# Patient Record
Sex: FEMALE | Race: WHITE | NOT HISPANIC OR LATINO | Employment: OTHER | URBAN - METROPOLITAN AREA
[De-identification: names, ages, dates, MRNs, and addresses within clinical notes are randomized per-mention and may not be internally consistent; named-entity substitution may affect disease eponyms.]

---

## 2017-07-20 ENCOUNTER — HOSPITAL ENCOUNTER (OUTPATIENT)
Dept: MAMMOGRAPHY | Facility: HOSPITAL | Age: 67
Discharge: HOME | End: 2017-07-20
Attending: FAMILY MEDICINE | Admitting: FAMILY MEDICINE

## 2017-08-14 ENCOUNTER — HOSPITAL ENCOUNTER (OUTPATIENT)
Dept: CARDIOLOGY | Facility: HOSPITAL | Age: 67
Discharge: HOME | End: 2017-08-14
Attending: FAMILY MEDICINE | Admitting: FAMILY MEDICINE

## 2018-09-21 ENCOUNTER — HOSPITAL ENCOUNTER (OUTPATIENT)
Dept: MAMMOGRAPHY | Facility: HOSPITAL | Age: 68
Discharge: HOME | End: 2018-09-21
Attending: FAMILY MEDICINE | Admitting: FAMILY MEDICINE

## 2019-10-09 ENCOUNTER — TRANSCRIBE ORDERS (OUTPATIENT)
Dept: ADMINISTRATIVE | Facility: HOSPITAL | Age: 69
End: 2019-10-09

## 2019-10-09 ENCOUNTER — HOSPITAL ENCOUNTER (OUTPATIENT)
Dept: GENERAL RADIOLOGY | Facility: HOSPITAL | Age: 69
Discharge: HOME | End: 2019-10-09
Admitting: NURSE PRACTITIONER

## 2019-10-09 DIAGNOSIS — M54.50 LOW BACK PAIN, UNSPECIFIED BACK PAIN LATERALITY, UNSPECIFIED CHRONICITY, UNSPECIFIED WHETHER SCIATICA PRESENT: ICD-10-CM

## 2019-10-09 DIAGNOSIS — M54.50 LOW BACK PAIN, UNSPECIFIED BACK PAIN LATERALITY, UNSPECIFIED CHRONICITY, UNSPECIFIED WHETHER SCIATICA PRESENT: Primary | ICD-10-CM

## 2019-10-09 PROCEDURE — 72114 X-RAY EXAM L-S SPINE BENDING: CPT

## 2020-06-09 ENCOUNTER — TRANSCRIBE ORDERS (OUTPATIENT)
Dept: ADMINISTRATIVE | Facility: HOSPITAL | Age: 70
End: 2020-06-09

## 2020-06-09 DIAGNOSIS — Z12.31 VISIT FOR SCREENING MAMMOGRAM: Primary | ICD-10-CM

## 2020-06-11 ENCOUNTER — HOSPITAL ENCOUNTER (OUTPATIENT)
Dept: MAMMOGRAPHY | Facility: HOSPITAL | Age: 70
Discharge: HOME | End: 2020-06-11
Admitting: NURSE PRACTITIONER

## 2020-06-11 DIAGNOSIS — Z12.31 VISIT FOR SCREENING MAMMOGRAM: ICD-10-CM

## 2020-06-11 PROCEDURE — 77063 BREAST TOMOSYNTHESIS BI: CPT

## 2020-06-11 PROCEDURE — 77067 SCR MAMMO BI INCL CAD: CPT

## 2020-06-30 ENCOUNTER — OFFICE (AMBULATORY)
Dept: URBAN - METROPOLITAN AREA CLINIC 64 | Facility: CLINIC | Age: 70
End: 2020-06-30
Payer: COMMERCIAL

## 2020-06-30 VITALS
HEART RATE: 65 BPM | SYSTOLIC BLOOD PRESSURE: 162 MMHG | HEIGHT: 66 IN | DIASTOLIC BLOOD PRESSURE: 72 MMHG | WEIGHT: 201 LBS

## 2020-06-30 DIAGNOSIS — Z79.01 LONG TERM (CURRENT) USE OF ANTICOAGULANTS: ICD-10-CM

## 2020-06-30 DIAGNOSIS — Z86.010 PERSONAL HISTORY OF COLONIC POLYPS: ICD-10-CM

## 2020-06-30 DIAGNOSIS — I10 ESSENTIAL (PRIMARY) HYPERTENSION: ICD-10-CM

## 2020-06-30 PROCEDURE — 99203 OFFICE O/P NEW LOW 30 MIN: CPT | Performed by: NURSE PRACTITIONER

## 2020-08-20 ENCOUNTER — OFFICE (AMBULATORY)
Dept: URBAN - METROPOLITAN AREA PATHOLOGY 4 | Facility: PATHOLOGY | Age: 70
End: 2020-08-20
Payer: COMMERCIAL

## 2020-08-20 ENCOUNTER — ON CAMPUS - OUTPATIENT (AMBULATORY)
Dept: URBAN - METROPOLITAN AREA HOSPITAL 2 | Facility: HOSPITAL | Age: 70
End: 2020-08-20
Payer: COMMERCIAL

## 2020-08-20 VITALS
SYSTOLIC BLOOD PRESSURE: 147 MMHG | SYSTOLIC BLOOD PRESSURE: 142 MMHG | RESPIRATION RATE: 19 BRPM | HEART RATE: 74 BPM | RESPIRATION RATE: 16 BRPM | SYSTOLIC BLOOD PRESSURE: 155 MMHG | OXYGEN SATURATION: 98 % | HEART RATE: 80 BPM | DIASTOLIC BLOOD PRESSURE: 64 MMHG | DIASTOLIC BLOOD PRESSURE: 92 MMHG | WEIGHT: 199 LBS | OXYGEN SATURATION: 92 % | HEART RATE: 82 BPM | DIASTOLIC BLOOD PRESSURE: 80 MMHG | OXYGEN SATURATION: 88 % | SYSTOLIC BLOOD PRESSURE: 152 MMHG | HEART RATE: 78 BPM | OXYGEN SATURATION: 94 % | OXYGEN SATURATION: 95 % | TEMPERATURE: 96.2 F | HEIGHT: 66 IN | DIASTOLIC BLOOD PRESSURE: 79 MMHG | HEART RATE: 75 BPM | RESPIRATION RATE: 18 BRPM | DIASTOLIC BLOOD PRESSURE: 77 MMHG | SYSTOLIC BLOOD PRESSURE: 141 MMHG | SYSTOLIC BLOOD PRESSURE: 154 MMHG | HEART RATE: 76 BPM | HEART RATE: 77 BPM | DIASTOLIC BLOOD PRESSURE: 73 MMHG | DIASTOLIC BLOOD PRESSURE: 88 MMHG | SYSTOLIC BLOOD PRESSURE: 105 MMHG

## 2020-08-20 DIAGNOSIS — Z86.010 PERSONAL HISTORY OF COLONIC POLYPS: ICD-10-CM

## 2020-08-20 DIAGNOSIS — K57.30 DIVERTICULOSIS OF LARGE INTESTINE WITHOUT PERFORATION OR ABS: ICD-10-CM

## 2020-08-20 DIAGNOSIS — D12.5 BENIGN NEOPLASM OF SIGMOID COLON: ICD-10-CM

## 2020-08-20 DIAGNOSIS — K62.1 RECTAL POLYP: ICD-10-CM

## 2020-08-20 DIAGNOSIS — K64.1 SECOND DEGREE HEMORRHOIDS: ICD-10-CM

## 2020-08-20 PROBLEM — K63.5 POLYP OF COLON: Status: ACTIVE | Noted: 2020-08-20

## 2020-08-20 LAB
GI HISTOLOGY: A. UNSPECIFIED: (no result)
GI HISTOLOGY: B. UNSPECIFIED: (no result)
GI HISTOLOGY: PDF REPORT: (no result)

## 2020-08-20 PROCEDURE — 45385 COLONOSCOPY W/LESION REMOVAL: CPT | Mod: PT | Performed by: INTERNAL MEDICINE

## 2020-08-20 PROCEDURE — 45380 COLONOSCOPY AND BIOPSY: CPT | Mod: 59,PT | Performed by: INTERNAL MEDICINE

## 2020-08-20 PROCEDURE — 88305 TISSUE EXAM BY PATHOLOGIST: CPT | Mod: 26 | Performed by: INTERNAL MEDICINE

## 2020-08-20 PROCEDURE — 45380 COLONOSCOPY AND BIOPSY: CPT | Mod: PT,59 | Performed by: INTERNAL MEDICINE

## 2021-09-29 ENCOUNTER — TRANSCRIBE ORDERS (OUTPATIENT)
Dept: ADMINISTRATIVE | Facility: HOSPITAL | Age: 71
End: 2021-09-29

## 2021-09-29 DIAGNOSIS — Z12.31 BREAST CANCER SCREENING BY MAMMOGRAM: ICD-10-CM

## 2021-09-29 DIAGNOSIS — Z12.39 BREAST SCREENING: Primary | ICD-10-CM

## 2021-10-27 ENCOUNTER — HOSPITAL ENCOUNTER (OUTPATIENT)
Dept: MAMMOGRAPHY | Facility: HOSPITAL | Age: 71
Discharge: HOME | End: 2021-10-27
Admitting: STUDENT IN AN ORGANIZED HEALTH CARE EDUCATION/TRAINING PROGRAM

## 2021-10-27 DIAGNOSIS — Z12.31 BREAST CANCER SCREENING BY MAMMOGRAM: ICD-10-CM

## 2021-10-27 PROCEDURE — 77067 SCR MAMMO BI INCL CAD: CPT

## 2021-10-27 PROCEDURE — 77063 BREAST TOMOSYNTHESIS BI: CPT

## 2021-12-16 ENCOUNTER — HOSPITAL ENCOUNTER (EMERGENCY)
Facility: HOSPITAL | Age: 71
Discharge: HOME | End: 2021-12-16
Admitting: EMERGENCY MEDICINE

## 2021-12-16 ENCOUNTER — APPOINTMENT (OUTPATIENT)
Dept: GENERAL RADIOLOGY | Facility: HOSPITAL | Age: 71
End: 2021-12-16

## 2021-12-16 ENCOUNTER — APPOINTMENT (OUTPATIENT)
Dept: CARDIOLOGY | Facility: HOSPITAL | Age: 71
End: 2021-12-16

## 2021-12-16 VITALS
HEIGHT: 66 IN | BODY MASS INDEX: 33.2 KG/M2 | HEART RATE: 64 BPM | DIASTOLIC BLOOD PRESSURE: 61 MMHG | TEMPERATURE: 96.3 F | SYSTOLIC BLOOD PRESSURE: 158 MMHG | WEIGHT: 206.6 LBS | RESPIRATION RATE: 18 BRPM | OXYGEN SATURATION: 97 %

## 2021-12-16 DIAGNOSIS — M25.472 LEFT ANKLE SWELLING: ICD-10-CM

## 2021-12-16 DIAGNOSIS — J01.10 ACUTE FRONTAL SINUSITIS, RECURRENCE NOT SPECIFIED: ICD-10-CM

## 2021-12-16 DIAGNOSIS — B34.9 VIRAL ILLNESS: ICD-10-CM

## 2021-12-16 DIAGNOSIS — R05.9 COUGH: Primary | ICD-10-CM

## 2021-12-16 DIAGNOSIS — M25.572 ACUTE LEFT ANKLE PAIN: ICD-10-CM

## 2021-12-16 LAB
B PARAPERT DNA SPEC QL NAA+PROBE: NOT DETECTED
B PERT DNA SPEC QL NAA+PROBE: NOT DETECTED
BH CV LOWER VASCULAR LEFT COMMON FEMORAL AUGMENT: NORMAL
BH CV LOWER VASCULAR LEFT COMMON FEMORAL COMPETENT: NORMAL
BH CV LOWER VASCULAR LEFT COMMON FEMORAL COMPRESS: NORMAL
BH CV LOWER VASCULAR LEFT COMMON FEMORAL PHASIC: NORMAL
BH CV LOWER VASCULAR LEFT COMMON FEMORAL SPONT: NORMAL
BH CV LOWER VASCULAR LEFT DISTAL FEMORAL COMPRESS: NORMAL
BH CV LOWER VASCULAR LEFT GASTRONEMIUS COMPRESS: NORMAL
BH CV LOWER VASCULAR LEFT GREATER SAPH AK COMPRESS: NORMAL
BH CV LOWER VASCULAR LEFT GREATER SAPH BK COMPRESS: NORMAL
BH CV LOWER VASCULAR LEFT LESSER SAPH COMPRESS: NORMAL
BH CV LOWER VASCULAR LEFT MID FEMORAL AUGMENT: NORMAL
BH CV LOWER VASCULAR LEFT MID FEMORAL COMPETENT: NORMAL
BH CV LOWER VASCULAR LEFT MID FEMORAL COMPRESS: NORMAL
BH CV LOWER VASCULAR LEFT MID FEMORAL PHASIC: NORMAL
BH CV LOWER VASCULAR LEFT MID FEMORAL SPONT: NORMAL
BH CV LOWER VASCULAR LEFT PERONEAL COMPRESS: NORMAL
BH CV LOWER VASCULAR LEFT POPLITEAL AUGMENT: NORMAL
BH CV LOWER VASCULAR LEFT POPLITEAL COMPETENT: NORMAL
BH CV LOWER VASCULAR LEFT POPLITEAL COMPRESS: NORMAL
BH CV LOWER VASCULAR LEFT POPLITEAL PHASIC: NORMAL
BH CV LOWER VASCULAR LEFT POPLITEAL SPONT: NORMAL
BH CV LOWER VASCULAR LEFT POSTERIOR TIBIAL COMPRESS: NORMAL
BH CV LOWER VASCULAR LEFT PROXIMAL FEMORAL COMPRESS: NORMAL
BH CV LOWER VASCULAR LEFT SAPHENOFEMORAL JUNCTION COMPRESS: NORMAL
BH CV LOWER VASCULAR RIGHT COMMON FEMORAL AUGMENT: NORMAL
BH CV LOWER VASCULAR RIGHT COMMON FEMORAL COMPETENT: NORMAL
BH CV LOWER VASCULAR RIGHT COMMON FEMORAL COMPRESS: NORMAL
BH CV LOWER VASCULAR RIGHT COMMON FEMORAL PHASIC: NORMAL
BH CV LOWER VASCULAR RIGHT COMMON FEMORAL SPONT: NORMAL
C PNEUM DNA NPH QL NAA+NON-PROBE: NOT DETECTED
FLUAV SUBTYP SPEC NAA+PROBE: NOT DETECTED
FLUBV RNA ISLT QL NAA+PROBE: NOT DETECTED
HADV DNA SPEC NAA+PROBE: NOT DETECTED
HCOV 229E RNA SPEC QL NAA+PROBE: NOT DETECTED
HCOV HKU1 RNA SPEC QL NAA+PROBE: NOT DETECTED
HCOV NL63 RNA SPEC QL NAA+PROBE: NOT DETECTED
HCOV OC43 RNA SPEC QL NAA+PROBE: NOT DETECTED
HMPV RNA NPH QL NAA+NON-PROBE: NOT DETECTED
HPIV1 RNA ISLT QL NAA+PROBE: NOT DETECTED
HPIV2 RNA SPEC QL NAA+PROBE: NOT DETECTED
HPIV3 RNA NPH QL NAA+PROBE: NOT DETECTED
HPIV4 P GENE NPH QL NAA+PROBE: NOT DETECTED
M PNEUMO IGG SER IA-ACNC: NOT DETECTED
MAXIMAL PREDICTED HEART RATE: 147 BPM
RHINOVIRUS RNA SPEC NAA+PROBE: NOT DETECTED
RSV RNA NPH QL NAA+NON-PROBE: NOT DETECTED
SARS-COV-2 RNA NPH QL NAA+NON-PROBE: NOT DETECTED
STRESS TARGET HR: 125 BPM

## 2021-12-16 PROCEDURE — 99283 EMERGENCY DEPT VISIT LOW MDM: CPT

## 2021-12-16 PROCEDURE — 71045 X-RAY EXAM CHEST 1 VIEW: CPT

## 2021-12-16 PROCEDURE — 93971 EXTREMITY STUDY: CPT

## 2021-12-16 PROCEDURE — 73610 X-RAY EXAM OF ANKLE: CPT

## 2021-12-16 PROCEDURE — 0202U NFCT DS 22 TRGT SARS-COV-2: CPT

## 2021-12-16 PROCEDURE — 73630 X-RAY EXAM OF FOOT: CPT

## 2021-12-16 RX ORDER — AZITHROMYCIN 250 MG/1
500 TABLET, FILM COATED ORAL DAILY
Qty: 6 TABLET | Refills: 0 | Status: ON HOLD | OUTPATIENT
Start: 2021-12-16 | End: 2022-05-14

## 2021-12-16 RX ORDER — AZITHROMYCIN 250 MG/1
250 TABLET, FILM COATED ORAL DAILY
Qty: 5 TABLET | Refills: 0 | Status: SHIPPED | OUTPATIENT
Start: 2021-12-16 | End: 2021-12-16 | Stop reason: SDUPTHER

## 2021-12-16 NOTE — DISCHARGE INSTRUCTIONS
Please fill your antibiotic if symptoms persist past 7 days of nasal congestion.  You likely have a viral illness.  Please use Mucinex and cough medicine over-the-counter as indicated.  Can use Benadryl or nondrowsy antihistamines like Zyrtec as needed to help with nasal congestion.  Can use over-the-counter Flonase as well to help with nasal congestion.  Please follow-up the primary care provider in 1 week's time if symptoms persist.  Please come back to the ER if you have chest pain or shortness of breath as you will need reevaluation at that time.

## 2022-03-20 ENCOUNTER — HOSPITAL ENCOUNTER (EMERGENCY)
Facility: HOSPITAL | Age: 72
Discharge: HOME | End: 2022-03-20
Attending: EMERGENCY MEDICINE | Admitting: EMERGENCY MEDICINE

## 2022-03-20 ENCOUNTER — APPOINTMENT (OUTPATIENT)
Dept: GENERAL RADIOLOGY | Facility: HOSPITAL | Age: 72
End: 2022-03-20

## 2022-03-20 VITALS
HEART RATE: 61 BPM | WEIGHT: 202.6 LBS | BODY MASS INDEX: 31.8 KG/M2 | HEIGHT: 67 IN | SYSTOLIC BLOOD PRESSURE: 153 MMHG | OXYGEN SATURATION: 92 % | TEMPERATURE: 97.7 F | RESPIRATION RATE: 16 BRPM | DIASTOLIC BLOOD PRESSURE: 67 MMHG

## 2022-03-20 DIAGNOSIS — J20.5 RSV BRONCHITIS: Primary | ICD-10-CM

## 2022-03-20 LAB
B PARAPERT DNA SPEC QL NAA+PROBE: NOT DETECTED
B PERT DNA SPEC QL NAA+PROBE: NOT DETECTED
C PNEUM DNA NPH QL NAA+NON-PROBE: NOT DETECTED
FLUAV SUBTYP SPEC NAA+PROBE: NOT DETECTED
FLUBV RNA ISLT QL NAA+PROBE: NOT DETECTED
HADV DNA SPEC NAA+PROBE: NOT DETECTED
HCOV 229E RNA SPEC QL NAA+PROBE: NOT DETECTED
HCOV HKU1 RNA SPEC QL NAA+PROBE: NOT DETECTED
HCOV NL63 RNA SPEC QL NAA+PROBE: NOT DETECTED
HCOV OC43 RNA SPEC QL NAA+PROBE: NOT DETECTED
HMPV RNA NPH QL NAA+NON-PROBE: NOT DETECTED
HPIV1 RNA ISLT QL NAA+PROBE: NOT DETECTED
HPIV2 RNA SPEC QL NAA+PROBE: NOT DETECTED
HPIV3 RNA NPH QL NAA+PROBE: NOT DETECTED
HPIV4 P GENE NPH QL NAA+PROBE: NOT DETECTED
M PNEUMO IGG SER IA-ACNC: NOT DETECTED
RHINOVIRUS RNA SPEC NAA+PROBE: NOT DETECTED
RSV RNA NPH QL NAA+NON-PROBE: DETECTED
SARS-COV-2 RNA NPH QL NAA+NON-PROBE: NOT DETECTED

## 2022-03-20 PROCEDURE — 0202U NFCT DS 22 TRGT SARS-COV-2: CPT | Performed by: EMERGENCY MEDICINE

## 2022-03-20 PROCEDURE — 71046 X-RAY EXAM CHEST 2 VIEWS: CPT

## 2022-03-20 PROCEDURE — 99283 EMERGENCY DEPT VISIT LOW MDM: CPT

## 2022-03-20 RX ORDER — BENZONATATE 100 MG/1
100 CAPSULE ORAL 3 TIMES DAILY PRN
Qty: 20 CAPSULE | Refills: 0 | Status: ON HOLD | OUTPATIENT
Start: 2022-03-20 | End: 2022-05-14

## 2022-03-20 RX ORDER — METHYLPREDNISOLONE 4 MG/1
TABLET ORAL
Qty: 21 TABLET | Refills: 0 | Status: ON HOLD | OUTPATIENT
Start: 2022-03-20 | End: 2022-05-14

## 2022-03-20 RX ORDER — ALBUTEROL SULFATE 90 UG/1
2 AEROSOL, METERED RESPIRATORY (INHALATION) EVERY 4 HOURS PRN
Qty: 18 G | Refills: 0 | Status: ON HOLD | OUTPATIENT
Start: 2022-03-20 | End: 2022-05-14

## 2022-03-20 NOTE — ED NOTES
Patient presents to the ED today with complaints of productive cough and congestion x2 weeks; states that she has been on abx, but is not improving; sputum is yellow;  is currently admitted for pneumonia; denies fever; denies nausea, vomiting, and diarrhea

## 2022-03-20 NOTE — ED PROVIDER NOTES
"Subjective   Chief complaint: Cough    72-year-old female presents with cough and congestion.  Patient states symptoms have been present for about 2 weeks.  She states her doctor has put her on 2 different rounds of antibiotics and she is not feeling any better.  She just finished Augmentin today.  She continues to have a productive cough and a lot of nasal congestion.  She denies any fever.  She denies any chest pain or shortness of breath.  She states her  is currently hospitalized with pneumonia.  She denies any alleviating or exacerbating factors.      History provided by:  Patient      Review of Systems   Constitutional: Negative for fever.   HENT: Positive for congestion.    Eyes: Negative for redness.   Respiratory: Positive for cough. Negative for shortness of breath.    Cardiovascular: Negative for chest pain.   Gastrointestinal: Negative for abdominal pain, diarrhea and vomiting.   Genitourinary: Negative for dysuria.   Musculoskeletal: Negative for back pain.   Skin: Negative for rash.   Neurological: Negative for dizziness and headaches.   Psychiatric/Behavioral: Negative for confusion.       No past medical history on file.    Allergies   Allergen Reactions   • Codeine Nausea And Vomiting, Other (See Comments) and Nausea Only     nausea  nausea     • Cefdinir Unknown - High Severity   • Levofloxacin Nausea Only   • Lisinopril Cough       Past Surgical History:   Procedure Laterality Date   • BREAST BIOPSY     • HYSTERECTOMY         Family History   Problem Relation Age of Onset   • Breast cancer Sister        Social History     Socioeconomic History   • Marital status:        /80 (BP Location: Left arm, Patient Position: Sitting)   Pulse 106   Temp 97.7 °F (36.5 °C) (Temporal)   Resp 16   Ht 168.9 cm (66.5\")   Wt 91.9 kg (202 lb 9.6 oz)   SpO2 93%   BMI 32.21 kg/m²       Objective   Physical Exam  Vitals and nursing note reviewed.   Constitutional:       Appearance: Normal " appearance. She is well-developed.   HENT:      Head: Normocephalic and atraumatic.      Nose: Congestion present.   Eyes:      Pupils: Pupils are equal, round, and reactive to light.   Cardiovascular:      Rate and Rhythm: Normal rate and regular rhythm.      Heart sounds: Normal heart sounds.   Pulmonary:      Effort: Pulmonary effort is normal. No respiratory distress.      Breath sounds: Normal breath sounds.   Abdominal:      General: Bowel sounds are normal.      Palpations: Abdomen is soft.      Tenderness: There is no abdominal tenderness.   Musculoskeletal:         General: Normal range of motion.      Cervical back: Normal range of motion and neck supple.   Skin:     General: Skin is warm and dry.   Neurological:      Mental Status: She is alert and oriented to person, place, and time.         Procedures           ED Course      Results for orders placed or performed during the hospital encounter of 03/20/22   Respiratory Panel PCR w/COVID-19(SARS-CoV-2) GABRIEL/SHAWN/DEE DEE/PAD/COR/MAD/DAVID In-House, NP Swab in UTM/VTM, 3-4 HR TAT - Swab, Nasopharynx    Specimen: Nasopharynx; Swab   Result Value Ref Range    ADENOVIRUS, PCR Not Detected Not Detected    Coronavirus 229E Not Detected Not Detected    Coronavirus HKU1 Not Detected Not Detected    Coronavirus NL63 Not Detected Not Detected    Coronavirus OC43 Not Detected Not Detected    COVID19 Not Detected Not Detected - Ref. Range    Human Metapneumovirus Not Detected Not Detected    Human Rhinovirus/Enterovirus Not Detected Not Detected    Influenza A PCR Not Detected Not Detected    Influenza B PCR Not Detected Not Detected    Parainfluenza Virus 1 Not Detected Not Detected    Parainfluenza Virus 2 Not Detected Not Detected    Parainfluenza Virus 3 Not Detected Not Detected    Parainfluenza Virus 4 Not Detected Not Detected    RSV, PCR Detected (A) Not Detected    Bordetella pertussis pcr Not Detected Not Detected    Bordetella parapertussis PCR Not Detected Not  Detected    Chlamydophila pneumoniae PCR Not Detected Not Detected    Mycoplasma pneumo by PCR Not Detected Not Detected     XR Chest 2 View    Result Date: 3/20/2022   1. Cardiomegaly. 2. No active pulmonary disease.  Electronically Signed By-Jun Rodriguez MD On:3/20/2022 1:00 PM This report was finalized on [id number] by  Jun Rodriguez MD.                                               MDM   Chest x-ray reviewed by me and read by the radiologist shows no acute disease.  Respiratory panel is positive for RSV.  Patient is oxygenating well on room air.  I see no indication for admission at this time.  Patient will be discharged with prescriptions for albuterol inhaler, Medrol Dosepak, Tessalon.  She is to follow-up with her primary doctor.      Final diagnoses:   RSV bronchitis       ED Disposition  ED Disposition     ED Disposition   Discharge    Condition   Stable    Comment   --             Ian Cordero MD  [address] [place] [address] [phone]    Call in 2 days           Medication List      New Prescriptions    albuterol sulfate  (90 Base) MCG/ACT inhaler  Commonly known as: PROVENTIL HFA;VENTOLIN HFA;PROAIR HFA  Inhale 2 puffs Every 4 (Four) Hours As Needed for Wheezing or Shortness of Air.     benzonatate 100 MG capsule  Commonly known as: TESSALON  Take 1 capsule by mouth 3 (Three) Times a Day As Needed for Cough.     methylPREDNISolone 4 MG dose pack  Commonly known as: MEDROL  Take as directed on package instructions.           Where to Get Your Medications      These medications were sent to [id number] - [facility] [place] - [address] AT [facility] - [phone]  - [phone]   [address], [place] [address]    Phone: [phone]   · albuterol sulfate  (90 Base) MCG/ACT inhaler  · benzonatate 100 MG capsule  · methylPREDNISolone 4 MG dose pack          Jude Espinoza MD  03/20/22 6720

## 2022-05-14 ENCOUNTER — HOSPITAL ENCOUNTER (OUTPATIENT)
Facility: HOSPITAL | Age: 72
Setting detail: OBSERVATION
Discharge: HOME | End: 2022-05-17
Attending: EMERGENCY MEDICINE | Admitting: HOSPITALIST

## 2022-05-14 ENCOUNTER — APPOINTMENT (OUTPATIENT)
Dept: CT IMAGING | Facility: HOSPITAL | Age: 72
End: 2022-05-14

## 2022-05-14 ENCOUNTER — APPOINTMENT (OUTPATIENT)
Dept: MRI IMAGING | Facility: HOSPITAL | Age: 72
End: 2022-05-14

## 2022-05-14 DIAGNOSIS — R42 DIZZINESS: ICD-10-CM

## 2022-05-14 DIAGNOSIS — N17.9 ACUTE KIDNEY INJURY: ICD-10-CM

## 2022-05-14 DIAGNOSIS — S01.01XA LACERATION OF SCALP, INITIAL ENCOUNTER: Primary | ICD-10-CM

## 2022-05-14 DIAGNOSIS — N39.0 URINARY TRACT INFECTION WITH HEMATURIA, SITE UNSPECIFIED: ICD-10-CM

## 2022-05-14 DIAGNOSIS — R31.9 URINARY TRACT INFECTION WITH HEMATURIA, SITE UNSPECIFIED: ICD-10-CM

## 2022-05-14 DIAGNOSIS — S09.90XA INJURY OF HEAD, INITIAL ENCOUNTER: ICD-10-CM

## 2022-05-14 PROBLEM — N30.00 ACUTE CYSTITIS: Status: ACTIVE | Noted: 2022-05-14

## 2022-05-14 LAB
ALBUMIN SERPL-MCNC: 3.9 G/DL (ref 3.5–5.2)
ALBUMIN/GLOB SERPL: 1.6 G/DL
ALP SERPL-CCNC: 65 U/L (ref 39–117)
ALT SERPL W P-5'-P-CCNC: 34 U/L (ref 1–33)
ANION GAP SERPL CALCULATED.3IONS-SCNC: 13 MMOL/L (ref 5–15)
ANION GAP SERPL CALCULATED.3IONS-SCNC: 14 MMOL/L (ref 5–15)
AST SERPL-CCNC: 41 U/L (ref 1–32)
BACTERIA UR QL AUTO: ABNORMAL /HPF
BASOPHILS # BLD AUTO: 0.1 10*3/MM3 (ref 0–0.2)
BASOPHILS # BLD AUTO: 0.2 10*3/MM3 (ref 0–0.2)
BASOPHILS NFR BLD AUTO: 0.6 % (ref 0–1.5)
BASOPHILS NFR BLD AUTO: 1.6 % (ref 0–1.5)
BILIRUB SERPL-MCNC: 0.4 MG/DL (ref 0–1.2)
BILIRUB UR QL STRIP: NEGATIVE
BUN SERPL-MCNC: 40 MG/DL (ref 8–23)
BUN SERPL-MCNC: 48 MG/DL (ref 8–23)
BUN/CREAT SERPL: 23 (ref 7–25)
BUN/CREAT SERPL: 24.4 (ref 7–25)
CALCIUM SPEC-SCNC: 10 MG/DL (ref 8.6–10.5)
CALCIUM SPEC-SCNC: 11 MG/DL (ref 8.6–10.5)
CHLORIDE SERPL-SCNC: 102 MMOL/L (ref 98–107)
CHLORIDE SERPL-SCNC: 108 MMOL/L (ref 98–107)
CLARITY UR: ABNORMAL
CO2 SERPL-SCNC: 20 MMOL/L (ref 22–29)
CO2 SERPL-SCNC: 23 MMOL/L (ref 22–29)
COLOR UR: YELLOW
CREAT SERPL-MCNC: 1.64 MG/DL (ref 0.57–1)
CREAT SERPL-MCNC: 2.09 MG/DL (ref 0.57–1)
DEPRECATED RDW RBC AUTO: 45.5 FL (ref 37–54)
DEPRECATED RDW RBC AUTO: 46.8 FL (ref 37–54)
EGFRCR SERPLBLD CKD-EPI 2021: 24.5 ML/MIN/1.73
EGFRCR SERPLBLD CKD-EPI 2021: 32.7 ML/MIN/1.73
EOSINOPHIL # BLD AUTO: 0.1 10*3/MM3 (ref 0–0.4)
EOSINOPHIL # BLD AUTO: 0.2 10*3/MM3 (ref 0–0.4)
EOSINOPHIL NFR BLD AUTO: 1 % (ref 0.3–6.2)
EOSINOPHIL NFR BLD AUTO: 1.6 % (ref 0.3–6.2)
ERYTHROCYTE [DISTWIDTH] IN BLOOD BY AUTOMATED COUNT: 15 % (ref 12.3–15.4)
ERYTHROCYTE [DISTWIDTH] IN BLOOD BY AUTOMATED COUNT: 15.2 % (ref 12.3–15.4)
GLOBULIN UR ELPH-MCNC: 2.5 GM/DL
GLUCOSE SERPL-MCNC: 104 MG/DL (ref 65–99)
GLUCOSE SERPL-MCNC: 106 MG/DL (ref 65–99)
GLUCOSE UR STRIP-MCNC: NEGATIVE MG/DL
HCT VFR BLD AUTO: 41.5 % (ref 34–46.6)
HCT VFR BLD AUTO: 42.4 % (ref 34–46.6)
HGB BLD-MCNC: 13 G/DL (ref 12–15.9)
HGB BLD-MCNC: 13.7 G/DL (ref 12–15.9)
HGB UR QL STRIP.AUTO: ABNORMAL
HYALINE CASTS UR QL AUTO: ABNORMAL /LPF
KETONES UR QL STRIP: NEGATIVE
LEUKOCYTE ESTERASE UR QL STRIP.AUTO: ABNORMAL
LYMPHOCYTES # BLD AUTO: 0.9 10*3/MM3 (ref 0.7–3.1)
LYMPHOCYTES # BLD AUTO: 1.3 10*3/MM3 (ref 0.7–3.1)
LYMPHOCYTES NFR BLD AUTO: 12.5 % (ref 19.6–45.3)
LYMPHOCYTES NFR BLD AUTO: 8.1 % (ref 19.6–45.3)
MAGNESIUM SERPL-MCNC: 1.8 MG/DL (ref 1.6–2.4)
MCH RBC QN AUTO: 27.5 PG (ref 26.6–33)
MCH RBC QN AUTO: 28.4 PG (ref 26.6–33)
MCHC RBC AUTO-ENTMCNC: 31.4 G/DL (ref 31.5–35.7)
MCHC RBC AUTO-ENTMCNC: 32.4 G/DL (ref 31.5–35.7)
MCV RBC AUTO: 87.4 FL (ref 79–97)
MCV RBC AUTO: 87.7 FL (ref 79–97)
MONOCYTES # BLD AUTO: 0.6 10*3/MM3 (ref 0.1–0.9)
MONOCYTES # BLD AUTO: 0.9 10*3/MM3 (ref 0.1–0.9)
MONOCYTES NFR BLD AUTO: 5.5 % (ref 5–12)
MONOCYTES NFR BLD AUTO: 8.6 % (ref 5–12)
NEUTROPHILS NFR BLD AUTO: 75.7 % (ref 42.7–76)
NEUTROPHILS NFR BLD AUTO: 8 10*3/MM3 (ref 1.7–7)
NEUTROPHILS NFR BLD AUTO: 84.8 % (ref 42.7–76)
NEUTROPHILS NFR BLD AUTO: 9.6 10*3/MM3 (ref 1.7–7)
NITRITE UR QL STRIP: POSITIVE
NRBC BLD AUTO-RTO: 0.1 /100 WBC (ref 0–0.2)
NRBC BLD AUTO-RTO: 0.1 /100 WBC (ref 0–0.2)
PH UR STRIP.AUTO: <=5 [PH] (ref 5–8)
PLATELET # BLD AUTO: 247 10*3/MM3 (ref 140–450)
PLATELET # BLD AUTO: 289 10*3/MM3 (ref 140–450)
PMV BLD AUTO: 6.5 FL (ref 6–12)
PMV BLD AUTO: 6.6 FL (ref 6–12)
POTASSIUM SERPL-SCNC: 3.2 MMOL/L (ref 3.5–5.2)
POTASSIUM SERPL-SCNC: 3.8 MMOL/L (ref 3.5–5.2)
PROT SERPL-MCNC: 6.4 G/DL (ref 6–8.5)
PROT UR QL STRIP: NEGATIVE
RBC # BLD AUTO: 4.74 10*6/MM3 (ref 3.77–5.28)
RBC # BLD AUTO: 4.83 10*6/MM3 (ref 3.77–5.28)
RBC # UR STRIP: ABNORMAL /HPF
REF LAB TEST METHOD: ABNORMAL
SARS-COV-2 RNA PNL SPEC NAA+PROBE: NOT DETECTED
SODIUM SERPL-SCNC: 139 MMOL/L (ref 136–145)
SODIUM SERPL-SCNC: 141 MMOL/L (ref 136–145)
SP GR UR STRIP: 1.02 (ref 1–1.03)
SQUAMOUS #/AREA URNS HPF: ABNORMAL /HPF
TROPONIN T SERPL-MCNC: <0.01 NG/ML (ref 0–0.03)
UROBILINOGEN UR QL STRIP: ABNORMAL
WBC # UR STRIP: ABNORMAL /HPF
WBC NRBC COR # BLD: 10.6 10*3/MM3 (ref 3.4–10.8)
WBC NRBC COR # BLD: 11.4 10*3/MM3 (ref 3.4–10.8)

## 2022-05-14 PROCEDURE — 81001 URINALYSIS AUTO W/SCOPE: CPT | Performed by: EMERGENCY MEDICINE

## 2022-05-14 PROCEDURE — G0378 HOSPITAL OBSERVATION PER HR: HCPCS

## 2022-05-14 PROCEDURE — 25010000002 MORPHINE PER 10 MG: Performed by: HOSPITALIST

## 2022-05-14 PROCEDURE — 36415 COLL VENOUS BLD VENIPUNCTURE: CPT | Performed by: HOSPITALIST

## 2022-05-14 PROCEDURE — 25010000002 TETANUS-DIPHTH-ACELL PERTUSSIS 5-2.5-18.5 LF-MCG/0.5 SUSPENSION PREFILLED SYRINGE: Performed by: EMERGENCY MEDICINE

## 2022-05-14 PROCEDURE — 25010000002 CEFTRIAXONE PER 250 MG: Performed by: HOSPITALIST

## 2022-05-14 PROCEDURE — 80053 COMPREHEN METABOLIC PANEL: CPT | Performed by: EMERGENCY MEDICINE

## 2022-05-14 PROCEDURE — 93005 ELECTROCARDIOGRAM TRACING: CPT | Performed by: EMERGENCY MEDICINE

## 2022-05-14 PROCEDURE — 25010000002 CEFTRIAXONE PER 250 MG: Performed by: NURSE PRACTITIONER

## 2022-05-14 PROCEDURE — 70551 MRI BRAIN STEM W/O DYE: CPT

## 2022-05-14 PROCEDURE — 99220 PR INITIAL OBSERVATION CARE/DAY 70 MINUTES: CPT | Performed by: HOSPITALIST

## 2022-05-14 PROCEDURE — 96361 HYDRATE IV INFUSION ADD-ON: CPT

## 2022-05-14 PROCEDURE — 90715 TDAP VACCINE 7 YRS/> IM: CPT | Performed by: EMERGENCY MEDICINE

## 2022-05-14 PROCEDURE — 96365 THER/PROPH/DIAG IV INF INIT: CPT

## 2022-05-14 PROCEDURE — 96376 TX/PRO/DX INJ SAME DRUG ADON: CPT

## 2022-05-14 PROCEDURE — C9803 HOPD COVID-19 SPEC COLLECT: HCPCS

## 2022-05-14 PROCEDURE — 87186 SC STD MICRODIL/AGAR DIL: CPT | Performed by: EMERGENCY MEDICINE

## 2022-05-14 PROCEDURE — 71250 CT THORAX DX C-: CPT

## 2022-05-14 PROCEDURE — 87635 SARS-COV-2 COVID-19 AMP PRB: CPT | Performed by: NURSE PRACTITIONER

## 2022-05-14 PROCEDURE — 83735 ASSAY OF MAGNESIUM: CPT | Performed by: EMERGENCY MEDICINE

## 2022-05-14 PROCEDURE — 85025 COMPLETE CBC W/AUTO DIFF WBC: CPT | Performed by: EMERGENCY MEDICINE

## 2022-05-14 PROCEDURE — 85025 COMPLETE CBC W/AUTO DIFF WBC: CPT | Performed by: HOSPITALIST

## 2022-05-14 PROCEDURE — 87040 BLOOD CULTURE FOR BACTERIA: CPT | Performed by: NURSE PRACTITIONER

## 2022-05-14 PROCEDURE — 84484 ASSAY OF TROPONIN QUANT: CPT | Performed by: EMERGENCY MEDICINE

## 2022-05-14 PROCEDURE — 90471 IMMUNIZATION ADMIN: CPT | Performed by: EMERGENCY MEDICINE

## 2022-05-14 PROCEDURE — 96375 TX/PRO/DX INJ NEW DRUG ADDON: CPT

## 2022-05-14 PROCEDURE — 70450 CT HEAD/BRAIN W/O DYE: CPT

## 2022-05-14 PROCEDURE — 99284 EMERGENCY DEPT VISIT MOD MDM: CPT

## 2022-05-14 PROCEDURE — 87086 URINE CULTURE/COLONY COUNT: CPT | Performed by: EMERGENCY MEDICINE

## 2022-05-14 PROCEDURE — 87077 CULTURE AEROBIC IDENTIFY: CPT | Performed by: EMERGENCY MEDICINE

## 2022-05-14 PROCEDURE — 72128 CT CHEST SPINE W/O DYE: CPT

## 2022-05-14 RX ORDER — SODIUM CHLORIDE 9 MG/ML
100 INJECTION, SOLUTION INTRAVENOUS CONTINUOUS
Status: DISCONTINUED | OUTPATIENT
Start: 2022-05-14 | End: 2022-05-15

## 2022-05-14 RX ORDER — POTASSIUM CHLORIDE 20 MEQ/1
20 TABLET, EXTENDED RELEASE ORAL ONCE
Status: COMPLETED | OUTPATIENT
Start: 2022-05-14 | End: 2022-05-14

## 2022-05-14 RX ORDER — AMLODIPINE BESYLATE 10 MG/1
10 TABLET ORAL DAILY
COMMUNITY
End: 2022-10-29

## 2022-05-14 RX ORDER — FENOFIBRATE 145 MG/1
145 TABLET, COATED ORAL DAILY
COMMUNITY
End: 2022-10-29

## 2022-05-14 RX ORDER — ATORVASTATIN CALCIUM 80 MG/1
80 TABLET, FILM COATED ORAL DAILY
COMMUNITY
End: 2022-10-29

## 2022-05-14 RX ORDER — SODIUM CHLORIDE 0.9 % (FLUSH) 0.9 %
10 SYRINGE (ML) INJECTION EVERY 12 HOURS SCHEDULED
Status: DISCONTINUED | OUTPATIENT
Start: 2022-05-14 | End: 2022-05-17 | Stop reason: HOSPADM

## 2022-05-14 RX ORDER — GLUCOSAMINE/D3/BOSWELLIA SERRA 1500MG-400
10000 TABLET ORAL DAILY
COMMUNITY
End: 2022-10-29

## 2022-05-14 RX ORDER — MORPHINE SULFATE 2 MG/ML
1 INJECTION, SOLUTION INTRAMUSCULAR; INTRAVENOUS EVERY 4 HOURS PRN
Status: DISCONTINUED | OUTPATIENT
Start: 2022-05-14 | End: 2022-05-17 | Stop reason: HOSPADM

## 2022-05-14 RX ORDER — IRBESARTAN 150 MG/1
300 TABLET ORAL NIGHTLY
Status: ON HOLD | COMMUNITY
End: 2022-05-17 | Stop reason: SDUPTHER

## 2022-05-14 RX ORDER — SODIUM CHLORIDE 0.9 % (FLUSH) 0.9 %
10 SYRINGE (ML) INJECTION AS NEEDED
Status: DISCONTINUED | OUTPATIENT
Start: 2022-05-14 | End: 2022-05-17 | Stop reason: HOSPADM

## 2022-05-14 RX ORDER — AMIODARONE HYDROCHLORIDE 200 MG/1
200 TABLET ORAL 3 TIMES DAILY
Status: ON HOLD | COMMUNITY
End: 2022-05-17 | Stop reason: SDUPTHER

## 2022-05-14 RX ORDER — POTASSIUM CHLORIDE 750 MG/1
10 TABLET, FILM COATED, EXTENDED RELEASE ORAL DAILY
COMMUNITY
End: 2022-05-17 | Stop reason: HOSPADM

## 2022-05-14 RX ORDER — CHOLECALCIFEROL (VITAMIN D3) 1250 MCG
1000 CAPSULE ORAL DAILY
COMMUNITY
End: 2022-10-29

## 2022-05-14 RX ORDER — ACETAMINOPHEN 325 MG/1
650 TABLET ORAL ONCE
Status: COMPLETED | OUTPATIENT
Start: 2022-05-14 | End: 2022-05-14

## 2022-05-14 RX ADMIN — CEFTRIAXONE 1 G: 10 INJECTION, POWDER, FOR SOLUTION INTRAVENOUS at 08:27

## 2022-05-14 RX ADMIN — TETANUS TOXOID, REDUCED DIPHTHERIA TOXOID AND ACELLULAR PERTUSSIS VACCINE, ADSORBED 0.5 ML: 5; 2.5; 8; 8; 2.5 SUSPENSION INTRAMUSCULAR at 06:36

## 2022-05-14 RX ADMIN — SODIUM CHLORIDE 1000 ML: 9 INJECTION, SOLUTION INTRAVENOUS at 07:17

## 2022-05-14 RX ADMIN — ACETAMINOPHEN 650 MG: 325 TABLET ORAL at 08:21

## 2022-05-14 RX ADMIN — MORPHINE SULFATE 1 MG: 2 INJECTION, SOLUTION INTRAMUSCULAR; INTRAVENOUS at 16:34

## 2022-05-14 RX ADMIN — SODIUM CHLORIDE 100 ML/HR: 9 INJECTION, SOLUTION INTRAVENOUS at 13:18

## 2022-05-14 RX ADMIN — CEFTRIAXONE 1 G: 1 INJECTION, POWDER, FOR SOLUTION INTRAMUSCULAR; INTRAVENOUS at 16:24

## 2022-05-14 RX ADMIN — POTASSIUM CHLORIDE 20 MEQ: 1500 TABLET, EXTENDED RELEASE ORAL at 08:21

## 2022-05-14 RX ADMIN — SODIUM CHLORIDE 100 ML/HR: 9 INJECTION, SOLUTION INTRAVENOUS at 16:25

## 2022-05-14 RX ADMIN — Medication 10 ML: at 13:19

## 2022-05-14 RX ADMIN — MORPHINE SULFATE 1 MG: 2 INJECTION, SOLUTION INTRAMUSCULAR; INTRAVENOUS at 23:47

## 2022-05-14 NOTE — ED PROVIDER NOTES
Subjective   72-year-old female tells me she got up from her recliner and had difficulty standing lost her balance and fell backwards striking her head.  Patient complains of a moderate headache, mild neck pain, moderate mid back pain, worse with movement.  Patient has been intermittently dizzy for the past several weeks.  Patient is a vague historian and does seem to minimize this to a degree.  Patient denies any focal weakness or numbness, no vomiting or diarrhea, no cough or fever, no chest pain or shortness of air.  No changes to her medicines.  Patient is managed on Xarelto for atrial fibrillation.          Review of Systems   Constitutional: Negative.    Respiratory: Negative.    Cardiovascular: Negative.    Gastrointestinal: Negative.    Genitourinary: Negative.    Musculoskeletal: Positive for back pain.   Skin: Positive for wound.   Neurological: Positive for dizziness, light-headedness and headaches.   Psychiatric/Behavioral: Negative.    All other systems reviewed and are negative.      Past Medical History:   Diagnosis Date   • Coronary artery disease        Allergies   Allergen Reactions   • Codeine Nausea And Vomiting, Other (See Comments) and Nausea Only     nausea  nausea     • Cefdinir Unknown - High Severity   • Levofloxacin Nausea Only   • Lisinopril Cough       Past Surgical History:   Procedure Laterality Date   • BREAST BIOPSY     • HYSTERECTOMY         Family History   Problem Relation Age of Onset   • Breast cancer Sister        Social History     Socioeconomic History   • Marital status:    Tobacco Use   • Smoking status: Never Smoker   • Smokeless tobacco: Never Used   Vaping Use   • Vaping Use: Never used           Objective   Physical Exam  Constitutional:       Comments: Elderly female no acute distress   HENT:      Head:      Comments: 2 cm occipital scalp wound with associated hematoma and active hemorrhage     Mouth/Throat:      Mouth: Mucous membranes are moist.      Pharynx:  Oropharynx is clear.   Eyes:      Extraocular Movements: Extraocular movements intact.      Conjunctiva/sclera: Conjunctivae normal.      Pupils: Pupils are equal, round, and reactive to light.   Neck:      Comments: Mild pain with range of motion, nontender  Cardiovascular:      Rate and Rhythm: Bradycardia present.      Heart sounds: Normal heart sounds.   Pulmonary:      Effort: Pulmonary effort is normal.      Breath sounds: Normal breath sounds.   Abdominal:      General: Bowel sounds are normal. There is no distension.      Palpations: Abdomen is soft.      Tenderness: There is no abdominal tenderness.   Musculoskeletal:      Comments: Full range of motion all 4 extremities without pain or tenderness.  Lumbar spine without any point tenderness, no point tenderness of thoracic spine but patient does have paraspinal tenderness at the level of the scapulas bilaterally extending over the ribs but not involving the shoulders.   Skin:     General: Skin is warm and dry.      Capillary Refill: Capillary refill takes less than 2 seconds.   Neurological:      Mental Status: She is oriented to person, place, and time.      Cranial Nerves: No cranial nerve deficit.      Sensory: No sensory deficit.      Motor: No weakness.   Psychiatric:         Mood and Affect: Mood normal.         Behavior: Behavior normal.         Laceration Repair    Date/Time: 5/14/2022 6:53 AM  Performed by: Rafael Navas MD  Authorized by: Rafael Navas MD     Consent:     Consent obtained:  Verbal    Consent given by:  Patient  Universal protocol:     Patient identity confirmed:  Verbally with patient  Anesthesia:     Anesthesia method:  Local infiltration    Local anesthetic:  Lidocaine 1% WITH epi  Laceration details:     Location:  Scalp    Scalp location:  Occipital    Length (cm):  2  Pre-procedure details:     Preparation:  Patient was prepped and draped in usual sterile fashion  Exploration:     Hemostasis achieved with:  Epinephrine  and direct pressure    Wound exploration: wound explored through full range of motion      Contaminated: no    Treatment:     Area cleansed with:  Chlorhexidine and saline    Amount of cleaning:  Standard    Irrigation solution:  Sterile saline    Debridement:  None  Skin repair:     Repair method:  Sutures    Suture size:  3-0    Suture material:  Nylon    Suture technique:  Simple interrupted    Number of sutures:  3  Approximation:     Approximation:  Close  Repair type:     Repair type:  Simple  Post-procedure details:     Dressing:  Antibiotic ointment    Procedure completion:  Tolerated               ED Course  ED Course as of 05/15/22 2258   Sat May 14, 2022   0640 EKG interpretation:  sinus bradycardia, rate 52, no acute st elevation [JR]   0730 Care assumed from Dr. Navas pending CT interpretation and admission [JW]   0808 Spoke with Dr. Sanford for admission..  On my reexam patient is awake alert and oriented but was asking for pain medicine for her back and Tylenol was ordered as she did have a head injury narcotics will be deferred to admitting.  Rocephin and blood cultures were ordered. [JW]   0808   Discussion with provider: rosalinda  Radiology interpretation:  X-rays reviewed by me and interpreted by radiologist,   CT Head Without Contrast    Result Date: 5/14/2022  1. No acute intracranial abnormality. 2. Right parietal soft tissue swelling. No calvarial fracture. 3. Age-related volume loss and mild chronic small vessel ischemic changes. Electronically signed by:  Prashant Robertson  5/14/2022 5:48 AM    CT Chest Without Contrast Diagnostic    Result Date: 5/14/2022  No acute abnormality. Electronically signed by:  Jake Jason D.O.  5/14/2022 5:54 AM    CT Thoracic Spine Without Contrast    Result Date: 5/14/2022   1. No acute fracture or malalignment of the thoracic spine. 2. Please see the separately reported chest CT for detailed intrathoracic findings.   Electronically signed by:  Prashant  Marcelo  5/14/2022 5:52 AM    Lab interpretation:  Labs viewed by me significant for, urinary tract infection, BUN 48 creatinine 2.09, potassium 3.2    Appropriate PPE worn during exam.    i discussed findings with patient who voices understanding of admission   [JW]      ED Course User Index  [JR] Rafael Navas MD  [JW] Dana Chen, APRN                                                 MDM  Number of Diagnoses or Management Options  Acute kidney injury (HCC)  Dizziness  Injury of head, initial encounter  Laceration of scalp, initial encounter  Urinary tract infection with hematuria, site unspecified  Diagnosis management comments: Results for orders placed or performed during the hospital encounter of 05/14/22  -Urine Culture - Urine, Urine, Clean Catch:   Specimen: Urine, Clean Catch       Result                      Value             Ref Range           Urine Culture                                                 Culture in progress  -Blood Culture - Blood, Blood, Arterial Line:   Specimen: Blood, Arterial Line       Result                      Value             Ref Range           Blood Culture                                                 No growth at 24 hours  -Blood Culture - Blood, Blood, Venous Line:   Specimen: Blood, Venous Line       Result                      Value             Ref Range           Blood Culture                                                 No growth at 24 hours  -COVID-19,CEPHEID/APURVA,COR/DEE DEE/PAD/KEVIN IN-HOUSE(OR EMERGENT/ADD-ON),NP SWAB IN TRANSPORT MEDIA 3-4 HR TAT, RT-PCR - Swab, Nasopharynx:   Specimen: Nasopharynx; Swab       Result                      Value             Ref Range           COVID19                     Not Detected      Not Detected*  -Comprehensive Metabolic Panel:   Specimen: Blood       Result                      Value             Ref Range           Glucose                     106 (H)           65 - 99 mg/dL       BUN                         48 (H)             8 - 23 mg/dL        Creatinine                  2.09 (H)          0.57 - 1.00 *       Sodium                      139               136 - 145 mm*       Potassium                   3.2 (L)           3.5 - 5.2 mm*       Chloride                    102               98 - 107 mmo*       CO2                         23.0              22.0 - 29.0 *       Calcium                     11.0 (H)          8.6 - 10.5 m*       Total Protein               6.4               6.0 - 8.5 g/*       Albumin                     3.90              3.50 - 5.20 *       ALT (SGPT)                  34 (H)            1 - 33 U/L          AST (SGOT)                  41 (H)            1 - 32 U/L          Alkaline Phosphatase        65                39 - 117 U/L        Total Bilirubin             0.4               0.0 - 1.2 mg*       Globulin                    2.5               gm/dL               A/G Ratio                   1.6               g/dL                BUN/Creatinine Ratio        23.0              7.0 - 25.0          Anion Gap                   14.0              5.0 - 15.0 m*       eGFR                        24.5 (L)          >60.0 mL/min*  -Urinalysis With Culture If Indicated - Urine, Clean Catch:   Specimen: Urine, Clean Catch       Result                      Value             Ref Range           Color, UA                   Yellow            Yellow, Straw       Appearance, UA              Cloudy (A)        Clear               pH, UA                      <=5.0             5.0 - 8.0           Specific [place], UA        1.016             1.005 - 1.030       Glucose, UA                 Negative          Negative            Ketones, UA                 Negative          Negative            Bilirubin, UA               Negative          Negative            Blood, UA                   Trace (A)         Negative            Protein, UA                 Negative          Negative            Leuk Esterase, UA                              Negative        Moderate (2+) (A)       Nitrite, UA                 Positive (A)      Negative            Urobilinogen, UA            1.0 E.U./dL       0.2 - 1.0 E.*  -Troponin:   Specimen: Blood       Result                      Value             Ref Range           Troponin T                  <0.010            0.000 - 0.03*  -CBC Auto Differential:   Specimen: Blood       Result                      Value             Ref Range           WBC                         10.60             3.40 - 10.80*       RBC                         4.83              3.77 - 5.28 *       Hemoglobin                  13.7              12.0 - 15.9 *       Hematocrit                  42.4              34.0 - 46.6 %       MCV                         87.7              79.0 - 97.0 *       MCH                         28.4              26.6 - 33.0 *       MCHC                        32.4              31.5 - 35.7 *       RDW                         15.2              12.3 - 15.4 %       RDW-SD                      46.8              37.0 - 54.0 *       MPV                         6.5               6.0 - 12.0 fL       Platelets                   289               140 - 450 10*       Neutrophil %                75.7              42.7 - 76.0 %       Lymphocyte %                12.5 (L)          19.6 - 45.3 %       Monocyte %                  8.6               5.0 - 12.0 %        Eosinophil %                1.6               0.3 - 6.2 %         Basophil %                  1.6 (H)           0.0 - 1.5 %         Neutrophils, Absolute       8.00 (H)          1.70 - 7.00 *       Lymphocytes, Absolute       1.30              0.70 - 3.10 *       Monocytes, Absolute         0.90              0.10 - 0.90 *       Eosinophils, Absolute       0.20              0.00 - 0.40 *       Basophils, Absolute         0.20              0.00 - 0.20 *       nRBC                        0.1               0.0 - 0.2 /1*  -Magnesium:   Specimen: Blood       Result                       Value             Ref Range           Magnesium                   1.8               1.6 - 2.4 mg*  -Urinalysis, Microscopic Only - Urine, Clean Catch:   Specimen: Urine, Clean Catch       Result                      Value             Ref Range           RBC, UA                     0-2 (A)           None Seen /H*       WBC, UA                     31-50 (A)         None Seen /H*       Bacteria, UA                4+ (A)            None Seen /H*       Squamous Epithelial Ce*     0-2               None Seen, 0*       Hyaline Casts, UA           7-12              None Seen /L*       Methodology                                                   Manual Light Microscopy  -Basic Metabolic Panel:   Specimen: Blood       Result                      Value             Ref Range           Glucose                     104 (H)           65 - 99 mg/dL       BUN                         40 (H)            8 - 23 mg/dL        Creatinine                  1.64 (H)          0.57 - 1.00 *       Sodium                      141               136 - 145 mm*       Potassium                   3.8               3.5 - 5.2 mm*       Chloride                    108 (H)           98 - 107 mmo*       CO2                         20.0 (L)          22.0 - 29.0 *       Calcium                     10.0              8.6 - 10.5 m*       BUN/Creatinine Ratio        24.4              7.0 - 25.0          Anion Gap                   13.0              5.0 - 15.0 m*       eGFR                        32.7 (L)          >60.0 mL/min*  -CBC Auto Differential:   Specimen: Blood       Result                      Value             Ref Range           WBC                         11.40 (H)         3.40 - 10.80*       RBC                         4.74              3.77 - 5.28 *       Hemoglobin                  13.0              12.0 - 15.9 *       Hematocrit                  41.5              34.0 - 46.6 %       MCV                         87.4              79.0 - 97.0 *        MCH                         27.5              26.6 - 33.0 *       MCHC                        31.4 (L)          31.5 - 35.7 *       RDW                         15.0              12.3 - 15.4 %       RDW-SD                      45.5              37.0 - 54.0 *       MPV                         6.6               6.0 - 12.0 fL       Platelets                   247               140 - 450 10*       Neutrophil %                84.8 (H)          42.7 - 76.0 %       Lymphocyte %                8.1 (L)           19.6 - 45.3 %       Monocyte %                  5.5               5.0 - 12.0 %        Eosinophil %                1.0               0.3 - 6.2 %         Basophil %                  0.6               0.0 - 1.5 %         Neutrophils, Absolute       9.60 (H)          1.70 - 7.00 *       Lymphocytes, Absolute       0.90              0.70 - 3.10 *       Monocytes, Absolute         0.60              0.10 - 0.90 *       Eosinophils, Absolute       0.10              0.00 - 0.40 *       Basophils, Absolute         0.10              0.00 - 0.20 *       nRBC                        0.1               0.0 - 0.2 /1*  -Basic Metabolic Panel:   Specimen: Blood       Result                      Value             Ref Range           Glucose                     82                65 - 99 mg/dL       BUN                         28 (H)            8 - 23 mg/dL        Creatinine                  1.21 (H)          0.57 - 1.00 *       Sodium                      142               136 - 145 mm*       Potassium                   3.9               3.5 - 5.2 mm*       Chloride                    108 (H)           98 - 107 mmo*       CO2                         24.0              22.0 - 29.0 *       Calcium                     9.9               8.6 - 10.5 m*       BUN/Creatinine Ratio        23.1              7.0 - 25.0          Anion Gap                   10.0              5.0 - 15.0 m*       eGFR                        47.1 (L)          >60.0  mL/min*  -CBC Auto Differential:   Specimen: Blood       Result                      Value             Ref Range           WBC                         8.80              3.40 - 10.80*       RBC                         4.35              3.77 - 5.28 *       Hemoglobin                  12.6              12.0 - 15.9 *       Hematocrit                  38.0              34.0 - 46.6 %       MCV                         87.3              79.0 - 97.0 *       MCH                         29.0              26.6 - 33.0 *       MCHC                        33.2              31.5 - 35.7 *       RDW                         15.0              12.3 - 15.4 %       RDW-SD                      46.4              37.0 - 54.0 *       MPV                         6.7               6.0 - 12.0 fL       Platelets                   244               140 - 450 10*       Neutrophil %                79.2 (H)          42.7 - 76.0 %       Lymphocyte %                10.6 (L)          19.6 - 45.3 %       Monocyte %                  7.9               5.0 - 12.0 %        Eosinophil %                1.6               0.3 - 6.2 %         Basophil %                  0.7               0.0 - 1.5 %         Neutrophils, Absolute       7.00              1.70 - 7.00 *       Lymphocytes, Absolute       0.90              0.70 - 3.10 *       Monocytes, Absolute         0.70              0.10 - 0.90 *       Eosinophils, Absolute       0.10              0.00 - 0.40 *       Basophils, Absolute         0.10              0.00 - 0.20 *       nRBC                        0.0               0.0 - 0.2 /1*  -ECG 12 Lead:        Result                      Value             Ref Range           QT Interval                 484               ms             CT Head Without Contrast   Final Result              1.  Interval resolution of small linear area of increased density within    the periventricular white matter of the posterior left temporal lobe    compatible with resolution of  hemorrhage.  No evidence of intracranial    hemorrhage or infarct.         Electronically Signed By-Abdirizak Bella MD On:5/15/2022 1:07 PM    This report was finalized on [id number] by  Abdirizak Bella MD.     MRI Brain Without Contrast   Final Result         No evidence of acute ischemic infarct or intracranial mass.         Solitary tiny subcentimeter focus of signal change which may relate to a    tiny focus of hemorrhage at the junction of the low left lateral    ventricle and the adjacent parietal-temporal lobe. On review of the head    CT earlier today there is subtle a tiny linear high density focus in    this area measuring 5 mm in length, 1 mm in width. Recommend head CT to    be performed today in 3 hours for follow-up of this finding to ensure no    increasing size of a tiny possible hemorrhage         There is a moderate-large amount of diffuse parenchymal disease favored    to relate to chronic microvascular ischemia or possibly demyelinating    disease.              Electronically Signed By-Babak Serra On:5/14/2022 10:13 AM    This report was finalized on [id number] by  Babak Serra, .     CT Head Without Contrast   Final Result        1. No acute intracranial abnormality.    2. Right parietal soft tissue swelling. No calvarial fracture.    3. Age-related volume loss and mild chronic small vessel ischemic changes.                    Electronically signed by:  Prashant Robertson      5/14/2022 5:48 AM     CT Thoracic Spine Without Contrast   Final Result         1. No acute fracture or malalignment of the thoracic spine.    2. Please see the separately reported chest CT for detailed intrathoracic findings.                      Electronically signed by:  Prashant Robertson      5/14/2022 5:52 AM     CT Chest Without Contrast Diagnostic   Final Result        No acute abnormality.        Electronically signed by:  Jake Jason D.O.      5/14/2022 5:54 AM            Amount and/or Complexity of Data  Reviewed  Clinical lab tests: reviewed and ordered  Tests in the radiology section of CPT®: ordered and reviewed  Tests in the medicine section of CPT®: ordered and reviewed  Decide to obtain previous medical records or to obtain history from someone other than the patient: yes  Obtain history from someone other than the patient: yes  Discuss the patient with other providers: yes  Independent visualization of images, tracings, or specimens: yes        Final diagnoses:   Laceration of scalp, initial encounter   Injury of head, initial encounter   Acute kidney injury (HCC)   Dizziness   Urinary tract infection with hematuria, site unspecified       ED Disposition  ED Disposition     ED Disposition   Decision to Admit    Condition   --    Comment   Level of Care: Telemetry [5]   Diagnosis: Laceration of scalp, initial encounter [403011]   Admitting Physician: RICHIE MITCHELL [[id number]]   Attending Physician: RICHIE MITCHELL [[id number]]               No follow-up provider specified.       Medication List      No changes were made to your prescriptions during this visit.          Rafael Navas MD  05/15/22 0725

## 2022-05-14 NOTE — PLAN OF CARE
Problem: Adult Inpatient Plan of Care  Goal: Plan of Care Review  Outcome: Ongoing, Progressing  Flowsheets (Taken 5/14/2022 1817)  Progress: improving  Plan of Care Reviewed With: patient  Outcome Evaluation: Pt fell at home. back head mild bleeding and controlled by ice pack. pain controlled by prn meds. NS running at 100. using BC times one assist. will continue to monitor.

## 2022-05-14 NOTE — H&P
[facility]      Patient Name: Rosario Ortez  : 1950  MRN: [id number]  Primary Care Physician:  Ian Cordero MD  Date of admission: 2022      Subjective      Chief Complaint: dizziness with Fall.    History of Present Illness: Rosario Ortez is a 72 y.o. female who presented to [facility] on 2022 complaining of feeling dizzy for last three to four days, today she was feeling very dizzy some times position with moving head, leading to fall with laceration to scalp region requiring suturing in ER. Patient underwent workup revealing elevated BUN and Cr consistent with acute kidney injury, also revealing elevated wbc on UA concerning for UTI, patient ct scan head follow by MRI head revealing no acute CVA.  Patient underwent ct scan chest without contrast revealing no acute infiltrates. Following this asked to admit the patient for further care.    Review of Systems   All other systems reviewed and are negative.       Personal History     No past medical history on file.     PMH significant for Allergic rhinitis.     Past Surgical History:   Procedure Laterality Date   • BREAST BIOPSY     • HYSTERECTOMY         Family History: family history includes Breast cancer in her sister. Otherwise pertinent FHx was reviewed and not pertinent to current issue.    Social History:      Home Medications:  Prior to Admission Medications     Prescriptions Last Dose Informant Patient Reported? Taking?    albuterol sulfate  (90 Base) MCG/ACT inhaler   No No    Inhale 2 puffs Every 4 (Four) Hours As Needed for Wheezing or Shortness of Air.    azithromycin (ZITHROMAX) 250 MG tablet   No No    Take 2 tablets by mouth Daily.    benzonatate (TESSALON) 100 MG capsule   No No    Take 1 capsule by mouth 3 (Three) Times a Day As Needed for Cough.    methylPREDNISolone (MEDROL) 4 MG dose pack   No No    Take as directed on package instructions.             Allergies:  Allergies   Allergen Reactions   • Codeine Nausea And Vomiting, Other (See Comments) and Nausea Only     nausea  nausea     • Cefdinir Unknown - High Severity   • Levofloxacin Nausea Only   • Lisinopril Cough       Objective      Vitals:   Temp:  [98.2 °F (36.8 °C)] 98.2 °F (36.8 °C)  Heart Rate:  [50-57] 56  Resp:  [16-20] 16  BP: (118-146)/(45-63) 143/61    Physical Exam  Vitals and nursing note reviewed.   Constitutional:       General: She is not in acute distress.     Appearance: Normal appearance. She is well-developed. She is not ill-appearing, toxic-appearing or diaphoretic.   HENT:      Head: Normocephalic and atraumatic.      Right Ear: Ear canal and external ear normal.      Left Ear: Ear canal and external ear normal.      Nose: Nose normal. No congestion or rhinorrhea.      Mouth/Throat:      Mouth: Mucous membranes are moist.      Pharynx: No oropharyngeal exudate.   Eyes:      General: No scleral icterus.        Right eye: No discharge.         Left eye: No discharge.      Extraocular Movements: Extraocular movements intact.      Conjunctiva/sclera: Conjunctivae normal.      Pupils: Pupils are equal, round, and reactive to light.   Neck:      Thyroid: No thyromegaly.      Vascular: No carotid bruit or JVD.      Trachea: No tracheal deviation.   Cardiovascular:      Rate and Rhythm: Normal rate and regular rhythm.      Pulses: Normal pulses.      Heart sounds: Normal heart sounds. No murmur heard.    No friction rub. No gallop.   Pulmonary:      Effort: Pulmonary effort is normal. No respiratory distress.      Breath sounds: Normal breath sounds. No stridor. No wheezing, rhonchi or rales.   Chest:      Chest wall: No tenderness.   Abdominal:      General: Bowel sounds are normal. There is no distension.      Palpations: Abdomen is soft. There is no mass.      Tenderness: There is no abdominal tenderness. There is no guarding or rebound.      Hernia: No hernia is present.    Musculoskeletal:         General: No swelling, tenderness, deformity or signs of injury. Normal range of motion.      Cervical back: Normal range of motion and neck supple. No rigidity. No muscular tenderness.      Right lower leg: No edema.      Left lower leg: No edema.   Lymphadenopathy:      Cervical: No cervical adenopathy.   Skin:     General: Skin is warm and dry.      Coloration: Skin is not jaundiced or pale.      Findings: No bruising, erythema or rash.   Neurological:      General: No focal deficit present.      Mental Status: She is alert and oriented to person, place, and time. Mental status is at baseline.      Cranial Nerves: No cranial nerve deficit.      Sensory: No sensory deficit.      Motor: No weakness or abnormal muscle tone.      Coordination: Coordination normal.   Psychiatric:         Mood and Affect: Mood normal.         Behavior: Behavior normal.         Thought Content: Thought content normal.         Judgment: Judgment normal.        Result Review    Result Review:  I have personally reviewed the results from the time of this admission to 5/14/2022 14:36 EDT and agree with these findings:  [x]  Laboratory  [x]  Microbiology  [x]  Radiology  [x]  EKG/Telemetry   []  Cardiology/Vascular   []  Pathology  []  Old records  []  Other:  Most notable findings include:     Assessment & Plan        Active Hospital Problems:  Active Hospital Problems    Diagnosis    • Laceration of scalp, initial encounter    • Acute kidney injury (HCC)    • Acute cystitis      Plan:      Fall with dizziness likely secondary to severe dehydration leading to scalp laceration, MRI head no acute finding but questionable concern for demyelinating disease, iv fluids, PTOT evaluate and treat,  for safe discharge planning.    Acute kidney injury ... iv fluids, monitor renal function, avoid nephro toxic medication, monitor urine output.    Acute UTI ... iv rocephin, follow urine culture, monitor wbc  count    Dvt prophylaxis with SCDs for now, will start lovenox sc       DVT prophylaxis:  Mechanical DVT prophylaxis orders are present.    CODE STATUS:    Level Of Support Discussed With: Patient  Code Status (Patient has no pulse and is not breathing): CPR (Attempt to Resuscitate)  Medical Interventions (Patient has pulse or is breathing): Full Support    Admission Status:  I believe this patient meets observation status.    I discussed the patient's findings and my recommendations with patient.    This patient has been examined wearing appropriate Personal Protective Equipment and discussed with hospital infection control department. 05/14/22      Signature:

## 2022-05-14 NOTE — ED NOTES
"Pt reports falling and hitting head and back. Pt is on blood thinners. Pt reports no LOC, n/v. Pt states she \"just fell\", pt reports she did not trip over anything, and her legs did not give out on her. Pt reports not having any recent falls. Pt reports no pain just \"soreness\". Pt has family at bedside.   "

## 2022-05-15 ENCOUNTER — APPOINTMENT (OUTPATIENT)
Dept: CT IMAGING | Facility: HOSPITAL | Age: 72
End: 2022-05-15

## 2022-05-15 LAB
ANION GAP SERPL CALCULATED.3IONS-SCNC: 10 MMOL/L (ref 5–15)
BASOPHILS # BLD AUTO: 0.1 10*3/MM3 (ref 0–0.2)
BASOPHILS NFR BLD AUTO: 0.7 % (ref 0–1.5)
BUN SERPL-MCNC: 28 MG/DL (ref 8–23)
BUN/CREAT SERPL: 23.1 (ref 7–25)
CALCIUM SPEC-SCNC: 9.9 MG/DL (ref 8.6–10.5)
CHLORIDE SERPL-SCNC: 108 MMOL/L (ref 98–107)
CO2 SERPL-SCNC: 24 MMOL/L (ref 22–29)
CREAT SERPL-MCNC: 1.21 MG/DL (ref 0.57–1)
DEPRECATED RDW RBC AUTO: 46.4 FL (ref 37–54)
EGFRCR SERPLBLD CKD-EPI 2021: 47.1 ML/MIN/1.73
EOSINOPHIL # BLD AUTO: 0.1 10*3/MM3 (ref 0–0.4)
EOSINOPHIL NFR BLD AUTO: 1.6 % (ref 0.3–6.2)
ERYTHROCYTE [DISTWIDTH] IN BLOOD BY AUTOMATED COUNT: 15 % (ref 12.3–15.4)
GLUCOSE SERPL-MCNC: 82 MG/DL (ref 65–99)
HCT VFR BLD AUTO: 38 % (ref 34–46.6)
HGB BLD-MCNC: 12.6 G/DL (ref 12–15.9)
LYMPHOCYTES # BLD AUTO: 0.9 10*3/MM3 (ref 0.7–3.1)
LYMPHOCYTES NFR BLD AUTO: 10.6 % (ref 19.6–45.3)
MCH RBC QN AUTO: 29 PG (ref 26.6–33)
MCHC RBC AUTO-ENTMCNC: 33.2 G/DL (ref 31.5–35.7)
MCV RBC AUTO: 87.3 FL (ref 79–97)
MONOCYTES # BLD AUTO: 0.7 10*3/MM3 (ref 0.1–0.9)
MONOCYTES NFR BLD AUTO: 7.9 % (ref 5–12)
NEUTROPHILS NFR BLD AUTO: 7 10*3/MM3 (ref 1.7–7)
NEUTROPHILS NFR BLD AUTO: 79.2 % (ref 42.7–76)
NRBC BLD AUTO-RTO: 0 /100 WBC (ref 0–0.2)
PLATELET # BLD AUTO: 244 10*3/MM3 (ref 140–450)
PMV BLD AUTO: 6.7 FL (ref 6–12)
POTASSIUM SERPL-SCNC: 3.9 MMOL/L (ref 3.5–5.2)
RBC # BLD AUTO: 4.35 10*6/MM3 (ref 3.77–5.28)
SODIUM SERPL-SCNC: 142 MMOL/L (ref 136–145)
WBC NRBC COR # BLD: 8.8 10*3/MM3 (ref 3.4–10.8)

## 2022-05-15 PROCEDURE — G0378 HOSPITAL OBSERVATION PER HR: HCPCS

## 2022-05-15 PROCEDURE — 97166 OT EVAL MOD COMPLEX 45 MIN: CPT

## 2022-05-15 PROCEDURE — 96366 THER/PROPH/DIAG IV INF ADDON: CPT

## 2022-05-15 PROCEDURE — 99225 PR SBSQ OBSERVATION CARE/DAY 25 MINUTES: CPT | Performed by: HOSPITALIST

## 2022-05-15 PROCEDURE — 99214 OFFICE O/P EST MOD 30 MIN: CPT | Performed by: PSYCHIATRY & NEUROLOGY

## 2022-05-15 PROCEDURE — 80048 BASIC METABOLIC PNL TOTAL CA: CPT | Performed by: HOSPITALIST

## 2022-05-15 PROCEDURE — 85025 COMPLETE CBC W/AUTO DIFF WBC: CPT | Performed by: HOSPITALIST

## 2022-05-15 PROCEDURE — 93005 ELECTROCARDIOGRAM TRACING: CPT | Performed by: HOSPITALIST

## 2022-05-15 PROCEDURE — 70450 CT HEAD/BRAIN W/O DYE: CPT

## 2022-05-15 PROCEDURE — 93010 ELECTROCARDIOGRAM REPORT: CPT | Performed by: INTERNAL MEDICINE

## 2022-05-15 PROCEDURE — 84446 ASSAY OF VITAMIN E: CPT | Performed by: PSYCHIATRY & NEUROLOGY

## 2022-05-15 PROCEDURE — 96361 HYDRATE IV INFUSION ADD-ON: CPT

## 2022-05-15 PROCEDURE — 25010000002 CEFTRIAXONE PER 250 MG: Performed by: HOSPITALIST

## 2022-05-15 RX ORDER — ACETAMINOPHEN 325 MG/1
650 TABLET ORAL EVERY 6 HOURS PRN
Status: DISCONTINUED | OUTPATIENT
Start: 2022-05-15 | End: 2022-05-17 | Stop reason: HOSPADM

## 2022-05-15 RX ADMIN — ACETAMINOPHEN 650 MG: 325 TABLET ORAL at 19:56

## 2022-05-15 RX ADMIN — Medication 10 ML: at 08:23

## 2022-05-15 RX ADMIN — CEFTRIAXONE 2 G: 2 INJECTION, POWDER, FOR SOLUTION INTRAMUSCULAR; INTRAVENOUS at 08:23

## 2022-05-15 RX ADMIN — Medication 10 ML: at 20:25

## 2022-05-15 RX ADMIN — ACETAMINOPHEN 650 MG: 325 TABLET ORAL at 09:06

## 2022-05-15 RX ADMIN — Medication 10 ML: at 19:56

## 2022-05-15 RX ADMIN — SODIUM CHLORIDE 100 ML/HR: 9 INJECTION, SOLUTION INTRAVENOUS at 03:45

## 2022-05-15 NOTE — THERAPY EVALUATION
Patient Name: Rosario Ortez  : 1950    MRN: [id number]                              Today's Date: 5/15/2022       Admit Date: 2022    Visit Dx:     ICD-10-CM ICD-9-CM   1. Laceration of scalp, initial encounter  S01.01XA 873.0   2. Injury of head, initial encounter  S09.90XA 959.01   3. Acute kidney injury (HCC)  N17.9 584.9   4. Dizziness  R42 780.4   5. Urinary tract infection with hematuria, site unspecified  N39.0 599.0    R31.9 599.70     Patient Active Problem List   Diagnosis   • Laceration of scalp, initial encounter   • Acute kidney injury (HCC)   • Acute cystitis     Past Medical History:   Diagnosis Date   • Coronary artery disease      Past Surgical History:   Procedure Laterality Date   • BREAST BIOPSY     • HYSTERECTOMY        General Information     Bimal Name 05/15/22 1615          OT Time and Intention    Document Type evaluation  -     Mode of Treatment individual therapy;occupational therapy  -DR Wallis Name 05/15/22 161          General Information    Patient Profile Reviewed yes  -     Prior Level of Function independent:;ADL's;all household mobility;home management  Pt states no AD at home although uses rwx in community at times  -     Existing Precautions/Restrictions fall  -     Barriers to Rehab none identified  -DR Wallis Name 05/15/22 161          Living Environment    People in Home spouse;child(clint), adult  -DR Wallis Name 05/15/22 161          Home Main Entrance    Number of Stairs, Main Entrance three  -     Stair Railings, Main Entrance railings safe and in good condition  -DR Wallis Name 05/15/22 161          Stairs Within Home, Primary    Stairs Comment, Within Home, Primary flight of stairs to access 2nd floor bedroom. Pt can stay in den if needed. 1/2 bath on main floor, full bath 2nd floor  -DR Wallis Name 05/15/22 1614          Cognition    Orientation Status (Cognition) oriented to;person;place;time  -DR Wallis Name 05/15/22 1775           Safety Issues, Functional Mobility    Impairments Affecting Function (Mobility) balance;cognition;endurance/activity tolerance;strength  -DR     Cognitive Impairments, Mobility Safety/Performance insight into deficits/self-awareness;judgment  -           User Key  (r) = Recorded By, (t) = Taken By, (c) = Cosigned By    Initials Name Provider Type    Cassie Garcia OT Occupational Therapist                 Mobility/ADL's     Row Name 05/15/22 1619          Bed Mobility    Bed Mobility supine-sit-supine  -DR     Supine-Sit-Supine [place] (Bed Mobility) supervision  -     Assistive Device (Bed Mobility) head of bed elevated;bed rails  -     Row Name 05/15/22 1619          Transfers    Transfers sit-stand transfer;toilet transfer  -DR     Sit-Stand [place] (Transfers) contact guard;verbal cues  -     [place] Level (Toilet Transfer) contact guard;verbal cues  -DR     Assistive Device (Toilet Transfer) walker, front-wheeled  -DR     Row Name 05/15/22 1619          Sit-Stand Transfer    Assistive Device (Sit-Stand Transfers) walker, front-wheeled  -DR     Row Name 05/15/22 1619          Toilet Transfer    Type (Toilet Transfer) --  amb level using rwx  -     Row Name 05/15/22 1619          Activities of Daily Living    BADL Assessment/Intervention upper body dressing;lower body dressing;toileting  -DR     Row Name 05/15/22 1619          Upper Body Dressing Assessment/Training    [place] Level (Upper Body Dressing) doff;don;pajama/robe;minimum assist (75% patient effort)  -     Position (Upper Body Dressing) edge of bed sitting  -DR     Row Name 05/15/22 1619          Lower Body Dressing Assessment/Training    [place] Level (Lower Body Dressing) doff;don;socks;supervision;verbal cues  -     Position (Lower Body Dressing) edge of bed sitting  -DR     Row Name 05/15/22 1619          Toileting Assessment/Training    [place] Level (Toileting) toileting skills;contact guard  assist  -DR     Assistive Devices (Toileting) commode  -     Position (Toileting) unsupported sitting  -DR           User Key  (r) = Recorded By, (t) = Taken By, (c) = Cosigned By    Initials Name Provider Type    Cassie Garcia, OT Occupational Therapist               Obj/Interventions     Row Name 05/15/22 1620          Sensory Assessment (Somatosensory)    Sensory Assessment (Somatosensory) UE sensation intact  -     Sensory Subjective Reports numbness;tingling  hands and feet due to neuropathy  -     Row Name 05/15/22 1620          Vision Assessment/Intervention    Visual Impairment/Limitations WFL  -     Row Name 05/15/22 1620          Range of Motion Comprehensive    General Range of Motion no range of motion deficits identified  -     Row Name 05/15/22 1620          Strength Comprehensive (MMT)    General Manual Muscle Testing (MMT) Assessment upper extremity strength deficits identified  -     Comment, General Manual Muscle Testing (MMT) Assessment BUE 4-/5  -     Row Name 05/15/22 1620          Balance    Balance Assessment sitting static balance;sitting dynamic balance;standing static balance;standing dynamic balance  -DR     Static Sitting Balance independent  -DR     Dynamic Sitting Balance supervision  -DR     Position, Sitting Balance sitting edge of bed  -DR     Static Standing Balance contact guard  -DR     Dynamic Standing Balance contact guard  -DR     Position/Device Used, Standing Balance supported;walker, front-wheeled  -DR     Balance Interventions occupation based/functional task  -DR           User Key  (r) = Recorded By, (t) = Taken By, (c) = Cosigned By    Initials Name Provider Type    Cassie Garcia, OT Occupational Therapist               Goals/Plan     Row Name 05/15/22 1628          Transfer Goal 1 (OT)    Activity/Assistive Device (Transfer Goal 1, OT) transfers, all  -     [place] Level/Cues Needed (Transfer Goal 1, OT) modified independence  -      Time Frame (Transfer Goal 1, OT) 2 weeks  -DR     Row Name 05/15/22 1628          Bathing Goal 1 (OT)    Activity/Device (Bathing Goal 1, OT) bathing skills, all  -DR     [place] Level/Cues Needed (Bathing Goal 1, OT) set-up required  -DR     Time Frame (Bathing Goal 1, OT) 2 weeks  -DR     Row Name 05/15/22 1628          Dressing Goal 1 (OT)    Activity/Device (Dressing Goal 1, OT) dressing skills, all  -DR     [place]/Cues Needed (Dressing Goal 1, OT) modified independence  -DR     Time Frame (Dressing Goal 1, OT) 2 weeks  -DR     Row Name 05/15/22 1628          Toileting Goal 1 (OT)    Activity/Device (Toileting Goal 1, OT) toileting skills, all  -DR     [place] Level/Cues Needed (Toileting Goal 1, OT) modified independence  -DR     Time Frame (Toileting Goal 1, OT) 2 weeks  -DR     Row Name 05/15/22 1628          Grooming Goal 1 (OT)    Activity/Device (Grooming Goal 1, OT) grooming skills, all  -DR     [place] (Grooming Goal 1, OT) modified independence  -DR     Time Frame (Grooming Goal 1, OT) 2 weeks  -DR     Row Name 05/15/22 1628          Therapy Assessment/Plan (OT)    Planned Therapy Interventions (OT) activity tolerance training;BADL retraining;occupation/activity based interventions;patient/caregiver education/training;transfer/mobility retraining  -DR           User Key  (r) = Recorded By, (t) = Taken By, (c) = Cosigned By    Initials Name Provider Type    Cassie Garcia, OT Occupational Therapist               Clinical Impression     Row Name 05/15/22 1622          Pain Assessment    Pretreatment Pain Rating 0/10 - no pain  -DR     Posttreatment Pain Rating 0/10 - no pain  -DR     Row Name 05/15/22 1622          Plan of Care Review    Plan of Care Reviewed With patient;daughter;spouse  -DR     Outcome Evaluation Pt is a 73 yo female who presented with dizziness with fall and laceration to scapl requiring sutures in ER.  MRI (-).  D/o acute kidney injury, dehyration, and acute  UTI.  Questionable concern for demylinating disease per chart review.  Pt lives with /daughter in 2 story home with 2-3 MAKSIM. Bedroom and full bath on 2nd floor although pt can stay in den on main level if needed. 1/2 bath on main level.  Pt I PTA no AD in the home and rwx commuity distances.  Pt able to have assist of family upon dc home. This date, S for bed mobility, CGA CTS, and CGA for amb level functional mobility using rwx. V/c's for safety with rwx use.  Pt S for socks, min A UBD, and CGA toileting for upright safety.  Pt alert and oriented except to situation, memory deficits noted.  Due to pt functioning below baseline, skilled OT to see 3x/wk while at [facility]. Low Intensity Therapy recommended post-acute care - This is recommended as therapy feels this patient would require 2-3 visits per week. HH would be the best option depending on patient's home bound status. OT recommending PT complete vestibular evaluation prior to pt being discharged home; pt reporting dizziness which led to fall although pt attributes to a medication. OT feels pt will benefit from vestibular eval.  -DR Wallis Name 05/15/22 1622          Therapy Assessment/Plan (OT)    Rehab Potential (OT) good, to achieve stated therapy goals  -     Criteria for Skilled Therapeutic Interventions Met (OT) yes;meets criteria  -     Therapy Frequency (OT) 3 times/wk  -DR Bimal Prieto 05/15/22 1622          Therapy Plan Review/Discharge Plan (OT)    Anticipated Discharge Disposition (OT) home with 24/7 care;home with home health  -DR Bimal Prieto 05/15/22 1622          Positioning and Restraints    Pre-Treatment Position in bed  -     Post Treatment Position bed  -DR     In Bed supine;call light within reach;encouraged to call for assist;with family/caregiver  -           User Key  (r) = Recorded By, (t) = Taken By, (c) = Cosigned By    Initials Name Provider Type    Cassie Garcia, OT Occupational Therapist               Outcome  Measures     Row Name 05/15/22 1628          How much help from another is currently needed...    Putting on and taking off regular lower body clothing? 3  -DR     Bathing (including washing, rinsing, and drying) 3  -DR     Toileting (which includes using toilet bed pan or urinal) 3  -DR     Putting on and taking off regular upper body clothing 3  -DR     Taking care of personal grooming (such as brushing teeth) 3  -DR     Eating meals 3  -DR     AM-PAC 6 Clicks Score (OT) 18  -DR     Row Name 05/15/22 1628          Functional Assessment    Outcome Measure Options AM-PAC 6 Clicks Daily Activity (OT)  -DR           User Key  (r) = Recorded By, (t) = Taken By, (c) = Cosigned By    Initials Name Provider Type    Cassie Garcia, OT Occupational Therapist                Occupational Therapy Education                 Title: PT OT SLP Therapies (In Progress)     Topic: Occupational Therapy (In Progress)     Point: ADL training (Done)     Description:   Instruct learner(s) on proper safety adaptation and remediation techniques during self care or transfers.   Instruct in proper use of assistive devices.              Learning Progress Summary           Patient CIARRA Clancy VU by  at 5/15/2022 1629   Family CIARRA Clancy VU by  at 5/15/2022 1629                   Point: Home exercise program (Not Started)     Description:   Instruct learner(s) on appropriate technique for monitoring, assisting and/or progressing therapeutic exercises/activities.              Learner Progress:  Not documented in this visit.          Point: Precautions (Done)     Description:   Instruct learner(s) on prescribed precautions during self-care and functional transfers.              Learning Progress Summary           Patient CIARRA Clancy VU by  at 5/15/2022 1629   Family CIARRA Clancy VU by  at 5/15/2022 1629                   Point: Body mechanics (Done)     Description:   Instruct learner(s) on proper positioning and spine alignment during  self-care, functional mobility activities and/or exercises.              Learning Progress Summary           Patient CIARRA Clancy VU by  at 5/15/2022 1629   Family CIARRA Clancy VU by  at 5/15/2022 1629                               User Key     Initials Effective Dates Name Provider Type Discipline     08/24/20 -  Cassie Kern OT Occupational Therapist OT              OT Recommendation and Plan  Planned Therapy Interventions (OT): activity tolerance training, BADL retraining, occupation/activity based interventions, patient/caregiver education/training, transfer/mobility retraining  Therapy Frequency (OT): 3 times/wk  Plan of Care Review  Plan of Care Reviewed With: patient, daughter, spouse  Outcome Evaluation: Pt is a 71 yo female who presented with dizziness with fall and laceration to scapl requiring sutures in ER.  MRI (-).  D/o acute kidney injury, dehyration, and acute UTI.  Questionable concern for demylinating disease per chart review.  Pt lives with /daughter in 2 story home with 2-3 MAKSIM. Bedroom and full bath on 2nd floor although pt can stay in den on main level if needed. 1/2 bath on main level.  Pt I PTA no AD in the home and rwx commuity distances.  Pt able to have assist of family upon dc home. This date, S for bed mobility, CGA CTS, and CGA for amb level functional mobility using rwx. V/c's for safety with rwx use.  Pt S for socks, min A UBD, and CGA toileting for upright safety.  Pt alert and oriented except to situation, memory deficits noted.  Due to pt functioning below baseline, skilled OT to see 3x/wk while at [facility]. Low Intensity Therapy recommended post-acute care - This is recommended as therapy feels this patient would require 2-3 visits per week. HH would be the best option depending on patient's home bound status. OT recommending PT complete vestibular evaluation prior to pt being discharged home; pt reporting dizziness which led to fall although pt attributes to a medication.  OT feels pt will benefit from vestibular eval.     Time Calculation:    Time Calculation- OT     Row Name 05/15/22 1629             Time Calculation- OT    OT Start Time 1455  -      OT Stop Time 1522  -      OT Time Calculation (min) 27 min  -      OT Received On 05/15/22  -      OT - Next Appointment 05/17/22  -      OT Goal Re-Cert Due Date 05/29/22  -            User Key  (r) = Recorded By, (t) = Taken By, (c) = Cosigned By    Initials Name Provider Type    Cassie Garcia OT Occupational Therapist              Therapy Charges for Today     Code Description Service Date Service Provider Modifiers Qty    [id number] HC OT EVAL MOD COMPLEXITY 4 5/15/2022 Cassie Kern OT GO 1               Cassie Kern OT  5/15/2022

## 2022-05-15 NOTE — PLAN OF CARE
Goal Outcome Evaluation:  Plan of Care Reviewed With: patient, daughter, spouse           Outcome Evaluation: Pt is a 71 yo female who presented with dizziness with fall and laceration to scapl requiring sutures in ER.  MRI (-).  D/o acute kidney injury, dehyration, and acute UTI.  Questionable concern for demylinating disease per chart review.  Pt lives with /daughter in 2 story home with 2-3 MAKSIM. Bedroom and full bath on 2nd floor although pt can stay in den on main level if needed. 1/2 bath on main level.  Pt I PTA no AD in the home and rwx commuity distances.  Pt able to have assist of family upon dc home. This date, S for bed mobility, CGA CTS, and CGA for amb level functional mobility using rwx. V/c's for safety with rwx use.  Pt S for socks, min A UBD, and CGA toileting for upright safety.  Pt alert and oriented except to situation, memory deficits noted.  Due to pt functioning below baseline, skilled OT to see 3x/wk while at [facility]. Low Intensity Therapy recommended post-acute care - This is recommended as therapy feels this patient would require 2-3 visits per week. HH would be the best option depending on patient's home bound status. OT recommending PT complete vestibular evaluation prior to pt being discharged home; pt reporting dizziness which led to fall although pt attributes to a medication. OT feels pt will benefit from vestibular eval.

## 2022-05-15 NOTE — CONSULTS
Primary Care Provider: Ian Cordero MD     Consult requested by: Sang Purcell MD    Reason for Consultation: Neurological evaluation for loss of balance.     Rosario Ortez is a 72 y.o. female *    History taken from: patient chart RN    Chief complaint: Evaluate for an episode of loss of balance.        SUBJECTIVE:    History of present illness:   The patient is a 72 year old lady with coronary artery disease who was sleeping on a recliner and upon waking up she fell down.  She tried to stand up twice before she was successful however she fell backwards and back of head strikes a hard surface.  She did not have loss or impairment of consciousness at that time.  She is not certain about getting dizzy prior to fall.  She was brought to [facility] and admitted for further work up and management.  She did not have any episodes of dizziness, however she had an episode of fall secondary to her legs give away a few months ago.  The patient underwent a MRI of Brain showed a tiny linear area of high density in left parietal-temporal region.  The patient denies any focal weakness, speech and swallowing problems, vision difficulties.     Review of Systems   Constitutional: Negative  HENT: Negative.    Eyes: Negative.    Respiratory: Negative.    Cardiovascular: CAD.    Gastrointestinal: Negative.    Genitourinary: Negative.    Musculoskeletal: Negative  Skin: Negative.    Neurological: s/p fall.    Hematological: Negative.    Psychiatric/Behavioral: Negative.        PATIENT HISTORY:  Past Medical History:   Diagnosis Date   • Coronary artery disease    ,   Past Surgical History:   Procedure Laterality Date   • BREAST BIOPSY     • HYSTERECTOMY     ,   Family History   Problem Relation Age of Onset   • Breast cancer Sister    ,   Social History     Tobacco Use   • Smoking status: Never Smoker   • Smokeless tobacco: Never Used   Vaping Use   • Vaping Use: Never used   ,   Medications Prior to Admission   Medication  Sig Dispense Refill Last Dose   • amiodarone (PACERONE) 200 MG tablet Take 200 mg by mouth 3 (Three) Times a Day.      • amLODIPine (NORVASC) 10 MG tablet Take 10 mg by mouth Daily.      • atorvastatin (LIPITOR) 80 MG tablet Take 80 mg by mouth Daily.      • Biotin 31028 MCG tablet Take 10,000 mcg by mouth Daily.      • Cholecalciferol (Vitamin D3) 1.25 MG (47149 UT) capsule Take 1,000 Units by mouth Daily.      • fenofibrate (TRICOR) 145 MG tablet Take 145 mg by mouth Daily.      • irbesartan (AVAPRO) 150 MG tablet Take 300 mg by mouth Every Night.      • potassium chloride 10 MEQ CR tablet Take 10 mEq by mouth Daily.      • rivaroxaban (XARELTO) 20 MG tablet Take 20 mg by mouth Daily.      , Scheduled Meds:  cefTRIAXone, 2 g, Intravenous, Q24H  sodium chloride, 10 mL, Intravenous, Q12H    , Continuous Infusions:  sodium chloride, 100 mL/hr, Last Rate: 100 mL/hr (05/15/22 0707)    , PRN Meds:  •  acetaminophen  •  Morphine  •  sodium chloride, Allergies:  Codeine, Cefdinir, Levofloxacin, and Lisinopril    ________________________________________________________        OBJECTIVE:  Upon today's exam, The patient is laying in bed in no apparent distress. Head NC, AT, Neck supple, trachea midline.  Lungs CTA,  Good pulmonary effort.  CV  S1-S2 no murmur. Abdomen soft, non tender.  Ext no edema, no cyanosis.          Neurologic Exam  The patient is awake, alert, oriented x 3, speech is fluent with good comprehension, follow commands. Name common objects.  CN VFFC, EOMI, no facial droop. Tongue midline. Motor 5/5. Sensory light touch intact. Reflexes absent, plantar mute. Cerebellum finger to nose intact.   ________________________________________________________   RESULTS REVIEW:    VITAL SIGNS:   Temp:  [97.4 °F (36.3 °C)-98.9 °F (37.2 °C)] 97.8 °F (36.6 °C)  Heart Rate:  [52-85] 74  Resp:  [16-19] 18  BP: (136-164)/(53-87) 136/86     LABS:  WBC   Date Value Ref Range Status   05/15/2022 8.80 3.40 - 10.80 10*3/mm3  Final     RBC   Date Value Ref Range Status   05/15/2022 4.35 3.77 - 5.28 10*6/mm3 Final     Hemoglobin   Date Value Ref Range Status   05/15/2022 12.6 12.0 - 15.9 g/dL Final     Hematocrit   Date Value Ref Range Status   05/15/2022 38.0 34.0 - 46.6 % Final     MCV   Date Value Ref Range Status   05/15/2022 87.3 79.0 - 97.0 fL Final     MCH   Date Value Ref Range Status   05/15/2022 29.0 26.6 - 33.0 pg Final     MCHC   Date Value Ref Range Status   05/15/2022 33.2 31.5 - 35.7 g/dL Final     RDW   Date Value Ref Range Status   05/15/2022 15.0 12.3 - 15.4 % Final     RDW-SD   Date Value Ref Range Status   05/15/2022 46.4 37.0 - 54.0 fl Final     MPV   Date Value Ref Range Status   05/15/2022 6.7 6.0 - 12.0 fL Final     Platelets   Date Value Ref Range Status   05/15/2022 244 140 - 450 10*3/mm3 Final     Neutrophil %   Date Value Ref Range Status   05/15/2022 79.2 (H) 42.7 - 76.0 % Final     Lymphocyte %   Date Value Ref Range Status   05/15/2022 10.6 (L) 19.6 - 45.3 % Final     Monocyte %   Date Value Ref Range Status   05/15/2022 7.9 5.0 - 12.0 % Final     Eosinophil %   Date Value Ref Range Status   05/15/2022 1.6 0.3 - 6.2 % Final     Basophil %   Date Value Ref Range Status   05/15/2022 0.7 0.0 - 1.5 % Final     Neutrophils, Absolute   Date Value Ref Range Status   05/15/2022 7.00 1.70 - 7.00 10*3/mm3 Final     Lymphocytes, Absolute   Date Value Ref Range Status   05/15/2022 0.90 0.70 - 3.10 10*3/mm3 Final     Monocytes, Absolute   Date Value Ref Range Status   05/15/2022 0.70 0.10 - 0.90 10*3/mm3 Final     Eosinophils, Absolute   Date Value Ref Range Status   05/15/2022 0.10 0.00 - 0.40 10*3/mm3 Final     Basophils, Absolute   Date Value Ref Range Status   05/15/2022 0.10 0.00 - 0.20 10*3/mm3 Final     nRBC   Date Value Ref Range Status   05/15/2022 0.0 0.0 - 0.2 /100 WBC Final     Glucose   Date Value Ref Range Status   05/15/2022 82 65 - 99 mg/dL Final     BUN   Date Value Ref Range Status   05/15/2022 28 (H) 8  - 23 mg/dL Final     Creatinine   Date Value Ref Range Status   05/15/2022 1.21 (H) 0.57 - 1.00 mg/dL Final     Sodium   Date Value Ref Range Status   05/15/2022 142 136 - 145 mmol/L Final     Potassium   Date Value Ref Range Status   05/15/2022 3.9 3.5 - 5.2 mmol/L Final     Chloride   Date Value Ref Range Status   05/15/2022 108 (H) 98 - 107 mmol/L Final     CO2   Date Value Ref Range Status   05/15/2022 24.0 22.0 - 29.0 mmol/L Final     Calcium   Date Value Ref Range Status   05/15/2022 9.9 8.6 - 10.5 mg/dL Final     Total Protein   Date Value Ref Range Status   05/14/2022 6.4 6.0 - 8.5 g/dL Final     Albumin   Date Value Ref Range Status   05/14/2022 3.90 3.50 - 5.20 g/dL Final     ALT (SGPT)   Date Value Ref Range Status   05/14/2022 34 (H) 1 - 33 U/L Final     AST (SGOT)   Date Value Ref Range Status   05/14/2022 41 (H) 1 - 32 U/L Final     Alkaline Phosphatase   Date Value Ref Range Status   05/14/2022 65 39 - 117 U/L Final     Total Bilirubin   Date Value Ref Range Status   05/14/2022 0.4 0.0 - 1.2 mg/dL Final     BUN/Creatinine Ratio   Date Value Ref Range Status   05/15/2022 23.1 7.0 - 25.0 Final     Anion Gap   Date Value Ref Range Status   05/15/2022 10.0 5.0 - 15.0 mmol/L Final       Lab Results   Component Value Date    TSH 0.117 (L) 10/16/2019    LDL UNABLE TO CALCULATE 10/16/2019    HGBA1C 5.9 (H) 10/16/2019         IMAGING STUDIES:  CT Head Without Contrast    Result Date: 5/14/2022  1. No acute intracranial abnormality. 2. Right parietal soft tissue swelling. No calvarial fracture. 3. Age-related volume loss and mild chronic small vessel ischemic changes. Electronically signed by:  Prashant Robertson  5/14/2022 5:48 AM    CT Chest Without Contrast Diagnostic    Result Date: 5/14/2022  No acute abnormality. Electronically signed by:  Jake Jason D.O.  5/14/2022 5:54 AM    CT Thoracic Spine Without Contrast    Result Date: 5/14/2022   1. No acute fracture or malalignment of the thoracic spine. 2.  Please see the separately reported chest CT for detailed intrathoracic findings.   Electronically signed by:  Prashant Robertson  5/14/2022 5:52 AM    MRI Brain Without Contrast    Result Date: 5/14/2022   No evidence of acute ischemic infarct or intracranial mass.  Solitary tiny subcentimeter focus of signal change which may relate to a tiny focus of hemorrhage at the junction of the low left lateral ventricle and the adjacent parietal-temporal lobe. On review of the head CT earlier today there is subtle a tiny linear high density focus in this area measuring 5 mm in length, 1 mm in width. Recommend head CT to be performed today in 3 hours for follow-up of this finding to ensure no increasing size of a tiny possible hemorrhage  There is a moderate-large amount of diffuse parenchymal disease favored to relate to chronic microvascular ischemia or possibly demyelinating disease.   Electronically Signed By-Babak Serra On:5/14/2022 10:13 AM This report was finalized on [id number] by  Babak Serra, .      I reviewed the patient's new clinical results.      ________________________________________________________     PROBLEM LIST:    Laceration of scalp, initial encounter    Acute kidney injury (HCC)    Acute cystitis          Assessment & Plan   ASSESSMENT/PLAN:  The patient is a 72 year old lady with coronary artery disease who had a fall while trying to get up from her recliner.  She may have a syncopal episode.  MRI of Brain showed some changes that may be suggestive of very small hemorrhage in left parietal temporal region.   Rec:  Will obtain a CT Scan of Head without contrast.  Will obtain blood work including Vit B12, Vit E, Folic acid level, Vit E, TSH.  Orthostatics x 1.  Cardiology consult will be beneficial for syncope.  Will follow.           I discussed the patient's findings and my recommendations with patient and nursing staff    Makayla Ramos MD  05/15/22  11:53 EDT

## 2022-05-15 NOTE — PLAN OF CARE
Problem: Adult Inpatient Plan of Care  Goal: Plan of Care Review  Outcome: Ongoing, Progressing  Flowsheets (Taken 5/15/2022 1540)  Progress: improving  Plan of Care Reviewed With: patient  Outcome Evaluation: neurologist consulted per pt request. cardiologist consult for irregular heart rate. pain controlled by prn meds. DC NS as order. will continue to monitor.

## 2022-05-15 NOTE — PROGRESS NOTES
[facility] Medicine Services Daily Progress Note    Patient Name: Rosario Ortez  : 1950  MRN: [id number]  Primary Care Physician:  Ian Cordero MD  Date of admission: 2022      Subjective      Chief Complaint: Fall with dizziness.     Subjective   Patient feels better with the dizziness, denies for any nausea vomiting.    Review of Systems   All other systems reviewed and are negative.         Objective      Vitals:   Temp:  [97.4 °F (36.3 °C)-98.9 °F (37.2 °C)] 97.8 °F (36.6 °C)  Heart Rate:  [52-85] 74  Resp:  [16-19] 18  BP: (136-164)/(53-87) 136/86    Physical Exam  Vitals and nursing note reviewed.   Constitutional:       General: She is not in acute distress.     Appearance: Normal appearance. She is well-developed. She is not ill-appearing, toxic-appearing or diaphoretic.   HENT:      Head: Normocephalic and atraumatic.      Right Ear: Ear canal and external ear normal.      Left Ear: Ear canal and external ear normal.      Nose: Nose normal. No congestion or rhinorrhea.      Mouth/Throat:      Mouth: Mucous membranes are moist.      Pharynx: No oropharyngeal exudate.   Eyes:      General: No scleral icterus.        Right eye: No discharge.         Left eye: No discharge.      Extraocular Movements: Extraocular movements intact.      Conjunctiva/sclera: Conjunctivae normal.      Pupils: Pupils are equal, round, and reactive to light.   Neck:      Thyroid: No thyromegaly.      Vascular: No carotid bruit or JVD.      Trachea: No tracheal deviation.   Cardiovascular:      Rate and Rhythm: Normal rate and regular rhythm.      Pulses: Normal pulses.      Heart sounds: Normal heart sounds. No murmur heard.    No friction rub. No gallop.   Pulmonary:      Effort: Pulmonary effort is normal. No respiratory distress.      Breath sounds: Normal breath sounds. No stridor. No wheezing, rhonchi or rales.   Chest:      Chest wall: No tenderness.   Abdominal:      General: Bowel  sounds are normal. There is no distension.      Palpations: Abdomen is soft. There is no mass.      Tenderness: There is no abdominal tenderness. There is no guarding or rebound.      Hernia: No hernia is present.   Musculoskeletal:         General: No swelling, tenderness, deformity or signs of injury. Normal range of motion.      Cervical back: Normal range of motion and neck supple. No rigidity. No muscular tenderness.      Right lower leg: No edema.      Left lower leg: No edema.   Lymphadenopathy:      Cervical: No cervical adenopathy.   Skin:     General: Skin is warm and dry.      Coloration: Skin is not jaundiced or pale.      Findings: No bruising, erythema or rash.   Neurological:      General: No focal deficit present.      Mental Status: She is alert and oriented to person, place, and time. Mental status is at baseline.      Cranial Nerves: No cranial nerve deficit.      Sensory: No sensory deficit.      Motor: No weakness or abnormal muscle tone.      Coordination: Coordination normal.   Psychiatric:         Mood and Affect: Mood normal.         Behavior: Behavior normal.         Thought Content: Thought content normal.         Judgment: Judgment normal.             Result Review    Result Review:  I have personally reviewed the results from the time of this admission to 5/15/2022 11:03 EDT and agree with these findings:  [x]  Laboratory  [x]  Microbiology  [x]  Radiology  []  EKG/Telemetry   []  Cardiology/Vascular   []  Pathology  []  Old records  []  Other:  Most notable findings include:     Wounds (last 24 hours)     LDA Wound     Row Name 05/15/22 0708             Wound 05/14/22 1900 posterior occipital region Laceration    Wound - Properties Group Placement Date: 05/14/22  [facility] Placement Time: 1900  - Present on Hospital Admission: Y  - Orientation: posterior  - Location: occipital region  - Primary Wound Type: Laceration  -AH Additional Comments: from fall at home  -      Dressing  Appearance dry;intact  -YS      Closure Open to air  -YS      Retired Wound - Properties Group Placement Date: 05/14/22  - Placement Time: 1900  -AH Present on Hospital Admission: Y  -AH Orientation: posterior  -AH Location: occipital region  -AH Primary Wound Type: Laceration  -AH Additional Comments: from fall at home  -      Retired Wound - Properties Group Date first assessed: 05/14/22  - Time first assessed: 1900  -AH Present on Hospital Admission: Y  -AH Location: occipital region  -AH Primary Wound Type: Laceration  -AH Additional Comments: from fall at home  -            User Key  (r) = Recorded By, (t) = Taken By, (c) = Cosigned By    Initials Name Provider Type     Adam Garg LPN Licensed Nurse    Lilly Castellano RN Registered Nurse                  Assessment & Plan      Brief Patient Summary:  Rosario Ortez is a 72 y.o. female who       cefTRIAXone, 2 g, Intravenous, Q24H  sodium chloride, 10 mL, Intravenous, Q12H       sodium chloride, 100 mL/hr, Last Rate: 100 mL/hr (05/15/22 0707)         Active Hospital Problems:  Active Hospital Problems    Diagnosis    • Laceration of scalp, initial encounter    • Acute kidney injury (HCC)    • Acute cystitis      Plan:       Fall with dizziness likely secondary to severe dehydration leading to scalp laceration, MRI head no acute finding but questionable concern for demyelinating disease, DC iv fluids, PTOT evaluate and treat,  for safe discharge planning.  Consult neurology as per family request.     Acute kidney injury improved... DC iv fluids, monitor renal function, avoid nephro toxic medication, monitor urine output.     Acute UTI ... iv rocephin, follow urine culture, wbc count improved.     Dvt prophylaxis with SCDs for now, will start lovenox sc     Physical deconditioning, will ask PT OT evaluate and treat.        DVT prophylaxis:  Mechanical DVT prophylaxis orders are present.     CODE STATUS:    Level Of Support  Discussed With: Patient  Code Status (Patient has no pulse and is not breathing): CPR (Attempt to Resuscitate)  Medical Interventions (Patient has pulse or is breathing): Full Support     Admission Status:  I believe this patient meets observation status.    DVT prophylaxis:  Medical and mechanical DVT prophylaxis orders are present.    CODE STATUS:    Level Of Support Discussed With: Patient  Code Status (Patient has no pulse and is not breathing): CPR (Attempt to Resuscitate)  Medical Interventions (Patient has pulse or is breathing): Full Support      Disposition:  I expect patient to be discharged as per clinical course.    This patient has been examined wearing appropriate Personal Protective Equipment and discussed with hospital infection control department. 05/15/22      Electronically signed by Sang Sanford MD, 05/15/22, 11:03 EDT.  [facility] Hospitalist Team

## 2022-05-15 NOTE — PLAN OF CARE
Problem: Adult Inpatient Plan of Care  Goal: Absence of Hospital-Acquired Illness or Injury  Intervention: Identify and Manage Fall Risk  Description: Perform standard risk assessment on admission using a validated tool or comprehensive approach appropriate to the patient; reassess fall risk frequently, with change in status or transfer to another level of care.  Communicate fall injury risk to interprofessional healthcare team.  Determine need for increased observation, equipment and environmental modification, such as low bed, signage and supportive, nonskid footwear.  Adjust safety measures to individual developmental age, stage and identified risk factors.  Reinforce the importance of safety and physical activity with patient and family.  Perform regular intentional rounding to assess need for position change, pain assessment and personal needs, including assistance with toileting.  Recent Flowsheet Documentation  Taken 5/15/2022 0100 by Adam Garg LPN  Safety Promotion/Fall Prevention:   safety round/check completed   room organization consistent   nonskid shoes/slippers when out of bed   muscle strengthening facilitated   mobility aid in reach   lighting adjusted   fall prevention program maintained   clutter free environment maintained   assistive device/personal items within reach   activity supervised  Taken 5/14/2022 2310 by Adam Garg LPN  Safety Promotion/Fall Prevention:   safety round/check completed   room organization consistent   nonskid shoes/slippers when out of bed   muscle strengthening facilitated   mobility aid in reach   lighting adjusted   fall prevention program maintained   clutter free environment maintained   assistive device/personal items within reach   activity supervised  Taken 5/14/2022 2100 by Adam Garg LPN  Safety Promotion/Fall Prevention:   safety round/check completed   room organization consistent   nonskid shoes/slippers when out of bed    muscle strengthening facilitated   mobility aid in reach   lighting adjusted   fall prevention program maintained   clutter free environment maintained   assistive device/personal items within reach   activity supervised  Taken 5/14/2022 1959 by Adam Garg LPN  Safety Promotion/Fall Prevention:   safety round/check completed   room organization consistent   nonskid shoes/slippers when out of bed   muscle strengthening facilitated   mobility aid in reach   lighting adjusted   fall prevention program maintained   clutter free environment maintained   assistive device/personal items within reach   activity supervised  Intervention: Prevent Skin Injury  Description: Perform a screening for skin injury risk, such as pressure or moisture associated skin damage on admission and at regular intervals throughout hospital stay.  Keep all areas of skin (especially folds) clean and dry.  Maintain adequate skin hydration.  Relieve and redistribute pressure and protect bony prominences; implement measures based on patient-specific risk factors.  Match turning and repositioning schedule to clinical condition.  Encourage weight shift frequently; assist with reposition if unable to complete independently.  Float heels off bed; avoid pressure on the Achilles tendon.  Keep skin free from extended contact with medical devices.  Encourage functional activity and mobility, as early as tolerated.  Use aids (e.g., slide boards, mechanical lift) during transfer.  Recent Flowsheet Documentation  Taken 5/15/2022 0100 by Adam Garg LPN  Body Position: position changed independently  Taken 5/14/2022 2310 by Adam Garg LPN  Body Position: position changed independently  Taken 5/14/2022 2100 by Adam Garg LPN  Body Position: position changed independently  Taken 5/14/2022 1959 by Adam Garg LPN  Body Position: position changed independently  Intervention: Prevent and Manage VTE (Venous  Thromboembolism) Risk  Description: Assess for VTE (venous thromboembolism) risk.  Encourage and assist with early ambulation.  Initiate and maintain compression or other therapy, as indicated, based on identified risk in accordance with organizational protocol and provider order.  Encourage both active and passive leg exercises while in bed, if unable to ambulate.  Recent Flowsheet Documentation  Taken 5/15/2022 0100 by Adam Garg LPN  Activity Management:   activity encouraged   ambulated to bathroom   up to bedside commode  Taken 5/14/2022 2310 by Adam Garg LPN  Activity Management: activity encouraged  Taken 5/14/2022 2100 by Adam Garg LPN  Activity Management:   activity adjusted per tolerance   activity encouraged   up to bedside commode  Taken 5/14/2022 1959 by Adam Garg LPN  Activity Management:   activity adjusted per tolerance   activity encouraged   back to bed  Intervention: Prevent Infection  Description: Maintain skin and mucous membrane integrity; promote hand, oral and pulmonary hygiene.  Optimize fluid balance, nutrition, sleep and glycemic control to maximize infection resistance.  Identify potential sources of infection early to prevent or mitigate progression of infection (e.g., wound, lines, devices).  Evaluate ongoing need for invasive devices; remove promptly when no longer indicated.  Recent Flowsheet Documentation  Taken 5/15/2022 0100 by Adam Garg LPN  Infection Prevention:   visitors restricted/screened   single patient room provided   rest/sleep promoted   personal protective equipment utilized   hand hygiene promoted  Taken 5/14/2022 2310 by Adam Garg LPN  Infection Prevention:   visitors restricted/screened   single patient room provided   personal protective equipment utilized   hand hygiene promoted   rest/sleep promoted  Taken 5/14/2022 2100 by Adam Garg LPN  Infection Prevention:   visitors  restricted/screened   single patient room provided   rest/sleep promoted   personal protective equipment utilized   hand hygiene promoted  Taken 5/14/2022 1959 by Adam Garg LPN  Infection Prevention:   visitors restricted/screened   rest/sleep promoted   single patient room provided   personal protective equipment utilized   hand hygiene promoted  Goal: Optimal Comfort and Wellbeing  Intervention: Monitor Pain and Promote Comfort  Description: Assess pain level, treatment efficacy and patient response at regular intervals using a consistent pain scale.  Consider the presence and impact of preexisting chronic pain.  Encourage patient and caregiver involvement in pain assessment, interventions and safety measures.  Recent Flowsheet Documentation  Taken 5/14/2022 2347 by Adam Garg LPN  Pain Management Interventions:   quiet environment facilitated   see MAR   position adjusted   pain management plan reviewed with patient/caregiver     Problem: Fall Injury Risk  Goal: Absence of Fall and Fall-Related Injury  Intervention: Identify and Manage Contributors  Description: Develop a fall prevention plan with the patient and caregiver/family.  Provide reorientation, appropriate sensory stimulation and routines with changes in mental status to decrease risk of fall.  Promote use of personal vision and auditory aids.  Assess assistance level required for safe and effective self-care; provide support as needed, such as toileting, mobilization. For age 65 and older, implement timed toileting with assistance.  Encourage physical activity, such as performance of mobility and self-care at highest level of patient ability, multicomponent exercise program and provision of appropriate assistive devices.  If fall occurs, assess the severity of injury; implement fall injury protocol. Determine the cause and revise fall injury prevention plan.  Regularly review medication contribution to fall risk; adjust medication  administration times to minimize risk of falling.  Consider risk related to polypharmacy and age.  Balance adequate pain management with potential for oversedation.  Recent Flowsheet Documentation  Taken 5/15/2022 0100 by Adam Garg LPN  Medication Review/Management: medications reviewed  Taken 5/14/2022 2310 by Adam Garg LPN  Medication Review/Management: medications reviewed  Taken 5/14/2022 2100 by Adam Garg LPN  Medication Review/Management: medications reviewed  Taken 5/14/2022 1959 by Adam Garg LPN  Medication Review/Management: medications reviewed  Intervention: Promote Injury-Free Environment  Description: Provide a safe, barrier-free environment that encourages independent activity.  Keep care area uncluttered and well-lighted.  Determine need for increased observation or monitoring.  Avoid use of devices that minimize mobility, such as restraints or indwelling urinary catheter.  Recent Flowsheet Documentation  Taken 5/15/2022 0100 by Adam Garg LPN  Safety Promotion/Fall Prevention:   safety round/check completed   room organization consistent   nonskid shoes/slippers when out of bed   muscle strengthening facilitated   mobility aid in reach   lighting adjusted   fall prevention program maintained   clutter free environment maintained   assistive device/personal items within reach   activity supervised  Taken 5/14/2022 2310 by Adam Garg LPN  Safety Promotion/Fall Prevention:   safety round/check completed   room organization consistent   nonskid shoes/slippers when out of bed   muscle strengthening facilitated   mobility aid in reach   lighting adjusted   fall prevention program maintained   clutter free environment maintained   assistive device/personal items within reach   activity supervised  Taken 5/14/2022 2100 by Adam Garg LPN  Safety Promotion/Fall Prevention:   safety round/check completed   room organization consistent    nonskid shoes/slippers when out of bed   muscle strengthening facilitated   mobility aid in reach   lighting adjusted   fall prevention program maintained   clutter free environment maintained   assistive device/personal items within reach   activity supervised  Taken 5/14/2022 1959 by Adam Garg LPN  Safety Promotion/Fall Prevention:   safety round/check completed   room organization consistent   nonskid shoes/slippers when out of bed   muscle strengthening facilitated   mobility aid in reach   lighting adjusted   fall prevention program maintained   clutter free environment maintained   assistive device/personal items within reach   activity supervised     Problem: Asthma Comorbidity  Goal: Maintenance of Asthma Control  Intervention: Maintain Asthma Symptom Control  Description: Evaluate adherence to self-management (asthma action plan), such as medication, symptom-control, trigger-avoidance and self-monitoring.  Advocate for continuation of home regimen, including medication, method of delivery, schedule and symptom monitoring; acknowledge preferred modality and routine.  Minimize exposure to potential triggers, such as perfume, cleaning chemicals and all types of smoke.  Assess for proper use of inhaled medication and delivery technique; assist or reinstruct if needed.  Evaluate effectiveness of coping skills; encourage expression of feelings, expectations and concerns related to disease management and quality of life; reinforce education to enhance management plan and wellbeing.  Recent Flowsheet Documentation  Taken 5/15/2022 0100 by Adam Garg LPN  Medication Review/Management: medications reviewed  Taken 5/14/2022 2310 by Adam Garg LPN  Medication Review/Management: medications reviewed  Taken 5/14/2022 2100 by Adam Garg LPN  Medication Review/Management: medications reviewed  Taken 5/14/2022 1959 by Adam Garg LPN  Medication Review/Management:  medications reviewed     Problem: Behavioral Health Comorbidity  Goal: Maintenance of Behavioral Health Symptom Control  Intervention: Maintain Behavioral Health Symptom Control  Description: Confirm mental health diagnosis and current treatment.  Evaluate adherence to previously identified self-management plan.  Advocate continuation of home strategies, including medication.  Evaluate effectiveness of self-management strategies and coping skills.  Communicate with providers to ensure continuity and follow-up at transition.  Recent Flowsheet Documentation  Taken 5/15/2022 0100 by Adam Garg LPN  Medication Review/Management: medications reviewed  Taken 5/14/2022 2310 by Adam Garg LPN  Medication Review/Management: medications reviewed  Taken 5/14/2022 2100 by Adam Garg LPN  Medication Review/Management: medications reviewed  Taken 5/14/2022 1959 by Adam Garg LPN  Medication Review/Management: medications reviewed     Problem: COPD (Chronic Obstructive Pulmonary Disease) Comorbidity  Goal: Maintenance of COPD Symptom Control  Intervention: Maintain COPD-Symptom Control  Description: Evaluate adherence to management plan (e.g., medication, trigger avoidance, infection prevention, self-monitoring).  Advocate for continuation of home regimen, including medication, method of delivery, schedule and symptom monitoring.  Anticipate the need for breathing techniques and activity pacing to minimize fatigue and breathlessness.  Assess for proper use of inhaled medication and delivery technique; assist or reinstruct if needed.  Evaluate effectiveness of coping skills; encourage expression of feelings, expectations and concerns related to disease management and quality of life; reinforce education to enhance management plan and wellbeing.  Recent Flowsheet Documentation  Taken 5/15/2022 0100 by Adam Garg LPN  Medication Review/Management: medications reviewed  Taken 5/14/2022  2310 by Adam Garg LPN  Medication Review/Management: medications reviewed  Taken 5/14/2022 2100 by Adam Garg LPN  Medication Review/Management: medications reviewed  Taken 5/14/2022 1959 by Adam Garg LPN  Medication Review/Management: medications reviewed     Problem: Heart Failure Comorbidity  Goal: Maintenance of Heart Failure Symptom Control  Intervention: Maintain Heart Failure-Management  Description: Evaluate adherence to home heart failure self-care regimen (e.g., medication, fluid balance, sodium intake, daily weight, physical activity, telemonitoring, support).  Advocate continuation of home medication and schedule.  Consider pharmacologic therapy administration time and effects (e.g., avoid giving diuretic prior to bedtime or nitrates on empty stomach).  Monitor response to pharmacologic therapy, including weight fluctuations, blood pressure and electrolyte levels.  Monitor for signs and symptoms of anxiety and depression, including severity and duration; if present, provide psychosocial support.  Consider need for heart failure clinic or palliative care consult.  Recent Flowsheet Documentation  Taken 5/15/2022 0100 by Adam Garg LPN  Medication Review/Management: medications reviewed  Taken 5/14/2022 2310 by Adam Garg LPN  Medication Review/Management: medications reviewed  Taken 5/14/2022 2100 by Adam Garg LPN  Medication Review/Management: medications reviewed  Taken 5/14/2022 1959 by Adam Garg LPN  Medication Review/Management: medications reviewed     Problem: Hypertension Comorbidity  Goal: Blood Pressure in Desired Range  Intervention: Maintain Blood Pressure Management  Description: Evaluate adherence to home antihypertensive regimen (e.g., exercise and activity, diet modification, medication).  Provide scheduled antihypertensive medication; consider administration time and effects (e.g., avoid giving diuretic prior to  bedtime).  Monitor response to antihypertensive medication therapy (e.g., blood pressure, electrolyte levels, medication effects).  Minimize risk of orthostatic hypotension; encourage caution with position changes, particularly if elderly.  Recent Flowsheet Documentation  Taken 5/15/2022 0100 by Adam Garg LPN  Medication Review/Management: medications reviewed  Taken 5/14/2022 2310 by Adam Garg LPN  Medication Review/Management: medications reviewed  Taken 5/14/2022 2100 by Adam Garg LPN  Medication Review/Management: medications reviewed  Taken 5/14/2022 1959 by Adam Garg LPN  Medication Review/Management: medications reviewed     Problem: Osteoarthritis Comorbidity  Goal: Maintenance of Osteoarthritis Symptom Control  Intervention: Maintain Osteoarthritis Symptom Control  Description: Evaluate adherence to self-management plan, such as medication, exercise and weight management.  Advocate for continuation of home regimen, such as medication, physical activity and thermal agents; monitor response.  Encourage participation in functional activities, such as mobility and ADLs (activities of daily living) to minimize decline associated with inactivity.  Facilitate use of patient-specific assistive devices, equipment or orthoses.  Evaluate effectiveness of coping skills; encourage expression of feelings, expectations and concerns related to disease management and quality of life; reinforce education to enhance management plan and wellbeing.  Recent Flowsheet Documentation  Taken 5/15/2022 0100 by Adam Garg LPN  Activity Management:   activity encouraged   ambulated to bathroom   up to bedside commode  Assistive Device Utilized:   walker   gait belt  Medication Review/Management: medications reviewed  Taken 5/14/2022 2310 by Adam Garg LPN  Activity Management: activity encouraged  Medication Review/Management: medications reviewed  Taken 5/14/2022 2100 by  Adam Garg LPN  Activity Management:   activity adjusted per tolerance   activity encouraged   up to bedside commode  Medication Review/Management: medications reviewed  Taken 5/14/2022 1959 by Adam Garg LPN  Activity Management:   activity adjusted per tolerance   activity encouraged   back to bed  Assistive Device Utilized:   cane   walker  Medication Review/Management: medications reviewed     Problem: Pain Chronic (Persistent) (Comorbidity Management)  Goal: Acceptable Pain Control and Functional Ability  Intervention: Manage Persistent Pain  Description: Evaluate pain level, effect of treatment and patient response at regular intervals.  Minimize pain stimuli; coordinate care and adjust environment (e.g., light, noise, unnecessary movement); promote sleep/rest.  Match pharmacologic analgesia to severity and type of pain mechanism (e.g., neuropathic, muscle, inflammatory); consider multimodal approach (e.g., nonopioid, opioid, adjuvant).  Provide medication at regular intervals; titrate to patient response.  Manage breakthrough pain with additional doses; consider rotation or switching medication.  Monitor for signs of substance tolerance (increased dose to reach desired effect, decreased effect with same dose).  Avoid abrupt withdrawal of medication, especially agents capable of causing physical dependence.  Manage medication-induced effects, such as constipation, nausea, pruritus, urinary retention, somnolence and dizziness.  Provide multimodal treatment interventions, such as physical activity, therapeutic exercise, yoga, TENS (transcutaneous electrical nerve stimulation) and manual therapy.  Train in functional activity modifications, such as body mechanics, posture, ergonomics, energy conservation and activity pacing.  Consider addition of complementary or alternative therapy, such as acupuncture, hypnosis or therapeutic touch.  Recent Flowsheet Documentation  Taken 5/15/2022 0100 by  Adam Garg LPN  Medication Review/Management: medications reviewed  Taken 5/14/2022 2310 by Adam Garg LPN  Medication Review/Management: medications reviewed  Taken 5/14/2022 2100 by Adam Garg LPN  Medication Review/Management: medications reviewed  Taken 5/14/2022 1959 by Adam Garg LPN  Medication Review/Management: medications reviewed  Intervention: Develop Pain Management Plan  Description: Acknowledge patient as the expert in pain self-management.  Use a consistent, validated tool for pain assessment; include function and quality of life.  Evaluate risk for opioid use and dependence.  Set pain management goals; determine acceptable level of discomfort to allow for maximal functioning and quality of life.  Determine [id number] pain management plan, including both pharmacologic and nonpharmacologic measures.  Identify and integrate past successful treatment measures, if able.  Encourage patient and caregiver involvement in pain assessment, interventions and safety measures.  Re-evaluate plan regularly.  Recent Flowsheet Documentation  Taken 5/14/2022 2347 by Adam Garg LPN  Pain Management Interventions:   quiet environment facilitated   see MAR   position adjusted   pain management plan reviewed with patient/caregiver     Problem: Seizure Disorder Comorbidity  Goal: Maintenance of Seizure Control  Intervention: Maintain Seizure-Symptom Control  Description: Identify risks that may lower seizure threshold (e.g., hypoglycemia, illness, change in medications); assist in treatment.  Evaluate adherence to management plan (e.g., medication, symptom-control, trigger-avoidance, self-monitoring).  Advocate for continuation of home regimen, including medication, medication administration schedule, diet, sleep schedule and symptom monitoring; acknowledge home management and routine.  Minimize potential seizure triggers, such as stress, sleep deprivation and  diet modifications.  Evaluate effectiveness of coping skills; encourage expression of feelings, expectations and concerns related to disease management and quality of life; reinforce education to enhance management plan and wellbeing.  Maintain seizure precautions, such as removal of potential harmful objects, padded side rails, bed in low position, suction and airway protection equipment readily available.  Provide a quiet, calm environment.  If seizure occurs, stay at bedside; monitor and record details of seizure activity.  Maintain patent airway; do not insert anything into the mouth (unless the airway becomes compromised).  Place head of bed flat and turn to side-lying position to prevent aspiration and promote safety; do not restrain.  Anticipate administration of pharmacologic therapy, such as anticonvulsant.  Assess for injury following seizure; monitor, reassure and reorient patient.  Recent Flowsheet Documentation  Taken 5/14/2022 1959 by Adam Garg LPN  Seizure Precautions: activity supervised   Goal Outcome Evaluation:

## 2022-05-16 LAB
ANION GAP SERPL CALCULATED.3IONS-SCNC: 11 MMOL/L (ref 5–15)
BASOPHILS # BLD AUTO: 0.1 10*3/MM3 (ref 0–0.2)
BASOPHILS NFR BLD AUTO: 1.1 % (ref 0–1.5)
BUN SERPL-MCNC: 22 MG/DL (ref 8–23)
BUN/CREAT SERPL: 18.2 (ref 7–25)
CALCIUM SPEC-SCNC: 10.2 MG/DL (ref 8.6–10.5)
CHLORIDE SERPL-SCNC: 107 MMOL/L (ref 98–107)
CO2 SERPL-SCNC: 26 MMOL/L (ref 22–29)
CREAT SERPL-MCNC: 1.21 MG/DL (ref 0.57–1)
DEPRECATED RDW RBC AUTO: 46.8 FL (ref 37–54)
EGFRCR SERPLBLD CKD-EPI 2021: 47.1 ML/MIN/1.73
EOSINOPHIL # BLD AUTO: 0.2 10*3/MM3 (ref 0–0.4)
EOSINOPHIL NFR BLD AUTO: 2.6 % (ref 0.3–6.2)
ERYTHROCYTE [DISTWIDTH] IN BLOOD BY AUTOMATED COUNT: 15.2 % (ref 12.3–15.4)
FOLATE SERPL-MCNC: 3.57 NG/ML (ref 4.78–24.2)
GLUCOSE SERPL-MCNC: 90 MG/DL (ref 65–99)
HCT VFR BLD AUTO: 38.6 % (ref 34–46.6)
HGB BLD-MCNC: 12.5 G/DL (ref 12–15.9)
LYMPHOCYTES # BLD AUTO: 1 10*3/MM3 (ref 0.7–3.1)
LYMPHOCYTES NFR BLD AUTO: 14.8 % (ref 19.6–45.3)
MCH RBC QN AUTO: 28.5 PG (ref 26.6–33)
MCHC RBC AUTO-ENTMCNC: 32.5 G/DL (ref 31.5–35.7)
MCV RBC AUTO: 87.8 FL (ref 79–97)
MONOCYTES # BLD AUTO: 0.6 10*3/MM3 (ref 0.1–0.9)
MONOCYTES NFR BLD AUTO: 9 % (ref 5–12)
NEUTROPHILS NFR BLD AUTO: 4.9 10*3/MM3 (ref 1.7–7)
NEUTROPHILS NFR BLD AUTO: 72.5 % (ref 42.7–76)
NRBC BLD AUTO-RTO: 0 /100 WBC (ref 0–0.2)
PLATELET # BLD AUTO: 241 10*3/MM3 (ref 140–450)
PMV BLD AUTO: 6.6 FL (ref 6–12)
POTASSIUM SERPL-SCNC: 4.1 MMOL/L (ref 3.5–5.2)
RBC # BLD AUTO: 4.4 10*6/MM3 (ref 3.77–5.28)
SODIUM SERPL-SCNC: 144 MMOL/L (ref 136–145)
TSH SERPL DL<=0.05 MIU/L-ACNC: 6.24 UIU/ML (ref 0.27–4.2)
VIT B12 BLD-MCNC: 319 PG/ML (ref 211–946)
WBC NRBC COR # BLD: 6.7 10*3/MM3 (ref 3.4–10.8)

## 2022-05-16 PROCEDURE — 99213 OFFICE O/P EST LOW 20 MIN: CPT | Performed by: PSYCHIATRY & NEUROLOGY

## 2022-05-16 PROCEDURE — 36415 COLL VENOUS BLD VENIPUNCTURE: CPT | Performed by: HOSPITALIST

## 2022-05-16 PROCEDURE — 85025 COMPLETE CBC W/AUTO DIFF WBC: CPT | Performed by: HOSPITALIST

## 2022-05-16 PROCEDURE — 99204 OFFICE O/P NEW MOD 45 MIN: CPT | Performed by: INTERNAL MEDICINE

## 2022-05-16 PROCEDURE — 82746 ASSAY OF FOLIC ACID SERUM: CPT | Performed by: PSYCHIATRY & NEUROLOGY

## 2022-05-16 PROCEDURE — 80048 BASIC METABOLIC PNL TOTAL CA: CPT | Performed by: HOSPITALIST

## 2022-05-16 PROCEDURE — 82607 VITAMIN B-12: CPT | Performed by: PSYCHIATRY & NEUROLOGY

## 2022-05-16 PROCEDURE — 97161 PT EVAL LOW COMPLEX 20 MIN: CPT

## 2022-05-16 PROCEDURE — 25010000002 CEFTRIAXONE PER 250 MG: Performed by: HOSPITALIST

## 2022-05-16 PROCEDURE — 25010000002 MORPHINE PER 10 MG: Performed by: HOSPITALIST

## 2022-05-16 PROCEDURE — 84443 ASSAY THYROID STIM HORMONE: CPT | Performed by: PSYCHIATRY & NEUROLOGY

## 2022-05-16 PROCEDURE — 99225 PR SBSQ OBSERVATION CARE/DAY 25 MINUTES: CPT | Performed by: HOSPITALIST

## 2022-05-16 PROCEDURE — 96376 TX/PRO/DX INJ SAME DRUG ADON: CPT

## 2022-05-16 PROCEDURE — G0378 HOSPITAL OBSERVATION PER HR: HCPCS

## 2022-05-16 RX ORDER — ATORVASTATIN CALCIUM 40 MG/1
80 TABLET, FILM COATED ORAL NIGHTLY
Status: DISCONTINUED | OUTPATIENT
Start: 2022-05-16 | End: 2022-05-17 | Stop reason: HOSPADM

## 2022-05-16 RX ORDER — AMLODIPINE BESYLATE 5 MG/1
10 TABLET ORAL
Status: DISCONTINUED | OUTPATIENT
Start: 2022-05-16 | End: 2022-05-17 | Stop reason: HOSPADM

## 2022-05-16 RX ORDER — METOPROLOL TARTRATE 50 MG/1
50 TABLET, FILM COATED ORAL EVERY 12 HOURS SCHEDULED
Status: DISCONTINUED | OUTPATIENT
Start: 2022-05-16 | End: 2022-05-16

## 2022-05-16 RX ADMIN — Medication 10 ML: at 08:08

## 2022-05-16 RX ADMIN — AMLODIPINE BESYLATE 10 MG: 5 TABLET ORAL at 15:40

## 2022-05-16 RX ADMIN — ATORVASTATIN CALCIUM 80 MG: 40 TABLET, FILM COATED ORAL at 20:12

## 2022-05-16 RX ADMIN — RIVAROXABAN 20 MG: 20 TABLET, FILM COATED ORAL at 17:35

## 2022-05-16 RX ADMIN — Medication 10 ML: at 20:14

## 2022-05-16 RX ADMIN — CEFTRIAXONE 2 G: 2 INJECTION, POWDER, FOR SOLUTION INTRAMUSCULAR; INTRAVENOUS at 08:08

## 2022-05-16 RX ADMIN — MORPHINE SULFATE 1 MG: 2 INJECTION, SOLUTION INTRAMUSCULAR; INTRAVENOUS at 15:52

## 2022-05-16 NOTE — PROGRESS NOTES
LOS: 0 days     Rosario Ortez is a 72 y.o. female     Chief Complaint:  Follow up for loss of balance.        SUBJECTIVE:  History taken from: patient chart family RN    Interval History: 72 yr old lady with CAD, a-fib on Xarelto who had a fall while standing up from recliner and fell down.  The back of head strikes a hard surface. She was not certain about having a dizzy spell.  She presented to ER of [facility] and admitted for further management.  A MRI of Brain showed possible high density linear area in left parietal-temporal region.  On subsequent CT of Head it is not seen most likely resolved. She does not have any focal weakness,  Seems to be at her baseline.           Patient Complaints: none.       Review of Systems   Review of Systems   Review of Systems   Constitutional: Negative  HENT: Negative.    Eyes: Negative.    Respiratory: Negative.    Cardiovascular: CAD, A-fib.    Gastrointestinal: Negative.    Genitourinary: Negative.    Musculoskeletal: Negative  Skin: Negative.    Neurological: balance problems.    Hematological: Negative.    Psychiatric/Behavioral: Negative.      Pertinent PMH:  has a past medical history of Coronary artery disease.   ________________________________________________     OBJECTIVE:  The patient is laying is sitting in a chair in no apparent distress. Head NC, AT, Neck supple, trachea midline.  Lungs CTA,  Good pulmonary effort.  CV  S1-S2 no murmur. Abdomen soft, non tender.  Ext no edema, no cyanosis.            Neurologic Exam    The patient is awake, alert, oriented x 3, speech is fluent with good comprehension, follow commands. Name common objects.  CN VFFC, EOMI, no facial droop. Tongue midline. Motor 5/5. Sensory light touch intact. Reflexes absent, plantar mute. Cerebellum finger to nose intact.    ________________________________________________   RESULTS REVIEW    VITAL SIGNS:  Temp:  [97.6 °F (36.4 °C)-98.1 °F (36.7 °C)] 98.1 °F (36.7 °C)  Heart Rate:  [53-65]  59  Resp:  [16-18] 16  BP: (122-136)/(63-76) 130/63    LABS:   Lab Results   Component Value Date    WBC 6.70 05/16/2022    HGB 12.5 05/16/2022    HCT 38.6 05/16/2022    MCV 87.8 05/16/2022     05/16/2022     Lab Results   Component Value Date    GLUCOSE 90 05/16/2022    BUN 22 05/16/2022    CREATININE 1.21 (H) 05/16/2022    BCR 18.2 05/16/2022    K 4.1 05/16/2022    CO2 26.0 05/16/2022    CALCIUM 10.2 05/16/2022    ALBUMIN 3.90 05/14/2022    LABIL2 1.3 10/16/2019    AST 41 (H) 05/14/2022    ALT 34 (H) 05/14/2022       Lab Results   Component Value Date    TSH 6.240 (H) 05/16/2022    LDL UNABLE TO CALCULATE 10/16/2019    HGBA1C 5.9 (H) 10/16/2019         IMAGING STUDIES:  CT Head Without Contrast    Result Date: 5/15/2022    1.  Interval resolution of small linear area of increased density within the periventricular white matter of the posterior left temporal lobe compatible with resolution of hemorrhage.  No evidence of intracranial hemorrhage or infarct.  Electronically Signed By-Abdirizak Bella MD On:5/15/2022 1:07 PM This report was finalized on [id number] by  Abdirizak Bella MD.      I reviewed the patient's new clinical results.    ________________________________________________      PROBLEM LIST:    Laceration of scalp, initial encounter    Acute kidney injury (HCC)    Acute cystitis        Assessment & Plan   ASSESSMENT/PLAN:  72 year old lady with coronary artery disease who had a fall while trying to get up from her recliner.  She may have a syncopal episode.  MRI of Brain showed some changes that may be suggestive of very small hemorrhage in left parietal temporal region. Subsequent CT of Head showed no evidence of cerebral cortical hemorrhage.   Rec:  Will continue supportive care.  No new thoughts to offer.  Patient is signed off.  Please call for further assistance.     **Please refer to previous notes for further details and recommendations.     I discussed the patients findings and my  recommendations with patient, family and nursing staff    Makayla Ramos MD  05/16/22  10:57 EDT

## 2022-05-16 NOTE — PLAN OF CARE
Goal Outcome Evaluation:  Plan of Care Reviewed With: patient, spouse        Progress: improving  Outcome Evaluation: 71 yo female adm 5/14/22 after several days of dizziness. This resulted in fall at home w/ laceration to scalp (occipital region) requiring sutures. MRI (-) for infarct. CT (+) for small L temperoparietal hemorrhage. Followup CT shows area of hemorrhage has resolved. Pt no longer having symptoms of dizziness at all, even w/ movement of head. Pt denies that movement ever resulted in spinning sensation. Caused more lightheadedness and feeling of motion sickness, but this has all resolved. Symptoms are not consistent w/ BPPV. More likely caused by cerebral hemorrhage, which has now resolved. At baseline, pt is able to amb w/o assistive device. She has a hip replacement in April, 2022, and she was participating in OP PT to address that. Strength today is WFL. Pt able to amb 240 ft w/o assistive device or loss of balance. She appears to be at her baseline level for mobility. Recommend pt continue her OP PT program. No need for IP PT at this time. Will sign off.

## 2022-05-16 NOTE — PLAN OF CARE
Goal Outcome Evaluation:           Progress: improving   Pt complains of pain in back and back of head, relieved with tylenol. Has been resting comfortably throughout the night. Will continue to monitor.

## 2022-05-16 NOTE — THERAPY EVALUATION
Patient Name: Rosario Ortez  : 1950    MRN: [id number]                              Today's Date: 2022       Admit Date: 2022    Visit Dx:     ICD-10-CM ICD-9-CM   1. Laceration of scalp, initial encounter  S01.01XA 873.0   2. Injury of head, initial encounter  S09.90XA 959.01   3. Acute kidney injury (HCC)  N17.9 584.9   4. Dizziness  R42 780.4   5. Urinary tract infection with hematuria, site unspecified  N39.0 599.0    R31.9 599.70     Patient Active Problem List   Diagnosis   • Laceration of scalp, initial encounter   • Acute kidney injury (HCC)   • Acute cystitis     Past Medical History:   Diagnosis Date   • Coronary artery disease      Past Surgical History:   Procedure Laterality Date   • BREAST BIOPSY     • HYSTERECTOMY        General Information     Row Name 22 1258          Physical Therapy Time and Intention    Document Type evaluation  -CM     Mode of Treatment physical therapy  -CM     Row Name 22 1258          General Information    Patient Profile Reviewed yes  -CM     Prior Level of Function independent:;all household mobility;gait;community mobility  ambulates w/o assistive device; usually uses grocery cart for support in large stores, limits distance. No home O2.  -CM     Existing Precautions/Restrictions fall  -CM     Barriers to Rehab none identified  -CM     Row Name 22 1258          Living Environment    People in Home child(clint), adult;spouse  -CM     Row Name 22 1258          Home Main Entrance    Number of Stairs, Main Entrance three  -CM     Stair Railings, Main Entrance railings safe and in good condition  -CM     Row Name 22 1258          Stairs Within Home, Primary    Number of Stairs, Within Home, Primary none  -CM     Row Name 22 1258          Cognition    Orientation Status (Cognition) oriented x 4  -CM           User Key  (r) = Recorded By, (t) = Taken By, (c) = Cosigned By    Initials Name Provider Type    Shital Sharif  C, PT Physical Therapist               Mobility     Row Name 05/16/22 1300          Bed Mobility    Bed Mobility supine-sit  -CM     Supine-Sit [place] (Bed Mobility) independent  -CM     Assistive Device (Bed Mobility) head of bed elevated  -CM     Row Name 05/16/22 1300          Sit-Stand Transfer    Sit-Stand [place] (Transfers) supervision  -CM     Assistive Device (Sit-Stand Transfers) other (see comments)  gait belt, non skid socks  -CM     Row Name 05/16/22 1300          Gait/Stairs (Locomotion)    [place] Level (Gait) independent  -CM     Assistive Device (Gait) other (see comments)  began w/ rw, but pt was not really using, so moved aside rw and pt continued w/ only use of gait belt  -CM     Distance in Feet (Gait) 240 ft; no loss of balance; slow gait speed, but no other gait deviations noted.  -CM           User Key  (r) = Recorded By, (t) = Taken By, (c) = Cosigned By    Initials Name Provider Type    Shital Sharif, PT Physical Therapist               Obj/Interventions     Row Name 05/16/22 1301          Range of Motion Comprehensive    General Range of Motion no range of motion deficits identified  -CM     Row Name 05/16/22 1301          Strength Comprehensive (MMT)    General Manual Muscle Testing (MMT) Assessment no strength deficits identified  -CM     Comment, General Manual Muscle Testing (MMT) Assessment all strength WFL  -CM     Row Name 05/16/22 1303 05/16/22 1301       Balance    Balance Assessment -- sitting static balance  -CM    Static Sitting Balance -- independent  -CM    Dynamic Sitting Balance -- independent  -CM    Position, Sitting Balance -- unsupported;sitting edge of bed  -CM    Static Standing Balance -- independent  -CM    Dynamic Standing Balance -- supervision  -CM    Comment, Balance scores 25 of 28 on Tinetti balance scale, which is indicative of low risk for falls. Pt denies any spinning sensation at any time w/ her onset of dizziness. PT performed ailyn  hallpike test and had (-) result to both sides.  -CM --    Row Name 05/16/22 1308          Sensory Assessment (Somatosensory)    Sensory Assessment (Somatosensory) other (see comments)  has peripheral neuropathy w/ intermittent numbness; sensation present in both feet but diminished.  -CM           User Key  (r) = Recorded By, (t) = Taken By, (c) = Cosigned By    Initials Name Provider Type    Shital hSarif, PT Physical Therapist               Goals/Plan    No documentation.                Clinical Impression     Row Name 05/16/22 1301          Pain    Pretreatment Pain Rating 0/10 - no pain  -CM     Posttreatment Pain Rating 0/10 - no pain  -CM     Pre/Posttreatment Pain Comment no symptoms of dizziness or lightheadedness at all this date.  -CM     Pain Intervention(s) Repositioned;Emotional support;Ambulation/increased activity;Therapeutic presence;Therapeutic touch  -CM     Row Name 05/16/22 2450          Plan of Care Review    Plan of Care Reviewed With patient;spouse  -CM     Progress improving  -CM     Outcome Evaluation 71 yo female adm 5/14/22 after several days of dizziness. This resulted in fall at home w/ laceration to scalp (occipital region) requiring sutures. MRI (-) for infarct. CT (+) for small L temperoparietal hemorrhage. Followup CT shows area of hemorrhage has resolved. Pt no longer having symptoms of dizziness at all, even w/ movement of head. Pt denies that movement ever resulted in spinning sensation. Caused more lightheadedness and feeling of motion sickness, but this has all resolved. Symptoms are not consistent w/ BPPV. More likely caused by cerebral hemorrhage, which has now resolved. At baseline, pt is able to amb w/o assistive device. She has a hip replacement in April, 2022, and she was participating in OP PT to address that. Strength today is WFL. Pt able to amb 240 ft w/o assistive device or loss of balance. She appears to be at her baseline level for mobility. Recommend pt  continue her OP PT program. No need for IP PT at this time. Will sign off.  -     Row Name 05/16/22 1311          Therapy Assessment/Plan (PT)    Criteria for Skilled Interventions Met (PT) no;no problems identified which require skilled intervention  -     Therapy Frequency (PT) evaluation only  -     Row Name 05/16/22 1311          Vital Signs    Recovery Time VSS  -[facility] Name 05/16/22 1311          Positioning and Restraints    Pre-Treatment Position in bed  -     Post Treatment Position chair  -CM     In Chair notified nsg;sitting;call light within reach;encouraged to call for assist;exit alarm on;with family/caregiver  -           User Key  (r) = Recorded By, (t) = Taken By, (c) = Cosigned By    Initials Name Provider Type    Shital Sharif, PT Physical Therapist               Outcome Measures     [facility] Name 05/16/22 1302 05/16/22 0701       How much help from another person do you currently need...    Turning from your back to your side while in flat bed without using bedrails? 4  -CM 4  -EH    Moving from lying on back to sitting on the side of a flat bed without bedrails? 4  -CM 4  -EH    Moving to and from a bed to a chair (including a wheelchair)? 4  -CM 3  -EH    Standing up from a chair using your arms (e.g., wheelchair, bedside chair)? 4  -CM 3  -EH    Climbing 3-5 steps with a railing? 3  -CM 3  -EH    To walk in hospital room? 4  -CM 3  -EH    AM-PAC 6 Clicks Score (PT) 23  -CM 20  -EH    Highest level of mobility 7 --> Walked 25 feet or more  -CM 6 --> Walked 10 steps or more  -EH    Row Name 05/16/22 1302          Modified Keene Scale    Pre-Stroke Modified Keene Scale 0 - No Symptoms at all.  -CM     Modified Jones Scale 0 - No Symptoms at all.  -[facility] Name 05/16/22 1302          Tinetti Assessment    Tinetti Assessment yes  -CM     Sitting Balance 1  -CM     Arises 1  -CM     Attempts to Rise 2  -CM     Immediate Standing Balance (first 5 sec) 2  -CM     Standing Balance  "2  -CM     Sternal Nudge (feet close together) 0  -CM     Eyes Closed (feet close together) 1  -CM     Turning 360 Degrees- Steps 1  -CM     Turning 360 Degrees- Steadiness 1  -CM     Sitting Down 2  -CM     Tinetti Balance Score 13  -CM     Gait Initiation (immediate after told \"go\") 1  -CM     Step Length- Right Swing 1  -CM     Step Length- Left Swing 1  -CM     Foot Clearance- Right Foot 1  -CM     Foot Clearance- Left Foot 1  -CM     Step Symmetry 1  -CM     Step Continuity 1  -CM     Path (excursion) 2  -CM     Trunk 2  -CM     Base of Support 1  -CM     Gait Score 12  -CM     Tinetti Total Score 25  -CM     Row Name 05/16/22 1302          Functional Assessment    Outcome Measure Options Modified Jones;Tinetti  -CM           User Key  (r) = Recorded By, (t) = Taken By, (c) = Cosigned By    Initials Name Provider Type    CM Shital Law, PT Physical Therapist    Miguelina Tolbert RN Registered Nurse                             Physical Therapy Education                 Title: PT OT SLP Therapies (In Progress)     Topic: Physical Therapy (Done)     Point: Mobility training (Done)     Learning Progress Summary           Patient Acceptance, E,TB,H, VU,DU by  at 5/16/2022 1312    Comment: also gave handout on signs/symptoms of cva   Significant Other Acceptance, E,TB,H, VU,DU by  at 5/16/2022 1312    Comment: also gave handout on signs/symptoms of cva                               User Key     Initials Effective Dates Name Provider Type Discipline     06/16/21 -  Shital Law, PT Physical Therapist PT              PT Recommendation and Plan     Plan of Care Reviewed With: patient, spouse  Progress: improving  Outcome Evaluation: 73 yo female adm 5/14/22 after several days of dizziness. This resulted in fall at home w/ laceration to scalp (occipital region) requiring sutures. MRI (-) for infarct. CT (+) for small L temperoparietal hemorrhage. Followup CT shows area of hemorrhage has resolved. Pt no " longer having symptoms of dizziness at all, even w/ movement of head. Pt denies that movement ever resulted in spinning sensation. Caused more lightheadedness and feeling of motion sickness, but this has all resolved. Symptoms are not consistent w/ BPPV. More likely caused by cerebral hemorrhage, which has now resolved. At baseline, pt is able to amb w/o assistive device. She has a hip replacement in April, 2022, and she was participating in OP PT to address that. Strength today is WFL. Pt able to amb 240 ft w/o assistive device or loss of balance. She appears to be at her baseline level for mobility. Recommend pt continue her OP PT program. No need for IP PT at this time. Will sign off.     Time Calculation:    PT Charges     Row Name 05/16/22 1313             Time Calculation    Start Time 0856  -CM      Stop Time 0940  -CM      Time Calculation (min) 44 min  -CM      PT Received On 05/16/22  -CM              Time Calculation- PT    Total Timed Code Minutes- PT 0 minute(s)  -CM            User Key  (r) = Recorded By, (t) = Taken By, (c) = Cosigned By    Initials Name Provider Type    Shital Sharif, PT Physical Therapist              Therapy Charges for Today     Code Description Service Date Service Provider Modifiers Qty    10702399907 HC PT EVAL LOW COMPLEXITY 4 5/16/2022 Shital Law, PT GP 1          PT G-Codes  Outcome Measure Options: Milagro Davies  AM-PAC 6 Clicks Score (PT): 23  AM-PAC 6 Clicks Score (OT): 18  Modified Yoakum Scale: 0 - No Symptoms at all.  Milagro Total Score: 25    Shital Law PT  5/16/2022

## 2022-05-16 NOTE — PROGRESS NOTES
[facility] Medicine Services Daily Progress Note    Patient Name: Rsoario Ortez  : 1950  MRN: [id number]  Primary Care Physician:  Ian Cordero MD  Date of admission: 2022      Subjective      Chief Complaint: Fall with dizziness.     Subjective   Patient denies for any new complaint, no nausea no vomiting no abdominal pain.      Review of Systems   All other systems reviewed and are negative.         Objective      Vitals:   Temp:  [97.7 °F (36.5 °C)-98.1 °F (36.7 °C)] 98.1 °F (36.7 °C)  Heart Rate:  [53-65] 59  Resp:  [16-18] 16  BP: (122-136)/(63-76) 130/63    Physical Exam  Vitals and nursing note reviewed.   Constitutional:       General: She is not in acute distress.     Appearance: Normal appearance. She is well-developed. She is not ill-appearing, toxic-appearing or diaphoretic.   HENT:      Head: Normocephalic and atraumatic.      Right Ear: Ear canal and external ear normal.      Left Ear: Ear canal and external ear normal.      Nose: Nose normal. No congestion or rhinorrhea.      Mouth/Throat:      Mouth: Mucous membranes are moist.      Pharynx: No oropharyngeal exudate.   Eyes:      General: No scleral icterus.        Right eye: No discharge.         Left eye: No discharge.      Extraocular Movements: Extraocular movements intact.      Conjunctiva/sclera: Conjunctivae normal.      Pupils: Pupils are equal, round, and reactive to light.   Neck:      Thyroid: No thyromegaly.      Vascular: No carotid bruit or JVD.      Trachea: No tracheal deviation.   Cardiovascular:      Rate and Rhythm: Normal rate and regular rhythm.      Pulses: Normal pulses.      Heart sounds: Normal heart sounds. No murmur heard.    No friction rub. No gallop.   Pulmonary:      Effort: Pulmonary effort is normal. No respiratory distress.      Breath sounds: Normal breath sounds. No stridor. No wheezing, rhonchi or rales.   Chest:      Chest wall: No tenderness.   Abdominal:      General:  Bowel sounds are normal. There is no distension.      Palpations: Abdomen is soft. There is no mass.      Tenderness: There is no abdominal tenderness. There is no guarding or rebound.      Hernia: No hernia is present.   Musculoskeletal:         General: No swelling, tenderness, deformity or signs of injury. Normal range of motion.      Cervical back: Normal range of motion and neck supple. No rigidity. No muscular tenderness.      Right lower leg: No edema.      Left lower leg: No edema.   Lymphadenopathy:      Cervical: No cervical adenopathy.   Skin:     General: Skin is warm and dry.      Coloration: Skin is not jaundiced or pale.      Findings: No bruising, erythema or rash.   Neurological:      General: No focal deficit present.      Mental Status: She is alert and oriented to person, place, and time. Mental status is at baseline.      Cranial Nerves: No cranial nerve deficit.      Sensory: No sensory deficit.      Motor: No weakness or abnormal muscle tone.      Coordination: Coordination normal.   Psychiatric:         Mood and Affect: Mood normal.         Behavior: Behavior normal.         Thought Content: Thought content normal.         Judgment: Judgment normal.             Result Review    Result Review:  I have personally reviewed the results from the time of this admission to 5/16/2022 13:27 EDT and agree with these findings:  [x]  Laboratory  [x]  Microbiology  [x]  Radiology  []  EKG/Telemetry   []  Cardiology/Vascular   []  Pathology  []  Old records  []  Other:  Most notable findings include:     Wounds (last 24 hours)     LDA Wound     Row Name 05/16/22 0701 05/15/22 1901          Wound 05/14/22 1900 posterior occipital region Laceration    Wound - Properties Group Placement Date: 05/14/22  [facility] Placement Time: 1900  - Present on Hospital Admission: Y  -AH Orientation: posterior  -AH Location: occipital region  - Primary Wound Type: Laceration  -AH Additional Comments: from fall at home  -      Dressing Appearance -- dry;intact  -AB     Closure Open to air  -EH Open to air  -AB     Retired Wound - Properties Group Placement Date: 05/14/22  - Placement Time: 1900  -AH Present on Hospital Admission: Y  -AH Orientation: posterior  -AH Location: occipital region  -AH Primary Wound Type: Laceration  -AH Additional Comments: from fall at home  -     Retired Wound - Properties Group Date first assessed: 05/14/22  - Time first assessed: 1900  - Present on Hospital Admission: Y  -AH Location: occipital region  -AH Primary Wound Type: Laceration  -AH Additional Comments: from fall at home  -           User Key  (r) = Recorded By, (t) = Taken By, (c) = Cosigned By    Initials Name Provider Type     Miguelina Palma, RN Registered Nurse     Adam Garg LPN Licensed Nurse    Emmie Moy LPN Licensed Nurse                  Assessment & Plan      Brief Patient Summary:  Rosario Ortez is a 72 y.o. female who       cefTRIAXone, 2 g, Intravenous, Q24H  sodium chloride, 10 mL, Intravenous, Q12H             Active Hospital Problems:  Active Hospital Problems    Diagnosis    • Laceration of scalp, initial encounter    • Acute kidney injury (HCC)    • Acute cystitis      Plan:       Fall with dizziness likely secondary to severe dehydration leading to scalp laceration, MRI head no acute finding but questionable concern for demyelinating disease, DC iv fluids, PTOT evaluate and treat,  for safe discharge planning.  Consulted neurology, underwent repeat CT head without contrast revealing no evidence of intracranial bleed, MRI revealed possible questionable very small intracranial bleed.     Acute kidney injury improved... DC iv fluids, monitor renal function, avoid nephro toxic medication, monitor urine output.     Acute UTI ... iv rocephin, follow urine culture, wbc count improved.     Dvt prophylaxis with SCDs for now, will start lovenox sc     Physical deconditioning, will ask PT OT  evaluate and treat.    Anticipate discharge in next 24 to 48 hours if coming along well.        DVT prophylaxis:  Mechanical DVT prophylaxis orders are present.     CODE STATUS:    Level Of Support Discussed With: Patient  Code Status (Patient has no pulse and is not breathing): CPR (Attempt to Resuscitate)  Medical Interventions (Patient has pulse or is breathing): Full Support     Admission Status:  I believe this patient meets observation status.    DVT prophylaxis:  Medical and mechanical DVT prophylaxis orders are present.    CODE STATUS:    Level Of Support Discussed With: Patient  Code Status (Patient has no pulse and is not breathing): CPR (Attempt to Resuscitate)  Medical Interventions (Patient has pulse or is breathing): Full Support      Disposition:  I expect patient to be discharged as per clinical course.    This patient has been examined wearing appropriate Personal Protective Equipment and discussed with hospital infection control department. 05/16/22      Electronically signed by Sang Sanford MD, 05/16/22, 13:27 EDT.  [facility] Hospitalist Team

## 2022-05-16 NOTE — CASE MANAGEMENT/SOCIAL WORK
Discharge Planning Assessment  [facility]     Patient Name: Rosario Ortez  MRN: [id number]  Today's Date: 5/16/2022    Admit Date: 5/14/2022     Discharge Needs Assessment     Row Name 05/16/22 1255       Living Environment    People in Home spouse;child(clint), adult    Name(s) of People in Home Spouse-Surya    Current Living Arrangements home    Primary Care Provided by self    Provides Primary Care For no one    Family Caregiver if Needed child(clint), adult;spouse    Family Caregiver Names Spouse-Surya, Daughter- Marily    Quality of Family Relationships supportive    Able to Return to Prior Arrangements yes       Resource/Environmental Concerns    Resource/Environmental Concerns none    Transportation Concerns none       Transition Planning    Patient/Family Anticipates Transition to home with family    Patient/Family Anticipated Services at Transition none    Transportation Anticipated family or friend will provide       Discharge Needs Assessment    Readmission Within the Last 30 Days no previous admission in last 30 days    Equipment Currently Used at Home none    Concerns to be Addressed denies needs/concerns at this time;discharge planning    Anticipated Changes Related to Illness none    Equipment Needed After Discharge none               Discharge Plan     Row Name 05/16/22 6982       Plan    Plan DC Plan: Home with Spouse    Patient/Family in Agreement with Plan yes    Plan Comments Met with the patient at bedside. PCP and Pharmacy verified. The patient reports that she lives at home with her spouse who  will provide transport at discharge, and one daughter. She denies unsing any DME at home. She reports that she is IADL's at home and can still drive but does not very much. She reports that she was doing therapy OP at [facility] on [address] with Danny. She reports that she converted to Afib and saw her cardiologist who wanted her to stop going to rehab for a bit. She reports that she is no longer in afib  but when she saw her cardiologist last week he still felt it was too soon to return to her OP PT. At this time she is denying the need for HH and reports that she plans to return to doing rehab at [facility] once her cardiologist is okay with her returning.               Demographic Summary     Row Name 05/16/22 1255       General Information    Admission Type observation    Arrived From emergency department    Referral Source admission list    Reason for Consult discharge planning    Preferred Language English       Contact Information    Permission Granted to Share Info With                Functional Status     Row Name 05/16/22 1250       Functional Status    Usual Activity Tolerance good    Current Activity Tolerance good       Functional Status, IADL    Medications independent    Meal Preparation independent    Housekeeping independent    Laundry independent    Shopping assistive person       Mental Status    General Appearance WDL WDL       Mental Status Summary    Recent Changes in Mental Status/Cognitive Functioning no changes       Employment/    Employment Status retired            Met with patient in room wearing PPE: mask/googles  Maintained distance greater than 6 feet and spent less than 15 minutes in the room.     Lata Araujo RN     Office Phone: [phone]  Office Cell: [phone]

## 2022-05-16 NOTE — CONSULTS
Cardiology Consult Note      REQUESTING PHYSICIAN    Sang Sanford MD    PATIENT IDENTIFICATION  Name: Rosario Ortez  Age: 72 y.o.  Sex: female  :  1950  MRN: [id number]             REASON FOR CONSULTATION:  72-year-old female with no known history of ischemic heart disease.  Past medical history includes paroxysmal atrial fibrillation, coagulopathy with Xarelto, dyslipidemia, hypertension.      CC:  Dizziness      HISTORY OF PRESENT ILLNESS:   Patient presented to the emergency department at [facility] 2022 with complaint of lightheadedness over the past 3-4 days.  This occurs usually with position changes.  Patient experienced a fall due to her symptoms and sustained a laceration to her scalp requiring sutures.  Head CT/MRI with no evidence of acute CVA.  Chest x-ray with no acute findings.  Neurology has evaluated and signed off.  Upon my evaluation, patient is sitting up in bed eating lunch.  She denies any complaints at present.  She has had no vision changes, lower extremity edema, chest discomfort    Extensive conversation with patient and her  with regards to the complexity and management of atrial fibrillation.  In actuality, the patient was most likely orthostatic and has responded well to the fluid resuscitation.  She reports feeling well today.  She is currently under the care of cardiology in [place].  She is currently managed with amiodarone and Xarelto.      REVIEW OF SYSTEMS:  Pertinent items are noted in HPI, all other systems reviewed and negative    OBJECTIVE       ASSESSMENT  Orthostasis likely secondary to UTI and dehydration resulting in fall and head injury.     Resolved with fluid resuscitation  Urinary tract infection  Scalp laceration secondary to above  Paroxysmal atrial fibrillation  Coagulopathy on Xarelto  Dyslipidemia  Acute kidney injury  Elevated TSH-possibly amiodarone related  Elevated chads vascular score  Essential  "hypertension      PLAN  Continue Xarelto for stroke prevention  Continue amlodipine for hypertension  Decrease amiodarone to 200 mg daily  Follow-up with primary cardiologist for further A. fib management  Patient's heart rate bradycardic in the 50s.  We will hold BB at this time.  No evidence of acute stroke.  Patient will need work-up for hypothyroidism/consideration for amiodarone toxicity     With modest elevation, will defer to primary cardiologist/primary care physician  No plans for further cardiac work-up at this point.  Antimicrobials as per admitting service  Patient needs close follow-up with her primary cardiologist at discharge          Vital Signs  Visit Vitals  /73   Pulse 61   Temp 97.8 °F (36.6 °C)   Resp 16   Ht 168.9 cm (66.5\")   Wt 89 kg (196 lb 4.8 oz)   SpO2 95%   BMI 31.21 kg/m²     Oxygen Therapy  SpO2: 95 %  Pulse Oximetry Type: Intermittent  Device (Oxygen Therapy): room air  Flowsheet Rows      Flowsheet Row First Filed Value   Admission Height 168.9 cm (66.5\") Documented at 05/14/2022 0542   Admission Weight 90.7 kg (200 lb) Documented at 05/14/2022 0542          Intake & Output (last 3 days)         05/13 0701  05/14 0700 05/14 0701  05/15 0700 05/15 0701  05/16 0700 05/16 0701  05/17 0700    P.O.  480 480 480    I.V. (mL/kg)  1078 (12.1)      IV Piggyback  1010      Total Intake(mL/kg)  2568 (28.9) 480 (5.4) 480 (5.4)    Urine (mL/kg/hr)  1500 (0.7)      Total Output  1500      Net  +1068 +480 +480            Urine Unmeasured Occurrence  2 x 4 x     Stool Unmeasured Occurrence   1 x           Lines, Drains & Airways       Active LDAs       Name Placement date Placement time Site Days    Peripheral IV 05/14/22 0633 Right Antecubital 05/14/22  0633  Antecubital  2                    MEDICAL HISTORY    Past Medical History:   Diagnosis Date    Coronary artery disease         SURGICAL HISTORY    Past Surgical History:   Procedure Laterality Date    BREAST BIOPSY      HYSTERECTOMY      " "    FAMILY HISTORY    Family History   Problem Relation Age of Onset    Breast cancer Sister        SOCIAL HISTORY    Social History     Tobacco Use    Smoking status: Never Smoker    Smokeless tobacco: Never Used   Substance Use Topics    Alcohol use: Not on file        ALLERGIES    Allergies   Allergen Reactions    Codeine Nausea And Vomiting, Other (See Comments) and Nausea Only     nausea  nausea      Cefdinir Unknown - High Severity    Levofloxacin Nausea Only    Lisinopril Cough              /73   Pulse 61   Temp 97.8 °F (36.6 °C)   Resp 16   Ht 168.9 cm (66.5\")   Wt 89 kg (196 lb 4.8 oz)   SpO2 95%   BMI 31.21 kg/m²   Intake/Output last 3 shifts:  I/O last 3 completed shifts:  In: 2038 [P.O.:960; I.V.:1078]  Out: 1500 [Urine:1500]  Intake/Output this shift:  I/O this shift:  In: 480 [P.O.:480]  Out: -     PHYSICAL EXAM:    General: Alert, cooperative, no distress, appears stated age  Head:  Normocephalic, atraumatic, mucous membranes moist  Eyes:  Conjunctivae/corneas clear, EOM's intact     Neck:  Supple,  no bruit  Lungs:             Clear to auscultation bilaterally, no wheezes, rhonchi or rales are noted  Chest wall: No tenderness  Heart::  Regular rate and rhythm, S1 and S2 normal, 1/6 holosystolic murmur.  No rub or gallop  Abdomen: Soft, nontender, nondistended, bowel sounds active  Extremities: No cyanosis, clubbing, or edema   Pulses: 2+ and symmetric all extremities  Skin:  No rashes or lesions  Neuro/psych: A&O x3. CN II through XII are grossly intact with appropriate affect      Scheduled Meds:      amLODIPine, 10 mg, Oral, Q24H  atorvastatin, 80 mg, Oral, Nightly  cefTRIAXone, 2 g, Intravenous, Q24H  rivaroxaban, 20 mg, Oral, Daily With Dinner  sodium chloride, 10 mL, Intravenous, Q12H        Continuous Infusions:         PRN Meds:      acetaminophen    Morphine    sodium chloride        Results Review:     I reviewed the patient's new clinical results.    CBC    Results from last 7 " days   Lab Units 05/16/22  0114 05/15/22  0228 05/14/22  1259 05/14/22  0632   WBC 10*3/mm3 6.70 8.80 11.40* 10.60   HEMOGLOBIN g/dL 12.5 12.6 13.0 13.7   PLATELETS 10*3/mm3 241 244 247 289     Cr Clearance Estimated Creatinine Clearance: 46.3 mL/min (A) (by C-G formula based on SCr of 1.21 mg/dL (H)).  Coag     HbA1C   Lab Results   Component Value Date    HGBA1C 5.9 (H) 10/16/2019     Blood Glucose No results found for: POCGLU  Infection   Results from last 7 days   Lab Units 05/14/22  0818 05/14/22  0816 05/14/22  0730   BLOODCX  No growth at 2 days No growth at 2 days  --    URINECX   --   --  >100,000 CFU/mL Gram Negative Bacilli*     CMP   Results from last 7 days   Lab Units 05/16/22  0114 05/15/22  0228 05/14/22  1259 05/14/22  0632   SODIUM mmol/L 144 142 141 139   POTASSIUM mmol/L 4.1 3.9 3.8 3.2*   CHLORIDE mmol/L 107 108* 108* 102   CO2 mmol/L 26.0 24.0 20.0* 23.0   BUN mg/dL 22 28* 40* 48*   CREATININE mg/dL 1.21* 1.21* 1.64* 2.09*   GLUCOSE mg/dL 90 82 104* 106*   ALBUMIN g/dL  --   --   --  3.90   BILIRUBIN mg/dL  --   --   --  0.4   ALK PHOS U/L  --   --   --  65   AST (SGOT) U/L  --   --   --  41*   ALT (SGPT) U/L  --   --   --  34*     ABG      UA    Results from last 7 days   Lab Units 05/14/22  0730   NITRITE UA  Positive*   WBC UA /HPF 31-50*   BACTERIA UA /HPF 4+*   SQUAM EPITHEL UA /HPF 0-2   URINECX  >100,000 CFU/mL Gram Negative Bacilli*     JAVIER  No results found for: POCMETH, POCAMPHET, POCBARBITUR, POCBENZO, POCCOCAINE, POCOPIATES, POCOXYCODO, POCPHENCYC, POCPROPOXY, POCTHC, POCTRICYC  Lysis Labs   Results from last 7 days   Lab Units 05/16/22  0114 05/15/22  0228 05/14/22  1259 05/14/22  0632   HEMOGLOBIN g/dL 12.5 12.6 13.0 13.7   PLATELETS 10*3/mm3 241 244 247 289   CREATININE mg/dL 1.21* 1.21* 1.64* 2.09*     Radiology(recent) CT Head Without Contrast    Result Date: 5/15/2022    1.  Interval resolution of small linear area of increased density within the periventricular white matter  of the posterior left temporal lobe compatible with resolution of hemorrhage.  No evidence of intracranial hemorrhage or infarct.  Electronically Signed By-Abdirizak Bella MD On:5/15/2022 1:07 PM This report was finalized on [id number] by  Abdirizak Bella MD.        Results from last 7 days   Lab Units 05/14/22  0632   TROPONIN T ng/mL <0.010       Xrays, labs reviewed personally by physician.    ECG/EMG Results (most recent)       Procedure Component Value Units Date/Time    ECG 12 Lead [[id number]] Collected: 05/14/22 0633     Updated: 05/14/22 0634     QT Interval 484 ms     Narrative:      HEART RATE= 52  bpm  RR Interval= 1156  ms  NJ Interval= 180  ms  P Horizontal Axis= -1  deg  P Front Axis= 52  deg  QRSD Interval= 101  ms  QT Interval= 484  ms  QRS Axis= -25  deg  T Wave Axis= 40  deg  - OTHERWISE NORMAL ECG -  Sinus bradycardia  No previous ECG available for comparison  Electronically Signed By:   Date and Time of Study: 2022-05-14 06:33:21    ECG 12 Lead [[id number]] Collected: 05/15/22 1123     Updated: 05/16/22 0903    Narrative:      HEART RATE= 50  bpm  RR Interval= 1196  ms  NJ Interval= 172  ms  P Horizontal Axis= -41  deg  P Front Axis= 56  deg  QRSD Interval= 89  ms  QT Interval=   ms  QRS Axis= -36  deg  T Wave Axis= 65  deg  - OTHERWISE NORMAL ECG -  Sinus bradycardia  Left axis deviation  Low voltage, precordial leads  When compared with ECG of 14-May-2022 6:33:21,  No significant change  Electronically Signed By: Cj Phelps ([facility]) 16-May-2022 09:03:46  Date and Time of Study: 2022-05-15 11:23:08              Medication Review:   I have reviewed the patient's current medication list  Scheduled Meds:amLODIPine, 10 mg, Oral, Q24H  atorvastatin, 80 mg, Oral, Nightly  cefTRIAXone, 2 g, Intravenous, Q24H  rivaroxaban, 20 mg, Oral, Daily With Dinner  sodium chloride, 10 mL, Intravenous, Q12H      Continuous Infusions:   PRN Meds:.  acetaminophen    Morphine    sodium chloride    Imaging:  Imaging  Results (Last 72 Hours)       Procedure Component Value Units Date/Time    CT Head Without Contrast [[id number]] Collected: 05/15/22 1302     Updated: 05/15/22 1317    Narrative:      CT HEAD WO CONTRAST-     Date of Exam: 5/15/2022 12:52 PM     Indication: possible small hemorrhage in left parieto-temporal region,  please compare to MRI of Brain.; S01.01XA-Laceration without foreign  body of scalp, initial encounter; S09.90XA-Unspecified injury of head,  initial encounter; N17.9-Acute kidney failure, unspecified;  R42-Dizziness and giddiness; N39.0-Urinary tract infection, site not  specified; R31.9-Hematuria, unspecified.     Comparison Exams: CT brain 05/14/2022, MRI brain 05/14/2022     Technique: Multiple axial images were obtained from the skull base to  the vertex without the administration of IV contrast. The axial data was  used to generate reformatted images in the coronal and sagittal planes.  Automated exposure control and iterative reconstruction methods were  used.     FINDINGS:  Previously demonstrated punctate area high density within the  periventricular white matter of the left posterior temporal lobe is no  longer visualized.  Findings would be compatible with small  intraparenchymal hemorrhage which has resolved in the interval.  No new  hemorrhage is seen.  No evidence of acute infarct.  No abnormal  extra-axial fluid collections are seen.   There is no mass effect or  hydrocephalus.     There is no evidence of skull fracture.   Visualized paranasal sinuses  and mastoid air cells are clear.      The globes and orbits are within normal limits.  There is soft tissue  swelling over the right posterior parietal occipital region.       Impression:            1.  Interval resolution of small linear area of increased density within  the periventricular white matter of the posterior left temporal lobe  compatible with resolution of hemorrhage.  No evidence of intracranial  hemorrhage or infarct.      Electronically Signed By-Abdirizak Bella MD On:5/15/2022 1:07 PM  This report was finalized on [id number] by  Abdirizak Bella MD.    MRI Brain Without Contrast [[id number]] Collected: 05/14/22 0957     Updated: 05/14/22 1015    Narrative:      MRI BRAIN WO CONTRAST-     Date of Exam: 5/14/2022 9:06 AM     Indication: dizziness; S01.01XA-Laceration without foreign body of  scalp, initial encounter; S09.90XA-Unspecified injury of head, initial  encounter; N17.9-Acute kidney failure, unspecified; R42-Dizziness and  giddiness; N39.0-Urinary tract infection, site not specified;  R31.9-Hematuria, unspecified.       Comparison: None available.     Technique: Multiplanar multisequence images of the brain were performed  without contrast according to routine brain MRI protocol.     FINDINGS:     No restricted diffusion to suggest acute or subacute infarct. No  intracranial mass, or midline shift. There is a solitary tiny  subcentimeter focus of susceptibility artifact along the lateral margin  of the left ventricle posteriorly at the atrium. This is shown on axial  image 27 series 9. No hydrocephalus. There is a moderate-large amount of  bilateral white matter signal change predominantly in the frontal and  parietal lobes, and also the brainstem. No extra-axial fluid collection.  Visualized intracranial flow voids are preserved.             Impression:         No evidence of acute ischemic infarct or intracranial mass.     Solitary tiny subcentimeter focus of signal change which may relate to a  tiny focus of hemorrhage at the junction of the low left lateral  ventricle and the adjacent parietal-temporal lobe. On review of the head  CT earlier today there is subtle a tiny linear high density focus in  this area measuring 5 mm in length, 1 mm in width. Recommend head CT to  be performed today in 3 hours for follow-up of this finding to ensure no  increasing size of a tiny possible hemorrhage     There is a moderate-large  amount of diffuse parenchymal disease favored  to relate to chronic microvascular ischemia or possibly demyelinating  disease.        Electronically Signed By-Babak Serra On:5/14/2022 10:13 AM  This report was finalized on [id number] by  Babak Serra, .    CT Chest Without Contrast Diagnostic [[id number]] Collected: 05/14/22 0751     Updated: 05/14/22 0755    Narrative:      EXAMINATION: CT SCAN OF THE CHEST WITHOUT INTRAVENOUS CONTRAST    DATE OF EXAM: 5/14/2022 6:57 AM    HISTORY: Fall    COMPARISON: None.    TECHNIQUE: CT examination of the chest was performed without intravenous contrast. CT dose lowering techniques were used, to include: automated exposure control, adjustment for patient size, and/or use of iterative reconstruction. 3D MIP reconstruction   was performed under concurrent supervision not requiring an independent workstation, in order to increase the sensitivity for detection of pulmonary nodules.    Note: The exam is limited because some types of pathology may not be adequately demonstrated due to lack of contrast enhancement.    FINDINGS:    CHEST:    Lungs:  No focal consolidation. No evidence of contusion.    Pleura: Normal.     Mediastinum And Crystal: No evidence of acute abnormality..     Cardiovascular: No evidence of acute abnormality on this nonenhanced exam.  No pericardial effusion. Moderate calcified atherosclerosis.    Chest Wall:  Normal.     Upper Abdomen: No acute abnormality.     MUSCULOSKELETAL: No acute abnormality.        Impression:        No acute abnormality.    Electronically signed by:  Jake Jason D.O.    5/14/2022 5:54 AM    CT Thoracic Spine Without Contrast [[id number]] Collected: 05/14/22 0750     Updated: 05/14/22 0753    Narrative:      EXAMINATION: CT THORACIC SPINE WO CONTRAST    DATE: 5/14/2022 6:57 AM     INDICATION: Trauma, fall.     COMPARISON: None available.     TECHNIQUE: Noncontrast CT imaging through the thoracic spine was performed in the axial  plane. Coronal and sagittal reformats were generated. CT dose lowering techniques were used, to include: automated exposure control, adjustment for patient size, and   or use of iterative reconstruction.     FINDINGS:     Vertebral column:    Normal thoracic spine alignment. No CT evidence of acute thoracic spine fracture or traumatic subluxation. Vertebral body heights are normal. Scattered areas of intervertebral disc space narrowing. Sclerotic lesion within the T9 vertebral body, likely   benign bone island.    No significant bony stenosis of the spinal canal or neural foramina.    Soft tissues:    Please see the separately reported chest CT for detailed intrathoracic findings.         Impression:         1. No acute fracture or malalignment of the thoracic spine.  2. Please see the separately reported chest CT for detailed intrathoracic findings.            Electronically signed by:  Prashant Robertson    5/14/2022 5:52 AM    CT Head Without Contrast [[id number]] Collected: 05/14/22 0747     Updated: 05/14/22 0749    Narrative:      EXAMINATION:  CT HEAD WO CONTRAST    DATE: 5/14/2022 6:57 AM     INDICATION: Trauma, fell and hit back of head.     COMPARISON: None.     TECHNIQUE:  Routine axial images through the head without contrast.    Low-dose CT acquisition technique included one or more of the following options: 1. Automated exposure control, 2. Adjustment of mA and/or KV according to patient's size and/or 3. Use of iterative reconstruction.    FINDINGS:      Intracranial Contents:  Mild cerebral volume loss, within normal limits for age. Scattered hypodensities in the periventricular white matter, which are nonspecific, but are most commonly related to chronic small vessel ischemic changes.    Gray-white differentiation is intact.     No acute intracranial hemorrhage. No mass effect, midline shift, hydrocephalus, herniation, or extra axial fluid collection.      Bones and Extracranial Soft Tissues:  The calvarium  "is intact. Right parietal soft tissue swelling.    Fluid level within the right maxillary sinus. Otherwise well-aerated paranasal sinuses and mastoid air cells. Distal internal carotid artery atherosclerotic calcifications.  Prior cataract surgery.      Impression:        1. No acute intracranial abnormality.  2. Right parietal soft tissue swelling. No calvarial fracture.  3. Age-related volume loss and mild chronic small vessel ischemic changes.          Electronically signed by:  Prashant Robertson    5/14/2022 5:48 AM              JOE Aguirre  05/16/22  14:19 EDT       EMR Dragon/Transcription:   \"Dictated utilizing Dragon dictation\".                 Electronically signed by JOE Aguirre, 05/16/22, 11:22 AM EDT.    Cardiology attending:  Seen and examined.  Chart and labs reviewed.  History and exam findings are verified with above changes noted.  Assessment and plan notated by APC after being formulated by attending consultant.  Note that greater than 50% of the time spent in care of the patient was provided by attending consultant.  Patient's symptoms are likely secondary to orthostasis that resulted from a UTI and dehydration.  She does have elevated TSH.  Given the mild elevation, would recommend decreasing her amiodarone to 200 mg daily and having her follow-up with her primary cardiologist for further management.  No plans for further cardiac work-up at this point.  Extensive conversation with patient and her  at bedside.    "

## 2022-05-17 VITALS
HEIGHT: 67 IN | DIASTOLIC BLOOD PRESSURE: 73 MMHG | TEMPERATURE: 97.8 F | BODY MASS INDEX: 30.81 KG/M2 | SYSTOLIC BLOOD PRESSURE: 149 MMHG | HEART RATE: 51 BPM | RESPIRATION RATE: 16 BRPM | OXYGEN SATURATION: 96 % | WEIGHT: 196.3 LBS

## 2022-05-17 LAB — BACTERIA SPEC AEROBE CULT: ABNORMAL

## 2022-05-17 PROCEDURE — 99217 PR OBSERVATION CARE DISCHARGE MANAGEMENT: CPT | Performed by: HOSPITALIST

## 2022-05-17 PROCEDURE — G0378 HOSPITAL OBSERVATION PER HR: HCPCS

## 2022-05-17 PROCEDURE — 99214 OFFICE O/P EST MOD 30 MIN: CPT | Performed by: NURSE PRACTITIONER

## 2022-05-17 PROCEDURE — 25010000002 CEFTRIAXONE PER 250 MG: Performed by: HOSPITALIST

## 2022-05-17 RX ORDER — AMIODARONE HYDROCHLORIDE 200 MG/1
200 TABLET ORAL DAILY
Qty: 30 TABLET | Refills: 3 | Status: SHIPPED | OUTPATIENT
Start: 2022-05-17 | End: 2022-10-29

## 2022-05-17 RX ORDER — LEVOTHYROXINE SODIUM 0.03 MG/1
25 TABLET ORAL DAILY
Qty: 30 TABLET | Refills: 1 | Status: SHIPPED | OUTPATIENT
Start: 2022-05-17 | End: 2022-10-29

## 2022-05-17 RX ORDER — FOLIC ACID 1 MG/1
1 TABLET ORAL DAILY
Qty: 30 TABLET | Refills: 5 | Status: SHIPPED | OUTPATIENT
Start: 2022-05-17 | End: 2022-10-29

## 2022-05-17 RX ORDER — IRBESARTAN 75 MG/1
75 TABLET ORAL NIGHTLY
Qty: 30 TABLET | Refills: 3 | Status: SHIPPED | OUTPATIENT
Start: 2022-05-17

## 2022-05-17 RX ORDER — CEPHALEXIN 500 MG/1
500 CAPSULE ORAL 2 TIMES DAILY
Qty: 10 CAPSULE | Refills: 0 | Status: SHIPPED | OUTPATIENT
Start: 2022-05-17 | End: 2022-10-29

## 2022-05-17 RX ADMIN — CEFTRIAXONE 2 G: 2 INJECTION, POWDER, FOR SOLUTION INTRAMUSCULAR; INTRAVENOUS at 08:50

## 2022-05-17 RX ADMIN — Medication 10 ML: at 08:52

## 2022-05-17 RX ADMIN — AMLODIPINE BESYLATE 10 MG: 5 TABLET ORAL at 08:49

## 2022-05-17 NOTE — PLAN OF CARE
Goal Outcome Evaluation:  Plan of Care Reviewed With: patient        Progress: improving  Outcome Evaluation: Resting quietly in recliner. Stated did not rest well in the bed yesterday. No c/o pain. Pt hoping to discharge home today. Up to BR independently. Will continue to monitor.

## 2022-05-17 NOTE — CASE MANAGEMENT/SOCIAL WORK
Case Management Discharge Note      Final Note: home         Selected Continued Care - Discharged on 5/17/2022 Admission date: 5/14/2022 - Discharge disposition: Home or Self Care   Transportation Services  Private: Car    Final Discharge Disposition Code: 01 - home or self-care

## 2022-05-17 NOTE — CONSULTS
"Nutrition Services    Patient Name: Rosario Ortez  YOB: 1950  MRN: [id number]  Admission date: 5/14/2022    Comment:      PPE Documentation        PPE Worn By Provider mask, gloves and eye protection   PPE Worn By Patient  None      CLINICAL NUTRITION ASSESSMENT      Reason for Assessment 5/17: MST of 3      H&P  72 y.o. female who presented to [facility] on 5/14/2022 complaining of feeling dizzy for last three to four days, today she was feeling very dizzy some times position with moving head, leading to fall with laceration to scalp region requiring suturing in ER    Past Medical History:   Diagnosis Date   • Coronary artery disease        Past Surgical History:   Procedure Laterality Date   • BREAST BIOPSY     • HYSTERECTOMY          Current Problems   Fall with dizziness  - neurology was consulted, CT showed no evidence of cerebral cortical hemorrhage  -cardiology following    Acute kidney injury     Acute UTI        Encounter Information        Trending Narrative     5/17: RD visited patient at bedside.  During discussion, patient transportation to D/C arrived.  Briefly discussed decreased appetite and little intake.  RD encouraged protein items such as eggs, yogurt, cottage cheese, peanut butter.       Anthropometrics        Current Height, Weight Height: 168.9 cm (66.5\")  Weight:  (Pt in chair and didn't want to get up) (05/17/22 0414)       Ideal Body Weight (IBW) 133#   Usual Body Weight (UBW) Unable to obtain from patient        Trending Weight Hx     This admission: 5/17: 196-200#              PTA: 3.0% weight loss x 2 months  4.9% weight loss x 6 months     Wt Readings from Last 30 Encounters:   05/15/22 0259 89 kg (196 lb 4.8 oz)   05/14/22 0542 90.7 kg (200 lb)   03/20/22 1214 91.9 kg (202 lb 9.6 oz)   12/16/21 1158 93.7 kg (206 lb 9.6 oz)   06/25/14 0808 88.9 kg (196 lb 0.2 oz)   06/04/14 0749 92.5 kg (203 lb 15.9 oz)   12/16/13 1525 93.4 kg (206 lb)   08/19/13 1102 89.8 kg " (197 lb 15.9 oz)   06/20/13 0810 92.1 kg (203 lb)   05/20/13 0845 92.5 kg (203 lb 15.9 oz)   01/17/13 0945 88 kg (194 lb 0.1 oz)      BMI kg/m2 Body mass index is 31.21 kg/m².       Labs        Pertinent Labs    Results from last 7 days   Lab Units 05/16/22  0114 05/15/22  0228 05/14/22  1259 05/14/22  0632   SODIUM mmol/L 144 142 141 139   POTASSIUM mmol/L 4.1 3.9 3.8 3.2*   CHLORIDE mmol/L 107 108* 108* 102   CO2 mmol/L 26.0 24.0 20.0* 23.0   BUN mg/dL 22 28* 40* 48*   CREATININE mg/dL 1.21* 1.21* 1.64* 2.09*   CALCIUM mg/dL 10.2 9.9 10.0 11.0*   BILIRUBIN mg/dL  --   --   --  0.4   ALK PHOS U/L  --   --   --  65   ALT (SGPT) U/L  --   --   --  34*   AST (SGOT) U/L  --   --   --  41*   GLUCOSE mg/dL 90 82 104* 106*     Results from last 7 days   Lab Units 05/16/22  0114 05/14/22  1259 05/14/22  0632   MAGNESIUM mg/dL  --   --  1.8   HEMOGLOBIN g/dL 12.5   < > 13.7   HEMATOCRIT % 38.6   < > 42.4    < > = values in this interval not displayed.     COVID19   Date Value Ref Range Status   05/14/2022 Not Detected Not Detected - Ref. Range Final     Lab Results   Component Value Date    HGBA1C 5.9 (H) 10/16/2019        Medications    Scheduled Medications amLODIPine, 10 mg, Oral, Q24H  atorvastatin, 80 mg, Oral, Nightly  cefTRIAXone, 2 g, Intravenous, Q24H  rivaroxaban, 20 mg, Oral, Daily With Dinner  sodium chloride, 10 mL, Intravenous, Q12H        Infusions      PRN Medications •  acetaminophen  •  Morphine  •  sodium chloride     Physical Findings        Trending Physical   Appearance, NFPE 5/17: Visually well nourished    --  Edema  No edema documented      Bowel Function Last BM 5/16 (yesterday)     Tubes No feeding tube      Chewing/Swallowing No known issues      Skin Posterior occipital region laceration      --  Current Nutrition Orders & Evaluation of Intake       Oral Nutrition     Food Allergies NKFA   Current PO Diet Diet Cardiac, Diabetic/Consistent Carbs; Healthy Heart; Diabetic - Consistent Carb    Supplement None ordered    PO Evaluation     Trending % PO Intake 5/17: 92% average po intakes since admission (patient states not ordering full meals)   --  Nutritional Risk Screening        NRS-2002 Score          Nutrition Diagnosis         Nutrition Dx Problem 1 Inadequate energy intake related to intake less than needs as evidenced by patient reported decreased appetite and low intakes.      Nutrition Dx Problem 2        Intervention Goal         Intervention Goal(s) Increased protein intake      Nutrition Intervention        RD Action Briefly educated on the importance of protein with meals      Nutrition Prescription          Diet Prescription Consistent CHO/Heart Healthy    Supplement Prescription    --  Monitor/Evaluation        Monitor Per protocol, I&O, PO intake, Supplement intake, Pertinent labs, Weight, Skin status, GI status, Symptoms, POC/GOC       Electronically signed by:  Radha Tao RD  05/17/22 11:00 EDT

## 2022-05-17 NOTE — PLAN OF CARE
Goal Outcome Evaluation:  Pt up in chair, alert and oriented x 4 able to ambulate with assist x 1, will switch to oral antibiotics and possible discharge today. Spouse at bedside. VSS, will continue to monitor.

## 2022-05-17 NOTE — PLAN OF CARE
Problem: Adult Inpatient Plan of Care  Goal: Plan of Care Review  5/17/2022 1226 by Kinsey Sierra RN  Outcome: Met  Flowsheets  Taken 5/17/2022 1226 by Kinsey Sierra RN  Progress: improving  Taken 5/17/2022 0417 by Tanner Mills, RN  Plan of Care Reviewed With: patient  5/17/2022 1221 by Kinsey Sierra RN  Outcome: Ongoing, Progressing  Flowsheets (Taken 5/17/2022 0417 by Tanner Mills, RN)  Progress: improving  Plan of Care Reviewed With: patient  Goal: Patient-Specific Goal (Individualized)  5/17/2022 1226 by Kinsey Sierra RN  Outcome: Met  5/17/2022 1221 by Kinsey Sierra RN  Outcome: Ongoing, Progressing  Goal: Absence of Hospital-Acquired Illness or Injury  5/17/2022 1226 by Kinsey Sierra RN  Outcome: Met  5/17/2022 1221 by Kinsey Sierra RN  Outcome: Ongoing, Progressing  Intervention: Identify and Manage Fall Risk  Recent Flowsheet Documentation  Taken 5/17/2022 1111 by Kinsey Sierra RN  Safety Promotion/Fall Prevention:   safety round/check completed   room organization consistent   activity supervised   assistive device/personal items within reach   clutter free environment maintained   fall prevention program maintained   nonskid shoes/slippers when out of bed  Taken 5/17/2022 0900 by Kinsey Sierra RN  Safety Promotion/Fall Prevention:   safety round/check completed   room organization consistent   clutter free environment maintained   activity supervised   assistive device/personal items within reach   fall prevention program maintained   nonskid shoes/slippers when out of bed  Taken 5/17/2022 0720 by Kinsey Sierra RN  Safety Promotion/Fall Prevention:   safety round/check completed   room organization consistent   activity supervised   assistive device/personal items within reach   clutter free environment maintained   fall prevention program maintained   nonskid shoes/slippers when out of bed  Taken 5/17/2022 0700 by Kinsey Sierra RN  Safety Promotion/Fall Prevention: safety  round/check completed  Intervention: Prevent Skin Injury  Recent Flowsheet Documentation  Taken 5/17/2022 1111 by Kinsey Sierra RN  Body Position: position changed independently  Taken 5/17/2022 0720 by Kinsey Sierra RN  Body Position: position changed independently  Intervention: Prevent and Manage VTE (Venous Thromboembolism) Risk  Recent Flowsheet Documentation  Taken 5/17/2022 1111 by Kinsey Sierra RN  Activity Management: up in chair  VTE Prevention/Management: dorsiflexion/plantar flexion performed  Taken 5/17/2022 0720 by Kinsey Sierra RN  Activity Management: up in chair  VTE Prevention/Management: dorsiflexion/plantar flexion performed  Intervention: Prevent Infection  Recent Flowsheet Documentation  Taken 5/17/2022 1111 by Kinsey Sierra RN  Infection Prevention:   hand hygiene promoted   visitors restricted/screened  Taken 5/17/2022 0900 by Kinsey Sierra RN  Infection Prevention:   hand hygiene promoted   single patient room provided  Taken 5/17/2022 0720 by Kinsey Sierra RN  Infection Prevention:   hand hygiene promoted   single patient room provided  Goal: Optimal Comfort and Wellbeing  5/17/2022 1226 by Kinsey Sierra RN  Outcome: Met  5/17/2022 1221 by Kinsey Sierra RN  Outcome: Ongoing, Progressing  Intervention: Provide Person-Centered Care  Recent Flowsheet Documentation  Taken 5/17/2022 1111 by Kinsey Sierra RN  Trust Relationship/Rapport: care explained  Taken 5/17/2022 0720 by Kinsey Sierra RN  Trust Relationship/Rapport: care explained  Goal: Readiness for Transition of Care  5/17/2022 1226 by Kinsey Sierra RN  Outcome: Met  5/17/2022 1221 by Kinsey Sierra RN  Outcome: Ongoing, Progressing     Problem: Fall Injury Risk  Goal: Absence of Fall and Fall-Related Injury  5/17/2022 1226 by Kisney Sierra RN  Outcome: Met  5/17/2022 1221 by Kinsey Sierra RN  Outcome: Ongoing, Progressing  Intervention: Identify and Manage Contributors  Recent Flowsheet Documentation  Taken 5/17/2022 1111  by Kinsey Sierra RN  Medication Review/Management: medications reviewed  Taken 5/17/2022 0720 by Kinsey Sierra RN  Medication Review/Management: medications reviewed  Intervention: Promote Injury-Free Environment  Recent Flowsheet Documentation  Taken 5/17/2022 1111 by Kinsey Sierra RN  Safety Promotion/Fall Prevention:   safety round/check completed   room organization consistent   activity supervised   assistive device/personal items within reach   clutter free environment maintained   fall prevention program maintained   nonskid shoes/slippers when out of bed  Taken 5/17/2022 0900 by Kinsey Sierra RN  Safety Promotion/Fall Prevention:   safety round/check completed   room organization consistent   clutter free environment maintained   activity supervised   assistive device/personal items within reach   fall prevention program maintained   nonskid shoes/slippers when out of bed  Taken 5/17/2022 0720 by Kinsey Sierra RN  Safety Promotion/Fall Prevention:   safety round/check completed   room organization consistent   activity supervised   assistive device/personal items within reach   clutter free environment maintained   fall prevention program maintained   nonskid shoes/slippers when out of bed  Taken 5/17/2022 0700 by Kinsey Sierra RN  Safety Promotion/Fall Prevention: safety round/check completed     Problem: Asthma Comorbidity  Goal: Maintenance of Asthma Control  5/17/2022 1226 by Kinsey Sierra RN  Outcome: Met  5/17/2022 1221 by Kinsey Sierra RN  Outcome: Ongoing, Progressing  Intervention: Maintain Asthma Symptom Control  Recent Flowsheet Documentation  Taken 5/17/2022 1111 by Kinsey Sierra RN  Medication Review/Management: medications reviewed  Taken 5/17/2022 0720 by Kinsey Sierra RN  Medication Review/Management: medications reviewed     Problem: Behavioral Health Comorbidity  Goal: Maintenance of Behavioral Health Symptom Control  5/17/2022 1226 by Kinsey Sierra RN  Outcome: Met  5/17/2022  1221 by Kinsey Sierra RN  Outcome: Ongoing, Progressing  Intervention: Maintain Behavioral Health Symptom Control  Recent Flowsheet Documentation  Taken 5/17/2022 1111 by Kinsey Sierra RN  Medication Review/Management: medications reviewed  Taken 5/17/2022 0720 by Kinsey Sierra RN  Medication Review/Management: medications reviewed     Problem: COPD (Chronic Obstructive Pulmonary Disease) Comorbidity  Goal: Maintenance of COPD Symptom Control  5/17/2022 1226 by Kinsey Sierra RN  Outcome: Met  5/17/2022 1221 by Kinsey Sierra RN  Outcome: Ongoing, Progressing  Intervention: Maintain COPD-Symptom Control  Recent Flowsheet Documentation  Taken 5/17/2022 1111 by Kinsey Sierra RN  Medication Review/Management: medications reviewed  Taken 5/17/2022 0720 by Kinsey iSerra RN  Medication Review/Management: medications reviewed     Problem: Diabetes Comorbidity  Goal: Blood Glucose Level Within Targeted Range  5/17/2022 1226 by Kinsey Sierra RN  Outcome: Met  5/17/2022 1221 by Kinsey Sierra RN  Outcome: Ongoing, Progressing     Problem: Heart Failure Comorbidity  Goal: Maintenance of Heart Failure Symptom Control  5/17/2022 1226 by Kinsey Sierra RN  Outcome: Met  5/17/2022 1221 by Kinsey Sierra RN  Outcome: Ongoing, Progressing  Intervention: Maintain Heart Failure-Management  Recent Flowsheet Documentation  Taken 5/17/2022 1111 by Kinsey Sierra RN  Medication Review/Management: medications reviewed  Taken 5/17/2022 0720 by Kinsey Sierra RN  Medication Review/Management: medications reviewed     Problem: Hypertension Comorbidity  Goal: Blood Pressure in Desired Range  5/17/2022 1226 by Kinsey Sierra RN  Outcome: Met  5/17/2022 1221 by Kinsey Sierra RN  Outcome: Ongoing, Progressing  Intervention: Maintain Blood Pressure Management  Recent Flowsheet Documentation  Taken 5/17/2022 1111 by Kinsey Sierra RN  Medication Review/Management: medications reviewed  Taken 5/17/2022 0720 by Kinsey Sierra RN  Syncope  Management: position changed slowly  Medication Review/Management: medications reviewed     Problem: Obstructive Sleep Apnea Risk or Actual Comorbidity Management  Goal: Unobstructed Breathing During Sleep  5/17/2022 1226 by Kinsey Sierra RN  Outcome: Met  5/17/2022 1221 by Kinsey Sierra RN  Outcome: Ongoing, Progressing     Problem: Osteoarthritis Comorbidity  Goal: Maintenance of Osteoarthritis Symptom Control  5/17/2022 1226 by Kinsey Sierra RN  Outcome: Met  5/17/2022 1221 by Kinsey Sierra RN  Outcome: Ongoing, Progressing  Intervention: Maintain Osteoarthritis Symptom Control  Recent Flowsheet Documentation  Taken 5/17/2022 1111 by Kinsey Sierra RN  Activity Management: up in chair  Assistive Device Utilized: walker  Medication Review/Management: medications reviewed  Taken 5/17/2022 0720 by Kinsey Sierra RN  Activity Management: up in chair  Assistive Device Utilized: walker  Medication Review/Management: medications reviewed     Problem: Pain Chronic (Persistent) (Comorbidity Management)  Goal: Acceptable Pain Control and Functional Ability  5/17/2022 1226 by Kinsey Sierra RN  Outcome: Met  5/17/2022 1221 by Kinsey Sierra RN  Outcome: Ongoing, Progressing  Intervention: Manage Persistent Pain  Recent Flowsheet Documentation  Taken 5/17/2022 1111 by Kinsey Sierra RN  Medication Review/Management: medications reviewed  Taken 5/17/2022 0720 by Kinsey Sierra RN  Medication Review/Management: medications reviewed  Intervention: Optimize Psychosocial Wellbeing  Recent Flowsheet Documentation  Taken 5/17/2022 1111 by Kinsey Sierra RN  Diversional Activities: television  Taken 5/17/2022 0720 by Kinsey Sierra RN  Diversional Activities: television  Family/Support System Care: self-care encouraged     Problem: Seizure Disorder Comorbidity  Goal: Maintenance of Seizure Control  5/17/2022 1226 by Kinsey Sierra RN  Outcome: Met  5/17/2022 1221 by Kinsey Sierra RN  Outcome: Ongoing, Progressing  Intervention:  Maintain Seizure-Symptom Control  Recent Flowsheet Documentation  Taken 5/17/2022 0720 by Kinsey Sierra, RN  Seizure Precautions:   activity supervised   clutter-free environment maintained   Goal Outcome Evaluation:    Goal met        Progress: improving

## 2022-05-17 NOTE — DISCHARGE SUMMARY
[facility] Medicine Services  DISCHARGE SUMMARY    Patient Name: Rosario Ortez  : 1950  MRN: [id number]    Date of Admission: 2022  Discharge Diagnosis:   Date of Discharge:  2022  Primary Care Physician: Ian Cordero MD      Presenting Problem:   Dizziness [R42]  Acute kidney injury (HCC) [N17.9]  Injury of head, initial encounter [S09.90XA]  Laceration of scalp, initial encounter [S01.01XA]  Urinary tract infection with hematuria, site unspecified [N39.0, R31.9]    Active and Resolved Hospital Problems:  Active Hospital Problems    Diagnosis POA   • Laceration of scalp, initial encounter [S01.01XA] Yes   • Acute kidney injury (HCC) [N17.9] Unknown   • Acute cystitis [N30.00] Unknown      Resolved Hospital Problems   No resolved problems to display.         Hospital Course     Hospital Course by problem list.      Fall with dizziness likely secondary to severe dehydration leading to scalp laceration, MRI head no acute finding but questionable concern for demyelinating disease, DC iv fluids, PTOT evaluate and treat,  for safe discharge planning.  Consulted neurology, underwent repeat CT head without contrast revealing no evidence of intracranial bleed, MRI revealed possible questionable very small intracranial bleed.  Pt seen by cardiology service ,advised to decrease the dose of amiodarone to 200 mg daily on discharge.     Low Folic acid ... will started on supplement on discharge.    Hypothyroidism ..... will be started on low dose synthroid, repeat TSH in four week with PCP.     Acute kidney injury improved... DC iv fluids, monitor renal function, avoid nephro toxic medication, monitor urine output. Restarted irebstartan with decrease dose of 75 mg daily     Acute UTI ... iv rocephin ... urine culture reviewed and changed to oral keflex for five more days on discharge.     Dvt prophylaxis with SCDs for now, will start lovenox sc      Physical  deconditioning, will ask PT OT evaluate and treat.    Condition on discharge stable.        DISCHARGE Follow Up with PCP in a week time.  Follow up with cardiology service in four week time.  Repeat TSH in four week time with PCP.    Reasons For Change In Medications and Indications for New Medications:      Day of Discharge     Vital Signs:  Temp:  [97.6 °F (36.4 °C)-98.3 °F (36.8 °C)] 98.3 °F (36.8 °C)  Heart Rate:  [55-79] 79  Resp:  [16] 16  BP: (127-152)/(66-73) 152/73    Physical Exam:  Physical Exam  Vitals and nursing note reviewed.   Constitutional:       General: She is not in acute distress.     Appearance: Normal appearance. She is well-developed. She is not ill-appearing, toxic-appearing or diaphoretic.   HENT:      Head: Normocephalic and atraumatic.      Right Ear: Ear canal and external ear normal.      Left Ear: Ear canal and external ear normal.      Nose: Nose normal. No congestion or rhinorrhea.      Mouth/Throat:      Mouth: Mucous membranes are moist.      Pharynx: No oropharyngeal exudate.   Eyes:      General: No scleral icterus.        Right eye: No discharge.         Left eye: No discharge.      Extraocular Movements: Extraocular movements intact.      Conjunctiva/sclera: Conjunctivae normal.      Pupils: Pupils are equal, round, and reactive to light.   Neck:      Thyroid: No thyromegaly.      Vascular: No carotid bruit or JVD.      Trachea: No tracheal deviation.   Cardiovascular:      Rate and Rhythm: Normal rate and regular rhythm.      Pulses: Normal pulses.      Heart sounds: Normal heart sounds. No murmur heard.    No friction rub. No gallop.   Pulmonary:      Effort: Pulmonary effort is normal. No respiratory distress.      Breath sounds: Normal breath sounds. No stridor. No wheezing, rhonchi or rales.   Chest:      Chest wall: No tenderness.   Abdominal:      General: Bowel sounds are normal. There is no distension.      Palpations: Abdomen is soft. There is no mass.       Tenderness: There is no abdominal tenderness. There is no guarding or rebound.      Hernia: No hernia is present.   Musculoskeletal:         General: No swelling, tenderness, deformity or signs of injury. Normal range of motion.      Cervical back: Normal range of motion and neck supple. No rigidity. No muscular tenderness.      Right lower leg: No edema.      Left lower leg: No edema.   Lymphadenopathy:      Cervical: No cervical adenopathy.   Skin:     General: Skin is warm and dry.      Coloration: Skin is not jaundiced or pale.      Findings: No bruising, erythema or rash.   Neurological:      General: No focal deficit present.      Mental Status: She is alert and oriented to person, place, and time. Mental status is at baseline.      Cranial Nerves: No cranial nerve deficit.      Sensory: No sensory deficit.      Motor: No weakness or abnormal muscle tone.      Coordination: Coordination normal.   Psychiatric:         Mood and Affect: Mood normal.         Behavior: Behavior normal.         Thought Content: Thought content normal.         Judgment: Judgment normal.            Pertinent  and/or Most Recent Results     LAB RESULTS:      Lab 05/16/22  0114 05/15/22  0228 05/14/22  1259 05/14/22  0632   WBC 6.70 8.80 11.40* 10.60   HEMOGLOBIN 12.5 12.6 13.0 13.7   HEMATOCRIT 38.6 38.0 41.5 42.4   PLATELETS 241 244 247 289   NEUTROS ABS 4.90 7.00 9.60* 8.00*   LYMPHS ABS 1.00 0.90 0.90 1.30   MONOS ABS 0.60 0.70 0.60 0.90   EOS ABS 0.20 0.10 0.10 0.20   MCV 87.8 87.3 87.4 87.7         Lab 05/16/22  0114 05/15/22  0228 05/14/22  1259 05/14/22  0632   SODIUM 144 142 141 139   POTASSIUM 4.1 3.9 3.8 3.2*   CHLORIDE 107 108* 108* 102   CO2 26.0 24.0 20.0* 23.0   ANION GAP 11.0 10.0 13.0 14.0   BUN 22 28* 40* 48*   CREATININE 1.21* 1.21* 1.64* 2.09*   EGFR 47.1* 47.1* 32.7* 24.5*   GLUCOSE 90 82 104* 106*   CALCIUM 10.2 9.9 10.0 11.0*   MAGNESIUM  --   --   --  1.8   TSH 6.240*  --   --   --          Lab 05/14/22  0632    TOTAL PROTEIN 6.4   ALBUMIN 3.90   GLOBULIN 2.5   ALT (SGPT) 34*   AST (SGOT) 41*   BILIRUBIN 0.4   ALK PHOS 65         Lab 05/14/22  0632   TROPONIN T <0.010             Lab 05/16/22  0114   FOLATE 3.57*   VITAMIN B 12 319         Brief Urine Lab Results  (Last result in the past 365 days)      Color   Clarity   Blood   Leuk Est   Nitrite   Protein   CREAT   Urine HCG        05/14/22 0730 Yellow   Cloudy   Trace   Moderate (2+)   Positive   Negative               Microbiology Results (last 10 days)     Procedure Component Value - Date/Time    COVID PRE-OP / PRE-PROCEDURE SCREENING ORDER (NO ISOLATION) - Swab, Nasopharynx [[id number]]  (Normal) Collected: 05/14/22 0823    Lab Status: Final result Specimen: Swab from Nasopharynx Updated: 05/14/22 0851    Narrative:      The following orders were created for panel order COVID PRE-OP / PRE-PROCEDURE SCREENING ORDER (NO ISOLATION) - Swab, Nasopharynx.  Procedure                               Abnormality         Status                     ---------                               -----------         ------                     COVID-19,CEPHEID/APURVA,CO...[[id number]]  Normal              Final result                 Please view results for these tests on the individual orders.    COVID-19,CEPHEID/APURVA,COR/DEE DEE/PAD/KEVIN IN-HOUSE(OR EMERGENT/ADD-ON),NP SWAB IN TRANSPORT MEDIA 3-4 HR TAT, RT-PCR - Swab, Nasopharynx [[id number]]  (Normal) Collected: 05/14/22 0823    Lab Status: Final result Specimen: Swab from Nasopharynx Updated: 05/14/22 0851     COVID19 Not Detected    Narrative:      Fact sheet for providers: https://www.fda.gov/media/043649/download     Fact sheet for patients: https://www.fda.gov/media/725360/download  Fact sheet for providers: https://www.fda.gov/media/740959/download    Fact sheet for patients: https://www.fda.gov/media/865061/download    Test performed by PCR.    Blood Culture - Blood, Blood, Venous Line [[id number]]  (Normal) Collected: 05/14/22 0818     Lab Status: Preliminary result Specimen: Blood, Venous Line Updated: 05/17/22 0832     Blood Culture No growth at 3 days    Blood Culture - Blood, Blood, Arterial Line [[id number]]  (Normal) Collected: 05/14/22 0816    Lab Status: Preliminary result Specimen: Blood, Arterial Line Updated: 05/17/22 0832     Blood Culture No growth at 3 days    Urine Culture - Urine, Urine, Clean Catch [[id number]]  (Abnormal)  (Susceptibility) Collected: 05/14/22 0730    Lab Status: Final result Specimen: Urine, Clean Catch Updated: 05/17/22 0935     Urine Culture >100,000 CFU/mL Klebsiella pneumoniae ssp pneumoniae    Narrative:      Colonization of the urinary tract without infection is common. Treatment is discouraged unless the patient is symptomatic, pregnant, or undergoing an invasive urologic procedure.    Susceptibility      Klebsiella pneumoniae ssp pneumoniae      ISELA      Ampicillin Resistant     Ampicillin + Sulbactam Susceptible      Cefazolin Susceptible      Cefepime Susceptible      Ceftazidime Susceptible      Ceftriaxone Susceptible      Gentamicin Susceptible      Levofloxacin Susceptible      Nitrofurantoin Susceptible      Piperacillin + Tazobactam Susceptible      Trimethoprim + Sulfamethoxazole Susceptible                                 CT Head Without Contrast    Result Date: 5/15/2022  Impression:   1.  Interval resolution of small linear area of increased density within the periventricular white matter of the posterior left temporal lobe compatible with resolution of hemorrhage.  No evidence of intracranial hemorrhage or infarct.  Electronically Signed By-Abdirizak Bella MD On:5/15/2022 1:07 PM This report was finalized on [id number] by  Abdirizak Bella MD.    CT Head Without Contrast    Result Date: 5/14/2022  Impression: 1. No acute intracranial abnormality. 2. Right parietal soft tissue swelling. No calvarial fracture. 3. Age-related volume loss and mild chronic small vessel ischemic changes.  Electronically signed by:  Prashant Robertson  5/14/2022 5:48 AM    CT Chest Without Contrast Diagnostic    Result Date: 5/14/2022  Impression: No acute abnormality. Electronically signed by:  Jaek Jason D.O.  5/14/2022 5:54 AM    CT Thoracic Spine Without Contrast    Result Date: 5/14/2022  Impression:  1. No acute fracture or malalignment of the thoracic spine. 2. Please see the separately reported chest CT for detailed intrathoracic findings.   Electronically signed by:  Prashant Robertson  5/14/2022 5:52 AM    MRI Brain Without Contrast    Result Date: 5/14/2022  Impression:  No evidence of acute ischemic infarct or intracranial mass.  Solitary tiny subcentimeter focus of signal change which may relate to a tiny focus of hemorrhage at the junction of the low left lateral ventricle and the adjacent parietal-temporal lobe. On review of the head CT earlier today there is subtle a tiny linear high density focus in this area measuring 5 mm in length, 1 mm in width. Recommend head CT to be performed today in 3 hours for follow-up of this finding to ensure no increasing size of a tiny possible hemorrhage  There is a moderate-large amount of diffuse parenchymal disease favored to relate to chronic microvascular ischemia or possibly demyelinating disease.   Electronically Signed By-Babak Serra On:5/14/2022 10:13 AM This report was finalized on [id number] by  Babak Serra, .      Results for orders placed during the hospital encounter of 12/16/21    Duplex Venous Lower Extremity - Left CAR    Interpretation Summary  · Normal left lower extremity venous duplex scan.      Results for orders placed during the hospital encounter of 12/16/21    Duplex Venous Lower Extremity - Left CAR    Interpretation Summary  · Normal left lower extremity venous duplex scan.          Labs Pending at Discharge:  Pending Labs     Order Current Status    Vitamin E In process    Blood Culture - Blood, Blood, Arterial Line Preliminary result     Blood Culture - Blood, Blood, Venous Line Preliminary result          Procedures Performed           Consults:   Consults     Date and Time Order Name Status Description    5/16/2022 10:15 AM Inpatient Cardiology Consult Completed     5/15/2022  2:00 PM Inpatient Cardiology Consult Completed     5/15/2022 10:43 AM Inpatient Neurology Consult General Completed     5/14/2022  7:56 AM Hospitalist (on-call MD unless specified)              Discharge Details        Discharge Medications      New Medications      Instructions Start Date   cephalexin 500 MG capsule  Commonly known as: Keflex   500 mg, Oral, 2 Times Daily      levothyroxine 25 MCG tablet  Commonly known as: Synthroid   25 mcg, Oral, Daily         Changes to Medications      Instructions Start Date   amiodarone 200 MG tablet  Commonly known as: PACERONE  What changed: when to take this   200 mg, Oral, Daily      irbesartan 150 MG tablet  Commonly known as: AVAPRO  What changed: how much to take   75 mg, Oral, Nightly         Continue These Medications      Instructions Start Date   amLODIPine 10 MG tablet  Commonly known as: NORVASC   10 mg, Oral, Daily      atorvastatin 80 MG tablet  Commonly known as: LIPITOR   80 mg, Oral, Daily      Biotin 81331 MCG tablet   10,000 mcg, Oral, Daily      fenofibrate 145 MG tablet  Commonly known as: TRICOR   145 mg, Oral, Daily      rivaroxaban 20 MG tablet  Commonly known as: XARELTO   20 mg, Oral, Daily      Vitamin D3 1.25 MG (24830 UT) capsule   1,000 Units, Oral, Daily         Stop These Medications    potassium chloride 10 MEQ CR tablet            Allergies   Allergen Reactions   • Codeine Nausea And Vomiting, Other (See Comments) and Nausea Only     nausea  nausea     • Cefdinir Unknown - High Severity   • Levofloxacin Nausea Only   • Lisinopril Cough         Discharge Disposition:   Home or Self Care    Diet:  Hospital:  Diet Order   Procedures   • Diet Cardiac, Diabetic/Consistent Carbs; Healthy Heart; Diabetic -  Consistent Carb         Discharge Activity:         CODE STATUS:  Code Status and Medical Interventions:   Ordered at: 05/14/22 0923     Level Of Support Discussed With:    Patient     Code Status (Patient has no pulse and is not breathing):    CPR (Attempt to Resuscitate)     Medical Interventions (Patient has pulse or is breathing):    Full Support         No future appointments.    Additional Instructions for the Follow-ups that You Need to Schedule     Ambulatory Referral to Home Health (Hospital)   As directed      Face to Face Visit Date: 5/17/2022    Follow-up provider for Plan of Care?: I treated the patient in an acute care facility and will not continue treatment after discharge.    Follow-up provider: IAN BRENNAN [[id number]]    Reason/Clinical Findings: weakness    Describe mobility limitations that make leaving home difficult: king baez    Nursing/Therapeutic Services Requested: Physical Therapy Skilled Nursing    Skilled nursing orders: Medication education Telehealth    PT orders: Gait Training Transfer training Therapeutic exercise    Weight Bearing Status: As Tolerated    Frequency: 1 Week 1         Discharge Follow-up with PCP   As directed       Currently Documented PCP:    Ian Brennan MD    PCP Phone Number:    [phone]     Follow Up Details: one week time.               Time spent on Discharge including face to face service 33 minutes.    This patient has been examined wearing appropriate Personal Protective Equipment and discussed with hospital infection control department. 05/17/22      Signature:

## 2022-05-18 LAB — QT INTERVAL: 484 MS

## 2022-05-19 LAB
BACTERIA SPEC AEROBE CULT: NORMAL
BACTERIA SPEC AEROBE CULT: NORMAL

## 2022-05-20 LAB
A-TOCOPHEROL VIT E SERPL-MCNC: 12.8 MG/L (ref 9–29)
GAMMA TOCOPHEROL SERPL-MCNC: 3.1 MG/L (ref 0.5–4.9)

## 2022-06-07 ENCOUNTER — HOSPITAL ENCOUNTER (EMERGENCY)
Facility: HOSPITAL | Age: 72
Discharge: HOME | End: 2022-06-07
Attending: EMERGENCY MEDICINE | Admitting: EMERGENCY MEDICINE

## 2022-06-07 VITALS
HEART RATE: 62 BPM | HEIGHT: 66 IN | DIASTOLIC BLOOD PRESSURE: 72 MMHG | OXYGEN SATURATION: 93 % | RESPIRATION RATE: 18 BRPM | WEIGHT: 195.33 LBS | BODY MASS INDEX: 31.39 KG/M2 | TEMPERATURE: 97.6 F | SYSTOLIC BLOOD PRESSURE: 154 MMHG

## 2022-06-07 DIAGNOSIS — R23.8 INFLAMMATORY PAPULE: ICD-10-CM

## 2022-06-07 DIAGNOSIS — S00.03XA SCALP HEMATOMA, INITIAL ENCOUNTER: Primary | ICD-10-CM

## 2022-06-07 PROCEDURE — 99283 EMERGENCY DEPT VISIT LOW MDM: CPT

## 2022-06-07 NOTE — DISCHARGE INSTRUCTIONS
Elevate head tonight  Sutures will dissolve  Do not rub at the area  Tylenol as needed for discomfort  Apply peroxide to area daily

## 2022-06-07 NOTE — ED PROVIDER NOTES
Subjective   72-year-old female complaining of a scalp laceration on 514.  The patient apparently had sutures placed initially the patient went to hospital in [facility] and had additional sutures placed for continued bleeding and oozing.  The patient is on blood thinners.  The patient reports that the area has been nodular and swollen ever since.  She states that that today she was attempting to squeeze it and had some mild blood that was dark expressed from the wound.  The patient reports no neck pain or stiffness she denies headache or paresthesias reports no recent change in balance or movement ability.  The patient reports no headache or visual changes      Patient later reported that she had not squeezed or scratched at the area    Review of Systems   Genitourinary: Negative for decreased urine volume.   Musculoskeletal: Negative for back pain and neck pain.   Skin: Positive for wound.   Neurological: Positive for light-headedness. Negative for headaches.   All other systems reviewed and are negative.      Past Medical History:   Diagnosis Date   • Coronary artery disease        Allergies   Allergen Reactions   • Codeine Nausea And Vomiting, Other (See Comments) and Nausea Only     nausea  nausea     • Cefdinir Unknown - High Severity   • Levofloxacin Nausea Only   • Lisinopril Cough       Past Surgical History:   Procedure Laterality Date   • BREAST BIOPSY     • HYSTERECTOMY         Family History   Problem Relation Age of Onset   • Breast cancer Sister        Social History     Socioeconomic History   • Marital status:    Tobacco Use   • Smoking status: Never Smoker   • Smokeless tobacco: Never Used   Vaping Use   • Vaping Use: Never used           Objective   Physical Exam  Alert Pérez Coma Scale 15   HEENT: Pupils equal and reactive to light. Conjunctivae are not injected. normal tympanic membranes. Oropharynx and nares are normal.  The patient has an area of induration approximately 1.5  cm in diameter.  There is extensive excoriations and scratching noted around the area.  The roof of the inflammatory papule/hematoma shows some dark necrotic appearing material.  There is no evidence of acute cellulitic change.   Neck: Supple. Midline trachea. No JVD. No goiter.   Chest: Clear and equal breath sounds bilaterally regular rate and rhythm without murmur or rub.   Abdomen: Positive bowel sounds nontender nondistended. No rebound or peritoneal signs. No CVA tenderness.   Extremities no clubbing cyanosis or edema motor sensory exam is normal the full range of motion is intact   skin: Warm and dry, no rashes or petechia.   Lymphatic: No regional lymphadenopathy. No calf pain, swelling or José Luis's sign    Procedures       The area was prepped with chlorhexidine was infiltrated with Xylocaine 1% with epi the area of central necrosis that measures approximately 4 x 6 mm was removed and clot expressed.  The wound cavity was irrigated.  Was closed with Vicryl 4-0 interrupted.  The patient tolerated the procedure well    ED Course                                                 MDM  Number of Diagnoses or Management Options  Risk of Complications, Morbidity, and/or Mortality  Presenting problems: high  Diagnostic procedures: high  Management options: high  General comments: The patient was advised to avoid pressing on or instrumenting the area.  The patient was advised that sutures would dissolve.  The patient will elevate her head tonight.  The patient was stable at discharge and vocalized understanding of discharge instructions and warnings        Final diagnoses:   Scalp hematoma, initial encounter   Inflammatory papule       ED Disposition  ED Disposition     ED Disposition   Discharge    Condition   Stable    Comment   --             Ian Cordero MD  [address] [place] [address] [phone]               Medication List      No changes were made to your prescriptions during this visit.           Rome Boswell MD  06/07/22 [id number]

## 2022-10-17 ENCOUNTER — TRANSCRIBE ORDERS (OUTPATIENT)
Dept: ADMINISTRATIVE | Facility: HOSPITAL | Age: 72
End: 2022-10-17

## 2022-10-17 DIAGNOSIS — Z78.0 POST-MENOPAUSAL: Primary | ICD-10-CM

## 2022-10-17 DIAGNOSIS — Z12.31 VISIT FOR SCREENING MAMMOGRAM: ICD-10-CM

## 2022-11-09 ENCOUNTER — APPOINTMENT (OUTPATIENT)
Dept: MAMMOGRAPHY | Facility: HOSPITAL | Age: 72
End: 2022-11-09

## 2022-11-09 ENCOUNTER — HOSPITAL ENCOUNTER (OUTPATIENT)
Dept: BONE DENSITY | Facility: HOSPITAL | Age: 72
End: 2022-11-09

## 2023-01-03 ENCOUNTER — OFFICE (AMBULATORY)
Dept: URBAN - METROPOLITAN AREA CLINIC 64 | Facility: CLINIC | Age: 73
End: 2023-01-03

## 2023-01-03 VITALS
HEART RATE: 63 BPM | HEIGHT: 66 IN | DIASTOLIC BLOOD PRESSURE: 77 MMHG | SYSTOLIC BLOOD PRESSURE: 139 MMHG | WEIGHT: 154 LBS

## 2023-01-03 DIAGNOSIS — R63.4 ABNORMAL WEIGHT LOSS: ICD-10-CM

## 2023-01-03 DIAGNOSIS — R15.2 FECAL URGENCY: ICD-10-CM

## 2023-01-03 DIAGNOSIS — R63.0 ANOREXIA: ICD-10-CM

## 2023-01-03 DIAGNOSIS — R10.30 LOWER ABDOMINAL PAIN, UNSPECIFIED: ICD-10-CM

## 2023-01-03 DIAGNOSIS — R94.5 ABNORMAL RESULTS OF LIVER FUNCTION STUDIES: ICD-10-CM

## 2023-01-03 DIAGNOSIS — R13.10 DYSPHAGIA, UNSPECIFIED: ICD-10-CM

## 2023-01-03 PROCEDURE — 99214 OFFICE O/P EST MOD 30 MIN: CPT | Performed by: NURSE PRACTITIONER

## 2023-01-20 ENCOUNTER — OFFICE (AMBULATORY)
Dept: URBAN - METROPOLITAN AREA PATHOLOGY 4 | Facility: PATHOLOGY | Age: 73
End: 2023-01-20
Payer: MEDICARE

## 2023-01-20 ENCOUNTER — OFFICE (AMBULATORY)
Dept: URBAN - METROPOLITAN AREA PATHOLOGY 4 | Facility: PATHOLOGY | Age: 73
End: 2023-01-20
Payer: COMMERCIAL

## 2023-01-20 ENCOUNTER — ON CAMPUS - OUTPATIENT (AMBULATORY)
Dept: URBAN - METROPOLITAN AREA HOSPITAL 2 | Facility: HOSPITAL | Age: 73
End: 2023-01-20
Payer: MEDICARE

## 2023-01-20 VITALS
HEART RATE: 61 BPM | DIASTOLIC BLOOD PRESSURE: 69 MMHG | RESPIRATION RATE: 17 BRPM | DIASTOLIC BLOOD PRESSURE: 63 MMHG | DIASTOLIC BLOOD PRESSURE: 74 MMHG | OXYGEN SATURATION: 98 % | SYSTOLIC BLOOD PRESSURE: 160 MMHG | SYSTOLIC BLOOD PRESSURE: 167 MMHG | SYSTOLIC BLOOD PRESSURE: 148 MMHG | OXYGEN SATURATION: 96 % | DIASTOLIC BLOOD PRESSURE: 76 MMHG | OXYGEN SATURATION: 99 % | SYSTOLIC BLOOD PRESSURE: 159 MMHG | HEART RATE: 58 BPM | HEART RATE: 60 BPM | HEART RATE: 62 BPM | RESPIRATION RATE: 18 BRPM | WEIGHT: 153 LBS | HEART RATE: 59 BPM | SYSTOLIC BLOOD PRESSURE: 151 MMHG | SYSTOLIC BLOOD PRESSURE: 150 MMHG | HEIGHT: 66 IN | DIASTOLIC BLOOD PRESSURE: 68 MMHG | RESPIRATION RATE: 12 BRPM | SYSTOLIC BLOOD PRESSURE: 172 MMHG | TEMPERATURE: 96.8 F | HEART RATE: 66 BPM | DIASTOLIC BLOOD PRESSURE: 60 MMHG | OXYGEN SATURATION: 94 % | SYSTOLIC BLOOD PRESSURE: 152 MMHG | DIASTOLIC BLOOD PRESSURE: 110 MMHG

## 2023-01-20 DIAGNOSIS — K22.2 ESOPHAGEAL OBSTRUCTION: ICD-10-CM

## 2023-01-20 DIAGNOSIS — K64.1 SECOND DEGREE HEMORRHOIDS: ICD-10-CM

## 2023-01-20 DIAGNOSIS — R63.0 ANOREXIA: ICD-10-CM

## 2023-01-20 DIAGNOSIS — K63.5 POLYP OF COLON: ICD-10-CM

## 2023-01-20 DIAGNOSIS — K57.30 DIVERTICULOSIS OF LARGE INTESTINE WITHOUT PERFORATION OR ABS: ICD-10-CM

## 2023-01-20 DIAGNOSIS — K29.60 OTHER GASTRITIS WITHOUT BLEEDING: ICD-10-CM

## 2023-01-20 DIAGNOSIS — R11.0 NAUSEA: ICD-10-CM

## 2023-01-20 DIAGNOSIS — D12.5 BENIGN NEOPLASM OF SIGMOID COLON: ICD-10-CM

## 2023-01-20 DIAGNOSIS — R63.4 ABNORMAL WEIGHT LOSS: ICD-10-CM

## 2023-01-20 DIAGNOSIS — K92.1 MELENA: ICD-10-CM

## 2023-01-20 DIAGNOSIS — K31.89 OTHER DISEASES OF STOMACH AND DUODENUM: ICD-10-CM

## 2023-01-20 PROBLEM — K21.9 GASTROESOPHAGEAL REFLUX DISEASE: Status: ACTIVE | Noted: 2023-01-20

## 2023-01-20 LAB
GI HISTOLOGY: A. SELECT: (no result)
GI HISTOLOGY: B. UNSPECIFIED: (no result)
GI HISTOLOGY: PDF REPORT: (no result)

## 2023-01-20 PROCEDURE — 88305 TISSUE EXAM BY PATHOLOGIST: CPT | Mod: 26 | Performed by: INTERNAL MEDICINE

## 2023-01-20 PROCEDURE — 45380 COLONOSCOPY AND BIOPSY: CPT | Performed by: INTERNAL MEDICINE

## 2023-01-20 PROCEDURE — 43239 EGD BIOPSY SINGLE/MULTIPLE: CPT | Performed by: INTERNAL MEDICINE

## 2023-01-20 PROCEDURE — 88342 IMHCHEM/IMCYTCHM 1ST ANTB: CPT | Mod: 26 | Performed by: INTERNAL MEDICINE

## 2023-01-20 PROCEDURE — 43450 DILATE ESOPHAGUS 1/MULT PASS: CPT | Performed by: INTERNAL MEDICINE

## 2023-02-23 ENCOUNTER — OFFICE (AMBULATORY)
Dept: URBAN - METROPOLITAN AREA CLINIC 64 | Facility: CLINIC | Age: 73
End: 2023-02-23

## 2023-02-23 VITALS
HEART RATE: 63 BPM | SYSTOLIC BLOOD PRESSURE: 134 MMHG | WEIGHT: 163 LBS | HEIGHT: 66 IN | DIASTOLIC BLOOD PRESSURE: 64 MMHG

## 2023-02-23 DIAGNOSIS — E03.9 HYPOTHYROIDISM, UNSPECIFIED: ICD-10-CM

## 2023-02-23 DIAGNOSIS — R93.3 ABNORMAL FINDINGS ON DIAGNOSTIC IMAGING OF OTHER PARTS OF DI: ICD-10-CM

## 2023-02-23 DIAGNOSIS — Z79.01 LONG TERM (CURRENT) USE OF ANTICOAGULANTS: ICD-10-CM

## 2023-02-23 DIAGNOSIS — R63.0 ANOREXIA: ICD-10-CM

## 2023-02-23 DIAGNOSIS — R94.5 ABNORMAL RESULTS OF LIVER FUNCTION STUDIES: ICD-10-CM

## 2023-02-23 DIAGNOSIS — K31.89 OTHER DISEASES OF STOMACH AND DUODENUM: ICD-10-CM

## 2023-02-23 DIAGNOSIS — R18.8 OTHER ASCITES: ICD-10-CM

## 2023-02-23 DIAGNOSIS — R13.10 DYSPHAGIA, UNSPECIFIED: ICD-10-CM

## 2023-02-23 DIAGNOSIS — K59.00 CONSTIPATION, UNSPECIFIED: ICD-10-CM

## 2023-02-23 DIAGNOSIS — R10.30 LOWER ABDOMINAL PAIN, UNSPECIFIED: ICD-10-CM

## 2023-02-23 DIAGNOSIS — L92.9 GRANULOMATOUS DISORDER OF THE SKIN AND SUBCUTANEOUS TISSUE,: ICD-10-CM

## 2023-02-23 PROCEDURE — 99215 OFFICE O/P EST HI 40 MIN: CPT | Performed by: INTERNAL MEDICINE

## 2023-04-12 ENCOUNTER — OFFICE (AMBULATORY)
Dept: RURAL CLINIC 3 | Facility: CLINIC | Age: 73
End: 2023-04-12

## 2023-04-12 VITALS
DIASTOLIC BLOOD PRESSURE: 79 MMHG | SYSTOLIC BLOOD PRESSURE: 151 MMHG | WEIGHT: 156 LBS | HEART RATE: 58 BPM | HEIGHT: 66 IN

## 2023-04-12 DIAGNOSIS — R94.5 ABNORMAL RESULTS OF LIVER FUNCTION STUDIES: ICD-10-CM

## 2023-04-12 DIAGNOSIS — R18.8 OTHER ASCITES: ICD-10-CM

## 2023-04-12 DIAGNOSIS — R15.0 INCOMPLETE DEFECATION: ICD-10-CM

## 2023-04-12 DIAGNOSIS — R93.3 ABNORMAL FINDINGS ON DIAGNOSTIC IMAGING OF OTHER PARTS OF DI: ICD-10-CM

## 2023-04-12 DIAGNOSIS — R10.84 GENERALIZED ABDOMINAL PAIN: ICD-10-CM

## 2023-04-12 DIAGNOSIS — R19.7 DIARRHEA, UNSPECIFIED: ICD-10-CM

## 2023-04-12 DIAGNOSIS — K31.89 OTHER DISEASES OF STOMACH AND DUODENUM: ICD-10-CM

## 2023-04-12 DIAGNOSIS — K59.00 CONSTIPATION, UNSPECIFIED: ICD-10-CM

## 2023-04-12 PROCEDURE — 99214 OFFICE O/P EST MOD 30 MIN: CPT | Performed by: NURSE PRACTITIONER

## 2023-04-13 ENCOUNTER — TELEPHONE (OUTPATIENT)
Dept: INFUSION THERAPY | Facility: HOSPITAL | Age: 73
End: 2023-04-13
Payer: MEDICARE

## 2023-04-18 DIAGNOSIS — R18.8 OTHER ASCITES: Primary | ICD-10-CM

## 2023-04-18 RX ORDER — ALBUMIN (HUMAN) 12.5 G/50ML
25 SOLUTION INTRAVENOUS DAILY PRN
Status: CANCELLED | OUTPATIENT
Start: 2023-04-20

## 2023-04-18 RX ORDER — LIDOCAINE HYDROCHLORIDE 10 MG/ML
10 INJECTION, SOLUTION INFILTRATION; PERINEURAL AS NEEDED
Status: CANCELLED | OUTPATIENT
Start: 2023-04-20

## 2023-04-18 RX ORDER — ALBUMIN (HUMAN) 12.5 G/50ML
12.5 SOLUTION INTRAVENOUS DAILY PRN
Status: CANCELLED | OUTPATIENT
Start: 2023-04-20

## 2023-04-18 RX ORDER — LIDOCAINE HYDROCHLORIDE 20 MG/ML
10 INJECTION, SOLUTION INFILTRATION; PERINEURAL AS NEEDED
Status: CANCELLED | OUTPATIENT
Start: 2023-04-20

## 2023-04-20 ENCOUNTER — HOSPITAL ENCOUNTER (OUTPATIENT)
Dept: INTERVENTIONAL RADIOLOGY/VASCULAR | Facility: HOSPITAL | Age: 73
Discharge: HOME | End: 2023-04-20
Payer: MEDICARE

## 2023-04-20 ENCOUNTER — HOSPITAL ENCOUNTER (OUTPATIENT)
Dept: INFUSION THERAPY | Facility: HOSPITAL | Age: 73
Discharge: HOME | End: 2023-04-20
Payer: MEDICARE

## 2023-04-20 VITALS
SYSTOLIC BLOOD PRESSURE: 137 MMHG | RESPIRATION RATE: 20 BRPM | TEMPERATURE: 99.1 F | OXYGEN SATURATION: 92 % | DIASTOLIC BLOOD PRESSURE: 57 MMHG | HEART RATE: 60 BPM

## 2023-04-20 DIAGNOSIS — Z87.898 HX OF ASCITES: ICD-10-CM

## 2023-04-20 DIAGNOSIS — R18.8 OTHER ASCITES: ICD-10-CM

## 2023-04-20 LAB
ALBUMIN FLD-MCNC: 0.7 G/DL
APPEARANCE FLD: CLEAR
COLOR FLD: YELLOW
DEPRECATED RDW RBC AUTO: 53.8 FL (ref 37–54)
ERYTHROCYTE [DISTWIDTH] IN BLOOD BY AUTOMATED COUNT: 16.8 % (ref 12.3–15.4)
HCT VFR BLD AUTO: 39.3 % (ref 34–46.6)
HGB BLD-MCNC: 13.1 G/DL (ref 12–15.9)
INR PPP: 1.12 (ref 0.93–1.1)
LYMPHOCYTES NFR FLD MANUAL: 23 %
MCH RBC QN AUTO: 29.3 PG (ref 26.6–33)
MCHC RBC AUTO-ENTMCNC: 33.3 G/DL (ref 31.5–35.7)
MCV RBC AUTO: 88 FL (ref 79–97)
MESOTHL CELL NFR FLD MANUAL: 42 %
METHOD: NORMAL
MONOCYTES NFR FLD: 20 %
NEUTROPHILS NFR FLD MANUAL: 15 %
NUC CELL # FLD: 207 /MM3
PLATELET # BLD AUTO: 231 10*3/MM3 (ref 140–450)
PMV BLD AUTO: 7.3 FL (ref 6–12)
PROT FLD-MCNC: 1.3 G/DL
PROTHROMBIN TIME: 11.9 SECONDS (ref 9.6–11.7)
RBC # BLD AUTO: 4.46 10*6/MM3 (ref 3.77–5.28)
WBC NRBC COR # BLD: 10.4 10*3/MM3 (ref 3.4–10.8)

## 2023-04-20 PROCEDURE — 84157 ASSAY OF PROTEIN OTHER: CPT | Performed by: NURSE PRACTITIONER

## 2023-04-20 PROCEDURE — 96365 THER/PROPH/DIAG IV INF INIT: CPT

## 2023-04-20 PROCEDURE — 25010000002 ALBUMIN HUMAN 25% PER 50 ML: Performed by: NURSE PRACTITIONER

## 2023-04-20 PROCEDURE — 83690 ASSAY OF LIPASE: CPT | Performed by: NURSE PRACTITIONER

## 2023-04-20 PROCEDURE — 87070 CULTURE OTHR SPECIMN AEROBIC: CPT | Performed by: NURSE PRACTITIONER

## 2023-04-20 PROCEDURE — P9047 ALBUMIN (HUMAN), 25%, 50ML: HCPCS | Performed by: NURSE PRACTITIONER

## 2023-04-20 PROCEDURE — 85610 PROTHROMBIN TIME: CPT | Performed by: NURSE PRACTITIONER

## 2023-04-20 PROCEDURE — 88108 CYTOPATH CONCENTRATE TECH: CPT | Performed by: NURSE PRACTITIONER

## 2023-04-20 PROCEDURE — 96366 THER/PROPH/DIAG IV INF ADDON: CPT

## 2023-04-20 PROCEDURE — 82042 OTHER SOURCE ALBUMIN QUAN EA: CPT | Performed by: NURSE PRACTITIONER

## 2023-04-20 PROCEDURE — 89051 BODY FLUID CELL COUNT: CPT | Performed by: NURSE PRACTITIONER

## 2023-04-20 PROCEDURE — 36415 COLL VENOUS BLD VENIPUNCTURE: CPT

## 2023-04-20 PROCEDURE — 76942 ECHO GUIDE FOR BIOPSY: CPT

## 2023-04-20 PROCEDURE — 85027 COMPLETE CBC AUTOMATED: CPT | Performed by: NURSE PRACTITIONER

## 2023-04-20 PROCEDURE — 87205 SMEAR GRAM STAIN: CPT | Performed by: NURSE PRACTITIONER

## 2023-04-20 RX ORDER — ALBUMIN (HUMAN) 12.5 G/50ML
25 SOLUTION INTRAVENOUS DAILY PRN
Status: DISCONTINUED | OUTPATIENT
Start: 2023-04-20 | End: 2023-04-22 | Stop reason: HOSPADM

## 2023-04-20 RX ORDER — ALBUMIN (HUMAN) 12.5 G/50ML
12.5 SOLUTION INTRAVENOUS DAILY PRN
Status: DISCONTINUED | OUTPATIENT
Start: 2023-04-20 | End: 2023-04-22 | Stop reason: HOSPADM

## 2023-04-20 RX ORDER — LIDOCAINE HYDROCHLORIDE 10 MG/ML
10 INJECTION, SOLUTION INFILTRATION; PERINEURAL AS NEEDED
Status: DISCONTINUED | OUTPATIENT
Start: 2023-04-20 | End: 2023-04-22 | Stop reason: HOSPADM

## 2023-04-20 RX ORDER — LIDOCAINE HYDROCHLORIDE 20 MG/ML
10 INJECTION, SOLUTION INFILTRATION; PERINEURAL AS NEEDED
Status: DISCONTINUED | OUTPATIENT
Start: 2023-04-20 | End: 2023-04-22 | Stop reason: HOSPADM

## 2023-04-20 RX ADMIN — LIDOCAINE HYDROCHLORIDE 10 ML: 10 INJECTION, SOLUTION EPIDURAL; INFILTRATION; INTRACAUDAL; PERINEURAL at 08:10

## 2023-04-20 RX ADMIN — ALBUMIN (HUMAN) 50 G: 0.25 INJECTION, SOLUTION INTRAVENOUS at 08:30

## 2023-04-23 LAB — LIPASE FLD-CCNC: 16 U/L

## 2023-04-24 LAB
LAB AP CASE REPORT: NORMAL
PATH REPORT.FINAL DX SPEC: NORMAL
PATH REPORT.GROSS SPEC: NORMAL

## 2023-04-25 LAB
BACTERIA FLD CULT: NORMAL
GRAM STN SPEC: NORMAL
GRAM STN SPEC: NORMAL

## 2023-05-17 ENCOUNTER — OFFICE (AMBULATORY)
Dept: RURAL CLINIC 3 | Facility: CLINIC | Age: 73
End: 2023-05-17

## 2023-05-17 VITALS
DIASTOLIC BLOOD PRESSURE: 85 MMHG | HEART RATE: 62 BPM | HEIGHT: 66 IN | SYSTOLIC BLOOD PRESSURE: 166 MMHG | WEIGHT: 147 LBS

## 2023-05-17 DIAGNOSIS — R94.5 ABNORMAL RESULTS OF LIVER FUNCTION STUDIES: ICD-10-CM

## 2023-05-17 DIAGNOSIS — R18.8 OTHER ASCITES: ICD-10-CM

## 2023-05-17 DIAGNOSIS — K59.00 CONSTIPATION, UNSPECIFIED: ICD-10-CM

## 2023-05-17 PROCEDURE — 99214 OFFICE O/P EST MOD 30 MIN: CPT | Performed by: INTERNAL MEDICINE

## 2023-05-17 RX ORDER — PANTOPRAZOLE SODIUM 40 MG/1
40 TABLET, DELAYED RELEASE ORAL
Qty: 30 | Refills: 11 | Status: ACTIVE
Start: 2023-05-17

## 2023-05-17 RX ORDER — FUROSEMIDE 20 MG/1
20 TABLET ORAL
Qty: 30 | Refills: 6 | Status: ACTIVE
Start: 2023-05-17

## 2023-05-17 RX ORDER — HYDROCHLOROTHIAZIDE 25 MG/1
TABLET ORAL
Refills: 2 | Status: COMPLETED
End: 2023-05-17

## 2023-06-09 DIAGNOSIS — R18.8 OTHER ASCITES: Primary | ICD-10-CM

## 2023-06-09 RX ORDER — LIDOCAINE HYDROCHLORIDE 20 MG/ML
10 INJECTION, SOLUTION INFILTRATION; PERINEURAL AS NEEDED
OUTPATIENT
Start: 2023-06-09

## 2023-06-09 RX ORDER — ALBUMIN (HUMAN) 12.5 G/50ML
12.5 SOLUTION INTRAVENOUS DAILY PRN
OUTPATIENT
Start: 2023-06-09

## 2023-06-09 RX ORDER — ALBUMIN (HUMAN) 12.5 G/50ML
25 SOLUTION INTRAVENOUS DAILY PRN
OUTPATIENT
Start: 2023-06-09

## 2023-06-09 RX ORDER — LIDOCAINE HYDROCHLORIDE 10 MG/ML
10 INJECTION, SOLUTION INFILTRATION; PERINEURAL AS NEEDED
OUTPATIENT
Start: 2023-06-09

## 2023-06-13 ENCOUNTER — HOSPITAL ENCOUNTER (OUTPATIENT)
Dept: INFUSION THERAPY | Facility: HOSPITAL | Age: 73
Discharge: HOME | End: 2023-06-13
Admitting: INTERNAL MEDICINE
Payer: MEDICARE

## 2023-06-13 VITALS
OXYGEN SATURATION: 98 % | SYSTOLIC BLOOD PRESSURE: 148 MMHG | HEART RATE: 57 BPM | RESPIRATION RATE: 14 BRPM | DIASTOLIC BLOOD PRESSURE: 54 MMHG

## 2023-06-13 DIAGNOSIS — R18.8 OTHER ASCITES: ICD-10-CM

## 2023-06-13 LAB
DEPRECATED RDW RBC AUTO: 56 FL (ref 37–54)
ERYTHROCYTE [DISTWIDTH] IN BLOOD BY AUTOMATED COUNT: 16.7 % (ref 12.3–15.4)
HCT VFR BLD AUTO: 30.2 % (ref 34–46.6)
HGB BLD-MCNC: 10 G/DL (ref 12–15.9)
INR PPP: 1.06 (ref 0.93–1.1)
MCH RBC QN AUTO: 30.3 PG (ref 26.6–33)
MCHC RBC AUTO-ENTMCNC: 33.1 G/DL (ref 31.5–35.7)
MCV RBC AUTO: 91.7 FL (ref 79–97)
PLATELET # BLD AUTO: 239 10*3/MM3 (ref 140–450)
PMV BLD AUTO: 6.6 FL (ref 6–12)
PROTHROMBIN TIME: 11.3 SECONDS (ref 9.6–11.7)
RBC # BLD AUTO: 3.3 10*6/MM3 (ref 3.77–5.28)
WBC NRBC COR # BLD: 10.6 10*3/MM3 (ref 3.4–10.8)

## 2023-06-13 PROCEDURE — 85610 PROTHROMBIN TIME: CPT | Performed by: INTERNAL MEDICINE

## 2023-06-13 PROCEDURE — 96365 THER/PROPH/DIAG IV INF INIT: CPT

## 2023-06-13 PROCEDURE — 96366 THER/PROPH/DIAG IV INF ADDON: CPT

## 2023-06-13 PROCEDURE — 25010000002 ALBUMIN HUMAN 25% PER 50 ML: Performed by: INTERNAL MEDICINE

## 2023-06-13 PROCEDURE — 85027 COMPLETE CBC AUTOMATED: CPT | Performed by: INTERNAL MEDICINE

## 2023-06-13 PROCEDURE — P9047 ALBUMIN (HUMAN), 25%, 50ML: HCPCS | Performed by: INTERNAL MEDICINE

## 2023-06-13 PROCEDURE — 76942 ECHO GUIDE FOR BIOPSY: CPT

## 2023-06-13 RX ORDER — ALBUMIN (HUMAN) 12.5 G/50ML
12.5 SOLUTION INTRAVENOUS DAILY PRN
Status: DISCONTINUED | OUTPATIENT
Start: 2023-06-13 | End: 2023-06-15 | Stop reason: HOSPADM

## 2023-06-13 RX ORDER — ALBUMIN (HUMAN) 12.5 G/50ML
25 SOLUTION INTRAVENOUS DAILY PRN
Status: DISCONTINUED | OUTPATIENT
Start: 2023-06-13 | End: 2023-06-15 | Stop reason: HOSPADM

## 2023-06-13 RX ORDER — LIDOCAINE HYDROCHLORIDE 10 MG/ML
10 INJECTION, SOLUTION INFILTRATION; PERINEURAL AS NEEDED
Status: DISCONTINUED | OUTPATIENT
Start: 2023-06-13 | End: 2023-06-15 | Stop reason: HOSPADM

## 2023-06-13 RX ORDER — FUROSEMIDE 40 MG/1
40 TABLET ORAL DAILY
COMMUNITY
Start: 2023-06-06

## 2023-06-13 RX ORDER — LIDOCAINE HYDROCHLORIDE 20 MG/ML
10 INJECTION, SOLUTION INFILTRATION; PERINEURAL AS NEEDED
Status: DISCONTINUED | OUTPATIENT
Start: 2023-06-13 | End: 2023-06-15 | Stop reason: HOSPADM

## 2023-06-13 RX ORDER — PREDNISONE 5 MG/1
5 TABLET ORAL 3 TIMES DAILY
COMMUNITY
Start: 2023-06-05

## 2023-06-13 RX ORDER — FOLIC ACID 1 MG/1
1 TABLET ORAL DAILY
COMMUNITY

## 2023-06-13 RX ORDER — PANTOPRAZOLE SODIUM 40 MG/1
40 TABLET, DELAYED RELEASE ORAL DAILY
COMMUNITY

## 2023-06-13 RX ADMIN — ALBUMIN (HUMAN) 25 G: 0.25 INJECTION, SOLUTION INTRAVENOUS at 09:02

## 2023-06-13 RX ADMIN — LIDOCAINE HYDROCHLORIDE 10 ML: 10 INJECTION, SOLUTION EPIDURAL; INFILTRATION; INTRACAUDAL; PERINEURAL at 08:05

## 2023-06-13 RX ADMIN — ALBUMIN (HUMAN) 25 G: 0.25 INJECTION, SOLUTION INTRAVENOUS at 08:30

## 2023-06-13 NOTE — PROGRESS NOTES
PARACENTESIS    Procedure: Paracentesis    Time Out Completed: yes    Time Out: 0805    Prep Site 1: Right Lateral Abdomen      Prep: Chlorhexidine and Sterile drape    Procedure Position: Supine and Right Side    Guidance: Ultrasound    Fluid Quality: Russell Yellow and Other: Hazy    Procedure Note: Procedure start at 0805. 1% lidocaine used for local anesthetic. No complications noted. Will continue to monitor.    Post-Procedure Position: Supine and HOB Elevated    Dressing Site 1: 2x2, 4x4, and Tegaderm    Post Procedure Status:  Alert and Oriented, returned to baseline, Dressing Dry/ Intact, IV documented (see flowsheet), Stable Condition, and Dressing properly labeled    Specimen To Lab: no    Total Fluid Removed: 6.5 Liters    Post Procedure Note:  Procedure complete at 0900. 50 grams of albumin given per MD order. No complications noted. Patient's  brought to bedside during completion of albumin.

## 2023-06-26 ENCOUNTER — HOSPITAL ENCOUNTER (OUTPATIENT)
Facility: HOSPITAL | Age: 73
Setting detail: OBSERVATION
Discharge: HOME | End: 2023-06-29
Attending: EMERGENCY MEDICINE | Admitting: INTERNAL MEDICINE
Payer: MEDICARE

## 2023-06-26 DIAGNOSIS — R18.8 OTHER ASCITES: ICD-10-CM

## 2023-06-26 DIAGNOSIS — N17.9 ACUTE KIDNEY INJURY: ICD-10-CM

## 2023-06-26 DIAGNOSIS — R55 SYNCOPE, UNSPECIFIED SYNCOPE TYPE: Primary | ICD-10-CM

## 2023-06-26 LAB
ANION GAP SERPL CALCULATED.3IONS-SCNC: 12 MMOL/L (ref 5–15)
B PARAPERT DNA SPEC QL NAA+PROBE: NOT DETECTED
B PERT DNA SPEC QL NAA+PROBE: NOT DETECTED
BASOPHILS # BLD AUTO: 0 10*3/MM3 (ref 0–0.2)
BASOPHILS NFR BLD AUTO: 0.2 % (ref 0–1.5)
BUN SERPL-MCNC: 42 MG/DL (ref 8–23)
BUN/CREAT SERPL: 29.8 (ref 7–25)
C PNEUM DNA NPH QL NAA+NON-PROBE: NOT DETECTED
CALCIUM SPEC-SCNC: 9 MG/DL (ref 8.6–10.5)
CHLORIDE SERPL-SCNC: 109 MMOL/L (ref 98–107)
CO2 SERPL-SCNC: 21 MMOL/L (ref 22–29)
CREAT SERPL-MCNC: 1.41 MG/DL (ref 0.57–1)
DEPRECATED RDW RBC AUTO: 56.9 FL (ref 37–54)
EGFRCR SERPLBLD CKD-EPI 2021: 39 ML/MIN/1.73
EOSINOPHIL # BLD AUTO: 0 10*3/MM3 (ref 0–0.4)
EOSINOPHIL NFR BLD AUTO: 0.1 % (ref 0.3–6.2)
ERYTHROCYTE [DISTWIDTH] IN BLOOD BY AUTOMATED COUNT: 17.3 % (ref 12.3–15.4)
FLUAV SUBTYP SPEC NAA+PROBE: NOT DETECTED
FLUBV RNA ISLT QL NAA+PROBE: NOT DETECTED
GEN 5 2HR TROPONIN T REFLEX: 22 NG/L
GLUCOSE SERPL-MCNC: 95 MG/DL (ref 65–99)
HADV DNA SPEC NAA+PROBE: NOT DETECTED
HCOV 229E RNA SPEC QL NAA+PROBE: NOT DETECTED
HCOV HKU1 RNA SPEC QL NAA+PROBE: NOT DETECTED
HCOV NL63 RNA SPEC QL NAA+PROBE: NOT DETECTED
HCOV OC43 RNA SPEC QL NAA+PROBE: NOT DETECTED
HCT VFR BLD AUTO: 26.9 % (ref 34–46.6)
HGB BLD-MCNC: 8.6 G/DL (ref 12–15.9)
HMPV RNA NPH QL NAA+NON-PROBE: NOT DETECTED
HPIV1 RNA ISLT QL NAA+PROBE: NOT DETECTED
HPIV2 RNA SPEC QL NAA+PROBE: NOT DETECTED
HPIV3 RNA NPH QL NAA+PROBE: NOT DETECTED
HPIV4 P GENE NPH QL NAA+PROBE: NOT DETECTED
LYMPHOCYTES # BLD AUTO: 0.3 10*3/MM3 (ref 0.7–3.1)
LYMPHOCYTES NFR BLD AUTO: 3.9 % (ref 19.6–45.3)
M PNEUMO IGG SER IA-ACNC: NOT DETECTED
MCH RBC QN AUTO: 30.7 PG (ref 26.6–33)
MCHC RBC AUTO-ENTMCNC: 32 G/DL (ref 31.5–35.7)
MCV RBC AUTO: 96.1 FL (ref 79–97)
MONOCYTES # BLD AUTO: 0.1 10*3/MM3 (ref 0.1–0.9)
MONOCYTES NFR BLD AUTO: 0.8 % (ref 5–12)
NEUTROPHILS NFR BLD AUTO: 6.4 10*3/MM3 (ref 1.7–7)
NEUTROPHILS NFR BLD AUTO: 95 % (ref 42.7–76)
NRBC BLD AUTO-RTO: 0 /100 WBC (ref 0–0.2)
PLATELET # BLD AUTO: 226 10*3/MM3 (ref 140–450)
PMV BLD AUTO: 7 FL (ref 6–12)
POTASSIUM SERPL-SCNC: 4.2 MMOL/L (ref 3.5–5.2)
RBC # BLD AUTO: 2.8 10*6/MM3 (ref 3.77–5.28)
RHINOVIRUS RNA SPEC NAA+PROBE: DETECTED
RSV RNA NPH QL NAA+NON-PROBE: NOT DETECTED
SARS-COV-2 RNA NPH QL NAA+NON-PROBE: NOT DETECTED
SODIUM SERPL-SCNC: 142 MMOL/L (ref 136–145)
TROPONIN T DELTA: -2 NG/L
TROPONIN T SERPL HS-MCNC: 24 NG/L
WBC NRBC COR # BLD: 6.7 10*3/MM3 (ref 3.4–10.8)

## 2023-06-26 PROCEDURE — G0378 HOSPITAL OBSERVATION PER HR: HCPCS

## 2023-06-26 PROCEDURE — 93005 ELECTROCARDIOGRAM TRACING: CPT | Performed by: FAMILY MEDICINE

## 2023-06-26 PROCEDURE — 85025 COMPLETE CBC W/AUTO DIFF WBC: CPT | Performed by: EMERGENCY MEDICINE

## 2023-06-26 PROCEDURE — 0202U NFCT DS 22 TRGT SARS-COV-2: CPT | Performed by: NURSE PRACTITIONER

## 2023-06-26 PROCEDURE — 99285 EMERGENCY DEPT VISIT HI MDM: CPT

## 2023-06-26 PROCEDURE — 36415 COLL VENOUS BLD VENIPUNCTURE: CPT | Performed by: NURSE PRACTITIONER

## 2023-06-26 PROCEDURE — 84443 ASSAY THYROID STIM HORMONE: CPT | Performed by: NURSE PRACTITIONER

## 2023-06-26 PROCEDURE — 84484 ASSAY OF TROPONIN QUANT: CPT | Performed by: NURSE PRACTITIONER

## 2023-06-26 PROCEDURE — 93005 ELECTROCARDIOGRAM TRACING: CPT | Performed by: EMERGENCY MEDICINE

## 2023-06-26 PROCEDURE — 93010 ELECTROCARDIOGRAM REPORT: CPT | Performed by: INTERNAL MEDICINE

## 2023-06-26 PROCEDURE — 80048 BASIC METABOLIC PNL TOTAL CA: CPT | Performed by: EMERGENCY MEDICINE

## 2023-06-26 RX ORDER — SODIUM CHLORIDE 0.9 % (FLUSH) 0.9 %
10 SYRINGE (ML) INJECTION EVERY 12 HOURS SCHEDULED
Status: DISCONTINUED | OUTPATIENT
Start: 2023-06-26 | End: 2023-06-29 | Stop reason: HOSPADM

## 2023-06-26 RX ORDER — SODIUM CHLORIDE 0.9 % (FLUSH) 0.9 %
10 SYRINGE (ML) INJECTION AS NEEDED
Status: DISCONTINUED | OUTPATIENT
Start: 2023-06-26 | End: 2023-06-29 | Stop reason: HOSPADM

## 2023-06-26 RX ORDER — PANTOPRAZOLE SODIUM 40 MG/1
40 TABLET, DELAYED RELEASE ORAL DAILY
Status: DISCONTINUED | OUTPATIENT
Start: 2023-06-27 | End: 2023-06-29 | Stop reason: HOSPADM

## 2023-06-26 RX ORDER — ACETAMINOPHEN 325 MG/1
650 TABLET ORAL EVERY 4 HOURS PRN
Status: DISCONTINUED | OUTPATIENT
Start: 2023-06-26 | End: 2023-06-29 | Stop reason: HOSPADM

## 2023-06-26 RX ORDER — BISACODYL 5 MG/1
5 TABLET, DELAYED RELEASE ORAL DAILY PRN
Status: DISCONTINUED | OUTPATIENT
Start: 2023-06-26 | End: 2023-06-29 | Stop reason: HOSPADM

## 2023-06-26 RX ORDER — FERROUS SULFATE TAB EC 324 MG (65 MG FE EQUIVALENT) 324 (65 FE) MG
324 TABLET DELAYED RESPONSE ORAL
Status: DISCONTINUED | OUTPATIENT
Start: 2023-06-27 | End: 2023-06-29 | Stop reason: HOSPADM

## 2023-06-26 RX ORDER — ATORVASTATIN CALCIUM 20 MG/1
20 TABLET, FILM COATED ORAL DAILY
Status: DISCONTINUED | OUTPATIENT
Start: 2023-06-27 | End: 2023-06-29 | Stop reason: HOSPADM

## 2023-06-26 RX ORDER — FOLIC ACID 1 MG/1
1 TABLET ORAL DAILY
Status: DISCONTINUED | OUTPATIENT
Start: 2023-06-27 | End: 2023-06-29 | Stop reason: HOSPADM

## 2023-06-26 RX ORDER — ONDANSETRON 4 MG/1
4 TABLET, FILM COATED ORAL EVERY 6 HOURS PRN
Status: DISCONTINUED | OUTPATIENT
Start: 2023-06-26 | End: 2023-06-29 | Stop reason: HOSPADM

## 2023-06-26 RX ORDER — FERROUS SULFATE 325(65) MG
325 TABLET ORAL
COMMUNITY

## 2023-06-26 RX ORDER — AMOXICILLIN 250 MG
2 CAPSULE ORAL 2 TIMES DAILY
Status: DISCONTINUED | OUTPATIENT
Start: 2023-06-26 | End: 2023-06-29 | Stop reason: HOSPADM

## 2023-06-26 RX ORDER — ONDANSETRON 2 MG/ML
4 INJECTION INTRAMUSCULAR; INTRAVENOUS EVERY 6 HOURS PRN
Status: DISCONTINUED | OUTPATIENT
Start: 2023-06-26 | End: 2023-06-29 | Stop reason: HOSPADM

## 2023-06-26 RX ORDER — ACETAMINOPHEN 650 MG/1
650 SUPPOSITORY RECTAL EVERY 4 HOURS PRN
Status: DISCONTINUED | OUTPATIENT
Start: 2023-06-26 | End: 2023-06-29 | Stop reason: HOSPADM

## 2023-06-26 RX ORDER — NITROGLYCERIN 0.4 MG/1
0.4 TABLET SUBLINGUAL
Status: DISCONTINUED | OUTPATIENT
Start: 2023-06-26 | End: 2023-06-29 | Stop reason: HOSPADM

## 2023-06-26 RX ORDER — BISACODYL 10 MG
10 SUPPOSITORY, RECTAL RECTAL DAILY PRN
Status: DISCONTINUED | OUTPATIENT
Start: 2023-06-26 | End: 2023-06-29 | Stop reason: HOSPADM

## 2023-06-26 RX ORDER — ACETAMINOPHEN 160 MG/5ML
650 SOLUTION ORAL EVERY 4 HOURS PRN
Status: DISCONTINUED | OUTPATIENT
Start: 2023-06-26 | End: 2023-06-29 | Stop reason: HOSPADM

## 2023-06-26 RX ORDER — POLYETHYLENE GLYCOL 3350 17 G/17G
17 POWDER, FOR SOLUTION ORAL DAILY PRN
Status: DISCONTINUED | OUTPATIENT
Start: 2023-06-26 | End: 2023-06-29 | Stop reason: HOSPADM

## 2023-06-26 RX ORDER — SODIUM CHLORIDE 9 MG/ML
40 INJECTION, SOLUTION INTRAVENOUS AS NEEDED
Status: DISCONTINUED | OUTPATIENT
Start: 2023-06-26 | End: 2023-06-28

## 2023-06-26 RX ORDER — LEVOTHYROXINE SODIUM 0.05 MG/1
50 TABLET ORAL
Status: DISCONTINUED | OUTPATIENT
Start: 2023-06-27 | End: 2023-06-29 | Stop reason: HOSPADM

## 2023-06-26 RX ORDER — CALCIUM CARBONATE 500 MG/1
3 TABLET, CHEWABLE ORAL 3 TIMES DAILY PRN
Status: DISCONTINUED | OUTPATIENT
Start: 2023-06-26 | End: 2023-06-29 | Stop reason: HOSPADM

## 2023-06-26 RX ORDER — ALUMINA, MAGNESIA, AND SIMETHICONE 2400; 2400; 240 MG/30ML; MG/30ML; MG/30ML
15 SUSPENSION ORAL EVERY 6 HOURS PRN
Status: DISCONTINUED | OUTPATIENT
Start: 2023-06-26 | End: 2023-06-29 | Stop reason: HOSPADM

## 2023-06-26 RX ADMIN — SODIUM CHLORIDE 500 ML: 9 INJECTION, SOLUTION INTRAVENOUS at 16:10

## 2023-06-26 RX ADMIN — Medication 10 ML: at 22:05

## 2023-06-26 NOTE — ED PROVIDER NOTES
Subjective   History of Present Illness  Patient is a 73-year-old female complaining of weak and dizzy.  Her  states that she did have a syncopal episode lasting proxy 20 to 30 seconds.  Patient also complains of abdominal pain but she states been chronic for the past 1 year and she is following outpatient with both her gastroenterologist and rheumatologist as they believe it is related to sarcoidosis.  Patient denies chest pain shortness of breath balm diarrhea or other complaint    Review of Systems    Past Medical History:   Diagnosis Date    A-fib     Coronary artery disease        Allergies   Allergen Reactions    Codeine Nausea And Vomiting, Other (See Comments) and Nausea Only     nausea  nausea      Topiramate Rash    Tramadol Other (See Comments) and Mental Status Change    Cefdinir Unknown - High Severity    Levofloxacin Nausea Only    Lisinopril Cough       Past Surgical History:   Procedure Laterality Date    BREAST BIOPSY      HYSTERECTOMY      JOINT REPLACEMENT Right 2015       Family History   Problem Relation Age of Onset    Breast cancer Sister        Social History     Socioeconomic History    Marital status:    Tobacco Use    Smoking status: Never     Passive exposure: Never    Smokeless tobacco: Never   Vaping Use    Vaping Use: Never used   Substance and Sexual Activity    Alcohol use: Never    Drug use: Never    Sexual activity: Defer           Objective   Physical Exam  Neck has no adenopathy JVD or bruits.  Lungs are clear.  Heart has a regular rate rhythm without murmur or gallop.  Chest is nontender.  Abdomen soft nontender.  Extremity exam is unremarkable.  Procedures       My EKG interpretation shows sinus bradycardia at a rate of 55 with no acute ST change    ED Course      Results for orders placed or performed during the hospital encounter of 06/26/23   Basic Metabolic Panel    Specimen: Blood   Result Value Ref Range    Glucose 95 65 - 99 mg/dL    BUN 42 (H) 8 - 23 mg/dL     Creatinine 1.41 (H) 0.57 - 1.00 mg/dL    Sodium 142 136 - 145 mmol/L    Potassium 4.2 3.5 - 5.2 mmol/L    Chloride 109 (H) 98 - 107 mmol/L    CO2 21.0 (L) 22.0 - 29.0 mmol/L    Calcium 9.0 8.6 - 10.5 mg/dL    BUN/Creatinine Ratio 29.8 (H) 7.0 - 25.0    Anion Gap 12.0 5.0 - 15.0 mmol/L    eGFR 39.0 (L) >60.0 mL/min/1.73   CBC Auto Differential    Specimen: Blood   Result Value Ref Range    WBC 6.70 3.40 - 10.80 10*3/mm3    RBC 2.80 (L) 3.77 - 5.28 10*6/mm3    Hemoglobin 8.6 (L) 12.0 - 15.9 g/dL    Hematocrit 26.9 (L) 34.0 - 46.6 %    MCV 96.1 79.0 - 97.0 fL    MCH 30.7 26.6 - 33.0 pg    MCHC 32.0 31.5 - 35.7 g/dL    RDW 17.3 (H) 12.3 - 15.4 %    RDW-SD 56.9 (H) 37.0 - 54.0 fl    MPV 7.0 6.0 - 12.0 fL    Platelets 226 140 - 450 10*3/mm3    Neutrophil % 95.0 (H) 42.7 - 76.0 %    Lymphocyte % 3.9 (L) 19.6 - 45.3 %    Monocyte % 0.8 (L) 5.0 - 12.0 %    Eosinophil % 0.1 (L) 0.3 - 6.2 %    Basophil % 0.2 0.0 - 1.5 %    Neutrophils, Absolute 6.40 1.70 - 7.00 10*3/mm3    Lymphocytes, Absolute 0.30 (L) 0.70 - 3.10 10*3/mm3    Monocytes, Absolute 0.10 0.10 - 0.90 10*3/mm3    Eosinophils, Absolute 0.00 0.00 - 0.40 10*3/mm3    Basophils, Absolute 0.00 0.00 - 0.20 10*3/mm3    nRBC 0.0 0.0 - 0.2 /100 WBC   ECG 12 Lead Syncope   Result Value Ref Range    QT Interval 450 ms     .rad                                       Medical Decision Making  Patient does have acute kidney injury on BMP.  She has no evidence of infectious process with no leukocytosis.  She does have mild anemia.  Patient's most recent hemoglobin was approximately 10 several months ago.  Patient was started on IV fluids.  Patient will be admitted for further IV fluids and evaluation.  I did speak to the on-call hospitalist.    Amount and/or Complexity of Data Reviewed  Labs: ordered. Decision-making details documented in ED Course.  ECG/medicine tests: ordered and independent interpretation performed.    Risk  Prescription drug management.  Decision regarding  hospitalization.        Final diagnoses:   Syncope, unspecified syncope type   Acute kidney injury       ED Disposition  ED Disposition       ED Disposition   Decision to Admit    Condition   --    Comment   --               No follow-up provider specified.       Medication List      No changes were made to your prescriptions during this visit.            oRd Cagle MD  06/26/23 [id number]

## 2023-06-26 NOTE — CASE MANAGEMENT/SOCIAL WORK
Discharge Planning Assessment  [facility]     Patient Name: Rosario Ortez  MRN: [id number]  Today's Date: 6/26/2023    Admit Date: 6/26/2023    Plan: Home with Family, if PT is needed Wants OP PT @ [facility].   Discharge Needs Assessment       Row Name 06/26/23 1710       Living Environment    People in Home child(clint), adult;spouse    Current Living Arrangements home    Potentially Unsafe Housing Conditions none    Primary Care Provided by self    Provides Primary Care For no one    Family Caregiver if Needed child(clint), adult;spouse    Family Caregiver Names  Surya, Daughter Marily    Quality of Family Relationships supportive    Able to Return to Prior Arrangements yes       Resource/Environmental Concerns    Resource/Environmental Concerns none    Transportation Concerns none       Food Insecurity    Within the past 12 months, you worried that your food would run out before you got the money to buy more. Never true    Within the past 12 months, the food you bought just didn't last and you didn't have money to get more. Never true       Transition Planning    Patient/Family Anticipates Transition to home with family    Patient/Family Anticipated Services at Transition none    Transportation Anticipated family or friend will provide       Discharge Needs Assessment    Readmission Within the Last 30 Days no previous admission in last 30 days    Equipment Currently Used at Home walker, rolling    Concerns to be Addressed denies needs/concerns at this time    Anticipated Changes Related to Illness none    Provided Post Acute Provider List? Refused                   Discharge Plan       Row Name 06/26/23 1711       Plan    Plan Home with Family, if PT is needed Wants OP PT @ [facility] on [facility].    Plan Comments [facility] Lives at home with  and daughter who are able to help if needed and they provide transportation. PCP and Pharmacy verified, able to afford  medications. Per Patient if PT is needed at time of D/C She prefers OP PT @ [facility] on [facility] as she has been there before, Referral and order will need sent. D/C Barriers: Syncope Work up, IVF                  Continued Care and Services - Admitted Since 6/26/2023    Coordination has not been started for this encounter.          Demographic Summary       Row Name 06/26/23 1709       General Information    Arrived From emergency department    Referral Source admission list    Reason for Consult discharge planning    Preferred Language English                   Functional Status       Row Name 06/26/23 1710       Functional Status    Usual Activity Tolerance moderate    Current Activity Tolerance moderate       Functional Status, IADL    Medications independent    Meal Preparation independent    Housekeeping assistive person    Laundry assistive person    Shopping assistive person       Mental Status    General Appearance WDL WDL                          Elin Kumar RN

## 2023-06-26 NOTE — H&P
"    [facility] Medicine Services  History & Physical    Patient Name: Rosario Ortez  : 1950  MRN: [id number]  Primary Care Physician:  Ian Cordero MD  Date of admission: 2023  Date and Time of Service: 2023 at 1730    Subjective      Chief Complaint: Syncopal episode    History of Present Illness: Rosario Ortez is a 73 y.o. female with persistent atrial fibrillation, chronic iron deficient anemia, chronic abdominal pain related to sarcoid (patient states she has been referred to [facility] for symptomology management), recent development of ascites hypertension, hyperlipidemia, GERD, hypothyroidism who presented to [facility] on 2023 complaining of abdominal pain this morning with inability to tolerate food but no change in her chronic abdominal pain, was walking with walker and became really weak and lost consciousness for approximately 30 seconds,  was close by and lowered her to the floor, no injury sustained.  She has new onset of ascites with first paracentesis on 2023, Lasix was initiated approximately 2 months ago, patient denies any previous syncopal events.  She states that she has been having intermittent GI bleeding from her hemorrhoids over the last 2 months, last episode 1 week ago, \"filling the toilet bowl with blood\".  Patient denies associated symptoms prior to syncopal event such as chest pain, shortness of breath, nausea/vomiting, or dizziness.    EKG in the emergency department with sinus bradycardia with excessive artifact hemoglobin 8.6 with hemoglobin 2 weeks ago being 10.0.  Creatinine 1.41, there may be some element of chronic kidney disease as patient has elevated creatinine on previous admissions.  Reviewed DONNIE from [facility] on 2023: EF 65%, mild to moderate AS and no mitral valve stenosis    Review of Systems   Constitutional: Positive for malaise/fatigue. Negative for diaphoresis, fever, weight gain and " weight loss.   Cardiovascular: Positive for leg swelling and syncope. Negative for chest pain, dyspnea on exertion, near-syncope, orthopnea and palpitations.   Respiratory: Negative for hemoptysis, shortness of breath, sputum production and wheezing.    Skin: Negative for rash.   Gastrointestinal: Positive for bloating, abdominal pain, flatus, hematochezia and hemorrhoids. Negative for change in bowel habit, hematemesis, nausea and vomiting.   Genitourinary: Negative for dysuria.   Neurological: Negative for dizziness, light-headedness, numbness and seizures.   Psychiatric/Behavioral: Negative.    All other systems reviewed and are negative.       Personal History     Past Medical History:   Diagnosis Date   • A-fib    • Coronary artery disease        Past Surgical History:   Procedure Laterality Date   • BREAST BIOPSY     • HYSTERECTOMY     • JOINT REPLACEMENT Right 2015       Family History: family history includes Breast cancer in her sister. Otherwise pertinent FHx was reviewed and not pertinent to current issue.    Social History:  reports that she has never smoked. She has never been exposed to tobacco smoke. She has never used smokeless tobacco. She reports that she does not drink alcohol and does not use drugs.    Home Medications:  Prior to Admission Medications     Prescriptions Last Dose Informant Patient Reported? Taking?    apixaban (ELIQUIS) 2.5 MG tablet tablet  Spouse/Significant Other Yes No    Take 1 tablet by mouth 2 (Two) Times a Day.    atorvastatin (LIPITOR) 20 MG tablet   Yes No    Take 1 tablet by mouth Daily.    folic acid (FOLVITE) 1 MG tablet  Spouse/Significant Other Yes No    Take 1 tablet by mouth Daily.    furosemide (LASIX) 40 MG tablet   Yes No    Take 1 tablet by mouth Daily.    irbesartan (AVAPRO) 75 MG tablet   No No    Take 1 tablet by mouth Every Night.    levothyroxine (SYNTHROID, LEVOTHROID) 75 MCG tablet   Yes No    1 tablet Every Morning.    methotrexate 2.5 MG tablet   Spouse/Significant Other Yes No    Take 1 tablet by mouth 1 (One) Time Per Week. Take 4 tablets 1 time per week    metoprolol tartrate (LOPRESSOR) 25 MG tablet  Spouse/Significant Other Yes No    Take 1 tablet by mouth 2 (Two) Times a Day. Take 1/2 tablet twice daily    pantoprazole (PROTONIX) 40 MG EC tablet  Spouse/Significant Other Yes No    Take 1 tablet by mouth Daily.    potassium chloride (K-DUR,KLOR-CON) 10 MEQ CR tablet   Yes No    Take 2 tablets by mouth Daily.    predniSONE (DELTASONE) 5 MG tablet   Yes No    Take 1 tablet by mouth 3 (Three) Times a Day.            Allergies:  Allergies   Allergen Reactions   • Codeine Nausea And Vomiting, Other (See Comments) and Nausea Only     nausea  nausea     • Topiramate Rash   • Tramadol Other (See Comments) and Mental Status Change   • Cefdinir Unknown - High Severity   • Levofloxacin Nausea Only   • Lisinopril Cough       Objective      Vitals:   Temp:  [97.8 °F (36.6 °C)-98 °F (36.7 °C)] 97.8 °F (36.6 °C)  Heart Rate:  [50-51] 50  Resp:  [18] 18  BP: (117-141)/(52-60) 117/52    Physical Exam  Vitals reviewed.   Cardiovascular:      Rate and Rhythm: Regular rhythm. Bradycardia present.      Pulses: Normal pulses.      Heart sounds: Murmur heard.    Systolic murmur is present with a grade of 3/6.  Pulmonary:      Effort: Pulmonary effort is normal.      Breath sounds: Normal breath sounds. No wheezing.   Abdominal:      General: There is no distension.      Tenderness: There is no abdominal tenderness. There is no guarding.   Musculoskeletal:      Right lower leg: 3+ Pitting Edema present.      Left lower leg: 3+ Pitting Edema present.   Skin:     General: Skin is warm and dry.   Neurological:      Mental Status: She is alert and oriented to person, place, and time.   Psychiatric:         Mood and Affect: Mood normal.            Result Review    Result Review:  I have personally reviewed the results from the time of this admission to 6/26/2023 16:38 EDT and agree  with these findings:  [x]  Laboratory  []  Microbiology  []  Radiology  [x]  EKG/Telemetry   []  Cardiology/Vascular   []  Pathology  [x]  Old records  []  Other:  Most notable findings include: See summary and plan (See history of present illness and plan)      Assessment & Plan        Active Hospital Problems:  There are no active hospital problems to display for this patient.    Plan:   #Syncopal episode  #Weakness  -Check viral panel  -Check troponin  -Check orthostatics  -Telemetry monitor  -Consult cardiology  -Stress test a.m.  -Check carotids  -Repeat EKG in a.m.  -Check prealbumin in a.m.    #Recurrent GI bleeding  #Iron deficiency anemia  #Hemorrhoids  #Chronic abdominal pain related to sarcoid  #Sarcoid  -Continue home methotrexate and prednisone  -Consult GI  -Follow hemoglobin, transfuse as needed    #JOSÉ, possible CKD, creatinine 1.2 on previous admission  -Creatinine 1.4 on admission  -Follow creatinine    #Ascites  -Paracentesis proximately 1 week ago    #Persistent atrial fibrillation  -Resume flecainide when home medications reconciled  -Hold Eliquis for now    #Hypertension   -Hold home antihypertensives for now    #hyperlipidemia  -Statin    #Hypothyroidism  -Resume levothyroxine  -Check TSH    #Mobility issue  -Utilizes walker for mobility      DVT prophylaxis:  No DVT prophylaxis order currently exists.    CODE STATUS:       Admission Status:  I believe this patient meets observation status.    I discussed the patient's findings and my recommendations with patient.    This patient has been examined wearing the appropriate protective equipment  Signature: Electronically signed by JOE Reddy, 06/26/23, 16:38 EDT.  [facility] Hospitalist Team

## 2023-06-26 NOTE — ED NOTES
"Nursing report ED to floor  Rosario AREVALO [place]  73 y.o.  female    HPI:   Chief Complaint   Patient presents with    Abdominal Pain       Admitting doctor:   Armando Adams MD    Admitting diagnosis:   The primary encounter diagnosis was Syncope, unspecified syncope type. A diagnosis of Acute kidney injury was also pertinent to this visit.    Code status:   Current Code Status       Date Active Code Status Order ID Comments User Context       Prior            Allergies:   Codeine, Topiramate, Tramadol, Cefdinir, Levofloxacin, and Lisinopril    Isolation:  No active isolations     Fall Risk:  Fall Risk Assessment was completed, and patient is at high risk for falls.   Predictive Model Details         8 (Low) Factor Value    Calculated 6/26/2023 14:06 Age 73    Risk of Fall Model Musculoskeletal Assessment WDL     Respiratory Rate 18     Skin Assessment WDL     Financial Class Other     Magnesium not on file     Diastolic BP 60     Drug Use No     Milton Scale not on file     Peripheral Vascular Assessment WDL     Calcium not on file     Gastrointestinal Assessment WDL     Albumin not on file     Cardiac Assessment WDL     Total Bilirubin not on file     Chloride not on file     Potassium not on file     Creatinine not on file     Days after Admission 0.007     ALT not on file         Weight:       06/26/23  1348   Weight: 65.8 kg (145 lb)       Intake and Output  No intake or output data in the 24 hours ending 06/26/23 1637    Diet:        Most recent vitals:   Vitals:    06/26/23 1348 06/26/23 1414 06/26/23 1531 06/26/23 1536   BP: 141/60  117/52    BP Location: Right arm      Patient Position: Lying      Pulse: 51   50   Resp: 18      Temp: 98 °F (36.7 °C) 97.8 °F (36.6 °C)     TempSrc: Oral Oral     SpO2: 96%  93%    Weight: 65.8 kg (145 lb)      Height: 167.6 cm (66\")          Active LDAs/IV Access:   Lines, Drains & Airways       Active LDAs       Name Placement date Placement time Site Days    Peripheral IV " 06/26/23 1501 Left Antecubital 06/26/23  1501  Antecubital  less than 1                    Skin Condition:   Skin Assessments (last day)       None             Labs (abnormal labs have a star):   Labs Reviewed   BASIC METABOLIC PANEL - Abnormal; Notable for the following components:       Result Value    BUN 42 (*)     Creatinine 1.41 (*)     Chloride 109 (*)     CO2 21.0 (*)     BUN/Creatinine Ratio 29.8 (*)     eGFR 39.0 (*)     All other components within normal limits    Narrative:     GFR Normal >60  Chronic Kidney Disease <60  Kidney Failure <15    The GFR formula is only valid for adults with stable renal function between ages 18 and 70.   CBC WITH AUTO DIFFERENTIAL - Abnormal; Notable for the following components:    RBC 2.80 (*)     Hemoglobin 8.6 (*)     Hematocrit 26.9 (*)     RDW 17.3 (*)     RDW-SD 56.9 (*)     Neutrophil % 95.0 (*)     Lymphocyte % 3.9 (*)     Monocyte % 0.8 (*)     Eosinophil % 0.1 (*)     Lymphocytes, Absolute 0.30 (*)     All other components within normal limits   CBC AND DIFFERENTIAL    Narrative:     The following orders were created for panel order CBC & Differential.  Procedure                               Abnormality         Status                     ---------                               -----------         ------                     CBC Auto Differential[[id number]]        Abnormal            Final result                 Please view results for these tests on the individual orders.       LOC: Person, Place, Time, and Situation    Telemetry:  Telemetry    Cardiac Monitoring Ordered: yes    EKG:   ECG 12 Lead Syncope   Preliminary Result   HEART RATE= 55  bpm   RR Interval= 1092  ms   SD Interval= 85  ms   P Horizontal Axis=   deg   P Front Axis= 0  deg   QRSD Interval= 108  ms   QT Interval= 450  ms   QRS Axis= -30  deg   T Wave Axis= 46  deg   - ABNORMAL ECG -   Sinus bradycardia   Left axis deviation   Probable anteroseptal infarct, recent   When compared with ECG of  15-May-2022 11:23:08,   New or worsened ischemia or infarction   Significant axis, voltage or hypertrophy change   Electronically Signed By:    Date and Time of Study: 2023-06-26 14:49:46          Medications Given in the ED:   Medications   sodium chloride 0.9 % flush 10 mL (has no administration in time range)   sodium chloride 0.9 % bolus 500 mL (500 mL Intravenous New Bag 6/26/23 1610)       Imaging results:  No radiology results for the last day    Social issues:   Social History     Socioeconomic History    Marital status:    Tobacco Use    Smoking status: Never     Passive exposure: Never    Smokeless tobacco: Never   Vaping Use    Vaping Use: Never used   Substance and Sexual Activity    Alcohol use: Never    Drug use: Never    Sexual activity: Defer       NIH Stroke Scale:  Interval: (not recorded)  1a. Level of Consciousness: (not recorded)  1b. LOC Questions: (not recorded)  1c. LOC Commands: (not recorded)  2. Best Gaze: (not recorded)  3. Visual: (not recorded)  4. Facial Palsy: (not recorded)  5a. Motor Arm, Left: (not recorded)  5b. Motor Arm, Right: (not recorded)  6a. Motor Leg, Left: (not recorded)  6b. Motor Leg, Right: (not recorded)  7. Limb Ataxia: (not recorded)  8. Sensory: (not recorded)  9. Best Language: (not recorded)  10. Dysarthria: (not recorded)  11. Extinction and Inattention (formerly Neglect): (not recorded)    Total (NIH Stroke Scale): (not recorded)     Additional notable assessment information:     Nursing report ED to floor:      Rosario Haile RN   06/26/23 16:37 EDT  Nursing report ED to floor  Rosario AREVALO [place]  73 y.o.  female    HPI:   Chief Complaint   Patient presents with    Abdominal Pain       Admitting doctor:   Armando Adams MD    Admitting diagnosis:   The primary encounter diagnosis was Syncope, unspecified syncope type. A diagnosis of Acute kidney injury was also pertinent to this visit.    Code status:   Current Code Status       Date Active Code Status  "Order ID Comments User Context       Prior            Allergies:   Codeine, Topiramate, Tramadol, Cefdinir, Levofloxacin, and Lisinopril    Isolation:  No active isolations     Fall Risk:  Fall Risk Assessment was completed, and patient is at high risk for falls.   Predictive Model Details         8 (Low) Factor Value    Calculated 6/26/2023 14:06 Age 73    Risk of Fall Model Musculoskeletal Assessment WDL     Respiratory Rate 18     Skin Assessment WDL     Financial Class Other     Magnesium not on file     Diastolic BP 60     Drug Use No     Milton Scale not on file     Peripheral Vascular Assessment WDL     Calcium not on file     Gastrointestinal Assessment WDL     Albumin not on file     Cardiac Assessment WDL     Total Bilirubin not on file     Chloride not on file     Potassium not on file     Creatinine not on file     Days after Admission 0.007     ALT not on file         Weight:       06/26/23  1348   Weight: 65.8 kg (145 lb)       Intake and Output  No intake or output data in the 24 hours ending 06/26/23 1637    Diet:        Most recent vitals:   Vitals:    06/26/23 1348 06/26/23 1414 06/26/23 1531 06/26/23 1536   BP: 141/60  117/52    BP Location: Right arm      Patient Position: Lying      Pulse: 51   50   Resp: 18      Temp: 98 °F (36.7 °C) 97.8 °F (36.6 °C)     TempSrc: Oral Oral     SpO2: 96%  93%    Weight: 65.8 kg (145 lb)      Height: 167.6 cm (66\")          Active LDAs/IV Access:   Lines, Drains & Airways       Active LDAs       Name Placement date Placement time Site Days    Peripheral IV 06/26/23 1501 Left Antecubital 06/26/23  1501  Antecubital  less than 1                    Skin Condition:   Skin Assessments (last day)       None             Labs (abnormal labs have a star):   Labs Reviewed   BASIC METABOLIC PANEL - Abnormal; Notable for the following components:       Result Value    BUN 42 (*)     Creatinine 1.41 (*)     Chloride 109 (*)     CO2 21.0 (*)     BUN/Creatinine Ratio 29.8 (*)  "    eGFR 39.0 (*)     All other components within normal limits    Narrative:     GFR Normal >60  Chronic Kidney Disease <60  Kidney Failure <15    The GFR formula is only valid for adults with stable renal function between ages 18 and 70.   CBC WITH AUTO DIFFERENTIAL - Abnormal; Notable for the following components:    RBC 2.80 (*)     Hemoglobin 8.6 (*)     Hematocrit 26.9 (*)     RDW 17.3 (*)     RDW-SD 56.9 (*)     Neutrophil % 95.0 (*)     Lymphocyte % 3.9 (*)     Monocyte % 0.8 (*)     Eosinophil % 0.1 (*)     Lymphocytes, Absolute 0.30 (*)     All other components within normal limits   CBC AND DIFFERENTIAL    Narrative:     The following orders were created for panel order CBC & Differential.  Procedure                               Abnormality         Status                     ---------                               -----------         ------                     CBC Auto Differential[[id number]]        Abnormal            Final result                 Please view results for these tests on the individual orders.       LOC: Person, Place, Time, and Situation    Telemetry:  Telemetry    Cardiac Monitoring Ordered: yes    EKG:   ECG 12 Lead Syncope   Preliminary Result   HEART RATE= 55  bpm   RR Interval= 1092  ms   [place] Interval= 85  ms   P Horizontal Axis=   deg   P Front Axis= 0  deg   QRSD Interval= 108  ms   QT Interval= 450  ms   QRS Axis= -30  deg   T Wave Axis= 46  deg   - ABNORMAL ECG -   Sinus bradycardia   Left axis deviation   Probable anteroseptal infarct, recent   When compared with ECG of 15-May-2022 11:23:08,   New or worsened ischemia or infarction   Significant axis, voltage or hypertrophy change   Electronically Signed By:    Date and Time of Study: 2023-06-26 14:49:46          Medications Given in the ED:   Medications   sodium chloride 0.9 % flush 10 mL (has no administration in time range)   sodium chloride 0.9 % bolus 500 mL (500 mL Intravenous New Bag 6/26/23 1610)       Imaging  results:  No radiology results for the last day    Social issues:   Social History     Socioeconomic History    Marital status:    Tobacco Use    Smoking status: Never     Passive exposure: Never    Smokeless tobacco: Never   Vaping Use    Vaping Use: Never used   Substance and Sexual Activity    Alcohol use: Never    Drug use: Never    Sexual activity: Defer       NIH Stroke Scale:  Interval: (not recorded)  1a. Level of Consciousness: (not recorded)  1b. LOC Questions: (not recorded)  1c. LOC Commands: (not recorded)  2. Best Gaze: (not recorded)  3. Visual: (not recorded)  4. Facial Palsy: (not recorded)  5a. Motor Arm, Left: (not recorded)  5b. Motor Arm, Right: (not recorded)  6a. Motor Leg, Left: (not recorded)  6b. Motor Leg, Right: (not recorded)  7. Limb Ataxia: (not recorded)  8. Sensory: (not recorded)  9. Best Language: (not recorded)  10. Dysarthria: (not recorded)  11. Extinction and Inattention (formerly Neglect): (not recorded)    Total (NIH Stroke Scale): (not recorded)     Additional notable assessment information:     Nursing report ED to floor:      Rosario Haile RN   06/26/23 16:37 EDT

## 2023-06-27 ENCOUNTER — ON CAMPUS - OUTPATIENT (AMBULATORY)
Dept: URBAN - METROPOLITAN AREA HOSPITAL 85 | Facility: HOSPITAL | Age: 73
End: 2023-06-27

## 2023-06-27 ENCOUNTER — APPOINTMENT (OUTPATIENT)
Dept: NUCLEAR MEDICINE | Facility: HOSPITAL | Age: 73
End: 2023-06-27
Payer: MEDICARE

## 2023-06-27 ENCOUNTER — APPOINTMENT (OUTPATIENT)
Dept: CARDIOLOGY | Facility: HOSPITAL | Age: 73
End: 2023-06-27
Payer: MEDICARE

## 2023-06-27 DIAGNOSIS — R18.8 OTHER ASCITES: ICD-10-CM

## 2023-06-27 DIAGNOSIS — R55 SYNCOPE AND COLLAPSE: ICD-10-CM

## 2023-06-27 DIAGNOSIS — R74.01 ELEVATION OF LEVELS OF LIVER TRANSAMINASE LEVELS: ICD-10-CM

## 2023-06-27 DIAGNOSIS — R10.84 GENERALIZED ABDOMINAL PAIN: ICD-10-CM

## 2023-06-27 LAB
ALBUMIN FLD-MCNC: 0.5 G/DL
ALBUMIN SERPL-MCNC: 2.6 G/DL (ref 3.5–5.2)
ALBUMIN/GLOB SERPL: 1.2 G/DL
ALP SERPL-CCNC: 189 U/L (ref 39–117)
ALPHA1 GLOB MFR UR ELPH: 174 MG/DL (ref 90–200)
ALT SERPL W P-5'-P-CCNC: 28 U/L (ref 1–33)
ANION GAP SERPL CALCULATED.3IONS-SCNC: 12 MMOL/L (ref 5–15)
ANION GAP SERPL CALCULATED.3IONS-SCNC: 12 MMOL/L (ref 5–15)
APPEARANCE FLD: CLEAR
AST SERPL-CCNC: 42 U/L (ref 1–32)
BASOPHILS # BLD AUTO: 0 10*3/MM3 (ref 0–0.2)
BASOPHILS # BLD AUTO: 0 10*3/MM3 (ref 0–0.2)
BASOPHILS NFR BLD AUTO: 0 % (ref 0–1.5)
BASOPHILS NFR BLD AUTO: 0.1 % (ref 0–1.5)
BH CV LOWER VASCULAR LEFT COMMON FEMORAL AUGMENT: NORMAL
BH CV LOWER VASCULAR LEFT COMMON FEMORAL COMPETENT: NORMAL
BH CV LOWER VASCULAR LEFT COMMON FEMORAL COMPRESS: NORMAL
BH CV LOWER VASCULAR LEFT COMMON FEMORAL PHASIC: NORMAL
BH CV LOWER VASCULAR LEFT COMMON FEMORAL SPONT: NORMAL
BH CV LOWER VASCULAR RIGHT COMMON FEMORAL AUGMENT: NORMAL
BH CV LOWER VASCULAR RIGHT COMMON FEMORAL COMPETENT: NORMAL
BH CV LOWER VASCULAR RIGHT COMMON FEMORAL COMPRESS: NORMAL
BH CV LOWER VASCULAR RIGHT COMMON FEMORAL PHASIC: NORMAL
BH CV LOWER VASCULAR RIGHT COMMON FEMORAL SPONT: NORMAL
BH CV LOWER VASCULAR RIGHT DISTAL FEMORAL COMPRESS: NORMAL
BH CV LOWER VASCULAR RIGHT GASTRONEMIUS COMPRESS: NORMAL
BH CV LOWER VASCULAR RIGHT GREATER SAPH AK COMPRESS: NORMAL
BH CV LOWER VASCULAR RIGHT GREATER SAPH BK COMPRESS: NORMAL
BH CV LOWER VASCULAR RIGHT LESSER SAPH COMPRESS: NORMAL
BH CV LOWER VASCULAR RIGHT MID FEMORAL AUGMENT: NORMAL
BH CV LOWER VASCULAR RIGHT MID FEMORAL COMPETENT: NORMAL
BH CV LOWER VASCULAR RIGHT MID FEMORAL COMPRESS: NORMAL
BH CV LOWER VASCULAR RIGHT MID FEMORAL PHASIC: NORMAL
BH CV LOWER VASCULAR RIGHT MID FEMORAL SPONT: NORMAL
BH CV LOWER VASCULAR RIGHT PERONEAL COMPRESS: NORMAL
BH CV LOWER VASCULAR RIGHT POPLITEAL AUGMENT: NORMAL
BH CV LOWER VASCULAR RIGHT POPLITEAL COMPETENT: NORMAL
BH CV LOWER VASCULAR RIGHT POPLITEAL COMPRESS: NORMAL
BH CV LOWER VASCULAR RIGHT POPLITEAL PHASIC: NORMAL
BH CV LOWER VASCULAR RIGHT POPLITEAL SPONT: NORMAL
BH CV LOWER VASCULAR RIGHT POSTERIOR TIBIAL COMPRESS: NORMAL
BH CV LOWER VASCULAR RIGHT PROXIMAL FEMORAL COMPRESS: NORMAL
BH CV LOWER VASCULAR RIGHT SAPHENOFEMORAL JUNCTION COMPRESS: NORMAL
BH CV NUCLEAR PRIOR STUDY: 3
BH CV REST NUCLEAR ISOTOPE DOSE: 11 MCI
BH CV STRESS BP STAGE 2: NORMAL
BH CV STRESS BP STAGE 3: NORMAL
BH CV STRESS COMMENTS STAGE 1: NORMAL
BH CV STRESS COMMENTS STAGE 2: NORMAL
BH CV STRESS DOSE REGADENOSON STAGE 1: 0.4
BH CV STRESS DURATION MIN STAGE 1: 0
BH CV STRESS DURATION MIN STAGE 2: 4
BH CV STRESS DURATION SEC STAGE 1: 10
BH CV STRESS DURATION SEC STAGE 2: 0
BH CV STRESS HR STAGE 1: 60
BH CV STRESS HR STAGE 2: 61
BH CV STRESS HR STAGE 3: 61
BH CV STRESS NUCLEAR ISOTOPE DOSE: 33 MCI
BH CV STRESS PROTOCOL 1: NORMAL
BH CV STRESS RECOVERY BP: NORMAL MMHG
BH CV STRESS RECOVERY HR: 58 BPM
BH CV STRESS STAGE 1: 1
BH CV STRESS STAGE 2: 2
BH CV STRESS STAGE 3: 3
BH CV XLRA MEAS LEFT DIST CCA EDV: 12.7 CM/SEC
BH CV XLRA MEAS LEFT DIST CCA PSV: 76.4 CM/SEC
BH CV XLRA MEAS LEFT DIST ICA EDV: -17.5 CM/SEC
BH CV XLRA MEAS LEFT DIST ICA PSV: -75.1 CM/SEC
BH CV XLRA MEAS LEFT ICA/CCA RATIO: -0.98
BH CV XLRA MEAS LEFT PROX CCA EDV: 8.7 CM/SEC
BH CV XLRA MEAS LEFT PROX CCA PSV: 92.6 CM/SEC
BH CV XLRA MEAS LEFT PROX ECA EDV: -9.3 CM/SEC
BH CV XLRA MEAS LEFT PROX ECA PSV: -92.6 CM/SEC
BH CV XLRA MEAS LEFT PROX ICA EDV: -14 CM/SEC
BH CV XLRA MEAS LEFT PROX ICA PSV: -63.1 CM/SEC
BH CV XLRA MEAS LEFT PROX SCLA PSV: 150 CM/SEC
BH CV XLRA MEAS LEFT VERTEBRAL A EDV: 15.7 CM/SEC
BH CV XLRA MEAS LEFT VERTEBRAL A PSV: 66.6 CM/SEC
BH CV XLRA MEAS RIGHT DIST CCA EDV: -5.9 CM/SEC
BH CV XLRA MEAS RIGHT DIST CCA PSV: -57.4 CM/SEC
BH CV XLRA MEAS RIGHT DIST ICA EDV: 19.8 CM/SEC
BH CV XLRA MEAS RIGHT DIST ICA PSV: 90.5 CM/SEC
BH CV XLRA MEAS RIGHT ICA/CCA RATIO: -1.58
BH CV XLRA MEAS RIGHT PROX CCA EDV: 8.8 CM/SEC
BH CV XLRA MEAS RIGHT PROX CCA PSV: 68.5 CM/SEC
BH CV XLRA MEAS RIGHT PROX ECA PSV: -99.9 CM/SEC
BH CV XLRA MEAS RIGHT PROX ICA EDV: -12.4 CM/SEC
BH CV XLRA MEAS RIGHT PROX ICA PSV: -49.7 CM/SEC
BH CV XLRA MEAS RIGHT PROX SCLA PSV: -88.8 CM/SEC
BH CV XLRA MEAS RIGHT VERTEBRAL A EDV: 8.1 CM/SEC
BH CV XLRA MEAS RIGHT VERTEBRAL A PSV: 50.1 CM/SEC
BILIRUB SERPL-MCNC: 1.1 MG/DL (ref 0–1.2)
BUN SERPL-MCNC: 40 MG/DL (ref 8–23)
BUN SERPL-MCNC: 45 MG/DL (ref 8–23)
BUN/CREAT SERPL: 30.4 (ref 7–25)
BUN/CREAT SERPL: 30.5 (ref 7–25)
CALCIUM SPEC-SCNC: 8.7 MG/DL (ref 8.6–10.5)
CALCIUM SPEC-SCNC: 8.9 MG/DL (ref 8.6–10.5)
CHLORIDE SERPL-SCNC: 110 MMOL/L (ref 98–107)
CHLORIDE SERPL-SCNC: 111 MMOL/L (ref 98–107)
CK SERPL-CCNC: 19 U/L (ref 20–180)
CO2 SERPL-SCNC: 18 MMOL/L (ref 22–29)
CO2 SERPL-SCNC: 21 MMOL/L (ref 22–29)
COLOR FLD: YELLOW
CREAT SERPL-MCNC: 1.31 MG/DL (ref 0.57–1)
CREAT SERPL-MCNC: 1.48 MG/DL (ref 0.57–1)
DEPRECATED RDW RBC AUTO: 57.3 FL (ref 37–54)
DEPRECATED RDW RBC AUTO: 66.9 FL (ref 37–54)
EGFRCR SERPLBLD CKD-EPI 2021: 36.8 ML/MIN/1.73
EGFRCR SERPLBLD CKD-EPI 2021: 42.6 ML/MIN/1.73
EOSINOPHIL # BLD AUTO: 0 10*3/MM3 (ref 0–0.4)
EOSINOPHIL # BLD AUTO: 0 10*3/MM3 (ref 0–0.4)
EOSINOPHIL NFR BLD AUTO: 0.1 % (ref 0.3–6.2)
EOSINOPHIL NFR BLD AUTO: 0.4 % (ref 0.3–6.2)
ERYTHROCYTE [DISTWIDTH] IN BLOOD BY AUTOMATED COUNT: 17.5 % (ref 12.3–15.4)
ERYTHROCYTE [DISTWIDTH] IN BLOOD BY AUTOMATED COUNT: 18.6 % (ref 12.3–15.4)
GLOBULIN UR ELPH-MCNC: 2.1 GM/DL
GLUCOSE SERPL-MCNC: 102 MG/DL (ref 65–99)
GLUCOSE SERPL-MCNC: 96 MG/DL (ref 65–99)
HCT VFR BLD AUTO: 22.6 % (ref 34–46.6)
HCT VFR BLD AUTO: 26.4 % (ref 34–46.6)
HGB BLD-MCNC: 7.1 G/DL (ref 12–15.9)
HGB BLD-MCNC: 8.4 G/DL (ref 12–15.9)
INR PPP: 1.1 (ref 0.93–1.1)
LYMPHOCYTES # BLD AUTO: 0.5 10*3/MM3 (ref 0.7–3.1)
LYMPHOCYTES # BLD AUTO: 0.5 10*3/MM3 (ref 0.7–3.1)
LYMPHOCYTES NFR BLD AUTO: 12.4 % (ref 19.6–45.3)
LYMPHOCYTES NFR BLD AUTO: 7.7 % (ref 19.6–45.3)
LYMPHOCYTES NFR FLD MANUAL: 16 %
MAXIMAL PREDICTED HEART RATE: 145 BPM
MCH RBC QN AUTO: 30.4 PG (ref 26.6–33)
MCH RBC QN AUTO: 30.4 PG (ref 26.6–33)
MCHC RBC AUTO-ENTMCNC: 31.4 G/DL (ref 31.5–35.7)
MCHC RBC AUTO-ENTMCNC: 31.7 G/DL (ref 31.5–35.7)
MCV RBC AUTO: 96 FL (ref 79–97)
MCV RBC AUTO: 96.9 FL (ref 79–97)
MESOTHL CELL NFR FLD MANUAL: 10 %
METHOD: NORMAL
MONOCYTES # BLD AUTO: 0.1 10*3/MM3 (ref 0.1–0.9)
MONOCYTES # BLD AUTO: 0.1 10*3/MM3 (ref 0.1–0.9)
MONOCYTES NFR BLD AUTO: 1.3 % (ref 5–12)
MONOCYTES NFR BLD AUTO: 2.7 % (ref 5–12)
MONOCYTES NFR FLD: 5 %
NEUTROPHILS NFR BLD AUTO: 3.5 10*3/MM3 (ref 1.7–7)
NEUTROPHILS NFR BLD AUTO: 5.8 10*3/MM3 (ref 1.7–7)
NEUTROPHILS NFR BLD AUTO: 84.8 % (ref 42.7–76)
NEUTROPHILS NFR BLD AUTO: 90.5 % (ref 42.7–76)
NEUTROPHILS NFR FLD MANUAL: 69 %
NRBC BLD AUTO-RTO: 0.1 /100 WBC (ref 0–0.2)
NRBC BLD AUTO-RTO: 0.2 /100 WBC (ref 0–0.2)
NUC CELL # FLD: 67 /MM3
PERCENT MAX PREDICTED HR: 42.07 %
PLATELET # BLD AUTO: 175 10*3/MM3 (ref 140–450)
PLATELET # BLD AUTO: 220 10*3/MM3 (ref 140–450)
PMV BLD AUTO: 7 FL (ref 6–12)
PMV BLD AUTO: 7.1 FL (ref 6–12)
POTASSIUM SERPL-SCNC: 4.1 MMOL/L (ref 3.5–5.2)
POTASSIUM SERPL-SCNC: 4.2 MMOL/L (ref 3.5–5.2)
PREALB SERPL-MCNC: 11.8 MG/DL (ref 20–40)
PROT SERPL-MCNC: 4.7 G/DL (ref 6–8.5)
PROTHROMBIN TIME: 11.7 SECONDS (ref 9.6–11.7)
PTH-INTACT SERPL-MCNC: 53.2 PG/ML (ref 15–65)
QT INTERVAL: 450 MS
RBC # BLD AUTO: 2.33 10*6/MM3 (ref 3.77–5.28)
RBC # BLD AUTO: 2.74 10*6/MM3 (ref 3.77–5.28)
RIGHT ARM BP: NORMAL MMHG
SODIUM SERPL-SCNC: 140 MMOL/L (ref 136–145)
SODIUM SERPL-SCNC: 144 MMOL/L (ref 136–145)
STRESS BASELINE BP: NORMAL MMHG
STRESS BASELINE HR: 52 BPM
STRESS PERCENT HR: 49 %
STRESS POST PEAK BP: NORMAL MMHG
STRESS POST PEAK HR: 61 BPM
STRESS TARGET HR: 123 BPM
TSH SERPL DL<=0.05 MIU/L-ACNC: 1.73 UIU/ML (ref 0.27–4.2)
TSH SERPL DL<=0.05 MIU/L-ACNC: 1.73 UIU/ML (ref 0.27–4.2)
WBC NRBC COR # BLD: 4.1 10*3/MM3 (ref 3.4–10.8)
WBC NRBC COR # BLD: 6.4 10*3/MM3 (ref 3.4–10.8)

## 2023-06-27 PROCEDURE — 25010000002 REGADENOSON 0.4 MG/5ML SOLUTION: Performed by: INTERNAL MEDICINE

## 2023-06-27 PROCEDURE — 99213 OFFICE O/P EST LOW 20 MIN: CPT | Performed by: NURSE PRACTITIONER

## 2023-06-27 PROCEDURE — 84134 ASSAY OF PREALBUMIN: CPT | Performed by: NURSE PRACTITIONER

## 2023-06-27 PROCEDURE — A9502 TC99M TETROFOSMIN: HCPCS | Performed by: FAMILY MEDICINE

## 2023-06-27 PROCEDURE — 82550 ASSAY OF CK (CPK): CPT | Performed by: NURSE PRACTITIONER

## 2023-06-27 PROCEDURE — 93005 ELECTROCARDIOGRAM TRACING: CPT | Performed by: NURSE PRACTITIONER

## 2023-06-27 PROCEDURE — 84443 ASSAY THYROID STIM HORMONE: CPT | Performed by: NURSE PRACTITIONER

## 2023-06-27 PROCEDURE — 85025 COMPLETE CBC W/AUTO DIFF WBC: CPT | Performed by: INTERNAL MEDICINE

## 2023-06-27 PROCEDURE — 89051 BODY FLUID CELL COUNT: CPT | Performed by: INTERNAL MEDICINE

## 2023-06-27 PROCEDURE — 0 TECHNETIUM TETROFOSMIN KIT: Performed by: FAMILY MEDICINE

## 2023-06-27 PROCEDURE — 80053 COMPREHEN METABOLIC PANEL: CPT | Performed by: NURSE PRACTITIONER

## 2023-06-27 PROCEDURE — 87070 CULTURE OTHR SPECIMN AEROBIC: CPT | Performed by: INTERNAL MEDICINE

## 2023-06-27 PROCEDURE — G0378 HOSPITAL OBSERVATION PER HR: HCPCS

## 2023-06-27 PROCEDURE — 0 TECHNETIUM TETROFOSMIN KIT: Performed by: INTERNAL MEDICINE

## 2023-06-27 PROCEDURE — 85025 COMPLETE CBC W/AUTO DIFF WBC: CPT | Performed by: NURSE PRACTITIONER

## 2023-06-27 PROCEDURE — 93017 CV STRESS TEST TRACING ONLY: CPT

## 2023-06-27 PROCEDURE — 93010 ELECTROCARDIOGRAM REPORT: CPT | Performed by: INTERNAL MEDICINE

## 2023-06-27 PROCEDURE — 25010000002 ALBUMIN HUMAN 25% PER 50 ML: Performed by: INTERNAL MEDICINE

## 2023-06-27 PROCEDURE — 93971 EXTREMITY STUDY: CPT

## 2023-06-27 PROCEDURE — 82042 OTHER SOURCE ALBUMIN QUAN EA: CPT | Performed by: INTERNAL MEDICINE

## 2023-06-27 PROCEDURE — A9502 TC99M TETROFOSMIN: HCPCS | Performed by: INTERNAL MEDICINE

## 2023-06-27 PROCEDURE — 85610 PROTHROMBIN TIME: CPT | Performed by: INTERNAL MEDICINE

## 2023-06-27 PROCEDURE — 63710000001 PREDNISONE PER 5 MG: Performed by: NURSE PRACTITIONER

## 2023-06-27 PROCEDURE — 84080 ASSAY ALKALINE PHOSPHATASES: CPT | Performed by: NURSE PRACTITIONER

## 2023-06-27 PROCEDURE — 84075 ASSAY ALKALINE PHOSPHATASE: CPT | Performed by: NURSE PRACTITIONER

## 2023-06-27 PROCEDURE — P9047 ALBUMIN (HUMAN), 25%, 50ML: HCPCS | Performed by: INTERNAL MEDICINE

## 2023-06-27 PROCEDURE — 93880 EXTRACRANIAL BILAT STUDY: CPT

## 2023-06-27 PROCEDURE — 82103 ALPHA-1-ANTITRYPSIN TOTAL: CPT | Performed by: NURSE PRACTITIONER

## 2023-06-27 PROCEDURE — 87205 SMEAR GRAM STAIN: CPT | Performed by: INTERNAL MEDICINE

## 2023-06-27 PROCEDURE — 78452 HT MUSCLE IMAGE SPECT MULT: CPT | Performed by: INTERNAL MEDICINE

## 2023-06-27 PROCEDURE — 93018 CV STRESS TEST I&R ONLY: CPT | Performed by: INTERNAL MEDICINE

## 2023-06-27 PROCEDURE — 78452 HT MUSCLE IMAGE SPECT MULT: CPT

## 2023-06-27 PROCEDURE — 83970 ASSAY OF PARATHORMONE: CPT | Performed by: NURSE PRACTITIONER

## 2023-06-27 RX ORDER — HYDROCODONE BITARTRATE AND ACETAMINOPHEN 5; 325 MG/1; MG/1
1 TABLET ORAL EVERY 4 HOURS PRN
Status: DISCONTINUED | OUTPATIENT
Start: 2023-06-27 | End: 2023-06-28

## 2023-06-27 RX ORDER — REGADENOSON 0.08 MG/ML
0.4 INJECTION, SOLUTION INTRAVENOUS
Status: COMPLETED | OUTPATIENT
Start: 2023-06-27 | End: 2023-06-27

## 2023-06-27 RX ORDER — ALBUMIN (HUMAN) 12.5 G/50ML
50 SOLUTION INTRAVENOUS ONCE
Status: COMPLETED | OUTPATIENT
Start: 2023-06-27 | End: 2023-06-27

## 2023-06-27 RX ADMIN — PREDNISONE 15 MG: 10 TABLET ORAL at 12:09

## 2023-06-27 RX ADMIN — REGADENOSON 0.4 MG: 0.08 INJECTION, SOLUTION INTRAVENOUS at 09:44

## 2023-06-27 RX ADMIN — PANTOPRAZOLE SODIUM 40 MG: 40 TABLET, DELAYED RELEASE ORAL at 12:10

## 2023-06-27 RX ADMIN — Medication 10 ML: at 21:09

## 2023-06-27 RX ADMIN — LEVOTHYROXINE SODIUM 50 MCG: 0.05 TABLET ORAL at 05:06

## 2023-06-27 RX ADMIN — HYDROCODONE BITARTRATE AND ACETAMINOPHEN 1 TABLET: 5; 325 TABLET ORAL at 12:10

## 2023-06-27 RX ADMIN — ATORVASTATIN CALCIUM 20 MG: 20 TABLET, FILM COATED ORAL at 12:10

## 2023-06-27 RX ADMIN — HYDROCODONE BITARTRATE AND ACETAMINOPHEN 1 TABLET: 5; 325 TABLET ORAL at 22:10

## 2023-06-27 RX ADMIN — FOLIC ACID 1 MG: 1 TABLET ORAL at 12:09

## 2023-06-27 RX ADMIN — ALBUMIN (HUMAN) 50 G: 0.25 INJECTION, SOLUTION INTRAVENOUS at 15:41

## 2023-06-27 RX ADMIN — TETROFOSMIN 1 DOSE: 1.38 INJECTION, POWDER, LYOPHILIZED, FOR SOLUTION INTRAVENOUS at 07:05

## 2023-06-27 RX ADMIN — SENNOSIDES AND DOCUSATE SODIUM 2 TABLET: 50; 8.6 TABLET ORAL at 12:10

## 2023-06-27 RX ADMIN — TETROFOSMIN 1 DOSE: 1.38 INJECTION, POWDER, LYOPHILIZED, FOR SOLUTION INTRAVENOUS at 09:44

## 2023-06-27 NOTE — SIGNIFICANT NOTE
06/27/23 1426   OTHER   Discipline physical therapist   Rehab Time/Intention   Session Not Performed patient unavailable for evaluation  (getting paracentesis when PT checked at 14:25; will check back in AM)   Recommendation   PT - Next Appointment 06/28/23

## 2023-06-27 NOTE — PROCEDURES
"Diagnostic, Therapeutic Bedside Paracentesis Without  Radiology    Date/Time: 6/27/2023 2:08 PM  Performed by: Elin Garza APRN  Authorized by: Kadie Rice MD   Consent: Verbal consent obtained. Written consent obtained.  Risks and benefits: risks, benefits and alternatives were discussed  Consent given by: patient  Patient understanding: patient states understanding of the procedure being performed  Patient consent: the patient's understanding of the procedure matches consent given  Procedure consent: procedure consent matches procedure scheduled  Relevant documents: relevant documents present and verified  Test results: test results available and properly labeled  Site marked: the operative site was marked  Imaging studies: imaging studies available  Required items: required blood products, implants, devices, and special equipment available  Patient identity confirmed: verbally with patient  Time out: Immediately prior to procedure a \"time out\" was called to verify the correct patient, procedure, equipment, support staff and site/side marked as required.  Initial or subsequent exam: subsequent  Procedure purpose: diagnostic and therapeutic  Indications: abdominal discomfort secondary to ascites  Anesthesia: local infiltration    Anesthesia:  Local Anesthetic: lidocaine 1% without epinephrine  Anesthetic total: 6 mL    Sedation:  Patient sedated: no    Preparation: Patient was prepped and draped in the usual sterile fashion.  Ultrasound guidance: yes  Puncture site: right lower quadrant  Aspirate amount (ml): 6.7 L.  Fluid appearance: clear (yellow)  Dressing: 4x4 sterile gauze  Patient tolerance: patient tolerated the procedure well with no immediate complications    Electronically signed by JOE Ruvalcaba, 06/27/23, 3:47 PM EDT.    "

## 2023-06-27 NOTE — PLAN OF CARE
Problem: Skin Injury Risk Increased  Goal: Skin Health and Integrity  Outcome: Ongoing, Not Progressing  Intervention: Optimize Skin Protection  Recent Flowsheet Documentation  Taken 6/26/2023 2035 by Lata Myers RN  Head of Bed (HOB) Positioning: HOB at 90 degrees     Problem: Hypertension Comorbidity  Goal: Blood Pressure in Desired Range  Outcome: Ongoing, Not Progressing  Intervention: Maintain Blood Pressure Management  Recent Flowsheet Documentation  Taken 6/27/2023 0025 by Lata Myers RN  Medication Review/Management:   medications reviewed   high-risk medications identified  Taken 6/26/2023 2206 by Lata Myers RN  Medication Review/Management:   medications reviewed   high-risk medications identified  Taken 6/26/2023 2035 by Lata Myers RN  Medication Review/Management:   medications reviewed   high-risk medications identified     Problem: Obstructive Sleep Apnea Risk or Actual Comorbidity Management  Goal: Unobstructed Breathing During Sleep  Outcome: Ongoing, Not Progressing     Problem: Fall Injury Risk  Goal: Absence of Fall and Fall-Related Injury  Outcome: Ongoing, Not Progressing  Intervention: Identify and Manage Contributors  Recent Flowsheet Documentation  Taken 6/27/2023 0025 by Lata Myers RN  Medication Review/Management:   medications reviewed   high-risk medications identified  Taken 6/26/2023 2206 by Lata Myers RN  Medication Review/Management:   medications reviewed   high-risk medications identified  Taken 6/26/2023 2035 by Lata Myers RN  Medication Review/Management:   medications reviewed   high-risk medications identified  Intervention: Promote Injury-Free Environment  Recent Flowsheet Documentation  Taken 6/27/2023 0231 by Lata Myers RN  Safety Promotion/Fall Prevention:   assistive device/personal items within reach   clutter free environment maintained   fall prevention program maintained   nonskid shoes/slippers when out of bed   room organization  consistent   safety round/check completed  Taken 6/27/2023 0025 by Lata Myers, RN  Safety Promotion/Fall Prevention:   assistive device/personal items within reach   clutter free environment maintained   fall prevention program maintained   nonskid shoes/slippers when out of bed   room organization consistent   safety round/check completed  Taken 6/26/2023 2206 by Lata Myers RN  Safety Promotion/Fall Prevention:   assistive device/personal items within reach   clutter free environment maintained   fall prevention program maintained   nonskid shoes/slippers when out of bed   room organization consistent   safety round/check completed  Taken 6/26/2023 2035 by Lata Myers RN  Safety Promotion/Fall Prevention:   assistive device/personal items within reach   clutter free environment maintained   nonskid shoes/slippers when out of bed   room organization consistent   safety round/check completed     Problem: Activity Intolerance  Goal: Enhanced Capacity and Energy  Outcome: Ongoing, Not Progressing  Intervention: Optimize Activity Tolerance  Recent Flowsheet Documentation  Taken 6/26/2023 2035 by Lata Myers RN  Activity Management: bedrest   Goal Outcome Evaluation: no change    Alert and oriented x 4. Takes medication whole and tolerates well. Continent of bowel and bladder. Assist x 1 for transfers, stand by for ambulation. Uses walker. Does not require O2 therapy at this time. Spouse at bedside. Cardiology and Gastroenterology consulted. Remains on droplet precautions r/t testing positive for Rhinovirus. NPO for NST in AM. No c/o pain/discomfort/SOB noted. Currently in bed, eyes closed/rise and fall of chest observed. Call bell in reach. Per case management patient to discharge home with family or to [facility].

## 2023-06-27 NOTE — PROGRESS NOTES
[facility] Medicine Services   Daily Progress Note    Patient Name: Rosario Ortez  : 1950  MRN: [id number]  Primary Care Physician:  Ian Cordero MD  Date of admission: 2023  Date and Time of Service: 2023      Subjective      Chief Complaint: Syncopal event/abdominal pain    Patient Reports this patient was admitted to the hospital yesterday because of syncopal event, she has previous syncope before and the work-up was negative including event monitor for 30 days, stress test was done and results are pending, I saw the patient after the stress test and her main complaint now is abdominal pain, evidently the patient has ascites and she has liver cirrhosis she will get paracentesis every 2 weeks, abdomen is distended and probably this time for paracentesis also she was complaining of bilateral lower extremity swelling more on right compared to left patient said this is a new    ROS all review of systems negative except for mentioned above      Objective      Vitals:   Temp:  [97.3 °F (36.3 °C)-98.7 °F (37.1 °C)] 98.4 °F (36.9 °C)  Heart Rate:  [46-75] 50  Resp:  [14-28] 14  BP: (102-135)/(42-55) 110/46    Physical Exam  Constitutional:       Appearance: Normal appearance.   HENT:      Head: Normocephalic.      Right Ear: Tympanic membrane normal.      Left Ear: Tympanic membrane normal.      Nose: Nose normal.      Mouth/Throat:      Mouth: Mucous membranes are moist.   Eyes:      Pupils: Pupils are equal, round, and reactive to light.   Cardiovascular:      Rate and Rhythm: Normal rate.      Pulses: Normal pulses.      Comments: Bilateral pitting edema more on right compared to left  Pulmonary:      Effort: Pulmonary effort is normal.   Abdominal:      General: Abdomen is flat.      Comments: Abdominal distention with ascites   Musculoskeletal:         General: Normal range of motion.      Cervical back: Normal range of motion.   Skin:     General: Skin is warm.      Capillary  Refill: Capillary refill takes less than 2 seconds.   Neurological:      General: No focal deficit present.      Mental Status: She is alert.   Psychiatric:         Mood and Affect: Mood normal.           Result Review    Result Review:  I have personally reviewed the results from the time of this admission to 6/27/2023 18:20 EDT and agree with these findings:  []  Laboratory  []  Microbiology  []  Radiology  []  EKG/Telemetry   []  Cardiology/Vascular   []  Pathology  []  Old records  []  Other:  Most notable findings include: INR 1.1, duplex of right lower extremity was negative for DVT, nuclear stress test came back negative for ischemia    Wounds (last 24 hours)       LDA Wound       Row Name 06/27/23 1037 06/27/23 0145          Wound 06/27/23 0145 Right lower leg Abcess    Wound - Properties Group Placement Date: 06/27/23  - Placement Time: 0145  - Present on Hospital Admission: Y  -AH Side: Right  -AH Orientation: lower  -AH Location: leg  -AH Primary Wound Type: Abcess  -    Dressing Appearance open to air  -AHA open to air  -AH     Closure Open to air  -AHA Open to air  -AH     Base yellow  -AHA yellow  -     Yellow (%), Wound Tissue Color -- 100  -     Periwound redness  -AHA redness  -     Periwound Temperature cool  -AHA cool  -     Periwound Skin Turgor soft  -AHA soft  -     Edges -- rolled/closed  -     Drainage Amount none  -AHA none  -     Care, Wound -- cleansed with;soap and water  -     Dressing Care open to air  -AHA open to air  -     Periwound Care -- dry periwound area maintained  -     Retired Wound - Properties Group Placement Date: 06/27/23  - Placement Time: 0145  - Present on Hospital Admission: Y  -AH Side: Right  -AH Orientation: lower  - Location: leg  -AH Primary Wound Type: Abcess  -AH    Retired Wound - Properties Group Date first assessed: 06/27/23  - Time first assessed: 0145  - Present on Hospital Admission: Y  -AH Side: Right  - Location:  leg  - Primary Wound Type: [facility]  -              User Key  (r) = Recorded By, (t) = Taken By, (c) = Cosigned By      Initials Name Provider Type    Dirk Varner, RN Registered Nurse    Lata Portillo RN Registered Nurse                      Assessment & Plan      atorvastatin, 20 mg, Oral, Daily  ferrous sulfate, 324 mg, Oral, Daily With Breakfast  folic acid, 1 mg, Oral, Daily  levothyroxine, 50 mcg, Oral, Q AM  [START ON 6/30/2023] methotrexate, 10 mg, Oral, Weekly  pantoprazole, 40 mg, Oral, Daily  predniSONE, 15 mg, Oral, Daily  senna-docusate sodium, 2 tablet, Oral, BID  sodium chloride, 10 mL, Intravenous, Q12H             Active Hospital Problems:  Active Hospital Problems    Diagnosis     **Syncope, unspecified syncope type      1.  Syncopal event this is a second time, patient had this before and woke up with primary cardiologist.  Event monitor and echocardiogram which were came back negative, no stress test is negative possible, patient is known to have sarcoidosis, she is on prednisone and methotrexate, there is no arrhythmia identified and event monitor before is reported to me by family did not show any significant findings.  2.  Ascites large with abdominal pain paracentesis was done.  3.  Right leg edema more than left no DVT        DVT prophylaxis:  Mechanical DVT prophylaxis orders are present.    CODE STATUS:    Code Status (Patient has no pulse and is not breathing): CPR (Attempt to Resuscitate)  Medical Interventions (Patient has pulse or is breathing): Full Support      Disposition:  I expect patient to be discharged 24 hours    This patient has been examined wearing appropriate Personal Protective Equipment and discussed with hospital infection control department. 06/27/23      Electronically signed by Kadie Rice MD, 06/27/23, 18:20 EDT.  [facility] Hospitalist Team

## 2023-06-27 NOTE — NURSING NOTE
PARACENTESIS    Procedure: Paracentesis    Time Out Completed: yes    Time Out: 1400    Prep Site 1: Right Lateral Abdomen      Prep: Chlorhexidine and Sterile drape    Procedure Position: Right Side and Semi Fowlers    Guidance: Ultrasound    Fluid Quality: Russell Yellow and Other: Hazy    Procedure Note: Procedure start at 1400. No complications noted. Specimens taken and sent to lab for diagnostic studies. Will continue to monitor.    Post-Procedure Position: Supine and HOB Elevated    Dressing Site 1: 2x2, 4x4, and Tegaderm    Post Procedure Status:  Drowsy, oriented, and returned to baseline, Dressing Dry/ Intact, Stable Condition, and Dressing properly labeled    Specimen To Lab: yes    Total Fluid Removed: 6.7 liters    Post Procedure Note:  Procedure complete at 1455. No albumin orders entered at this time. Report given to Dirk at 1455 and discussed possible albumin orders.  at bedside.

## 2023-06-27 NOTE — CONSULTS
Administered the [facility] the [facility].

## 2023-06-27 NOTE — CONSULTS
GI CONSULT  NOTE:    Referring Provider:  Dr. Rice    Chief complaint: Weakness, dizziness    Subjective .     History of present illness: Patient is a 73-year-old female with history of A-fib on Eliquis, iron deficiency anemia, cholecystectomy, and possible sarcoidosis who presented to the hospital yesterday with weakness and dizziness.  Nurse reports that patient had a short syncopal episode and was brought to the emergency room.  Patient is a poor historian and is currently having paracentesis.  Her  is not present currently.  She reports having generalized abdominal pain.  Denies any complaints of nausea/vomiting or diarrhea.  She has been followed recently in our office by Dr. Flores for possible sarcoidosis.  Has new onset ascites and underwent paracentesis on 6/13/2020 was 6.5 L removed.  Patient had previous liver work-up in our office with negative NICOLAS, ferritin, ceruloplasmin, mitochondrial antibody, smooth muscle antibody, and viral hepatitis panel.  GGT was 438.  ACE level elevated at 109.  There was discussion of possible EUS guided liver biopsy.    Endo History:  1/2023 EGD/colonoscopy by Dr. Flores -gastritis with biopsy showing granulomatous inflammation, esophageal stricture status post dilation, hyperplastic colon polyps, diverticulosis, and hemorrhoids.    Past Medical History:  Past Medical History:   Diagnosis Date    A-fib     Coronary artery disease        Past Surgical History:  Past Surgical History:   Procedure Laterality Date    BREAST BIOPSY      HYSTERECTOMY      JOINT REPLACEMENT Right 2015       Social History:  Social History     Tobacco Use    Smoking status: Never     Passive exposure: Never    Smokeless tobacco: Never   Vaping Use    Vaping Use: Never used   Substance Use Topics    Alcohol use: Never    Drug use: Never       Family History:  Family History   Problem Relation Age of Onset    Breast cancer Sister        Medications:  Medications Prior to Admission    Medication Sig Dispense Refill Last Dose    apixaban (ELIQUIS) 2.5 MG tablet tablet Take 1 tablet by mouth 2 (Two) Times a Day.       atorvastatin (LIPITOR) 20 MG tablet Take 1 tablet by mouth Daily.       ferrous sulfate 325 (65 FE) MG tablet Take 1 tablet by mouth Daily With Breakfast.       folic acid (FOLVITE) 1 MG tablet Take 1 tablet by mouth Daily.       furosemide (LASIX) 40 MG tablet Take 1 tablet by mouth Daily.       irbesartan (AVAPRO) 75 MG tablet Take 1 tablet by mouth Every Night. 30 tablet 3     levothyroxine (SYNTHROID, LEVOTHROID) 50 MCG tablet 1 tablet Every Morning.       methotrexate 2.5 MG tablet Take 4 tablets by mouth 1 (One) Time Per Week. Fridays       metoprolol tartrate (LOPRESSOR) 25 MG tablet Take 12.5 mg by mouth 2 (Two) Times a Day.       pantoprazole (PROTONIX) 40 MG EC tablet Take 1 tablet by mouth Daily.       potassium chloride (K-DUR,KLOR-CON) 10 MEQ CR tablet Take 2 tablets by mouth Daily.       predniSONE (DELTASONE) 5 MG tablet Take 3 tablets by mouth Daily.          Scheduled Meds:atorvastatin, 20 mg, Oral, Daily  ferrous sulfate, 324 mg, Oral, Daily With Breakfast  folic acid, 1 mg, Oral, Daily  levothyroxine, 50 mcg, Oral, Q AM  [START ON 6/30/2023] methotrexate, 10 mg, Oral, Weekly  pantoprazole, 40 mg, Oral, Daily  predniSONE, 15 mg, Oral, Daily  senna-docusate sodium, 2 tablet, Oral, BID  sodium chloride, 10 mL, Intravenous, Q12H      Continuous Infusions:   PRN Meds:.  acetaminophen **OR** acetaminophen **OR** acetaminophen    aluminum-magnesium hydroxide-simethicone    senna-docusate sodium **AND** polyethylene glycol **AND** bisacodyl **AND** bisacodyl    calcium carbonate    HYDROcodone-acetaminophen    nitroglycerin    ondansetron **OR** ondansetron    [COMPLETED] Insert Peripheral IV **AND** sodium chloride    sodium chloride    sodium chloride    ALLERGIES:  Codeine, Topiramate, Tramadol, Cefdinir, Levofloxacin, and Lisinopril    ROS:  Review of Systems  "  Gastrointestinal:  Positive for abdominal pain. Negative for diarrhea, nausea and vomiting.   Neurological:  Positive for dizziness and weakness.   Psychiatric/Behavioral:  Positive for decreased concentration. Negative for agitation.      Objective     Vital Signs:   Visit Vitals  /42   Pulse (!) 49   Temp 98.7 °F (37.1 °C) (Oral)   Resp 14   Ht 167.6 cm (66\")   Wt 65.4 kg (144 lb 2.9 oz)   SpO2 93%   BMI 23.27 kg/m²       Physical Exam:      General Appearance:    Awake and alert, in no acute distress   Head:    Normocephalic, without obvious abnormality, atraumatic   Eyes:            Conjunctivae normal, anicteric sclera   Ears:    Ears appear intact with no abnormalities noted   Throat:   No oral lesions, no thrush, oral mucosa moist   Neck:   No adenopathy, supple, no thyromegaly, no JVD   Lungs:     Respirations regular, even and unlabored       Chest Wall:    No abnormalities observed   Abdomen:    Did not examine, getting paracentesis   Rectal:     Deferred   Extremities:   + edema, no cyanosis, no redness   Pulses:   Pulses palpable and equal bilaterally   Skin:   No bleeding, bruising or rash, no jaundice   Lymph nodes:   No palpable adenopathy   Neurologic:   Sensation intact       Results Review:   I reviewed the patient's labs and imaging.  CBC  Results from last 7 days   Lab Units 06/27/23  0123 06/26/23  1500   RBC 10*6/mm3 2.74* 2.80*   WBC 10*3/mm3 6.40 6.70   HEMOGLOBIN g/dL 8.4* 8.6*   PLATELETS 10*3/mm3 220 226       CMP  Results from last 7 days   Lab Units 06/27/23  0123 06/26/23  1500   SODIUM mmol/L 144 142   POTASSIUM mmol/L 4.2 4.2   CHLORIDE mmol/L 111* 109*   CO2 mmol/L 21.0* 21.0*   BUN mg/dL 45* 42*   CREATININE mg/dL 1.48* 1.41*   GLUCOSE mg/dL 96 95   ALBUMIN g/dL 2.6*  --    BILIRUBIN mg/dL 1.1  --    ALK PHOS U/L 189*  --    AST (SGOT) U/L 42*  --    ALT (SGPT) U/L 28  --        Amylase and Lipase        CRP         Imaging Results (Last 24 Hours)       ** No results found " for the last 24 hours. **              ASSESSMENT AND PLAN:  73-year-old female presented to the hospital yesterday with weakness and dizziness and has been reported syncopal episode.  Has recently been evaluated by Dr. Flores for possible sarcoidosis.    Recurrent ascites  Generalized abdominal pain  Elevated liver enzymes  Dizziness/syncopal episode  Normocytic anemia  Renal insufficiency   Rhinovirus/enterovirus  Possible sarcoidosis  A-fib on Eliquis  History of cholecystectomy  Hypothyroidism    Principal Problem:    Syncope, unspecified syncope type     Plan:  73-year-old female presented to the hospital yesterday with weakness and syncopal episode.  Had reported possible bright red blood per rectum which she does not report to us.  Hemoglobin 8.4 .  It was 10 on 6/13/2023 and 13 in February 2023.  EGD/colonoscopy 6 months ago with no active bleeding.  Eliquis is being held..  Continue to monitor H&H and transfuse as needed.  She has recent ascites and had initial paracentesis on 6/13/2023 with 6.5 L removed.  She is currently undergoing another paracentesis and we will await the fluid studies.  She has been followed by Dr. Flores as outpatient for work-up of liver disease.  AST 42, alk phos 189, ALT and bilirubin normal.  Platelets are normal.  Albumin 2.6.  Recent labs showed normal NICOLAS, ferritin, ceruloplasmin, mitochondrial antibody, smooth muscle antibody, and viral hepatitis panel.  GGT elevated at 438.  ACE level elevated at 109.  We have also sent fractionated alkaline phosphatase as well as alpha-1 antitrypsin.  She may need liver biopsy.  She did have MRCP in March 2023 with CBD 10 mm.  Plan to follow-up on fluid studies as well as labs and monitor.  She can resume clear liquids for now and monitor for rectal bleeding.    I discussed the patients findings and my recommendations with the patient.  Crystal Betancourt, JOE  06/27/23  14:19 EDT

## 2023-06-27 NOTE — PLAN OF CARE
Goal Outcome Evaluation:                      Patient resting in bed. Has complained of abdominal pain at times. Treated per MAR. Multiple test done today. Patient resting well. Will continue to monitor

## 2023-06-27 NOTE — CASE MANAGEMENT/SOCIAL WORK
Continued Stay Note  YAQUELIN Taveras     Patient Name: Rosario Ortez  MRN: [id number]  Today's Date: 6/27/2023    Admit Date: 6/26/2023    Plan: Return home with family. Possible OP PT.   Discharge Plan       Row Name 06/27/23 1616       Plan    Plan Return home with family. Possible OP PT.    Patient/Family in Agreement with Plan yes    Plan Comments DC barriers: Cardio and GI consulted. Paracentesis today 6/27. Stress test completed.             Megan Naegele, RN     Office Phone: [phone]  Office Cell: [phone]

## 2023-06-28 ENCOUNTER — ON CAMPUS - OUTPATIENT (AMBULATORY)
Dept: URBAN - METROPOLITAN AREA HOSPITAL 85 | Facility: HOSPITAL | Age: 73
End: 2023-06-28

## 2023-06-28 DIAGNOSIS — R74.01 ELEVATION OF LEVELS OF LIVER TRANSAMINASE LEVELS: ICD-10-CM

## 2023-06-28 DIAGNOSIS — R10.84 GENERALIZED ABDOMINAL PAIN: ICD-10-CM

## 2023-06-28 DIAGNOSIS — R18.8 OTHER ASCITES: ICD-10-CM

## 2023-06-28 DIAGNOSIS — R42 DIZZINESS AND GIDDINESS: ICD-10-CM

## 2023-06-28 DIAGNOSIS — D64.9 ANEMIA, UNSPECIFIED: ICD-10-CM

## 2023-06-28 LAB
ABO GROUP BLD: NORMAL
ALP BONE CFR SERPL: 27 % (ref 14–68)
ALP INTEST CFR SERPL: 7 % (ref 0–18)
ALP LIVER CFR SERPL: 67 % (ref 18–85)
ALP SERPL-CCNC: 187 IU/L (ref 44–121)
ARTERIAL PATENCY WRIST A: POSITIVE
ATMOSPHERIC PRESS: ABNORMAL MM[HG]
BASE EXCESS BLDA CALC-SCNC: -1.3 MMOL/L (ref 0–3)
BASOPHILS # BLD AUTO: 0 10*3/MM3 (ref 0–0.2)
BASOPHILS NFR BLD AUTO: 0.1 % (ref 0–1.5)
BDY SITE: ABNORMAL
BLD GP AB SCN SERPL QL: NEGATIVE
CO2 BLDA-SCNC: 21.4 MMOL/L (ref 22–29)
D-LACTATE SERPL-SCNC: 1.9 MMOL/L (ref 0.5–2)
DEPRECATED RDW RBC AUTO: 61.3 FL (ref 37–54)
EOSINOPHIL # BLD AUTO: 0 10*3/MM3 (ref 0–0.4)
EOSINOPHIL NFR BLD AUTO: 0.2 % (ref 0.3–6.2)
ERYTHROCYTE [DISTWIDTH] IN BLOOD BY AUTOMATED COUNT: 17.8 % (ref 12.3–15.4)
FERRITIN SERPL-MCNC: 220.1 NG/ML (ref 13–150)
HCO3 BLDA-SCNC: 20.7 MMOL/L (ref 21–28)
HCT VFR BLD AUTO: 23.9 % (ref 34–46.6)
HCT VFR BLD AUTO: 25.4 % (ref 34–46.6)
HEMOCCULT STL QL IA: POSITIVE
HEMODILUTION: NO
HGB BLD-MCNC: 7.8 G/DL (ref 12–15.9)
HGB BLD-MCNC: 8.2 G/DL (ref 12–15.9)
INHALED O2 CONCENTRATION: 21 %
INR PPP: 1.08 (ref 0.93–1.1)
IRON 24H UR-MRATE: 53 MCG/DL (ref 37–145)
IRON SATN MFR SERPL: 28 % (ref 20–50)
LYMPHOCYTES # BLD AUTO: 0.5 10*3/MM3 (ref 0.7–3.1)
LYMPHOCYTES NFR BLD AUTO: 10.3 % (ref 19.6–45.3)
MCH RBC QN AUTO: 30.7 PG (ref 26.6–33)
MCHC RBC AUTO-ENTMCNC: 32.3 G/DL (ref 31.5–35.7)
MCV RBC AUTO: 95 FL (ref 79–97)
MODALITY: ABNORMAL
MONOCYTES # BLD AUTO: 0.2 10*3/MM3 (ref 0.1–0.9)
MONOCYTES NFR BLD AUTO: 5.4 % (ref 5–12)
NEUTROPHILS NFR BLD AUTO: 3.9 10*3/MM3 (ref 1.7–7)
NEUTROPHILS NFR BLD AUTO: 84 % (ref 42.7–76)
NRBC BLD AUTO-RTO: 0.3 /100 WBC (ref 0–0.2)
PCO2 BLDA: 24.2 MM HG (ref 35–48)
PH BLDA: 7.54 PH UNITS (ref 7.35–7.45)
PLATELET # BLD AUTO: 210 10*3/MM3 (ref 140–450)
PMV BLD AUTO: 6.9 FL (ref 6–12)
PO2 BLDA: 71.5 MM HG (ref 83–108)
PROTHROMBIN TIME: 11.5 SECONDS (ref 9.6–11.7)
RBC # BLD AUTO: 2.68 10*6/MM3 (ref 3.77–5.28)
RETICS # AUTO: 0.02 10*6/MM3 (ref 0.02–0.13)
RETICS/RBC NFR AUTO: 0.56 % (ref 0.7–1.9)
RH BLD: POSITIVE
SAO2 % BLDCOA: 96.3 % (ref 94–98)
T&S EXPIRATION DATE: NORMAL
TIBC SERPL-MCNC: 189 MCG/DL (ref 298–536)
TRANSFERRIN SERPL-MCNC: 127 MG/DL (ref 200–360)
VIT B12 BLD-MCNC: 873 PG/ML (ref 211–946)
WBC NRBC COR # BLD: 4.6 10*3/MM3 (ref 3.4–10.8)

## 2023-06-28 PROCEDURE — 99233 SBSQ HOSP IP/OBS HIGH 50: CPT | Performed by: NURSE PRACTITIONER

## 2023-06-28 PROCEDURE — 63710000001 PREDNISONE PER 5 MG: Performed by: NURSE PRACTITIONER

## 2023-06-28 PROCEDURE — 97162 PT EVAL MOD COMPLEX 30 MIN: CPT

## 2023-06-28 PROCEDURE — 82607 VITAMIN B-12: CPT | Performed by: INTERNAL MEDICINE

## 2023-06-28 PROCEDURE — 85025 COMPLETE CBC W/AUTO DIFF WBC: CPT | Performed by: INTERNAL MEDICINE

## 2023-06-28 PROCEDURE — 99214 OFFICE O/P EST MOD 30 MIN: CPT | Performed by: INTERNAL MEDICINE

## 2023-06-28 PROCEDURE — G0378 HOSPITAL OBSERVATION PER HR: HCPCS

## 2023-06-28 PROCEDURE — 86901 BLOOD TYPING SEROLOGIC RH(D): CPT | Performed by: HOSPITALIST

## 2023-06-28 PROCEDURE — 36600 WITHDRAWAL OF ARTERIAL BLOOD: CPT

## 2023-06-28 PROCEDURE — 86901 BLOOD TYPING SEROLOGIC RH(D): CPT

## 2023-06-28 PROCEDURE — 82274 ASSAY TEST FOR BLOOD FECAL: CPT | Performed by: INTERNAL MEDICINE

## 2023-06-28 PROCEDURE — 85018 HEMOGLOBIN: CPT | Performed by: HOSPITALIST

## 2023-06-28 PROCEDURE — 86900 BLOOD TYPING SEROLOGIC ABO: CPT | Performed by: HOSPITALIST

## 2023-06-28 PROCEDURE — 85014 HEMATOCRIT: CPT | Performed by: HOSPITALIST

## 2023-06-28 PROCEDURE — 82728 ASSAY OF FERRITIN: CPT | Performed by: INTERNAL MEDICINE

## 2023-06-28 PROCEDURE — 84466 ASSAY OF TRANSFERRIN: CPT | Performed by: INTERNAL MEDICINE

## 2023-06-28 PROCEDURE — 83540 ASSAY OF IRON: CPT | Performed by: INTERNAL MEDICINE

## 2023-06-28 PROCEDURE — 85045 AUTOMATED RETICULOCYTE COUNT: CPT | Performed by: INTERNAL MEDICINE

## 2023-06-28 PROCEDURE — 80053 COMPREHEN METABOLIC PANEL: CPT | Performed by: NURSE PRACTITIONER

## 2023-06-28 PROCEDURE — 85610 PROTHROMBIN TIME: CPT | Performed by: NURSE PRACTITIONER

## 2023-06-28 PROCEDURE — 83615 LACTATE (LD) (LDH) ENZYME: CPT | Performed by: INTERNAL MEDICINE

## 2023-06-28 PROCEDURE — 83605 ASSAY OF LACTIC ACID: CPT | Performed by: INTERNAL MEDICINE

## 2023-06-28 PROCEDURE — 82803 BLOOD GASES ANY COMBINATION: CPT

## 2023-06-28 PROCEDURE — 86850 RBC ANTIBODY SCREEN: CPT | Performed by: HOSPITALIST

## 2023-06-28 PROCEDURE — 86900 BLOOD TYPING SEROLOGIC ABO: CPT

## 2023-06-28 RX ORDER — FUROSEMIDE 40 MG/1
40 TABLET ORAL DAILY
Status: DISCONTINUED | OUTPATIENT
Start: 2023-06-28 | End: 2023-06-29

## 2023-06-28 RX ORDER — SPIRONOLACTONE 25 MG/1
25 TABLET ORAL DAILY
Status: DISCONTINUED | OUTPATIENT
Start: 2023-06-28 | End: 2023-06-29 | Stop reason: HOSPADM

## 2023-06-28 RX ADMIN — PREDNISONE 15 MG: 10 TABLET ORAL at 08:55

## 2023-06-28 RX ADMIN — SENNOSIDES AND DOCUSATE SODIUM 2 TABLET: 50; 8.6 TABLET ORAL at 08:55

## 2023-06-28 RX ADMIN — FUROSEMIDE 40 MG: 40 TABLET ORAL at 13:38

## 2023-06-28 RX ADMIN — Medication 10 ML: at 08:56

## 2023-06-28 RX ADMIN — FOLIC ACID 1 MG: 1 TABLET ORAL at 08:55

## 2023-06-28 RX ADMIN — Medication 10 ML: at 20:59

## 2023-06-28 RX ADMIN — LEVOTHYROXINE SODIUM 50 MCG: 0.05 TABLET ORAL at 05:17

## 2023-06-28 RX ADMIN — FERROUS SULFATE TAB EC 324 MG (65 MG FE EQUIVALENT) 324 MG: 324 (65 FE) TABLET DELAYED RESPONSE at 08:55

## 2023-06-28 RX ADMIN — ATORVASTATIN CALCIUM 20 MG: 20 TABLET, FILM COATED ORAL at 08:55

## 2023-06-28 RX ADMIN — PANTOPRAZOLE SODIUM 40 MG: 40 TABLET, DELAYED RELEASE ORAL at 08:55

## 2023-06-28 NOTE — PROGRESS NOTES
LOS: 0 days   Patient Care Team:  Ian Cordero MD as PCP - General      Subjective     Subjective: Patient feeling some better today.  She denies any abdominal pain or nausea/vomiting.  Having bowel movements and actually just had one that was brown.  She does report some bright red blood about 2 months ago.  Tolerating liquids and is hungry.      ROS:   Review of Systems   Constitutional:  Negative for chills and fever.   Respiratory:  Negative for cough and shortness of breath.    Cardiovascular:  Negative for chest pain.   Gastrointestinal:  Negative for abdominal pain, blood in stool, nausea and vomiting.   Neurological:  Positive for weakness. Negative for dizziness.   Psychiatric/Behavioral:  Negative for agitation and confusion.       Medication Review:   Scheduled Meds:atorvastatin, 20 mg, Oral, Daily  ferrous sulfate, 324 mg, Oral, Daily With Breakfast  folic acid, 1 mg, Oral, Daily  furosemide, 40 mg, Oral, Daily  levothyroxine, 50 mcg, Oral, Q AM  [START ON 6/30/2023] methotrexate, 10 mg, Oral, Weekly  pantoprazole, 40 mg, Oral, Daily  predniSONE, 15 mg, Oral, Daily  senna-docusate sodium, 2 tablet, Oral, BID  sodium chloride, 10 mL, Intravenous, Q12H  spironolactone, 25 mg, Oral, Daily      Continuous Infusions:   PRN Meds:.  acetaminophen **OR** acetaminophen **OR** acetaminophen    aluminum-magnesium hydroxide-simethicone    senna-docusate sodium **AND** polyethylene glycol **AND** bisacodyl **AND** bisacodyl    calcium carbonate    nitroglycerin    ondansetron **OR** ondansetron    [COMPLETED] Insert Peripheral IV **AND** sodium chloride    sodium chloride      Objective     Vital Signs  Temp:  [97.4 °F (36.3 °C)-98.4 °F (36.9 °C)] 97.4 °F (36.3 °C)  Heart Rate:  [45-72] 72  Resp:  [12-19] 13  BP: (104-128)/(42-91) 116/54    Physical Exam:    General Appearance:    Awake and alert, in no acute distress   Head:    Normocephalic, without obvious abnormality   Eyes:          Conjunctivae normal,  anicteric sclera   Ears:    Hearing intact   Throat:   No oral lesions, no thrush, oral mucosa moist   Neck:   No adenopathy, supple, no JVD   Lungs:     Respirations regular, even and unlabored           Rectal:     Deferred   Extremities:   No edema, no cyanosis, no redness   Skin:   No bleeding, bruising or rash, no jaundice   Neurologic:   Sensation intact        Results Review:    CBC  Results from last 7 days   Lab Units 06/28/23  0622 06/27/23 2258 06/27/23  0123 06/26/23  1500   RBC 10*6/mm3  --  2.33* 2.74* 2.80*   WBC 10*3/mm3  --  4.10 6.40 6.70   HEMOGLOBIN g/dL 7.8* 7.1* 8.4* 8.6*   PLATELETS 10*3/mm3  --  175 220 226       CMP  Results from last 7 days   Lab Units 06/27/23 2258 06/27/23 0123 06/26/23  1500   SODIUM mmol/L 140 144 142   POTASSIUM mmol/L 4.1 4.2 4.2   CHLORIDE mmol/L 110* 111* 109*   CO2 mmol/L 18.0* 21.0* 21.0*   BUN mg/dL 40* 45* 42*   CREATININE mg/dL 1.31* 1.48* 1.41*   GLUCOSE mg/dL 102* 96 95   ALBUMIN g/dL  --  2.6*  --    BILIRUBIN mg/dL  --  1.1  --    ALK PHOS U/L  --  189*  --    AST (SGOT) U/L  --  42*  --    ALT (SGPT) U/L  --  28  --        Amylase and Lipase        CRP         Imaging Results (Last 24 Hours)       ** No results found for the last 24 hours. **              Assessment & Plan     73-year-old female presented to the hospital yesterday with weakness and dizziness and has been reported syncopal episode.  Has recently been evaluated by Dr. Flores for possible sarcoidosis.     Recurrent ascites  Generalized abdominal pain  Elevated liver enzymes  Dizziness/syncopal episode  Normocytic anemia  Renal insufficiency   Rhinovirus/enterovirus  Possible sarcoidosis  A-fib on Eliquis  History of cholecystectomy  Hypothyroidism       Plan:  Patient reports feeling better today and looks better than she did yesterday.  She denies any abdominal pain or nausea/vomiting.  Having bowel movements that have been brown.  She does have history of some rectal bleeding but this  was not recent.  We discussed with her and her  especially due to hemoglobin of 7.8.  Continue to monitor H&H and transfuse as needed.  She remains on PPI and is receiving oral iron.  Eliquis is being held.  She underwent paracentesis yesterday 6/27/2023 with no SBP.  She does have mildly elevated liver enzymes as well as hypoalbuminemia.  Platelets are normal.  Recent labs showed normal NICOLAS, ferritin, ceruloplasmin, mitochondrial antibody, smooth muscle antibody, and viral hepatitis panel.  GGT elevated at 438.  ACE level elevated at 109. We have also sent fractionated alkaline phosphatase as well as alpha-1 antitrypsin.  She may need liver biopsy.  She did have MRCP in March 2023 with CBD 10 mm.  Patient can be started on 2 g sodium diet with high-protein.  They requests education about diet so consult with dietitian today.  Her  reports that they are awaiting an appointment at [facility] and she may get liver biopsy there.    Crystal Betancourt, JOE  06/28/23  13:51 EDT

## 2023-06-28 NOTE — PLAN OF CARE
Goal Outcome Evaluation:         Pt A&Ox4, VS stable, bradycardic at times. Cardiology consulted. Diet adv. Hgb stable. Will continue to monitor.

## 2023-06-28 NOTE — CONSULTS
CARDIOLOGY CONSULT:    Rosario Ortez  1950  female  [id number]      Referring Provider: Hospitalist  Reason for Consultation: Syncope    Patient Care Team:  Ian Cordero MD as PCP - General    Chief complaint dizziness and syncope    Subjective .     History of present illness:  Rosario Ortez is a 73 y.o. female with history of coronary disease atrial fibrillation and other medical problems presented to the hospital with complaints of dizziness weakness and a near syncopal episode.  Patient does not have any symptoms of chest pain or shortness of breath.  No complains any PND orthopnea.  No palpitations swelling of the feet.  In the ER patient was noted to have rhinovirus infection and she was admitted to the hospital.  Patient was taking her medicines including Eliquis for atrial fibrillation but he is in sinus rhythm.  She also has been having recurrent ascites.  Patient also had paracentesis performed.  Patient has history of sarcoidosis and is on prednisone and methotrexate.  Patient has remained in the past for which her primary cardiologist has performed echocardiogram and event monitor which were negative.  She is taking her medicines including beta-blockers and Eliquis.    Review of Systems   Constitutional: Negative for fever and malaise/fatigue.   HENT:  Negative for ear pain and nosebleeds.    Eyes:  Negative for blurred vision and double vision.   Cardiovascular:  Positive for syncope. Negative for chest pain, dyspnea on exertion and palpitations.   Respiratory:  Negative for cough and shortness of breath.    Skin:  Negative for rash.   Musculoskeletal:  Negative for joint pain.   Gastrointestinal:  Negative for abdominal pain, nausea and vomiting.   Neurological:  Positive for dizziness. Negative for focal weakness and headaches.   Psychiatric/Behavioral:  Negative for depression. The patient is not nervous/anxious.    All other systems reviewed and are negative.    History  Past Medical  History:   Diagnosis Date    A-fib     Coronary artery disease        Past Surgical History:   Procedure Laterality Date    BREAST BIOPSY      HYSTERECTOMY      JOINT REPLACEMENT Right 2015       Family History   Problem Relation Age of Onset    Breast cancer Sister        Social History     Tobacco Use    Smoking status: Never     Passive exposure: Never    Smokeless tobacco: Never   Vaping Use    Vaping Use: Never used   Substance Use Topics    Alcohol use: Never    Drug use: Never        Medications Prior to Admission   Medication Sig Dispense Refill Last Dose    apixaban (ELIQUIS) 2.5 MG tablet tablet Take 1 tablet by mouth 2 (Two) Times a Day.       atorvastatin (LIPITOR) 20 MG tablet Take 1 tablet by mouth Daily.       ferrous sulfate 325 (65 FE) MG tablet Take 1 tablet by mouth Daily With Breakfast.       folic acid (FOLVITE) 1 MG tablet Take 1 tablet by mouth Daily.       furosemide (LASIX) 40 MG tablet Take 1 tablet by mouth Daily.       irbesartan (AVAPRO) 75 MG tablet Take 1 tablet by mouth Every Night. 30 tablet 3     levothyroxine (SYNTHROID, LEVOTHROID) 50 MCG tablet 1 tablet Every Morning.       methotrexate 2.5 MG tablet Take 4 tablets by mouth 1 (One) Time Per Week. Fridays       metoprolol tartrate (LOPRESSOR) 25 MG tablet Take 12.5 mg by mouth 2 (Two) Times a Day.       pantoprazole (PROTONIX) 40 MG EC tablet Take 1 tablet by mouth Daily.       potassium chloride (K-DUR,KLOR-CON) 10 MEQ CR tablet Take 2 tablets by mouth Daily.       predniSONE (DELTASONE) 5 MG tablet Take 3 tablets by mouth Daily.            Codeine, Topiramate, Tramadol, Cefdinir, Levofloxacin, and Lisinopril    Scheduled Meds:atorvastatin, 20 mg, Oral, Daily  ferrous sulfate, 324 mg, Oral, Daily With Breakfast  folic acid, 1 mg, Oral, Daily  furosemide, 40 mg, Oral, Daily  levothyroxine, 50 mcg, Oral, Q AM  [START ON 6/30/2023] methotrexate, 10 mg, Oral, Weekly  pantoprazole, 40 mg, Oral, Daily  predniSONE, 15 mg, Oral,  "Daily  senna-docusate sodium, 2 tablet, Oral, BID  sodium chloride, 10 mL, Intravenous, Q12H  spironolactone, 25 mg, Oral, Daily      Continuous Infusions:   PRN Meds:.  acetaminophen **OR** acetaminophen **OR** acetaminophen    aluminum-magnesium hydroxide-simethicone    senna-docusate sodium **AND** polyethylene glycol **AND** bisacodyl **AND** bisacodyl    calcium carbonate    nitroglycerin    ondansetron **OR** ondansetron    [COMPLETED] Insert Peripheral IV **AND** sodium chloride    sodium chloride    Objective     VITAL SIGNS  Vitals:    06/28/23 1615 06/28/23 1630 06/28/23 1645 06/28/23 1700   BP:       BP Location:       Patient Position:       Pulse: 66 67 75 69   Resp:       Temp:       TempSrc:       SpO2:       Weight:       Height:           Flowsheet Rows      Flowsheet Row First Filed Value   Admission Height 167.6 cm (66\") Documented at 06/26/2023 1348   Admission Weight 65.8 kg (145 lb) Documented at 06/26/2023 1348             TELEMETRY: Sinus rhythm    Physical Exam:  Constitutional:       Appearance: Well-developed.   Eyes:      General: No scleral icterus.     Conjunctiva/sclera: Conjunctivae normal.      Pupils: Pupils are equal, round, and reactive to light.   HENT:      Head: Normocephalic and atraumatic.   Neck:      Vascular: No carotid bruit or JVD.   Pulmonary:      Effort: Pulmonary effort is normal.      Breath sounds: Normal breath sounds. No wheezing. No rales.   Cardiovascular:      Normal rate. Regular rhythm.   Pulses:     Intact distal pulses.   Abdominal:      General: Bowel sounds are normal.      Palpations: Abdomen is soft.   Musculoskeletal: Normal range of motion.      Cervical back: Normal range of motion and neck supple. Skin:     General: Skin is warm and dry.      Findings: No rash.   Neurological:      Mental Status: Alert.      Comments: No focal deficits        Results Review:   I reviewed the patient's new clinical results.  Lab Results (last 24 hours)       " Procedure Component Value Units Date/Time    Alkaline Phosphatase, Isoenzymes [[id number]]  (Abnormal) Collected: 06/27/23 1126    Specimen: Blood Updated: 06/28/23 1509     Alkaline Phosphatase 187 IU/L      Liver Fraction: 67 %      Bone Fraction: 27 %      Intestinal Frac.: 7 %     Narrative:      Performed at:  [facility]  [id number]  : Edward Ulloa PhD, Phone:  [phone]    Blood Gas, Arterial - [[id number]]  (Abnormal) Collected: 06/28/23 1357    Specimen: Arterial Blood Updated: 06/28/23 1359     Site Right Radial     Magdiel's Test Positive     pH, Arterial 7.539 pH units      pCO2, Arterial 24.2 mm Hg      pO2, Arterial 71.5 mm Hg      HCO3, Arterial 20.7 mmol/L      Base Excess, Arterial -1.3 mmol/L      Comment: Serial Number: [id number]:  [id number]        O2 Saturation, Arterial 96.3 %      CO2 Content 21.4 mmol/L      Barometric Pressure for Blood Gas --     Comment: N/A        Modality Room Air     FIO2 21 %      Hemodilution No    Ferritin [[id number]]  (Abnormal) Collected: 06/28/23 1235    Specimen: Blood Updated: 06/28/23 1342     Ferritin 220.10 ng/mL     Narrative:      Results may be falsely decreased if patient taking Biotin.      Iron Profile [[id number]]  (Abnormal) Collected: 06/28/23 1235    Specimen: Blood Updated: 06/28/23 1342     Iron 53 mcg/dL      Iron Saturation (TSAT) 28 %      Transferrin 127 mg/dL      TIBC 189 mcg/dL     Lactic Acid, Plasma [[id number]]  (Normal) Collected: 06/28/23 1235    Specimen: Blood Updated: 06/28/23 1336     Lactate 1.9 mmol/L     Reticulocytes [[id number]]  (Abnormal) Collected: 06/28/23 1235    Specimen: Blood Updated: 06/28/23 1316     Reticulocyte % 0.56 %      Reticulocyte Absolute 0.0160 10*6/mm3     Vitamin B12 [[id number]] Collected: 06/28/23 1235    Specimen: Blood Updated: 06/28/23 1303    Hemoglobin & Hematocrit, Blood [[id number]]  (Abnormal) Collected: 06/28/23 0622    Specimen: Blood Updated:  06/28/23 0659     Hemoglobin 7.8 g/dL      Hematocrit 23.9 %     Body Fluid Culture - Body Fluid, Peritoneal Catheter [[id number]] Collected: 06/27/23 1400    Specimen: Body Fluid from Peritoneal Catheter Updated: 06/28/23 0650     Body Fluid Culture No growth at less than 24 hours     Gram Stain No WBCs or organisms seen    Basic Metabolic Panel [[id number]]  (Abnormal) Collected: 06/27/23 2258    Specimen: Blood Updated: 06/27/23 2342     Glucose 102 mg/dL      BUN 40 mg/dL      Creatinine 1.31 mg/dL      Sodium 140 mmol/L      Potassium 4.1 mmol/L      Chloride 110 mmol/L      CO2 18.0 mmol/L      Calcium 8.7 mg/dL      BUN/Creatinine Ratio 30.5     Anion Gap 12.0 mmol/L      eGFR 42.6 mL/min/1.73     Narrative:      GFR Normal >60  Chronic Kidney Disease <60  Kidney Failure <15    The GFR formula is only valid for adults with stable renal function between ages 18 and 70.    CBC & Differential [[id number]]  (Abnormal) Collected: 06/27/23 2258    Specimen: Blood Updated: 06/27/23 2321    Narrative:      The following orders were created for panel order CBC & Differential.  Procedure                               Abnormality         Status                     ---------                               -----------         ------                     CBC Auto Differential[[id number]]        Abnormal            Final result                 Please view results for these tests on the individual orders.    CBC Auto Differential [[id number]]  (Abnormal) Collected: 06/27/23 2258    Specimen: Blood Updated: 06/27/23 2321     WBC 4.10 10*3/mm3      RBC 2.33 10*6/mm3      Hemoglobin 7.1 g/dL      Hematocrit 22.6 %      MCV 96.9 fL      MCH 30.4 pg      MCHC 31.4 g/dL      RDW 18.6 %      RDW-SD 66.9 fl      MPV 7.0 fL      Platelets 175 10*3/mm3      Neutrophil % 84.8 %      Lymphocyte % 12.4 %      Monocyte % 2.7 %      Eosinophil % 0.1 %      Basophil % 0.0 %      Neutrophils, Absolute 3.50 10*3/mm3      Lymphocytes, Absolute 0.50  10*3/mm3      Monocytes, Absolute 0.10 10*3/mm3      Eosinophils, Absolute 0.00 10*3/mm3      Basophils, Absolute 0.00 10*3/mm3      nRBC 0.2 /100 WBC     Albumin, Fluid - Body Fluid, Peritoneum [[id number]] Collected: 06/27/23 1400    Specimen: Body Fluid from Peritoneum Updated: 06/27/23 2002     Albumin, Fluid 0.50 g/dL     Narrative:      No Reference Ranges Established.    A Serous fluid albumin gradient (serum albumin-fluid) <1.1 g/dL suggests the fluid is an exudate.  Cirrhosis usually results in an ascites fluid albumin gradient >1.1 g/dL.    This test was developed, its performance characteristics determined and judged suitable for clinical purposes by [facility].  It has not been cleared or approved by the FDA.  The laboratory is regulated under CLIA as qualified to perfom high-complexity testing.              Imaging Results (Last 24 Hours)       ** No results found for the last 24 hours. **            EKG      I personally viewed and interpreted the patient's EKG/Telemetry data:    ECHOCARDIOGRAM:         STRESS MYOVIEW:  Results for orders placed during the hospital encounter of 06/26/23    Stress Test With Myocardial Perfusion One Day    Interpretation Summary  Indications  Syncope    This study was performed under the direct supervision of Kacie NAYLOR.    Resting ECG  Sinus rhythm    The patient was injected with Lexiscan intravenously while constantly monitoring electrocardiogram and vital signs.  Patient did not have any chest discomfort ST abnormalities or ectopy with injection of Lexiscan.    Cardiolite was used as an imaging agent.    Cardiolite images showed uniform distribution of radionuclide without any evidence for myocardial ischemia.    Gated SPECT images revealed normal left ventricle size and contractility with ejection fraction of 74%.    Impression  ========  Lexiscan Cardiolite test is negative for myocardial ischemia.    Gated SPECT images revealed normal left  ventricular size and contractility with ejection fraction of 74%.       CARDIAC CATHETERIZATION:    No results found for this or any previous visit.       OTHER:         MDM      #1 dizziness with syncope  Patient had a second episode and the first episode had work-up done with an echo and a Holter monitor which did not show any arrhythmias at that time  Patient has history of atrial fibrillation and is currently in sinus rhythm but is on beta-blockers and Eliquis  Patient will have a long-term event monitor like M cot when she is discharged in the meantime we will be ruled out for MI by EKG and enzymes  Patient had a stress partially during this admission which did not show any ischemia.    2.  Atrial fibrillation  Patient has history of atrial fibrillation but is currently on metoprolol and Eliquis and is not currently in sinus rhythm  3.  Hyperlipidemia  Patient is currently on atorvastatin the lipid levels are well within normal limits  4.  Hypertension  Patient blood pressure currently stable on medications  5.  Rhinovirus infection  Patient will be discharged to home soon with a monitor and followed with a primary cardiologist.    I discussed the patients findings and my recommendations with patient and nurse    Bernabe Veras MD  06/28/23  17:13 EDT

## 2023-06-28 NOTE — PROGRESS NOTES
[facility] Medicine Services   Daily Progress Note    Patient Name: Rosario Ortez  : 1950  MRN: [id number]  Primary Care Physician:  Ian Cordero MD  Date of admission: 2023  Date and Time of Service: 2023      Subjective      Chief Complaint: Syncopal event/abdominal pain    Patient Reports this patient was admitted to the hospital yesterday because of syncopal event, she has previous syncope before and the work-up was negative including event monitor for 30 days, stress test was done and results are pending, I saw the patient after the stress test and her main complaint now is abdominal pain, evidently the patient has ascites and she has liver cirrhosis she will get paracentesis every 2 weeks, abdomen is distended and probably this time for paracentesis also she was complaining of bilateral lower extremity swelling more on right compared to left patient said this is a new    2023: This patient was seen and examined, no nausea vomiting no fever chills, she is feeling cold and wearing a jacket, I spoke with her  and daughter and they mentioned that this has been going on for a few months now, her heart rate was ranging anywhere between 45-50 according to  this is normal for her, beta-blocker 12.5 twice a day was discontinued, because of anemia I am still keeping her.    ROS   all review of systems negative except for mentioned above      Objective      Vitals:   Temp:  [97.9 °F (36.6 °C)-98.4 °F (36.9 °C)] 97.9 °F (36.6 °C)  Heart Rate:  [45-62] 54  Resp:  [12-19] 16  BP: (104-131)/(42-91) 124/49    Physical Exam  Constitutional:       Appearance: Normal appearance.   HENT:      Head: Normocephalic.      Right Ear: Tympanic membrane normal.      Left Ear: Tympanic membrane normal.      Nose: Nose normal.      Mouth/Throat:      Mouth: Mucous membranes are moist.   Eyes:      Pupils: Pupils are equal, round, and reactive to light.   Cardiovascular:      Rate  and Rhythm: Normal rate.      Pulses: Normal pulses.      Comments: Bilateral pitting edema more on right compared to left  Pulmonary:      Effort: Pulmonary effort is normal.   Abdominal:      General: Abdomen is flat.      Comments: Abdominal distention with ascites   Musculoskeletal:         General: Normal range of motion.      Cervical back: Normal range of motion.   Skin:     General: Skin is warm.      Capillary Refill: Capillary refill takes less than 2 seconds.   Neurological:      General: No focal deficit present.      Mental Status: She is alert.   Psychiatric:         Mood and Affect: Mood normal.             Result Review    Result Review:  I have personally reviewed the results from the time of this admission to 6/28/2023 12:15 EDT and agree with these findings:  []  Laboratory  []  Microbiology  []  Radiology  []  EKG/Telemetry   []  Cardiology/Vascular   []  Pathology  []  Old records  []  Other:  Most notable findings include: INR 1.1, duplex of right lower extremity was negative for DVT, nuclear stress test came back negative for ischemia    Wounds (last 24 hours)     LDA Wound     Row Name 06/27/23 2040             Wound 06/27/23 0145 Right lower leg Abcess    Wound - Properties Group Placement Date: 06/27/23  - Placement Time: 0145  - Present on Hospital Admission: Y  -AH Side: Right  - Orientation: lower  -AH Location: leg  -AH Primary Wound Type: Abcess  -AH    Dressing Appearance open to air  -JT      Closure Open to air  -JT      Base yellow  -JT      Periwound redness  -JT      Drainage Amount none  -JT      Retired Wound - Properties Group Placement Date: 06/27/23  - Placement Time: 0145  - Present on Hospital Admission: Y  -AH Side: Right  - Orientation: lower  -AH Location: leg  -AH Primary Wound Type: Abcess  -AH    Retired Wound - Properties Group Date first assessed: 06/27/23  - Time first assessed: 0145  - Present on Hospital Admission: Y  -AH Side: Right  -  Location: leg  - Primary Wound Type: [facility]          User Key  (r) = Recorded By, (t) = Taken By, (c) = Cosigned By    Initials Name Provider Type    Jayla Melchor, RN Registered Nurse    Lata Portillo RN Registered Nurse                  Assessment & Plan      atorvastatin, 20 mg, Oral, Daily  ferrous sulfate, 324 mg, Oral, Daily With Breakfast  folic acid, 1 mg, Oral, Daily  furosemide, 40 mg, Oral, Daily  levothyroxine, 50 mcg, Oral, Q AM  [START ON 6/30/2023] methotrexate, 10 mg, Oral, Weekly  pantoprazole, 40 mg, Oral, Daily  predniSONE, 15 mg, Oral, Daily  senna-docusate sodium, 2 tablet, Oral, BID  sodium chloride, 10 mL, Intravenous, Q12H  spironolactone, 25 mg, Oral, Daily             Active Hospital Problems:  Active Hospital Problems    Diagnosis    • **Syncope, unspecified syncope type      1.  Syncopal event this is a second time, patient had this before and woke up with primary cardiologist.  Event monitor and echocardiogram which were came back negative, no stress test is negative possible, patient is known to have sarcoidosis, she is on prednisone and methotrexate, there is no arrhythmia identified and event monitor before is reported to me by family did not show any significant findings.  2.  Ascites large with abdominal pain paracentesis was done.  3.  Right leg edema more than left no DVT  4. Bradycardia, stopped beta blocker  5. Developing anemia no evidence of bleeding, GI on board, abixapan on hold, pending GI work up        DVT prophylaxis:  Mechanical DVT prophylaxis orders are present.    CODE STATUS:    Code Status (Patient has no pulse and is not breathing): CPR (Attempt to Resuscitate)  Medical Interventions (Patient has pulse or is breathing): Full Support      Disposition:  I expect patient to be discharged 24 hours    This patient has been examined wearing appropriate Personal Protective Equipment and discussed with hospital infection control department.  06/28/23      Electronically signed by Kadie Rice MD, 06/28/23, 12:15 EDT.  [facility] Hospitalist Team

## 2023-06-28 NOTE — PLAN OF CARE
Goal Outcome Evaluation:  Plan of Care Reviewed With: patient, spouse        Progress: no change  Outcome Evaluation: Patient with complaints of pain on shift- see MAR. pt on clear liquids. Hemoglobin dropped to 7.1- MD notified. type and screen ordered and hemoglobin redraw in AM.  at bedside. Will monitor.

## 2023-06-28 NOTE — PLAN OF CARE
Goal Outcome Evaluation:  Plan of Care Reviewed With: patient, spouse        Progress: no change  Outcome Evaluation: Pt is a 74 y/o F who presents to [facility] after syncopal episode and weakness. Pt has recurring abd pain with recent paracentesis on 6/13 and another on 6/27 at bedside due to ascites accum. PMH includes sarcoidosis, anemia, HRN, hypothyroidism. At baseline, pt lives with spouse and dtr who provide modA with ADL's, med mgmt, and mobility. Pt uses RW for mobility and sometime's W/C. Pt reports dizziness sometimes when walking around at [employer] looking through different aisles, but denies dizziness with other head movements. Pt demo impaired smooth pursuit, and impaired saccades to the L. VOR head impulse (+) L. Sharpened Rhomberg (+). Pt currently is mod-ind with bed mobility, CGA-Amado with sit to stands, and CGA with ambulation. Pt demo narrow JERAD, slow gait, shuffled gait, difficulty with turns. Edu pt and spouse on recommending OPPT for addressing balance and dizziness symptoms, as well as improving strength to regain indep at home. PT to follow while admitted, and recommend home with family and OPPT upon d/c due to decline in baseline function.      Anticipated Discharge Disposition (PT): home with assist, home with outpatient therapy services

## 2023-06-29 ENCOUNTER — INPATIENT HOSPITAL (AMBULATORY)
Dept: URBAN - METROPOLITAN AREA HOSPITAL 84 | Facility: HOSPITAL | Age: 73
End: 2023-06-29

## 2023-06-29 VITALS
HEART RATE: 74 BPM | BODY MASS INDEX: 23.17 KG/M2 | HEIGHT: 66 IN | RESPIRATION RATE: 13 BRPM | TEMPERATURE: 97.8 F | DIASTOLIC BLOOD PRESSURE: 50 MMHG | OXYGEN SATURATION: 94 % | WEIGHT: 144.18 LBS | SYSTOLIC BLOOD PRESSURE: 138 MMHG

## 2023-06-29 DIAGNOSIS — R10.84 GENERALIZED ABDOMINAL PAIN: ICD-10-CM

## 2023-06-29 DIAGNOSIS — R18.8 OTHER ASCITES: ICD-10-CM

## 2023-06-29 PROBLEM — R55 SYNCOPE, UNSPECIFIED SYNCOPE TYPE: Status: RESOLVED | Noted: 2023-06-26 | Resolved: 2023-06-29

## 2023-06-29 LAB
ALBUMIN SERPL-MCNC: 3.1 G/DL (ref 3.5–5.2)
ALBUMIN/GLOB SERPL: 1.8 G/DL
ALP SERPL-CCNC: 168 U/L (ref 39–117)
ALT SERPL W P-5'-P-CCNC: 23 U/L (ref 1–33)
ANION GAP SERPL CALCULATED.3IONS-SCNC: 13 MMOL/L (ref 5–15)
AST SERPL-CCNC: 37 U/L (ref 1–32)
BILIRUB SERPL-MCNC: 1 MG/DL (ref 0–1.2)
BUN SERPL-MCNC: 39 MG/DL (ref 8–23)
BUN/CREAT SERPL: 28.5 (ref 7–25)
CALCIUM SPEC-SCNC: 9.1 MG/DL (ref 8.6–10.5)
CHLORIDE SERPL-SCNC: 109 MMOL/L (ref 98–107)
CO2 SERPL-SCNC: 19 MMOL/L (ref 22–29)
CREAT SERPL-MCNC: 1.37 MG/DL (ref 0.57–1)
EGFRCR SERPLBLD CKD-EPI 2021: 40.3 ML/MIN/1.73
GLOBULIN UR ELPH-MCNC: 1.7 GM/DL
GLUCOSE SERPL-MCNC: 114 MG/DL (ref 65–99)
LDH SERPL-CCNC: 152 U/L (ref 135–214)
POTASSIUM SERPL-SCNC: 3.9 MMOL/L (ref 3.5–5.2)
PROT SERPL-MCNC: 4.8 G/DL (ref 6–8.5)
SODIUM SERPL-SCNC: 141 MMOL/L (ref 136–145)

## 2023-06-29 PROCEDURE — 99232 SBSQ HOSP IP/OBS MODERATE 35: CPT | Performed by: NURSE PRACTITIONER

## 2023-06-29 PROCEDURE — 63710000001 PREDNISONE PER 5 MG: Performed by: NURSE PRACTITIONER

## 2023-06-29 PROCEDURE — G0378 HOSPITAL OBSERVATION PER HR: HCPCS

## 2023-06-29 PROCEDURE — 99214 OFFICE O/P EST MOD 30 MIN: CPT

## 2023-06-29 RX ORDER — SPIRONOLACTONE 25 MG/1
25 TABLET ORAL DAILY
Qty: 30 TABLET | Refills: 0 | Status: SHIPPED | OUTPATIENT
Start: 2023-06-30 | End: 2023-07-13

## 2023-06-29 RX ORDER — FUROSEMIDE 20 MG/1
20 TABLET ORAL DAILY
Status: DISCONTINUED | OUTPATIENT
Start: 2023-06-29 | End: 2023-06-29 | Stop reason: HOSPADM

## 2023-06-29 RX ORDER — FUROSEMIDE 20 MG/1
20 TABLET ORAL DAILY
Qty: 30 TABLET | Refills: 0 | Status: SHIPPED | OUTPATIENT
Start: 2023-06-30 | End: 2023-07-30

## 2023-06-29 RX ADMIN — PANTOPRAZOLE SODIUM 40 MG: 40 TABLET, DELAYED RELEASE ORAL at 08:10

## 2023-06-29 RX ADMIN — FUROSEMIDE 20 MG: 20 TABLET ORAL at 08:09

## 2023-06-29 RX ADMIN — Medication 10 ML: at 08:10

## 2023-06-29 RX ADMIN — FOLIC ACID 1 MG: 1 TABLET ORAL at 08:09

## 2023-06-29 RX ADMIN — SPIRONOLACTONE 25 MG: 25 TABLET ORAL at 08:09

## 2023-06-29 RX ADMIN — ATORVASTATIN CALCIUM 20 MG: 20 TABLET, FILM COATED ORAL at 08:10

## 2023-06-29 RX ADMIN — FERROUS SULFATE TAB EC 324 MG (65 MG FE EQUIVALENT) 324 MG: 324 (65 FE) TABLET DELAYED RESPONSE at 08:09

## 2023-06-29 RX ADMIN — PREDNISONE 15 MG: 10 TABLET ORAL at 08:09

## 2023-06-29 RX ADMIN — LEVOTHYROXINE SODIUM 50 MCG: 0.05 TABLET ORAL at 05:16

## 2023-06-29 NOTE — PLAN OF CARE
Problem: Skin Injury Risk Increased  Goal: Skin Health and Integrity  Intervention: Optimize Skin Protection  Recent Flowsheet Documentation  Taken 6/28/2023 2059 by Germain Manriquez RN  Head of Bed (HOB) Positioning: HOB at 20-30 degrees     Problem: Hypertension Comorbidity  Goal: Blood Pressure in Desired Range  Intervention: Maintain Blood Pressure Management  Recent Flowsheet Documentation  Taken 6/28/2023 2059 by Germain Manriquez RN  Medication Review/Management: medications reviewed     Problem: Fall Injury Risk  Goal: Absence of Fall and Fall-Related Injury  Intervention: Identify and Manage Contributors  Recent Flowsheet Documentation  Taken 6/28/2023 2059 by Germain Manriquez RN  Medication Review/Management: medications reviewed  Self-Care Promotion: meal set-up provided  Intervention: Promote Injury-Free Environment  Recent Flowsheet Documentation  Taken 6/29/2023 0209 by Germain Manriquez RN  Safety Promotion/Fall Prevention:   safety round/check completed   room organization consistent   nonskid shoes/slippers when out of bed   lighting adjusted   clutter free environment maintained   assistive device/personal items within reach  Taken 6/29/2023 0026 by Germain Manriquez RN  Safety Promotion/Fall Prevention:   safety round/check completed   room organization consistent   nonskid shoes/slippers when out of bed   mobility aid in reach   lighting adjusted   clutter free environment maintained   assistive device/personal items within reach  Taken 6/28/2023 2225 by Germain Manriquez RN  Safety Promotion/Fall Prevention:   safety round/check completed   room organization consistent   lighting adjusted   clutter free environment maintained   assistive device/personal items within reach  Taken 6/28/2023 2059 by Germain Manriquez RN  Safety Promotion/Fall Prevention:   safety round/check completed   room organization consistent   assistive device/personal items within reach   clutter free environment maintained     Problem: Activity  Intolerance  Goal: Enhanced Capacity and Energy  Intervention: Optimize Activity Tolerance  Recent Flowsheet Documentation  Taken 6/28/2023 2059 by Germain Manriquez RN  Self-Care Promotion: meal set-up provided     Problem: Adult Inpatient Plan of Care  Goal: Absence of Hospital-Acquired Illness or Injury  Intervention: Identify and Manage Fall Risk  Recent Flowsheet Documentation  Taken 6/29/2023 0209 by Germain Manriquez RN  Safety Promotion/Fall Prevention:   safety round/check completed   room organization consistent   nonskid shoes/slippers when out of bed   lighting adjusted   clutter free environment maintained   assistive device/personal items within reach  Taken 6/29/2023 0026 by Germain Manriquez RN  Safety Promotion/Fall Prevention:   safety round/check completed   room organization consistent   nonskid shoes/slippers when out of bed   mobility aid in reach   lighting adjusted   clutter free environment maintained   assistive device/personal items within reach  Taken 6/28/2023 2225 by Germain Manriquez RN  Safety Promotion/Fall Prevention:   safety round/check completed   room organization consistent   lighting adjusted   clutter free environment maintained   assistive device/personal items within reach  Taken 6/28/2023 2059 by Germain Manriquez RN  Safety Promotion/Fall Prevention:   safety round/check completed   room organization consistent   assistive device/personal items within reach   clutter free environment maintained  Intervention: Prevent Skin Injury  Recent Flowsheet Documentation  Taken 6/28/2023 2059 by Germain Manriquez RN  Body Position:   supine   left  Intervention: Prevent and Manage VTE (Venous Thromboembolism) Risk  Recent Flowsheet Documentation  Taken 6/28/2023 2059 by Germain Manriquez RN  Range of Motion: active ROM (range of motion) encouraged  Intervention: Prevent Infection  Recent Flowsheet Documentation  Taken 6/29/2023 0209 by eGrmain Manriquez RN  Infection Prevention:   visitors restricted/screened   single patient  room provided   rest/sleep promoted   environmental surveillance performed  Taken 6/29/2023 0026 by Germain Manriquez RN  Infection Prevention:   visitors restricted/screened   single patient room provided   rest/sleep promoted   environmental surveillance performed  Taken 6/28/2023 2225 by Germain Manriquez RN  Infection Prevention:   single patient room provided   rest/sleep promoted   environmental surveillance performed  Taken 6/28/2023 2059 by Germain Manriquez RN  Infection Prevention:   visitors restricted/screened   single patient room provided   rest/sleep promoted   environmental surveillance performed   Goal Outcome Evaluation:      Patient is alert and oriented x3. Vital signs checked and stable. Droplet precaution maintained. For Cardiology consult. Fecal occult blood came back positive (+). For long term event monitoring like MCOT. Medications given and tolerating well. Sleeping comfortably with rise of chest noted. On continuing monitoring and not in any distress.

## 2023-06-29 NOTE — PROGRESS NOTES
LOS: 0 days   Patient Care Team:  Ian Cordero MD as PCP - General      Subjective     Subjective: Patient reports doing well this morning.  Ate breakfast well.  Has no abdominal pain or nausea.  She had a bowel movement this morning that was brown.      ROS:   Review of Systems   Constitutional:  Negative for chills and fever.   Respiratory:  Positive for shortness of breath. Negative for cough.    Cardiovascular:  Negative for chest pain.   Gastrointestinal:  Negative for abdominal pain, constipation, nausea and vomiting.   Psychiatric/Behavioral:  Negative for agitation and confusion.       Medication Review:   Scheduled Meds:atorvastatin, 20 mg, Oral, Daily  ferrous sulfate, 324 mg, Oral, Daily With Breakfast  folic acid, 1 mg, Oral, Daily  furosemide, 20 mg, Oral, Daily  levothyroxine, 50 mcg, Oral, Q AM  [START ON 6/30/2023] methotrexate, 10 mg, Oral, Weekly  pantoprazole, 40 mg, Oral, Daily  predniSONE, 15 mg, Oral, Daily  senna-docusate sodium, 2 tablet, Oral, BID  sodium chloride, 10 mL, Intravenous, Q12H  spironolactone, 25 mg, Oral, Daily      Continuous Infusions:   PRN Meds:.  acetaminophen **OR** acetaminophen **OR** acetaminophen    aluminum-magnesium hydroxide-simethicone    senna-docusate sodium **AND** polyethylene glycol **AND** bisacodyl **AND** bisacodyl    calcium carbonate    nitroglycerin    ondansetron **OR** ondansetron    [COMPLETED] Insert Peripheral IV **AND** sodium chloride    sodium chloride      Objective     Vital Signs  Temp:  [97.4 °F (36.3 °C)-98 °F (36.7 °C)] 98 °F (36.7 °C)  Heart Rate:  [53-82] 71  Resp:  [15-20] 15  BP: (106-137)/(40-55) 137/50    Physical Exam:    General Appearance:    Awake and alert, in no acute distress   Head:    Normocephalic, without obvious abnormality   Eyes:          Conjunctivae normal, anicteric sclera   Ears:    Hearing intact   Throat:   No oral lesions, no thrush, oral mucosa moist   Neck:   No adenopathy, supple, no JVD   Lungs:      Respirations regular, even and unlabored           Rectal:     Deferred   Extremities:   No edema, no cyanosis, no redness   Skin:   No bleeding, bruising or rash, no jaundice   Neurologic:   Sensation intact        Results Review:    CBC  Results from last 7 days   Lab Units 06/28/23 2252 06/28/23 0622 06/27/23 2258 06/27/23 0123 06/26/23  1500   RBC 10*6/mm3 2.68*  --  2.33* 2.74* 2.80*   WBC 10*3/mm3 4.60  --  4.10 6.40 6.70   HEMOGLOBIN g/dL 8.2* 7.8* 7.1* 8.4* 8.6*   PLATELETS 10*3/mm3 210  --  175 220 226       CMP  Results from last 7 days   Lab Units 06/28/23 2252 06/27/23 2258 06/27/23 1126 06/27/23 0123 06/26/23  1500   SODIUM mmol/L 141 140  --  144 142   POTASSIUM mmol/L 3.9 4.1  --  4.2 4.2   CHLORIDE mmol/L 109* 110*  --  111* 109*   CO2 mmol/L 19.0* 18.0*  --  21.0* 21.0*   BUN mg/dL 39* 40*  --  45* 42*   CREATININE mg/dL 1.37* 1.31*  --  1.48* 1.41*   GLUCOSE mg/dL 114* 102*  --  96 95   ALBUMIN g/dL 3.1*  --   --  2.6*  --    BILIRUBIN mg/dL 1.0  --   --  1.1  --    ALK PHOS U/L 168*  --  187* 189*  --    AST (SGOT) U/L 37*  --   --  42*  --    ALT (SGPT) U/L 23  --   --  28  --        Amylase and Lipase        CRP         Imaging Results (Last 24 Hours)       ** No results found for the last 24 hours. **              Assessment & Plan     73-year-old female presented to the hospital yesterday with weakness and dizziness and has been reported syncopal episode.  Has recently been evaluated by Dr. Flores for possible sarcoidosis.     Recurrent ascites  Generalized abdominal pain  Elevated liver enzymes  Dizziness/syncopal episode  Normocytic anemia  Renal insufficiency   Rhinovirus/enterovirus  Possible sarcoidosis  A-fib on Eliquis  History of cholecystectomy  Hypothyroidism        Plan:  Patient reports feeling well this morning.  She is eating solid food well.  No abdominal pain or nausea/vomiting.  Just had a couple bowel movements which are brown.  No overt GI bleeding although heme  positive stool.  She had recent EGD and colonoscopy in January 2023 with no GI bleeding.  Hemoglobin is 8.2.  Continue to monitor H&H and transfuse as needed.  She continues on PPI as well as iron.  Status post paracentesis 6/27/2023 with no SBP.  Recent labs showed normal NICOLAS, ferritin, ceruloplasmin, mitochondrial antibody, smooth muscle antibody, and viral hepatitis panel.  GGT elevated at 438.  ACE level elevated at 109. We have also sent fractionated alkaline phosphatase as well as alpha-1 antitrypsin. She did have MRCP in March 2023 with CBD 10 mm.    Patient is planning to follow-up with [facility] where liver biopsy may be performed.  Continue on low-sodium diet and follow-up with Dr. Flores.      Crystal Betancourt, JOE  06/29/23  12:41 EDT

## 2023-06-29 NOTE — CASE MANAGEMENT/SOCIAL WORK
Case Management Discharge Note      Final Note: Home with OP PT.    Selected Continued Care - Discharged on 6/29/2023 Admission date: 6/26/2023 - Discharge disposition: Home or Self Care      Therapy Coordination complete.      Service Provider Selected Services Address Phone Fax Patient Preferred    [facility] [place] [facility] [address], [place] [address] [phone] --               Transportation Services  Private: Car    Final Discharge Disposition Code: 01 - home or self-care

## 2023-06-29 NOTE — PROGRESS NOTES
CARDIOLOGY PROGRESS NOTE:    Rosario Ortez  73 y.o.  female  1950  [id number]      Referring Provider: JOE Reddy    Reason for follow-up: Syncope     Patient Care Team:  Ian Cordero MD as PCP - General    Subjective .  Patient seen and examined.  Labs and chart reviewed.  Patient states she is feeling better today with no complaints of dizziness, chest pain, shortness of breath, palpitations.  However, she states she was [place] and became extremely fatigued.     Objective  Patient lying in bed resting comfortably with family at bedside    Review of Systems   Constitutional: Positive for malaise/fatigue. Negative for chills and fever.   Cardiovascular: Negative for chest pain, leg swelling, palpitations and syncope.   Respiratory: Negative for cough and shortness of breath.    Gastrointestinal: Negative for abdominal pain, nausea and vomiting.   Neurological: Negative for dizziness, headaches and light-headedness.   Psychiatric/Behavioral: Negative for altered mental status.       Codeine, Topiramate, Tramadol, Cefdinir, Levofloxacin, and Lisinopril    Scheduled Meds:atorvastatin, 20 mg, Oral, Daily  ferrous sulfate, 324 mg, Oral, Daily With Breakfast  folic acid, 1 mg, Oral, Daily  furosemide, 20 mg, Oral, Daily  levothyroxine, 50 mcg, Oral, Q AM  [START ON 6/30/2023] methotrexate, 10 mg, Oral, Weekly  pantoprazole, 40 mg, Oral, Daily  predniSONE, 15 mg, Oral, Daily  senna-docusate sodium, 2 tablet, Oral, BID  sodium chloride, 10 mL, Intravenous, Q12H  spironolactone, 25 mg, Oral, Daily      Continuous Infusions:   PRN Meds:.•  acetaminophen **OR** acetaminophen **OR** acetaminophen  •  aluminum-magnesium hydroxide-simethicone  •  senna-docusate sodium **AND** polyethylene glycol **AND** bisacodyl **AND** bisacodyl  •  calcium carbonate  •  nitroglycerin  •  ondansetron **OR** ondansetron  •  [COMPLETED] Insert Peripheral IV **AND** sodium chloride  •  sodium  "chloride        VITAL SIGNS  Vitals:    06/28/23 1957 06/28/23 2300 06/29/23 0451 06/29/23 0820   BP: 106/52 127/55 132/48 137/50   BP Location: Left arm Left arm Left arm Left arm   Patient Position: Lying Lying Lying Lying   Pulse: 69 66 64 71   Resp: 19 20 16 15   Temp: 98 °F (36.7 °C) 98 °F (36.7 °C) 98 °F (36.7 °C)    TempSrc: Oral Oral Axillary    SpO2: 90% 97% 97% 96%   Weight:       Height:           Flowsheet Rows    Flowsheet Row First Filed Value   Admission Height 167.6 cm (66\") Documented at 06/26/2023 1348   Admission Weight 65.8 kg (145 lb) Documented at 06/26/2023 1348           TELEMETRY: Sinus rhythm    Physical Exam:  Vitals reviewed.   Constitutional:       Appearance: Not in distress.   Eyes:      Pupils: Pupils are equal, round, and reactive to light.   HENT:    Mouth/Throat:      Pharynx: Oropharynx is clear.   Pulmonary:      Effort: Pulmonary effort is normal.      Breath sounds: Normal breath sounds.   Cardiovascular:      Normal rate. Regular rhythm.   Pulses:     Intact distal pulses.   Edema:     Peripheral edema present.  Abdominal:      General: Bowel sounds are normal.   Musculoskeletal: Normal range of motion.      Cervical back: Normal range of motion and neck supple. Skin:     General: Skin is warm.   Neurological:      General: No focal deficit present.      Mental Status: Alert and oriented to person, place and time.          Results Review:   I reviewed the patient's new clinical results.  Lab Results (last 24 hours)     Procedure Component Value Units Date/Time    Body Fluid Culture - Body Fluid, Peritoneal Catheter [[id number]] Collected: 06/27/23 1400    Specimen: Body Fluid from Peritoneal Catheter Updated: 06/29/23 0657     Body Fluid Culture No growth at 2 days     Gram Stain No WBCs or organisms seen    Comprehensive Metabolic Panel [[id number]]  (Abnormal) Collected: 06/28/23 2252    Specimen: Blood Updated: 06/29/23 0012     Glucose 114 mg/dL      BUN 39 mg/dL      " Creatinine 1.37 mg/dL      Sodium 141 mmol/L      Potassium 3.9 mmol/L      Chloride 109 mmol/L      CO2 19.0 mmol/L      Calcium 9.1 mg/dL      Total Protein 4.8 g/dL      Albumin 3.1 g/dL      ALT (SGPT) 23 U/L      AST (SGOT) 37 U/L      Alkaline Phosphatase 168 U/L      Total Bilirubin 1.0 mg/dL      Globulin 1.7 gm/dL      A/G Ratio 1.8 g/dL      BUN/Creatinine Ratio 28.5     Anion Gap 13.0 mmol/L      eGFR 40.3 mL/min/1.73     Narrative:      GFR Normal >60  Chronic Kidney Disease <60  Kidney Failure <15    The GFR formula is only valid for adults with stable renal function between ages 18 and 70.    Lactate Dehydrogenase [[id number]]  (Normal) Collected: 06/28/23 2252    Specimen: Blood Updated: 06/29/23 0012      U/L     Protime-INR [[id number]]  (Normal) Collected: 06/28/23 2252    Specimen: Blood Updated: 06/28/23 2345     Protime 11.5 Seconds      INR 1.08    CBC & Differential [[id number]]  (Abnormal) Collected: 06/28/23 2252    Specimen: Blood Updated: 06/28/23 2330    Narrative:      The following orders were created for panel order CBC & Differential.  Procedure                               Abnormality         Status                     ---------                               -----------         ------                     CBC Auto Differential[[id number]]        Abnormal            Final result                 Please view results for these tests on the individual orders.    CBC Auto Differential [[id number]]  (Abnormal) Collected: 06/28/23 2252    Specimen: Blood Updated: 06/28/23 2330     WBC 4.60 10*3/mm3      RBC 2.68 10*6/mm3      Hemoglobin 8.2 g/dL      Hematocrit 25.4 %      MCV 95.0 fL      MCH 30.7 pg      MCHC 32.3 g/dL      RDW 17.8 %      RDW-SD 61.3 fl      MPV 6.9 fL      Platelets 210 10*3/mm3      Neutrophil % 84.0 %      Lymphocyte % 10.3 %      Monocyte % 5.4 %      Eosinophil % 0.2 %      Basophil % 0.1 %      Neutrophils, Absolute 3.90 10*3/mm3      Lymphocytes, Absolute 0.50  10*3/mm3      Monocytes, Absolute 0.20 10*3/mm3      Eosinophils, Absolute 0.00 10*3/mm3      Basophils, Absolute 0.00 10*3/mm3      nRBC 0.3 /100 WBC     Occult Blood, Fecal By Immunoassay - Stool, Per Rectum [[id number]]  (Abnormal) Collected: 06/28/23 2057    Specimen: Stool from Per Rectum Updated: 06/28/23 2128     Occult Blood, Fecal by Immunoassay Positive    Vitamin B12 [[id number]]  (Normal) Collected: 06/28/23 1235    Specimen: Blood Updated: 06/28/23 1736     Vitamin B-12 873 pg/mL     Narrative:      Results may be falsely increased if patient taking Biotin.      Alkaline Phosphatase, Isoenzymes [[id number]]  (Abnormal) Collected: 06/27/23 1126    Specimen: Blood Updated: 06/28/23 1509     Alkaline Phosphatase 187 IU/L      Liver Fraction: 67 %      Bone Fraction: 27 %      Intestinal Frac.: 7 %     Narrative:      Performed at:  [address] [place] [address]  [id number]  : Edward Ulloa PhD, Phone:  [phone]    Blood Gas, Arterial - [[id number]]  (Abnormal) Collected: 06/28/23 1357    Specimen: Arterial Blood Updated: 06/28/23 1359     Site Right Radial     Magdiel's Test Positive     pH, Arterial 7.539 pH units      pCO2, Arterial 24.2 mm Hg      pO2, Arterial 71.5 mm Hg      HCO3, Arterial 20.7 mmol/L      Base Excess, Arterial -1.3 mmol/L      Comment: Serial Number: [id number]:  [id number]        O2 Saturation, Arterial 96.3 %      CO2 Content 21.4 mmol/L      Barometric Pressure for Blood Gas --     Comment: N/A        Modality Room Air     FIO2 21 %      Hemodilution No    Ferritin [[id number]]  (Abnormal) Collected: 06/28/23 1235    Specimen: Blood Updated: 06/28/23 1342     Ferritin 220.10 ng/mL     Narrative:      Results may be falsely decreased if patient taking Biotin.      Iron Profile [[id number]]  (Abnormal) Collected: 06/28/23 1235    Specimen: Blood Updated: 06/28/23 1342     Iron 53 mcg/dL      Iron Saturation (TSAT) 28 %      Transferrin 127 mg/dL      TIBC  189 mcg/dL     Lactic Acid, Plasma [[id number]]  (Normal) Collected: 06/28/23 1235    Specimen: Blood Updated: 06/28/23 1336     Lactate 1.9 mmol/L     Reticulocytes [[id number]]  (Abnormal) Collected: 06/28/23 1235    Specimen: Blood Updated: 06/28/23 1316     Reticulocyte % 0.56 %      Reticulocyte Absolute 0.0160 10*6/mm3           Imaging Results (Last 24 Hours)     ** No results found for the last 24 hours. **          EKG          I personally viewed and interpreted the patient's EKG/Telemetry data:    ECHOCARDIOGRAM:       STRESS MYOVIEW:  Results for orders placed during the hospital encounter of 06/26/23    Stress Test With Myocardial Perfusion One Day    Interpretation Summary  Indications  Syncope    This study was performed under the direct supervision of Kacie NAYLOR.    Resting ECG  Sinus rhythm    The patient was injected with Lexiscan intravenously while constantly monitoring electrocardiogram and vital signs.  Patient did not have any chest discomfort ST abnormalities or ectopy with injection of Lexiscan.    Cardiolite was used as an imaging agent.    Cardiolite images showed uniform distribution of radionuclide without any evidence for myocardial ischemia.    Gated SPECT images revealed normal left ventricle size and contractility with ejection fraction of 74%.    Impression  ========  Lexiscan Cardiolite test is negative for myocardial ischemia.    Gated SPECT images revealed normal left ventricular size and contractility with ejection fraction of 74%.       CARDIAC CATHETERIZATION:  No results found for this or any previous visit.       OTHER:         Assessment & Plan     Principal Problem:    Syncope, unspecified syncope type       Dizziness with syncope  Patient presented with dizziness and syncopal episode  Patient had a previous episode which had work-up done with an echo and a Holter monitor which did not show any arrhythmias at that time  Patient has history of paroxysmal atrial fibrillation  and is currently in sinus rhythm but is on beta-blockers and Eliquis  Patient underwent Myoview study which showed no evidence of ischemia  Patient states she recently had MCOT study by primary cardiologist which did not show any abnormalities  Patient is okay to discharge from cardiology standpoint and will follow up with primary cardiologist for further work-up/treatment     Atrial fibrillation  Patient has history of paroxysmal atrial fibrillation but currently in sinus rhythm  Patient takes home low-dose beta-blocker for rate control  Beta-blocker has been on hold due to hypotension at admission  Eliquis currently on hold due to concern for GIB, GI following and will restart when clears patient    Hyperlipidemia  Patient is currently on statin therapy    Hypertension  Patient blood pressure currently stable  Home blood pressure medications on home due to recent hypotension    Anemia  GI following  Patient underwent paracentesis yesterday  H&H improved today  Eliquis remains on hold, restart when cleared by GI    No further cardiac work-up at this time, patient is to follow-up with primary cardiologist at discharge    I discussed the patients findings and my recommendations with patient and nurse    JOE Cruz  06/29/23  10:25 EDT

## 2023-06-29 NOTE — CONSULTS
Nutrition Services    Patient Name: Rosario Ortez  YOB: 1950  MRN: [id number]  Admission date: 6/26/2023      PPE Documentation        PPE Worn By Provider mask   PPE Worn By Patient  None     Nutrition Counseling & Education       Reason for Visit: Patient request/pt family request      Session topic: Sodium Restriction, Heart Healthy eating, and Other     Session comments: Other: Visited pt with  at bedside. They were wanting diet education on low-sodium diet/hearty healthy diet with high protein. I printed off some diet education materials from [employer] Nutrition Care Manual and highlighted key points for them. Informed the pt and her  about what a low-sodium diet entails and why it is important. They had several questions on common foods they eat and restaurants they go to. I explained how to choose lower sodium foods and what to look for when shopping or ordering. I also went over heart healthy information like decreasing saturated fat intake and eating lean meats. The pt received her lunch tray during the education and I took the time to walk them through a balanced plate education as well. The total education took about 40 minutes. The pt and her  were appreciative of the education. I let them know that if they had any additional questions they can call the hospital and ask for a Dietitian.     Provided print material via: [employer]-Nutrition Care Manual      Goals: Increase knowledge on diet/nutrition effects on condition/status      Monitoring/Follow Up: RD to follow up PRN           Electronically signed by:  Delma Patel RD  06/29/23 11:37 EDT

## 2023-06-29 NOTE — CASE MANAGEMENT/SOCIAL WORK
Continued Stay Note  YAQUELIN Nitin     Patient Name: Rosario Ortez  MRN: [id number]  Today's Date: 6/29/2023    Admit Date: 6/26/2023    Plan: Return home with family. OP PT at [facility].   Discharge Plan       Row Name 06/29/23 1344       Plan    Plan Return home with family. OP PT at [facility].    Patient/Family in Agreement with Plan yes    Plan Comments CM met with patient and  at bedside. Reviewed PT's recommendations and confirmed pt's preferred location is [facility] on [address]. CM contacted office but had to leave voicemail. Faxed H&P along with PT evaluation and ambulatory order. Have not received return phone call.             Megan Naegele, RN     Office Phone: [phone]  Office Cell: [phone]

## 2023-06-29 NOTE — DISCHARGE SUMMARY
[facility] Medicine Services  Discharge Summary    Date of Service: 2023  Patient Name: Rosario Ortez  : 1950  MRN: [id number]    Date of Admission: 2023  Discharge Diagnosis:  1.  Presyncopal event multifactorial could be related to low blood pressure or bradycardia evaluated by cardiology and now plan for further work-up.  2.  Large ascites with fast accumulation GI evaluated the patient started on Aldactone in addition to Lasix.  3.  Bilateral lower extremity edema right more than left DVT study is negative.  4.  Bradycardia caused by metoprolol heart rate is back to normal after metoprolol was initiated.  5.  Paroxysmal atrial fibrillation in sinus rhythm now started on apixaban after clearance from gastroenterology.  6.  Anemia iron studies suggesting anemia of chronic disease hemoglobin stable    Date of Discharge: 2023  Primary Care Physician: Ian Cordero MD      Presenting Problem:   Acute kidney injury [N17.9]  Syncope, unspecified syncope type [R55]    Active and Resolved Hospital Problems:  Active Hospital Problems   No active problems to display.      Resolved Hospital Problems    Diagnosis POA    Syncope, unspecified syncope type [R55] Yes         Hospital Course     Hospital Course:  Rosario Ortez is a 73 y.o. female the patient was admitted to the hospital because of presyncope, she has history of ongoing cirrhosis of unknown type at this time ongoing work-up as outpatient and mention to that she has history of syncope in the past she was evaluated by her own cardiologist and event monitor and echocardiogram was done and was negative, a nuclear stress test was done and that was negative evaluated by cardiology and recommended no further work-up, patient was found to be bradycardic heart rate was ranging from 40-50 this might be the reason of her presyncope metoprolol was discontinued and heart rate is back to 75 today.  Evaluated by  gastroenterology for anemia, hemoglobin has been slightly improving day by day and iron studies suggested the patient has anemia of chronic disease, although patient is on apixaban and occult blood test slightly positive however stool is light brown and no evidence of active bleeding gastroenterology recommended restarting apixaban on discharge.  Discharge required 45 minutes        DISCHARGE Follow Up Recommendations for labs and diagnostics: Follow-up on kidney function electrolytes and hemoglobin shortly      Reasons For Change In Medications and Indications for New Medications:      Day of Discharge     Vital Signs:  Temp:  [97.4 °F (36.3 °C)-98 °F (36.7 °C)] 97.8 °F (36.6 °C)  Heart Rate:  [53-82] 74  Resp:  [13-20] 13  BP: (106-138)/(40-55) 138/50    Physical Exam:  Physical Exam  Constitutional:       Appearance: Normal appearance.   HENT:      Head: Normocephalic.      Right Ear: Tympanic membrane normal.      Left Ear: Tympanic membrane normal.      Nose: Nose normal.      Mouth/Throat:      Mouth: Mucous membranes are moist.   Eyes:      Pupils: Pupils are equal, round, and reactive to light.   Cardiovascular:      Rate and Rhythm: Normal rate.      Pulses: Normal pulses.   Pulmonary:      Effort: Pulmonary effort is normal.   Abdominal:      General: Abdomen is flat.   Musculoskeletal:         General: Normal range of motion.      Cervical back: Normal range of motion.   Skin:     General: Skin is warm.      Capillary Refill: Capillary refill takes less than 2 seconds.   Neurological:      General: No focal deficit present.      Mental Status: She is alert.   Psychiatric:         Mood and Affect: Mood normal.          Pertinent  and/or Most Recent Results     LAB RESULTS:      Lab 06/28/23  2252 06/28/23  1235 06/28/23  0622 06/27/23  2258 06/27/23  1139 06/27/23  0123 06/26/23  1500   WBC 4.60  --   --  4.10  --  6.40 6.70   HEMOGLOBIN 8.2*  --  7.8* 7.1*  --  8.4* 8.6*   HEMATOCRIT 25.4*  --  23.9*  22.6*  --  26.4* 26.9*   PLATELETS 210  --   --  175  --  220 226   NEUTROS ABS 3.90  --   --  3.50  --  5.80 6.40   LYMPHS ABS 0.50*  --   --  0.50*  --  0.50* 0.30*   MONOS ABS 0.20  --   --  0.10  --  0.10 0.10   EOS ABS 0.00  --   --  0.00  --  0.00 0.00   MCV 95.0  --   --  96.9  --  96.0 96.1   LACTATE  --  1.9  --   --   --   --   --      --   --   --   --   --   --    PROTIME 11.5  --   --   --  11.7  --   --          Lab 06/28/23 2252 06/27/23 2258 06/27/23 0123 06/26/23 2048 06/26/23  1500   SODIUM 141 140 144  --  142   POTASSIUM 3.9 4.1 4.2  --  4.2   CHLORIDE 109* 110* 111*  --  109*   CO2 19.0* 18.0* 21.0*  --  21.0*   ANION GAP 13.0 12.0 12.0  --  12.0   BUN 39* 40* 45*  --  42*   CREATININE 1.37* 1.31* 1.48*  --  1.41*   EGFR 40.3* 42.6* 36.8*  --  39.0*   GLUCOSE 114* 102* 96  --  95   CALCIUM 9.1 8.7 8.9  --  9.0   TSH  --   --  1.730 1.730  --          Lab 06/28/23 2252 06/27/23  1126 06/27/23  0123   TOTAL PROTEIN 4.8*  --  4.7*   ALBUMIN 3.1*  --  2.6*   GLOBULIN 1.7  --  2.1   ALT (SGPT) 23  --  28   AST (SGOT) 37*  --  42*   BILIRUBIN 1.0  --  1.1   ALK PHOS 168* 187* 189*         Lab 06/28/23 2252 06/27/23  1139 06/26/23 2048 06/26/23  1500   HSTROP T  --   --  22* 24*   PROTIME 11.5 11.7  --   --    INR 1.08 1.10  --   --              Lab 06/28/23  1235 06/28/23  0622   IRON 53  --    IRON SATURATION (TSAT) 28  --    TIBC 189*  --    TRANSFERRIN 127*  --    FERRITIN 220.10*  --    VITAMIN B 12 873  --    ABO TYPING  --  O   RH TYPING  --  Positive   ANTIBODY SCREEN  --  Negative         Lab 06/28/23  1357   PH, ARTERIAL 7.539*   PCO2, ARTERIAL 24.2*   PO2 ART 71.5*   O2 SATURATION ART 96.3   FIO2 21   HCO3 ART 20.7*   BASE EXCESS ART -1.3*     Brief Urine Lab Results       None          Microbiology Results (last 10 days)       Procedure Component Value - Date/Time    Body Fluid Culture - Body Fluid, Peritoneal Catheter [[id number]] Collected: 06/27/23 1400    Lab Status:  Preliminary result Specimen: Body Fluid from Peritoneal Catheter Updated: 06/29/23 0657     Body Fluid Culture No growth at 2 days     Gram Stain No WBCs or organisms seen    Respiratory Panel PCR w/COVID-19(SARS-CoV-2) [facility] In-House, NP Swab in UTM/VTM, 3-4 HR TAT - Swab, Nasopharynx [[id number]]  (Abnormal) Collected: 06/26/23 2141    Lab Status: Final result Specimen: Swab from Nasopharynx Updated: 06/26/23 2324     ADENOVIRUS, PCR Not Detected     Coronavirus 229E Not Detected     Coronavirus HKU1 Not Detected     Coronavirus NL63 Not Detected     Coronavirus OC43 Not Detected     COVID19 Not Detected     Human Metapneumovirus Not Detected     Human Rhinovirus/Enterovirus Detected     Influenza A PCR Not Detected     Influenza B PCR Not Detected     Parainfluenza Virus 1 Not Detected     Parainfluenza Virus 2 Not Detected     Parainfluenza Virus 3 Not Detected     Parainfluenza Virus 4 Not Detected     RSV, PCR Not Detected     Bordetella pertussis pcr Not Detected     Bordetella parapertussis PCR Not Detected     Chlamydophila pneumoniae PCR Not Detected     Mycoplasma pneumo by PCR Not Detected    Narrative:      In the setting of a positive respiratory panel with a viral infection PLUS a negative procalcitonin without other underlying concern for bacterial infection, consider observing off antibiotics or discontinuation of antibiotics and continue supportive care. If the respiratory panel is positive for atypical bacterial infection (Bordetella pertussis, Chlamydophila pneumoniae, or Mycoplasma pneumoniae), consider antibiotic de-escalation to target atypical bacterial infection.            Therapeutic Paracentesis With Radiology    Result Date: 6/13/2023  Impression: Impression: Technically successful US Guided Paracentesis. Please see Ambulatory nursing notes for intra-procedure vitals and information. Electronically Signed: Rafael Greene  6/13/2023 11:00 AM EDT  Workstation ID:  [id number]     Results for orders placed during the hospital encounter of 06/26/23    Duplex Venous Lower Extremity - Right CAR    Interpretation Summary    Normal right lower extremity venous duplex scan.      Results for orders placed during the hospital encounter of 06/26/23    Duplex Venous Lower Extremity - Right CAR    Interpretation Summary    Normal right lower extremity venous duplex scan.          Labs Pending at Discharge:  Pending Labs       Order Current Status    Body Fluid Culture - Body Fluid, Peritoneal Catheter Preliminary result            Procedures Performed    06/27 1408 Diagnostic, Therapeutic Bedside Paracentesis Without  Radiology      Consults:   Consults       Date and Time Order Name Status Description    6/28/2023  3:23 PM Inpatient Cardiology Consult      6/26/2023  5:53 PM Inpatient Gastroenterology Consult Completed     6/26/2023  5:45 PM Inpatient Cardiology Consult      6/26/2023  3:51 PM Hospitalist (on-call MD unless specified)                Discharge Details        Discharge Medications        New Medications        Instructions Start Date   spironolactone 25 MG tablet  Commonly known as: ALDACTONE   25 mg, Oral, Daily   Start Date: June 30, 2023            Changes to Medications        Instructions Start Date   furosemide 20 MG tablet  Commonly known as: LASIX  What changed:   medication strength  how much to take   20 mg, Oral, Daily   Start Date: June 30, 2023            Continue These Medications        Instructions Start Date   apixaban 2.5 MG tablet tablet  Commonly known as: ELIQUIS   2.5 mg, Oral, 2 Times Daily      atorvastatin 20 MG tablet  Commonly known as: LIPITOR   20 mg, Oral, Daily      ferrous sulfate 325 (65 FE) MG tablet   325 mg, Oral, Daily With Breakfast      folic acid 1 MG tablet  Commonly known as: FOLVITE   1 mg, Oral, Daily      irbesartan 75 MG tablet  Commonly known as: AVAPRO   75 mg, Oral, Nightly      levothyroxine 50 MCG tablet  Commonly known as:  SYNTHROID, LEVOTHROID   50 mcg, Every Early Morning      methotrexate 2.5 MG tablet   10 mg, Oral, Weekly, Fridays       pantoprazole 40 MG EC tablet  Commonly known as: PROTONIX   40 mg, Oral, Daily      predniSONE 5 MG tablet  Commonly known as: DELTASONE   15 mg, Oral, Daily             Stop These Medications      metoprolol tartrate 25 MG tablet  Commonly known as: LOPRESSOR     potassium chloride 10 MEQ CR tablet  Commonly known as: K-DUR,KLOR-CON              Allergies   Allergen Reactions    Codeine Nausea And Vomiting, Other (See Comments) and Nausea Only     nausea  nausea      Topiramate Rash    Tramadol Other (See Comments) and Mental Status Change    Cefdinir Unknown - High Severity    Levofloxacin Nausea Only    Lisinopril Cough         Discharge Disposition:   Home or Self Care    Diet:  Hospital:  Diet Order   Procedures    Diet: Cardiac Diets; Healthy Heart (2-3 Na+); Texture: Regular Texture (IDDSI 7); Fluid Consistency: Thin (IDDSI 0)         Discharge Activity:   Activity Instructions       Activity as Tolerated                CODE STATUS:  Code Status and Medical Interventions:   Ordered at: 06/26/23 9283     Code Status (Patient has no pulse and is not breathing):    CPR (Attempt to Resuscitate)     Medical Interventions (Patient has pulse or is breathing):    Full Support         Future Appointments   Date Time Provider Department Center   7/25/2023  7:15 AM ROOM 09 [facility]  [facility] None       Additional Instructions for the Follow-ups that You Need to Schedule       Ambulatory Referral to Physical Therapy Evaluate and treat   As directed      Specialty needed: Evaluate and treat    Follow-up needed: Yes         Discharge Follow-up with PCP   As directed       Currently Documented PCP:    Ian Cordero MD    PCP Phone Number:    [phone]     Follow Up Details: 7 days         Discharge Follow-up with Specified Provider: cardiology; 2 Weeks   As directed      To: cardiology     Follow Up: 2 Weeks         Discharge Follow-up with Specified Provider: gastroenterology; 2 Weeks   As directed      To: gastroenterology    Follow Up: 2 Weeks                 Time spent on Discharge including face to face service:  45 minutes    This patient has been examined wearing appropriate Personal Protective Equipment and discussed with hospital infection control department. 06/29/23      Signature: Electronically signed by Kadie Rice MD, 06/29/23, 12:52 EDT.  [facility] Hospitalist Team

## 2023-06-29 NOTE — DISCHARGE PLACEMENT REQUEST
"Rosario Ortez (73 y.o. Female)       Date of Birth   1950    Social Security Number       Address   [address] [place] [address] [place] [facility]    Home Phone   [phone]    MRN   [id number]       Gnosticist   Mormonism    Marital Status                               Admission Date   6/26/23    Admission Type   Emergency    Admitting Provider   Armando Aadms MD    Attending Provider   Kadie Rice MD    Department, Room/Bed   [facility] 3C MEDICAL INPATIENT, 357/1       Discharge Date       Discharge Disposition       Discharge Destination                                 Attending Provider: Kadie Rice MD    Allergies: Codeine, Topiramate, Tramadol, Cefdinir, Levofloxacin, Lisinopril    Isolation: Droplet   Infection: Rhinovirus  (06/26/23)   Code Status: CPR    Ht: 167.6 cm (66\")   Wt: 65.4 kg (144 lb 2.9 oz)    Admission Cmt: None   Principal Problem: Syncope, unspecified syncope type [R55]                   Active Insurance as of 6/26/2023       Primary Coverage       Payor Plan Insurance Group Employer/Plan Group    HUMANA MEDICARE REPLACEMENT HUMANA MEDICARE REPLACEMENT [id number]       Payor Plan Address Payor Plan Phone Number Payor Plan Fax Number Effective Dates    [address] [address] [phone]  3/1/2023 - None Entered    [facility] [id number]         Subscriber Name Subscriber Birth Date Member ID       ROSARIO ORTEZ 1950 [id number]                   Emergency Contacts        (Rel.) Home Phone Work Phone Mobile Phone    WILMAR ORTEZ (Spouse) -- -- [phone]    Marily Ortez (Daughter) [phone] -- --        History & Physical        Coco Palacio APRN at 06/26/23 1637       Attestation signed by Armando Adams MD at 06/26/23 4270    I have personally staffed the case in detail with the advanced care practitioner.  I have also personally reviewed all medical data and imaging studies.  I agree with the history, assessment, and admission " "plan of care as documented.                  [facility]  History & Physical    Patient Name: Rosario Ortez  : 1950  MRN: [id number]  Primary Care Physician:  Ian Cordero MD  Date of admission: 2023  Date and Time of Service: 2023 at 1730    Subjective       Chief Complaint: Syncopal episode    History of Present Illness: Rosario Ortez is a 73 y.o. female with persistent atrial fibrillation, chronic iron deficient anemia, chronic abdominal pain related to sarcoid (patient states she has been referred to [facility] for symptomology management), recent development of ascites hypertension, hyperlipidemia, GERD, hypothyroidism who presented to [facility] on 2023 complaining of abdominal pain this morning with inability to tolerate food but no change in her chronic abdominal pain, was walking with walker and became really weak and lost consciousness for approximately 30 seconds,  was close by and lowered her to the floor, no injury sustained.  She has new onset of ascites with first paracentesis on 2023, Lasix was initiated approximately 2 months ago, patient denies any previous syncopal events.  She states that she has been having intermittent GI bleeding from her hemorrhoids over the last 2 months, last episode 1 week ago, \"filling the toilet bowl with blood\".  Patient denies associated symptoms prior to syncopal event such as chest pain, shortness of breath, nausea/vomiting, or dizziness.    EKG in the emergency department with sinus bradycardia with excessive artifact hemoglobin 8.6 with hemoglobin 2 weeks ago being 10.0.  Creatinine 1.41, there may be some element of chronic kidney disease as patient has elevated creatinine on previous admissions.  Reviewed DONNIE from [facility] on 2023: EF 65%, mild to moderate AS and no mitral valve stenosis    Review of Systems   Constitutional: Positive for malaise/fatigue. Negative " for diaphoresis, fever, weight gain and weight loss.   Cardiovascular: Positive for leg swelling and syncope. Negative for chest pain, dyspnea on exertion, near-syncope, orthopnea and palpitations.   Respiratory: Negative for hemoptysis, shortness of breath, sputum production and wheezing.    Skin: Negative for rash.   Gastrointestinal: Positive for bloating, abdominal pain, flatus, hematochezia and hemorrhoids. Negative for change in bowel habit, hematemesis, nausea and vomiting.   Genitourinary: Negative for dysuria.   Neurological: Negative for dizziness, light-headedness, numbness and seizures.   Psychiatric/Behavioral: Negative.    All other systems reviewed and are negative.       Personal History     Past Medical History:   Diagnosis Date    A-fib     Coronary artery disease        Past Surgical History:   Procedure Laterality Date    BREAST BIOPSY      HYSTERECTOMY      JOINT REPLACEMENT Right 2015       Family History: family history includes Breast cancer in her sister. Otherwise pertinent FHx was reviewed and not pertinent to current issue.    Social History:  reports that she has never smoked. She has never been exposed to tobacco smoke. She has never used smokeless tobacco. She reports that she does not drink alcohol and does not use drugs.    Home Medications:  Prior to Admission Medications       Prescriptions Last Dose Informant Patient Reported? Taking?    apixaban (ELIQUIS) 2.5 MG tablet tablet  Spouse/Significant Other Yes No    Take 1 tablet by mouth 2 (Two) Times a Day.    atorvastatin (LIPITOR) 20 MG tablet   Yes No    Take 1 tablet by mouth Daily.    folic acid (FOLVITE) 1 MG tablet  Spouse/Significant Other Yes No    Take 1 tablet by mouth Daily.    furosemide (LASIX) 40 MG tablet   Yes No    Take 1 tablet by mouth Daily.    irbesartan (AVAPRO) 75 MG tablet   No No    Take 1 tablet by mouth Every Night.    levothyroxine (SYNTHROID, LEVOTHROID) 75 MCG tablet   Yes No    1 tablet Every Morning.     methotrexate 2.5 MG tablet  Spouse/Significant Other Yes No    Take 1 tablet by mouth 1 (One) Time Per Week. Take 4 tablets 1 time per week    metoprolol tartrate (LOPRESSOR) 25 MG tablet  Spouse/Significant Other Yes No    Take 1 tablet by mouth 2 (Two) Times a Day. Take 1/2 tablet twice daily    pantoprazole (PROTONIX) 40 MG EC tablet  Spouse/Significant Other Yes No    Take 1 tablet by mouth Daily.    potassium chloride (K-DUR,KLOR-CON) 10 MEQ CR tablet   Yes No    Take 2 tablets by mouth Daily.    predniSONE (DELTASONE) 5 MG tablet   Yes No    Take 1 tablet by mouth 3 (Three) Times a Day.              Allergies:  Allergies   Allergen Reactions    Codeine Nausea And Vomiting, Other (See Comments) and Nausea Only     nausea  nausea      Topiramate Rash    Tramadol Other (See Comments) and Mental Status Change    Cefdinir Unknown - High Severity    Levofloxacin Nausea Only    Lisinopril Cough       Objective       Vitals:   Temp:  [97.8 °F (36.6 °C)-98 °F (36.7 °C)] 97.8 °F (36.6 °C)  Heart Rate:  [50-51] 50  Resp:  [18] 18  BP: (117-141)/(52-60) 117/52    Physical Exam  Vitals reviewed.   Cardiovascular:      Rate and Rhythm: Regular rhythm. Bradycardia present.      Pulses: Normal pulses.      Heart sounds: Murmur heard.    Systolic murmur is present with a grade of 3/6.  Pulmonary:      Effort: Pulmonary effort is normal.      Breath sounds: Normal breath sounds. No wheezing.   Abdominal:      General: There is no distension.      Tenderness: There is no abdominal tenderness. There is no guarding.   Musculoskeletal:      Right lower leg: 3+ Pitting Edema present.      Left lower leg: 3+ Pitting Edema present.   Skin:     General: Skin is warm and dry.   Neurological:      Mental Status: She is alert and oriented to person, place, and time.   Psychiatric:         Mood and Affect: Mood normal.            Result Review    Result Review:  I have personally reviewed the results from the time of this admission to  6/26/2023 16:38 EDT and agree with these findings:  [x]  Laboratory  []  Microbiology  []  Radiology  [x]  EKG/Telemetry   []  Cardiology/Vascular   []  Pathology  [x]  Old records  []  Other:  Most notable findings include: See summary and plan (See history of present illness and plan)      Assessment & Plan        Active Hospital Problems:  There are no active hospital problems to display for this patient.    Plan:   #Syncopal episode  #Weakness  -Check viral panel  -Check troponin  -Check orthostatics  -Telemetry monitor  -Consult cardiology  -Stress test a.m.  -Check carotids  -Repeat EKG in a.m.  -Check prealbumin in a.m.    #Recurrent GI bleeding  #Iron deficiency anemia  #Hemorrhoids  #Chronic abdominal pain related to sarcoid  #Sarcoid  -Continue home methotrexate and prednisone  -Consult GI  -Follow hemoglobin, transfuse as needed    #JOSÉ, possible CKD, creatinine 1.2 on previous admission  -Creatinine 1.4 on admission  -Follow creatinine    #Ascites  -Paracentesis proximately 1 week ago    #Persistent atrial fibrillation  -Resume flecainide when home medications reconciled  -Hold Eliquis for now    #Hypertension   -Hold home antihypertensives for now    #hyperlipidemia  -Statin    #Hypothyroidism  -Resume levothyroxine  -Check TSH    #Mobility issue  -Utilizes walker for mobility      DVT prophylaxis:  No DVT prophylaxis order currently exists.    CODE STATUS:       Admission Status:  I believe this patient meets observation status.    I discussed the patient's findings and my recommendations with patient.    This patient has been examined wearing the appropriate protective equipment  Signature: Electronically signed by JOE Reddy, 06/26/23, 16:38 EDT.  [facility] Hospitalist Team    Electronically signed by Armando Adams MD at 06/26/23 1651          Physical Therapy Notes (all)        Natacha Price PT at 06/28/23 1134  Version 1 of 1      Attestation signed by Sariah Morgan PT at  23 1321    Reviewed and agreed.                Patient Name: Rosario Ortez  : 1950    MRN: [id number]                              Today's Date: 2023       Admit Date: 2023    Visit Dx:     ICD-10-CM ICD-9-CM   1. Syncope, unspecified syncope type  R55 780.2   2. Acute kidney injury  N17.9 584.9   3. Other ascites  R18.8 789.59     Patient Active Problem List   Diagnosis    Laceration of scalp, initial encounter    Acute kidney injury    Acute cystitis    Syncope, unspecified syncope type     Past Medical History:   Diagnosis Date    A-fib     Coronary artery disease      Past Surgical History:   Procedure Laterality Date    BREAST BIOPSY      HYSTERECTOMY      JOINT REPLACEMENT Right 2015      General Information       Row Name 23 1108          Physical Therapy Time and Intention    Document Type evaluation  -OD     Mode of Treatment physical therapy  -OD       Row Name 23 1108          General Information    Patient Profile Reviewed yes  -OD     Prior Level of Function mod assist:;grooming;bathing;all household mobility;community mobility;home management;ADL's  -OD     Existing Precautions/Restrictions fall  -OD     Barriers to Rehab previous functional deficit  -OD       Row Name 23 1108          Living Environment    People in Home child(clint), adult;spouse  -OD       Row Name 23 1108          Home Main Entrance    Number of Stairs, Main Entrance three;other (see comments)  Through garage, with railing  -OD     Stair Railings, Main Entrance railings safe and in good condition  -OD       Row Name 23 1108          Stairs Within Home, Primary    Number of Stairs, Within Home, Primary none  -OD       Row Name 23 1108          Cognition    Orientation Status (Cognition) oriented x 4  -OD       Row Name 23 1108          Safety Issues, Functional Mobility    Impairments Affecting Function (Mobility) balance;endurance/activity tolerance;motor  planning;pain;strength  -OD               User Key  (r) = Recorded By, (t) = Taken By, (c) = Cosigned By      Initials Name Provider Type    OD Natacha Price PT Physical Therapist                   Mobility       Row Name 06/28/23 1110          Bed Mobility    Bed Mobility bed mobility (all) activities  -OD     All Activities, [place] (Bed Mobility) supervision  -OD     Assistive Device (Bed Mobility) bed rails  -OD     Comment, (Bed Mobility) Mod-ind with bed mobility, but relies a lot on spouse for Amado  -OD       Row Name 06/28/23 1110          Bed-Chair Transfer    Bed-Chair [place] (Transfers) supervision  -OD     Assistive Device (Bed-Chair Transfers) walker, front-wheeled  -OD       Row Name 06/28/23 1110          Sit-Stand Transfer    Sit-Stand [place] (Transfers) minimum assist (75% patient effort)  -OD     Assistive Device (Sit-Stand Transfers) walker, front-wheeled  -OD     Comment, (Sit-Stand Transfer) Cues for hand placement on bed and walker  -OD       Row Name 06/28/23 1110          Gait/Stairs (Locomotion)    [place] Level (Gait) contact guard  -OD     Assistive Device (Gait) walker, front-wheeled  -OD     Distance in Feet (Gait) 12  -OD     Deviations/Abnormal Patterns (Gait) bilateral deviations;festinating/shuffling;gait speed decreased;base of support, narrow;stride length decreased;weight shifting decreased  -OD     Bilateral Gait Deviations heel strike decreased;weight shift ability decreased  -OD               User Key  (r) = Recorded By, (t) = Taken By, (c) = Cosigned By      Initials Name Provider Type    Natacha Alba PT Physical Therapist                   Obj/Interventions       Row Name 06/28/23 1112          Range of Motion Comprehensive    General Range of Motion no range of motion deficits identified  -OD       Row Name 06/28/23 1112          Strength Comprehensive (MMT)    General Manual Muscle Testing (MMT) Assessment lower extremity strength deficits  identified  -OD     Comment, General Manual Muscle Testing (MMT) Assessment Pt demo 3+/5 strength B hip flex, R knee ext. 4/5 L knee ext.  -OD       Row Name 06/28/23 1112          Balance    Balance Interventions standing;narrowed base of support;moderate challenge;tandem standing;highly challenging;other (see comments)  Pt limited due to fear in addition to impaired balance  -OD       Row Name 06/28/23 1112          Sensory Assessment (Somatosensory)    Sensory Assessment (Somatosensory) sensation intact  -OD               User Key  (r) = Recorded By, (t) = Taken By, (c) = Cosigned By      Initials Name Provider Type    OD Natacha Price, PT Physical Therapist                   Goals/Plan       Row Name 06/28/23 1127          Bed Mobility Goal 1 (PT)    Activity/Assistive Device (Bed Mobility Goal 1, PT) bed mobility activities, all  -OD     [place] Level/Cues Needed (Bed Mobility Goal 1, PT) independent  -OD     Time Frame (Bed Mobility Goal 1, PT) long term goal (LTG);2 weeks  -OD       Row Name 06/28/23 1127          Transfer Goal 1 (PT)    Activity/Assistive Device (Transfer Goal 1, PT) transfers, all;walker, rolling  -OD     [place] Level/Cues Needed (Transfer Goal 1, PT) modified independence  -OD     Time Frame (Transfer Goal 1, PT) long term goal (LTG);2 weeks  -OD       Row Name 06/28/23 1127          Gait Training Goal 1 (PT)    Activity/Assistive Device (Gait Training Goal 1, PT) gait (walking locomotion);assistive device use;diminish gait deviation;normalize weight shifts;walker, rolling  -OD     [place] Level (Gait Training Goal 1, PT) modified independence  -OD     Distance (Gait Training Goal 1, PT) 100 feet  -OD     Time Frame (Gait Training Goal 1, PT) long term goal (LTG);2 weeks  -OD       Row Name 06/28/23 1127          Problem Specific Goal 1 (PT)    Problem Specific Goal 1 (PT) Pt will be able to hold tandem stance (B) > 10 sec for improving balance and reducing risk for falls.   -OD     Time Frame (Problem Specific Goal 1, PT) long-term goal (LTG);2 weeks  -OD       Row Name 06/28/23 1127          Therapy Assessment/Plan (PT)    Planned Therapy Interventions (PT) balance training;bed mobility training;gait training;home exercise program;neuromuscular re-education;patient/family education;strengthening;transfer training;vestibular therapy  -OD               User Key  (r) = Recorded By, (t) = Taken By, (c) = Cosigned By      Initials Name Provider Type    OD Natacha Price, OLEGARIO Physical Therapist                   Clinical Impression       Row Name 06/28/23 1113          Pain    Pretreatment Pain Rating 0/10 - no pain  -OD     Posttreatment Pain Rating 0/10 - no pain  -OD     Pain Intervention(s) Emotional support;Ambulation/increased activity  -OD       Row Name 06/28/23 1113          Plan of Care Review    Plan of Care Reviewed With patient;spouse  -OD     Progress no change  -OD     Outcome Evaluation Pt is a 72 y/o F who presents to [facility] after syncopal episode and weakness. Pt has recurring abd pain with recent paracentesis on 6/13 and another on 6/27 at bedside due to ascites accum. PMH includes sarcoidosis, anemia, HRN, hypothyroidism. At baseline, pt lives with spouse and dtr who provide modA with ADL's, med mgmt, and mobility. Pt uses RW for mobility and sometime's W/C. Pt reports dizziness sometimes when walking around at target looking through different aisles, but denies dizziness with other head movements. Pt demo impaired smooth pursuit, and impaired saccades to the L. VOR head impulse (+) L. Sharpened Rhomberg (+). Pt currently is mod-ind with bed mobility, CGA-Amado with sit to stands, and CGA with ambulation. Pt demo narrow JERAD, slow gait, shuffled gait, difficulty with turns. Edu pt and spouse on recommending OPPT for addressing balance and dizziness symptoms, as well as improving strength to regain indep at home. PT to follow while admitted, and recommend home with family and  OPPT upon d/c due to decline in baseline function.  -OD       Row Name 06/28/23 1113          Therapy Assessment/Plan (PT)    Rehab Potential (PT) good, to achieve stated therapy goals  -OD     Criteria for Skilled Interventions Met (PT) yes;skilled treatment is necessary  -OD     Therapy Frequency (PT) 3 times/wk  -OD     Predicted Duration of Therapy Intervention (PT) until d/c  -OD       Row Name 06/28/23 1113          Vital Signs    Pre Systolic BP Rehab 134  -OD     Pre Treatment Diastolic BP 53  -OD     O2 Delivery Pre Treatment room air  -OD     O2 Delivery Intra Treatment room air  -OD     O2 Delivery Post Treatment room air  -OD     Pre Patient Position Supine  -OD     Intra Patient Position Standing  -OD     Post Patient Position Sitting  -OD     Recovery Time VSS  -OD       Row Name 06/28/23 1113          Positioning and Restraints    Pre-Treatment Position in bed  -OD     Post Treatment Position chair  -OD     In Chair notified nsg;reclined;call light within reach;encouraged to call for assist;exit alarm on;with family/caregiver  -OD               User Key  (r) = Recorded By, (t) = Taken By, (c) = Cosigned By      Initials Name Provider Type    OD Natacha Price, PT Physical Therapist                   Outcome Measures       Row Name 06/28/23 1128 06/28/23 0850       How much help from another person do you currently need...    Turning from your back to your side while in flat bed without using bedrails? 4  -OD 4  -EH    Moving from lying on back to sitting on the side of a flat bed without bedrails? 4  -OD 4  -EH    Moving to and from a bed to a chair (including a wheelchair)? 3  -OD 3  -EH    Standing up from a chair using your arms (e.g., wheelchair, bedside chair)? 3  -OD 3  -EH    Climbing 3-5 steps with a railing? 2  -OD 2  -EH    To walk in hospital room? 3  -OD 3  -EH    AM-PAC 6 Clicks Score (PT) 19  -OD 19  -EH    Highest level of mobility 6 --> Walked 10 steps or more  -OD 6 --> Walked 10  steps or more  -              User Key  (r) = Recorded By, (t) = Taken By, (c) = Cosigned By      Initials Name Provider Type     Estela Sales, RN Registered Nurse    Natacha Alba, OLEGARIO Physical Therapist                  Physical Therapy Education           PT Recommendation and Plan  Planned Therapy Interventions (PT): balance training, bed mobility training, gait training, home exercise program, neuromuscular re-education, patient/family education, strengthening, transfer training, vestibular therapy  Plan of Care Reviewed With: patient, spouse  Progress: no change  Outcome Evaluation: Pt is a 74 y/o F who presents to [facility] after syncopal episode and weakness. Pt has recurring abd pain with recent paracentesis on 6/13 and another on 6/27 at bedside due to ascites accum. PMH includes sarcoidosis, anemia, HRN, hypothyroidism. At baseline, pt lives with spouse and dtr who provide modA with ADL's, med mgmt, and mobility. Pt uses RW for mobility and sometime's W/C. Pt reports dizziness sometimes when walking around at target looking through different aisles, but denies dizziness with other head movements. Pt demo impaired smooth pursuit, and impaired saccades to the L. VOR head impulse (+) L. Sharpened Rhomberg (+). Pt currently is mod-ind with bed mobility, CGA-Amado with sit to stands, and CGA with ambulation. Pt demo narrow JERAD, slow gait, shuffled gait, difficulty with turns. Edu pt and spouse on recommending OPPT for addressing balance and dizziness symptoms, as well as improving strength to regain indep at home. PT to follow while admitted, and recommend home with family and OPPT upon d/c due to decline in baseline function.     Time Calculation:    PT Charges       Row Name 06/28/23 6980             Time Calculation    Start Time 0852  -OD      Stop Time 0930  -OD      Time Calculation (min) 38 min  -OD      PT Received On 06/28/23  -OD      PT - Next Appointment 06/29/23  -OD      PT Goal Re-Cert Due  Date 07/12/23  -OD         Time Calculation- PT    Total Timed Code Minutes- PT 0 minute(s)  -OD                User Key  (r) = Recorded By, (t) = Taken By, (c) = Cosigned By      Initials Name Provider Type    OD Natacha Price PT Physical Therapist                  Therapy Charges for Today       Code Description Service Date Service Provider Modifiers Qty    07795100239 HC PT EVAL MOD COMPLEXITY 4 6/28/2023 Natacha Price, PT GP 1            PT G-Codes  AM-PAC 6 Clicks Score (PT): 19  PT Discharge Summary  Anticipated Discharge Disposition (PT): home with assist, home with outpatient therapy services    Natacha Price PT  6/28/2023      Electronically signed by Sariah Morgan PT at 06/28/23 1321       Natacha Price PT at 06/28/23 1135  Version 1 of 1      Attestation signed by Sariah Morgan PT at 06/28/23 1321    Reviewed and agreed.                Goal Outcome Evaluation:  Plan of Care Reviewed With: patient, spouse        Progress: no change  Outcome Evaluation: Pt is a 74 y/o F who presents to [facility] after syncopal episode and weakness. Pt has recurring abd pain with recent paracentesis on 6/13 and another on 6/27 at bedside due to ascites accum. PMH includes sarcoidosis, anemia, HRN, hypothyroidism. At baseline, pt lives with spouse and dtr who provide modA with ADL's, med mgmt, and mobility. Pt uses RW for mobility and sometime's W/C. Pt reports dizziness sometimes when walking around at target looking through different aisles, but denies dizziness with other head movements. Pt demo impaired smooth pursuit, and impaired saccades to the L. VOR head impulse (+) L. Sharpened Rhomberg (+). Pt currently is mod-ind with bed mobility, CGA-Amado with sit to stands, and CGA with ambulation. Pt demo narrow JERAD, slow gait, shuffled gait, difficulty with turns. Edu pt and spouse on recommending OPPT for addressing balance and dizziness symptoms, as well as improving strength to regain indep at home. PT to follow while  admitted, and recommend home with family and OPPT upon d/c due to decline in baseline function.      Anticipated Discharge Disposition (PT): home with assist, home with outpatient therapy services    Electronically signed by Sariah Morgan, PT at 23 1321       [address] MEDICAL INPATIENT  [address] [facility] [place] [address]  Phone:  [phone]  Fax:  [phone] Date: 2023      Ambulatory Referral to Physical Therapy Evaluate and treat     Patient:  Rosario Ortez MRN:  [id number]   [address] [facility] [place] [address] :  1950  SSN:    Phone: [phone] Sex:  F      INSURANCE PAYOR PLAN GROUP # SUBSCRIBER ID   Primary:    HUMANA MEDICARE REPLACEMENT [id number]      Referring Provider Information:  KADIE RICE Phone: [phone] Fax: [phone]       Referral Information:   # Visits:  1 Referral Type: Physical Therapy [AE1]   Urgency:  Routine Referral Reason: Specialty Services Required   Start Date: 2023 End Date:  To be determined by Insurer   Diagnosis: Syncope, unspecified syncope type (R55 [ICD-10-CM] 780.2 [ICD-9-CM])      Refer to Dept:   Refer to Provider:   Refer to Provider Phone:   Refer to Facility:       Specialty needed: Evaluate and treat  Follow-up needed: Yes     This document serves as a request of services and does not constitute Insurance authorization or approval of services.  To determine eligibility, please contact the members Insurance carrier to verify and review coverage.     If you have medical questions regarding this request for services. Please contact [address] [facility] at [phone] during normal business hours.        Verbal Order Mode: Verbal with readback   Authorizing Provider: Kadie Rice MD  Authorizing Provider's NPI: [id number]     Order Entered By: Naegele, Megan, RN 2023 12:27 PM     Electronically signed by: Kadie Rice MD 2023  6:40 AM

## 2023-07-02 LAB
BACTERIA FLD CULT: NORMAL
GRAM STN SPEC: NORMAL

## 2023-07-06 LAB
QT INTERVAL: 446 MS
QT INTERVAL: 458 MS

## 2023-07-13 ENCOUNTER — APPOINTMENT (OUTPATIENT)
Dept: GENERAL RADIOLOGY | Facility: HOSPITAL | Age: 73
DRG: 602 | End: 2023-07-13
Payer: MEDICARE

## 2023-07-13 ENCOUNTER — HOSPITAL ENCOUNTER (INPATIENT)
Facility: HOSPITAL | Age: 73
LOS: 8 days | Discharge: HOSPICE | DRG: 602 | End: 2023-07-22
Attending: EMERGENCY MEDICINE
Payer: MEDICARE

## 2023-07-13 DIAGNOSIS — N17.9 ACUTE KIDNEY INJURY: ICD-10-CM

## 2023-07-13 DIAGNOSIS — R63.30 FEEDING DIFFICULTIES: ICD-10-CM

## 2023-07-13 DIAGNOSIS — R53.1 WEAKNESS: Primary | ICD-10-CM

## 2023-07-13 DIAGNOSIS — R13.10 DYSPHAGIA, UNSPECIFIED TYPE: ICD-10-CM

## 2023-07-13 LAB
ALBUMIN SERPL-MCNC: 2.6 G/DL (ref 3.5–5.2)
ALBUMIN/GLOB SERPL: 1 G/DL
ALP SERPL-CCNC: 174 U/L (ref 39–117)
ALT SERPL W P-5'-P-CCNC: 23 U/L (ref 1–33)
ANION GAP SERPL CALCULATED.3IONS-SCNC: 14 MMOL/L (ref 5–15)
AST SERPL-CCNC: 40 U/L (ref 1–32)
BACTERIA UR QL AUTO: ABNORMAL /HPF
BASOPHILS # BLD AUTO: 0 10*3/MM3 (ref 0–0.2)
BASOPHILS NFR BLD AUTO: 0.1 % (ref 0–1.5)
BILIRUB SERPL-MCNC: 0.9 MG/DL (ref 0–1.2)
BILIRUB UR QL STRIP: NEGATIVE
BUN SERPL-MCNC: 82 MG/DL (ref 8–23)
BUN/CREAT SERPL: 29.4 (ref 7–25)
CALCIUM SPEC-SCNC: 9.4 MG/DL (ref 8.6–10.5)
CHLORIDE SERPL-SCNC: 103 MMOL/L (ref 98–107)
CLARITY UR: CLEAR
CO2 SERPL-SCNC: 18 MMOL/L (ref 22–29)
COLOR UR: YELLOW
CREAT SERPL-MCNC: 2.79 MG/DL (ref 0.57–1)
DEPRECATED RDW RBC AUTO: 66.9 FL (ref 37–54)
EGFRCR SERPLBLD CKD-EPI 2021: 17.2 ML/MIN/1.73
EOSINOPHIL # BLD AUTO: 0 10*3/MM3 (ref 0–0.4)
EOSINOPHIL NFR BLD AUTO: 0 % (ref 0.3–6.2)
ERYTHROCYTE [DISTWIDTH] IN BLOOD BY AUTOMATED COUNT: 20.2 % (ref 12.3–15.4)
GLOBULIN UR ELPH-MCNC: 2.7 GM/DL
GLUCOSE SERPL-MCNC: 86 MG/DL (ref 65–99)
GLUCOSE UR STRIP-MCNC: NEGATIVE MG/DL
HCT VFR BLD AUTO: 27.4 % (ref 34–46.6)
HGB BLD-MCNC: 8.7 G/DL (ref 12–15.9)
HGB UR QL STRIP.AUTO: NEGATIVE
HYALINE CASTS UR QL AUTO: ABNORMAL /LPF
KETONES UR QL STRIP: NEGATIVE
LEUKOCYTE ESTERASE UR QL STRIP.AUTO: ABNORMAL
LYMPHOCYTES # BLD AUTO: 0.8 10*3/MM3 (ref 0.7–3.1)
LYMPHOCYTES NFR BLD AUTO: 6.6 % (ref 19.6–45.3)
MCH RBC QN AUTO: 31 PG (ref 26.6–33)
MCHC RBC AUTO-ENTMCNC: 31.9 G/DL (ref 31.5–35.7)
MCV RBC AUTO: 97.3 FL (ref 79–97)
MONOCYTES # BLD AUTO: 0.7 10*3/MM3 (ref 0.1–0.9)
MONOCYTES NFR BLD AUTO: 5.9 % (ref 5–12)
NEUTROPHILS NFR BLD AUTO: 11.1 10*3/MM3 (ref 1.7–7)
NEUTROPHILS NFR BLD AUTO: 87.4 % (ref 42.7–76)
NITRITE UR QL STRIP: NEGATIVE
NRBC BLD AUTO-RTO: 0 /100 WBC (ref 0–0.2)
PH UR STRIP.AUTO: <=5 [PH] (ref 5–8)
PLATELET # BLD AUTO: 254 10*3/MM3 (ref 140–450)
PMV BLD AUTO: 7.1 FL (ref 6–12)
POTASSIUM SERPL-SCNC: 4.2 MMOL/L (ref 3.5–5.2)
PROT SERPL-MCNC: 5.3 G/DL (ref 6–8.5)
PROT UR QL STRIP: NEGATIVE
QT INTERVAL: 322 MS
RBC # BLD AUTO: 2.81 10*6/MM3 (ref 3.77–5.28)
RBC # UR STRIP: ABNORMAL /HPF
REF LAB TEST METHOD: ABNORMAL
SODIUM SERPL-SCNC: 135 MMOL/L (ref 136–145)
SP GR UR STRIP: 1.01 (ref 1–1.03)
SQUAMOUS #/AREA URNS HPF: ABNORMAL /HPF
UROBILINOGEN UR QL STRIP: ABNORMAL
WBC # UR STRIP: ABNORMAL /HPF
WBC NRBC COR # BLD: 12.6 10*3/MM3 (ref 3.4–10.8)

## 2023-07-13 PROCEDURE — 63710000001 PREDNISONE PER 5 MG: Performed by: INTERNAL MEDICINE

## 2023-07-13 PROCEDURE — P9612 CATHETERIZE FOR URINE SPEC: HCPCS

## 2023-07-13 PROCEDURE — 93005 ELECTROCARDIOGRAM TRACING: CPT | Performed by: EMERGENCY MEDICINE

## 2023-07-13 PROCEDURE — 73560 X-RAY EXAM OF KNEE 1 OR 2: CPT

## 2023-07-13 PROCEDURE — 93010 ELECTROCARDIOGRAM REPORT: CPT | Performed by: INTERNAL MEDICINE

## 2023-07-13 PROCEDURE — 81001 URINALYSIS AUTO W/SCOPE: CPT | Performed by: EMERGENCY MEDICINE

## 2023-07-13 PROCEDURE — 25010000002 CEFTRIAXONE PER 250 MG: Performed by: EMERGENCY MEDICINE

## 2023-07-13 PROCEDURE — 87150 DNA/RNA AMPLIFIED PROBE: CPT | Performed by: EMERGENCY MEDICINE

## 2023-07-13 PROCEDURE — G0378 HOSPITAL OBSERVATION PER HR: HCPCS

## 2023-07-13 PROCEDURE — 87077 CULTURE AEROBIC IDENTIFY: CPT | Performed by: EMERGENCY MEDICINE

## 2023-07-13 PROCEDURE — 87086 URINE CULTURE/COLONY COUNT: CPT | Performed by: EMERGENCY MEDICINE

## 2023-07-13 PROCEDURE — 99285 EMERGENCY DEPT VISIT HI MDM: CPT

## 2023-07-13 PROCEDURE — 93005 ELECTROCARDIOGRAM TRACING: CPT | Performed by: INTERNAL MEDICINE

## 2023-07-13 PROCEDURE — 87186 SC STD MICRODIL/AGAR DIL: CPT | Performed by: EMERGENCY MEDICINE

## 2023-07-13 PROCEDURE — 80053 COMPREHEN METABOLIC PANEL: CPT | Performed by: EMERGENCY MEDICINE

## 2023-07-13 PROCEDURE — 87040 BLOOD CULTURE FOR BACTERIA: CPT | Performed by: EMERGENCY MEDICINE

## 2023-07-13 PROCEDURE — 85025 COMPLETE CBC W/AUTO DIFF WBC: CPT | Performed by: EMERGENCY MEDICINE

## 2023-07-13 RX ORDER — TRAZODONE HYDROCHLORIDE 50 MG/1
100 TABLET ORAL NIGHTLY PRN
COMMUNITY

## 2023-07-13 RX ORDER — PANTOPRAZOLE SODIUM 40 MG/1
40 TABLET, DELAYED RELEASE ORAL DAILY
Status: DISCONTINUED | OUTPATIENT
Start: 2023-07-13 | End: 2023-07-22 | Stop reason: HOSPADM

## 2023-07-13 RX ORDER — ONDANSETRON 2 MG/ML
4 INJECTION INTRAMUSCULAR; INTRAVENOUS EVERY 6 HOURS PRN
Status: DISCONTINUED | OUTPATIENT
Start: 2023-07-13 | End: 2023-07-22 | Stop reason: HOSPADM

## 2023-07-13 RX ORDER — DOXYCYCLINE 100 MG/1
100 CAPSULE ORAL EVERY 12 HOURS SCHEDULED
Status: DISCONTINUED | OUTPATIENT
Start: 2023-07-13 | End: 2023-07-14

## 2023-07-13 RX ORDER — SODIUM CHLORIDE 9 MG/ML
40 INJECTION, SOLUTION INTRAVENOUS AS NEEDED
Status: DISCONTINUED | OUTPATIENT
Start: 2023-07-13 | End: 2023-07-22 | Stop reason: HOSPADM

## 2023-07-13 RX ORDER — AMOXICILLIN 250 MG
2 CAPSULE ORAL 2 TIMES DAILY
Status: DISCONTINUED | OUTPATIENT
Start: 2023-07-13 | End: 2023-07-22 | Stop reason: HOSPADM

## 2023-07-13 RX ORDER — DOXYCYCLINE HYCLATE 100 MG/1
100 CAPSULE ORAL 2 TIMES DAILY
COMMUNITY
Start: 2023-07-12 | End: 2023-07-22 | Stop reason: HOSPADM

## 2023-07-13 RX ORDER — HYDROCHLOROTHIAZIDE 25 MG/1
25 TABLET ORAL DAILY
COMMUNITY
End: 2023-07-22 | Stop reason: HOSPADM

## 2023-07-13 RX ORDER — SODIUM CHLORIDE 0.9 % (FLUSH) 0.9 %
10 SYRINGE (ML) INJECTION AS NEEDED
Status: DISCONTINUED | OUTPATIENT
Start: 2023-07-13 | End: 2023-07-22 | Stop reason: HOSPADM

## 2023-07-13 RX ORDER — TRAZODONE HYDROCHLORIDE 100 MG/1
100 TABLET ORAL NIGHTLY PRN
Status: DISCONTINUED | OUTPATIENT
Start: 2023-07-13 | End: 2023-07-22 | Stop reason: HOSPADM

## 2023-07-13 RX ORDER — ATORVASTATIN CALCIUM 20 MG/1
20 TABLET, FILM COATED ORAL NIGHTLY
Status: DISCONTINUED | OUTPATIENT
Start: 2023-07-13 | End: 2023-07-22 | Stop reason: HOSPADM

## 2023-07-13 RX ORDER — ACETAMINOPHEN 325 MG/1
650 TABLET ORAL EVERY 6 HOURS PRN
Status: DISCONTINUED | OUTPATIENT
Start: 2023-07-13 | End: 2023-07-22 | Stop reason: HOSPADM

## 2023-07-13 RX ORDER — BISACODYL 5 MG/1
5 TABLET, DELAYED RELEASE ORAL DAILY PRN
Status: DISCONTINUED | OUTPATIENT
Start: 2023-07-13 | End: 2023-07-22 | Stop reason: HOSPADM

## 2023-07-13 RX ORDER — SODIUM CHLORIDE 0.9 % (FLUSH) 0.9 %
10 SYRINGE (ML) INJECTION EVERY 12 HOURS SCHEDULED
Status: DISCONTINUED | OUTPATIENT
Start: 2023-07-13 | End: 2023-07-22 | Stop reason: HOSPADM

## 2023-07-13 RX ORDER — BISACODYL 10 MG
10 SUPPOSITORY, RECTAL RECTAL DAILY PRN
Status: DISCONTINUED | OUTPATIENT
Start: 2023-07-13 | End: 2023-07-22 | Stop reason: HOSPADM

## 2023-07-13 RX ORDER — FOLIC ACID 1 MG/1
1 TABLET ORAL DAILY
Status: DISCONTINUED | OUTPATIENT
Start: 2023-07-13 | End: 2023-07-22 | Stop reason: HOSPADM

## 2023-07-13 RX ORDER — POLYETHYLENE GLYCOL 3350 17 G/17G
17 POWDER, FOR SOLUTION ORAL DAILY PRN
Status: DISCONTINUED | OUTPATIENT
Start: 2023-07-13 | End: 2023-07-22 | Stop reason: HOSPADM

## 2023-07-13 RX ORDER — SODIUM CHLORIDE 9 MG/ML
100 INJECTION, SOLUTION INTRAVENOUS CONTINUOUS
Status: DISCONTINUED | OUTPATIENT
Start: 2023-07-13 | End: 2023-07-16

## 2023-07-13 RX ORDER — LEVOTHYROXINE SODIUM 0.05 MG/1
50 TABLET ORAL
Status: DISCONTINUED | OUTPATIENT
Start: 2023-07-13 | End: 2023-07-22 | Stop reason: HOSPADM

## 2023-07-13 RX ADMIN — PREDNISONE 15 MG: 5 TABLET ORAL at 13:58

## 2023-07-13 RX ADMIN — TRAZODONE HYDROCHLORIDE 100 MG: 50 TABLET ORAL at 21:09

## 2023-07-13 RX ADMIN — PANTOPRAZOLE SODIUM 40 MG: 40 TABLET, DELAYED RELEASE ORAL at 13:58

## 2023-07-13 RX ADMIN — DOXYCYCLINE 100 MG: 100 CAPSULE ORAL at 13:58

## 2023-07-13 RX ADMIN — DOXYCYCLINE 100 MG: 100 CAPSULE ORAL at 21:17

## 2023-07-13 RX ADMIN — Medication 10 ML: at 21:09

## 2023-07-13 RX ADMIN — ATORVASTATIN CALCIUM 20 MG: 20 TABLET, FILM COATED ORAL at 21:10

## 2023-07-13 RX ADMIN — LEVOTHYROXINE SODIUM 50 MCG: 0.05 TABLET ORAL at 13:58

## 2023-07-13 RX ADMIN — Medication 10 ML: at 13:58

## 2023-07-13 RX ADMIN — APIXABAN 2.5 MG: 2.5 TABLET, FILM COATED ORAL at 13:58

## 2023-07-13 RX ADMIN — CEFTRIAXONE 1000 MG: 1 INJECTION, POWDER, FOR SOLUTION INTRAMUSCULAR; INTRAVENOUS at 12:13

## 2023-07-13 RX ADMIN — SODIUM CHLORIDE 250 ML: 9 INJECTION, SOLUTION INTRAVENOUS at 08:56

## 2023-07-13 RX ADMIN — APIXABAN 2.5 MG: 2.5 TABLET, FILM COATED ORAL at 21:17

## 2023-07-13 RX ADMIN — SODIUM CHLORIDE 75 ML/HR: 9 INJECTION, SOLUTION INTRAVENOUS at 13:59

## 2023-07-13 RX ADMIN — FOLIC ACID 1 MG: 1 TABLET ORAL at 13:58

## 2023-07-13 NOTE — H&P
[facility] Medicine Services  History & Physical    Patient Name: Rosario Ortez  : 1950  MRN: [id number]  Primary Care Physician:  Ian Cordero MD  Date of admission: 2023  Date and Time of Service: 2023 at 12:50 PM.    Subjective      Chief Complaint: Sent from the PCPs office secondary to abnormal lab results.    History of Present Illness: Rosario Ortez is a 73 y.o. female with past medical history of paroxysmal atrial fibrillation, hypertension, hypothyroidism and fluid accumulation in the abdomen and lower extremities and recently suspected to have sarcoidosis for which she is going to specialized center next week for further diagnosis has been doing at her baseline and went for her blood work at her PCPs office yesterday. PCPs office called her today and told her to come to the hospital because her kidney function looks worse and she has a UTI.  Patient does have JOÉS on CKD but does not have a UTI at this point.  Her baseline activity is pretty poor and she would walk some very small distances with walker but is mostly wheelchair-bound.  She was also complaining of some area of erythematous lesions with some swelling in her right leg and knee and probably had some bronchitis for which she was started on doxycycline yesterday by her PCP.  Patient's oral intake is very poor as per family.  She has been on water pills with Lasix and she was also started on hydrochlorothiazide yesterday.  She denies any nausea vomiting or abdominal pain but is quite tender in the abdomen.  She denies any fever chills chest pain or shortness of breath but has some productive cough.  She denies any other significant complaints though.      Review of Systems   Constitutional: Positive for decreased appetite. Negative for chills, fever and weight loss.   HENT:  Positive for congestion. Negative for sore throat.    Eyes:  Negative for blurred vision, double vision and visual disturbance.    Cardiovascular:  Positive for leg swelling. Negative for chest pain, cyanosis, palpitations and syncope.   Respiratory:  Positive for cough, shortness of breath and sputum production. Negative for hemoptysis and wheezing.    Endocrine: Negative for cold intolerance and heat intolerance.   Hematologic/Lymphatic: Negative for adenopathy.   Skin:  Negative for itching and rash.        Questionable cellulitis of the right knee and possibly right lower extremity.   Musculoskeletal:  Negative for back pain, falls and stiffness.   Gastrointestinal:  Positive for anorexia and diarrhea. Negative for abdominal pain, constipation, hematemesis, hematochezia, jaundice, melena, nausea and vomiting.   Genitourinary:  Negative for dysuria, frequency, hematuria and urgency.   Neurological:  Positive for weakness. Negative for dizziness, focal weakness, tremors and vertigo.   Psychiatric/Behavioral:  Negative for altered mental status, depression, suicidal ideas and thoughts of violence.         Personal History     Past Medical History:   Diagnosis Date    A-fib     Coronary artery disease        Past Surgical History:   Procedure Laterality Date    BREAST BIOPSY      HYSTERECTOMY      JOINT REPLACEMENT Right 2015       Family History: family history includes Breast cancer in her sister. Otherwise pertinent FHx was reviewed and not pertinent to current issue.    Social History:  reports that she has never smoked. She has never been exposed to tobacco smoke. She has never used smokeless tobacco. She reports that she does not drink alcohol and does not use drugs.    Home Medications:  Prior to Admission Medications       Prescriptions Last Dose Informant Patient Reported? Taking?    apixaban (ELIQUIS) 2.5 MG tablet tablet  Spouse/Significant Other Yes Yes    Take 1 tablet by mouth 2 (Two) Times a Day.    atorvastatin (LIPITOR) 20 MG tablet   Yes Yes    Take 1 tablet by mouth Daily.    doxycycline (VIBRAMYCIN) 100 MG capsule   Yes  Yes    Take 1 capsule by mouth 2 (Two) Times a Day.    ferrous sulfate 325 (65 FE) MG tablet   Yes Yes    Take 1 tablet by mouth Daily With Breakfast.    folic acid (FOLVITE) 1 MG tablet  Spouse/Significant Other Yes Yes    Take 1 tablet by mouth Daily.    furosemide (LASIX) 20 MG tablet   No Yes    Take 1 tablet by mouth Daily for 30 days.    hydroCHLOROthiazide (HYDRODIURIL) 25 MG tablet   Yes Yes    Take 1 tablet by mouth Daily.    irbesartan (AVAPRO) 75 MG tablet   No Yes    Take 1 tablet by mouth Every Night.    levothyroxine (SYNTHROID, LEVOTHROID) 50 MCG tablet   Yes Yes    1 tablet Every Morning.    pantoprazole (PROTONIX) 40 MG EC tablet  Spouse/Significant Other Yes Yes    Take 1 tablet by mouth Daily.    predniSONE (DELTASONE) 5 MG tablet   Yes Yes    Take 3 tablets by mouth Daily.    traZODone (DESYREL) 50 MG tablet   Yes Yes    Take 2 tablets by mouth At Night As Needed for Sleep.              Allergies:  Allergies   Allergen Reactions    Codeine Nausea And Vomiting, Other (See Comments) and Nausea Only     nausea  nausea      Topiramate Rash    Tramadol Other (See Comments) and Mental Status Change    Cefdinir Unknown - High Severity    Levofloxacin Nausea Only    Lisinopril Cough       Objective      Vitals:   Temp:  [97.7 °F (36.5 °C)] 97.7 °F (36.5 °C)  Heart Rate:  [] 69  Resp:  [18] 18  BP: ()/(41-65) 103/41    Physical Exam  Constitutional:       General: She is in acute distress.   HENT:      Head: Normocephalic and atraumatic.      Ears:      Comments: Patient has systolic murmur at the right upper sternal border with intensity of 2/6.     Mouth/Throat:      Mouth: Mucous membranes are moist.      Pharynx: Oropharynx is clear.   Eyes:      Extraocular Movements: Extraocular movements intact.   Cardiovascular:      Rate and Rhythm: Normal rate and regular rhythm.      Heart sounds: Murmur heard.   Pulmonary:      Effort: Pulmonary effort is normal.      Breath sounds: Normal breath  sounds.   Abdominal:      General: There is no distension.      Palpations: Abdomen is soft.      Tenderness: There is abdominal tenderness. There is no rebound.   Musculoskeletal:      Cervical back: Neck supple.      Right lower leg: Edema present.      Left lower leg: Edema present.   Skin:     General: Skin is warm and dry.   Neurological:      General: No focal deficit present.      Mental Status: She is alert.   Psychiatric:         Mood and Affect: Mood normal.          Result Review    Result Review:  I have personally reviewed the results from the time of this admission to 7/13/2023 12:56 EDT and agree with these findings:  []  Laboratory  []  Microbiology  []  Radiology  []  EKG/Telemetry   []  Cardiology/Vascular   []  Pathology  []  Old records  []  Other:  Most notable findings include:   CBC shows WBC of 4.6, H&H of 8.7 and 27.4 and platelets of 254.  CMP shows sodium 135, potassium 4.2, chloride 103, carbon dioxide 18, BUN 82, creatinine 2.79.  Anion gap is 14.  Alkaline phosphatase was 174, total protein 5.3, albumin 2.6 and AST of 40 otherwise liver enzymes were negative.  UA shows trace leukocyte esterase 3-5 WBCs 3+ bacteria.  Chest x-ray was without any acute problems.      Assessment & Plan        Active Hospital Problems:  Active Hospital Problems    Diagnosis     **JOSÉ (acute kidney injury)      Plan:   #JOSÉ on CKD:  Likely secondary to dehydration due to poor p.o. intake in addition to her diuretics.  Hold all diuretics and ARB's and start gentle hydration.  Repeat BMP in the morning.    #Questionable UTI:  Patient does not have UTI on UA.    #Cellulitis right lower extremity and right knee:  Continue doxycycline which was started yesterday.  We will get an x-ray of the right knee.    #PAF:  Patient seems to be in normal rhythm at this point.  Continue Eliquis at home dose.    #Hypothyroidism:  Continue Synthroid at home dose.  We will check a TSH at this point.    #Hypertension:  Patient's  blood pressure is running low at this point likely secondary to dehydration we will see if it improves after holding her blood pressure medications and giving gentle hydration.    Patient is full code and she wants her medical proxy decision-maker to be her .    I have utilized all available immediate resources to obtain, update, or review the patient's current medications.  I confirmed that the patient's Advance Care Plan is present, code status is documented, or surrogate decision maker is listed in the patient's medical record.   [] I did NOT confirm today the presence of an Advance Care Plan or surrogate decision maker documented within the patient's medical record.         DVT prophylaxis:  Medical DVT prophylaxis orders are present.    CODE STATUS:    Level Of Support Discussed With: Patient; Health Care Surrogate  Code Status (Patient has no pulse and is not breathing): CPR (Attempt to Resuscitate)  Medical Interventions (Patient has pulse or is breathing): Full Support    Admission Status:  I believe this patient meets observation status.    I discussed the patient's findings and my recommendations with patient and family.        Signature: Electronically signed by Abhinav Boyd MD, 07/13/23, 12:56 EDT.  [facility] Hospitalist Team

## 2023-07-13 NOTE — PLAN OF CARE
Problem: Adult Inpatient Plan of Care  Goal: Plan of Care Review  Outcome: Ongoing, Progressing  Goal: Patient-Specific Goal (Individualized)  Outcome: Ongoing, Progressing  Goal: Absence of Hospital-Acquired Illness or Injury  Outcome: Ongoing, Progressing  Intervention: Identify and Manage Fall Risk  Recent Flowsheet Documentation  Taken 7/13/2023 1800 by Jennie Zelaya, RN  Safety Promotion/Fall Prevention: safety round/check completed  Taken 7/13/2023 1449 by Jennie Zelaya RN  Safety Promotion/Fall Prevention: safety round/check completed  Goal: Optimal Comfort and Wellbeing  Outcome: Ongoing, Progressing  Goal: Readiness for Transition of Care  Outcome: Ongoing, Progressing  Intervention: Mutually Develop Transition Plan  Recent Flowsheet Documentation  Taken 7/13/2023 1420 by Jennie Zelaya, RN  Equipment Currently Used at Home:   wheelchair   walker, james  Taken 7/13/2023 1417 by Jennie Zelaya, RN  Transportation Anticipated: family or friend will provide  Patient/Family Anticipated Services at Transition:   Patient/Family Anticipates Transition to: home with family     Problem: Skin Injury Risk Increased  Goal: Skin Health and Integrity  Outcome: Ongoing, Progressing  Intervention: Optimize Skin Protection  Recent Flowsheet Documentation  Taken 7/13/2023 1449 by Jennie Zelaya, RN  Pressure Reduction Devices: pressure-redistributing mattress utilized   Goal Outcome Evaluation:

## 2023-07-13 NOTE — ED PROVIDER NOTES
Subjective   History of Present Illness  Chief complaint sent by my doctor because of bad kidney function    History of present illness 73-year-old female recent admission in the hospital just released within the last couple weeks after presyncopal episode.  And some acute kidney injury who went to the doctor's office had some labs done yesterday today was told to go the hospital because her kidney function was worse.  She denies any complaints of fever chills sweats cough congestion dizziness or syncope or change in medications otherwise no abdominal pain no black or bloody stool patient is making urine no dysuria frequency.  No other complaints or associated symptoms at this time.    Review of Systems   Constitutional:  Negative for chills and fever.   Respiratory:  Negative for chest tightness and shortness of breath.    Cardiovascular:  Negative for chest pain and palpitations.   Gastrointestinal:  Negative for abdominal pain and vomiting.   Endocrine: Negative for cold intolerance and heat intolerance.   Genitourinary:  Negative for difficulty urinating and dysuria.   Musculoskeletal:  Negative for back pain and neck pain.   Skin:  Negative for rash.   Neurological:  Positive for weakness. Negative for dizziness.   Psychiatric/Behavioral:  Negative for confusion.      Past Medical History:   Diagnosis Date    A-fib     Coronary artery disease        Allergies   Allergen Reactions    Codeine Nausea And Vomiting, Other (See Comments) and Nausea Only     nausea  nausea      Topiramate Rash    Tramadol Other (See Comments) and Mental Status Change    Cefdinir Unknown - High Severity    Levofloxacin Nausea Only    Lisinopril Cough       Past Surgical History:   Procedure Laterality Date    BREAST BIOPSY      HYSTERECTOMY      JOINT REPLACEMENT Right 2015       Family History   Problem Relation Age of Onset    Breast cancer Sister        Social History     Socioeconomic History    Marital status:    Tobacco  Use    Smoking status: Never     Passive exposure: Never    Smokeless tobacco: Never   Vaping Use    Vaping Use: Never used   Substance and Sexual Activity    Alcohol use: Never    Drug use: Never    Sexual activity: Defer     Prior to Admission medications    Medication Sig Start Date End Date Taking? Authorizing Provider   apixaban (ELIQUIS) 2.5 MG tablet tablet Take 1 tablet by mouth 2 (Two) Times a Day.   Yes Joy Qureshi MD   atorvastatin (LIPITOR) 20 MG tablet Take 1 tablet by mouth Daily. 9/13/22  Yes Joy Qureshi MD   doxycycline (VIBRAMYCIN) 100 MG capsule Take 1 capsule by mouth 2 (Two) Times a Day. 7/12/23 7/22/23 Yes Joy Qureshi MD   ferrous sulfate 325 (65 FE) MG tablet Take 1 tablet by mouth Daily With Breakfast.   Yes Joy Qureshi MD   folic acid (FOLVITE) 1 MG tablet Take 1 tablet by mouth Daily.   Yes Joy Qureshi MD   furosemide (LASIX) 20 MG tablet Take 1 tablet by mouth Daily for 30 days. 6/30/23 7/30/23 Yes Kadie Rice MD   hydroCHLOROthiazide (HYDRODIURIL) 25 MG tablet Take 1 tablet by mouth Daily.   Yes ProviderJoy MD   irbesartan (AVAPRO) 75 MG tablet Take 1 tablet by mouth Every Night. 5/17/22  Yes Sang Sanford MD   levothyroxine (SYNTHROID, LEVOTHROID) 50 MCG tablet 1 tablet Every Morning. 3/16/23  Yes Joy Qureshi MD   pantoprazole (PROTONIX) 40 MG EC tablet Take 1 tablet by mouth Daily.   Yes ProviderJoy MD   predniSONE (DELTASONE) 5 MG tablet Take 3 tablets by mouth Daily. 6/5/23  Yes Joy Qureshi MD   traZODone (DESYREL) 50 MG tablet Take 2 tablets by mouth At Night As Needed for Sleep.   Yes ProviderJoy MD   spironolactone (ALDACTONE) 25 MG tablet Take 1 tablet by mouth Daily for 30 days. 6/30/23 7/13/23 Yes Kadie Rice MD   meloxicam (MOBIC) 15 MG tablet Take 1 tablet by mouth Daily. 7/8/23 7/13/23  Rod Cagle MD   methotrexate 2.5 MG tablet Take 4 tablets by mouth 1 (One)  Time Per Week. Fridays 7/13/23  Provider, MD Joy          Objective   Physical Exam  Constitutional 73-year-old female awake alert no distress triage vital signs reviewed.  HEENT extraocular muscles are intact pupils equal round reactive sclera clear mouth clear neck supple no adenopathy no JV no bruits no meningeal signs.  Lungs clear no retractions heart irregular without murmur rub abdomen soft nontender good bowel sounds no pulsatile masses extremities pulses equal upper and lower extremities no edema cords or Homans' sign or evidence of DVT.  Skin warm and dry without rashes neurologic awake alert follows commands motor strength normal without focal weakness  Procedures           ED Course      Results for orders placed or performed during the hospital encounter of 07/13/23   Urinalysis With Microscopic If Indicated (No Culture) - Straight Cath    Specimen: Straight Cath; Urine   Result Value Ref Range    Color, UA Yellow Yellow, Straw    Appearance, UA Clear Clear    pH, UA <=5.0 5.0 - 8.0    Specific Gravity, UA 1.012 1.005 - 1.030    Glucose, UA Negative Negative    Ketones, UA Negative Negative    Bilirubin, UA Negative Negative    Blood, UA Negative Negative    Protein, UA Negative Negative    Leuk Esterase, UA Trace (A) Negative    Nitrite, UA Negative Negative    Urobilinogen, UA 0.2 E.U./dL 0.2 - 1.0 E.U./dL   Comprehensive Metabolic Panel    Specimen: Blood   Result Value Ref Range    Glucose 86 65 - 99 mg/dL    BUN 82 (H) 8 - 23 mg/dL    Creatinine 2.79 (H) 0.57 - 1.00 mg/dL    Sodium 135 (L) 136 - 145 mmol/L    Potassium 4.2 3.5 - 5.2 mmol/L    Chloride 103 98 - 107 mmol/L    CO2 18.0 (L) 22.0 - 29.0 mmol/L    Calcium 9.4 8.6 - 10.5 mg/dL    Total Protein 5.3 (L) 6.0 - 8.5 g/dL    Albumin 2.6 (L) 3.5 - 5.2 g/dL    ALT (SGPT) 23 1 - 33 U/L    AST (SGOT) 40 (H) 1 - 32 U/L    Alkaline Phosphatase 174 (H) 39 - 117 U/L    Total Bilirubin 0.9 0.0 - 1.2 mg/dL    Globulin 2.7 gm/dL    A/G Ratio 1.0  g/dL    BUN/Creatinine Ratio 29.4 (H) 7.0 - 25.0    Anion Gap 14.0 5.0 - 15.0 mmol/L    eGFR 17.2 (L) >60.0 mL/min/1.73   CBC Auto Differential    Specimen: Blood   Result Value Ref Range    WBC 12.60 (H) 3.40 - 10.80 10*3/mm3    RBC 2.81 (L) 3.77 - 5.28 10*6/mm3    Hemoglobin 8.7 (L) 12.0 - 15.9 g/dL    Hematocrit 27.4 (L) 34.0 - 46.6 %    MCV 97.3 (H) 79.0 - 97.0 fL    MCH 31.0 26.6 - 33.0 pg    MCHC 31.9 31.5 - 35.7 g/dL    RDW 20.2 (H) 12.3 - 15.4 %    RDW-SD 66.9 (H) 37.0 - 54.0 fl    MPV 7.1 6.0 - 12.0 fL    Platelets 254 140 - 450 10*3/mm3    Neutrophil % 87.4 (H) 42.7 - 76.0 %    Lymphocyte % 6.6 (L) 19.6 - 45.3 %    Monocyte % 5.9 5.0 - 12.0 %    Eosinophil % 0.0 (L) 0.3 - 6.2 %    Basophil % 0.1 0.0 - 1.5 %    Neutrophils, Absolute 11.10 (H) 1.70 - 7.00 10*3/mm3    Lymphocytes, Absolute 0.80 0.70 - 3.10 10*3/mm3    Monocytes, Absolute 0.70 0.10 - 0.90 10*3/mm3    Eosinophils, Absolute 0.00 0.00 - 0.40 10*3/mm3    Basophils, Absolute 0.00 0.00 - 0.20 10*3/mm3    nRBC 0.0 0.0 - 0.2 /100 WBC   Urinalysis, Microscopic Only - Straight Cath    Specimen: Straight Cath; Urine   Result Value Ref Range    RBC, UA 0-2 (A) None Seen /HPF    WBC, UA 3-5 (A) None Seen /HPF    Bacteria, UA 3+ (A) None Seen /HPF    Squamous Epithelial Cells, UA 0-2 None Seen, 0-2 /HPF    Hyaline Casts, UA 7-12 None Seen /LPF    Methodology Manual Light Microscopy    ECG 12 Lead Tachycardia   Result Value Ref Range    QT Interval 322 ms     No radiology results for the last day  Medications   sodium chloride 0.9 % flush 10 mL (has no administration in time range)   sodium chloride 0.9 % bolus 250 mL (250 mL Intravenous New Bag 7/13/23 0856)       Record review 7/8/2023 BUN of 50 creatinine 1.4        EKG my interpretation atrial fibrillation rate of 100 normal axis no hypertrophy QTc of 4 and 21 is unchanged from previous EKG when compared to 7/8/2023 abnormal                      Labs from the office yesterday reviewed on the chart BUN  was 69 creatinine was 2.6       Medical Decision Making  Medical decision making.  IV established patient given 250 cc normal saline bolus monitor placed my review in atrial fibrillation.  EKG obtained independent reviewed by me shows an atrial fibrillation rate of 100 normal axis hypertrophy QTc of 421 is unchanged from previous EKG on 7/8/2023 and abnormal.  Labs obtained independent reviewed by me urinalysis showed 3+ bacteria 5 white cells I did culture that.  Labs independent reviewed by me CBC hemoglobin 8.7 but unchanged from previous just a couple weeks ago in the hospital comprehensive metabolic profile unremarkable other than a BUN of 82 and a creatinine of 2.79 a CO2 of 18 potassium was 4.2.  Record review showed on 7/8/2023 BUN is 50 creatinine was 1.4 urine was cultured.  The patient had a gram Rocephin IV obtained which she has tolerated before it says she is allergic to cefdinir but it is unspecified she has tolerated Rocephin on review of records and talking to the pharmacy.  The patient has a significant elevation in her creatinine from her previous baseline in the office that showed a BUN of 60 and a creatinine 2.6 yesterday but it is even worse today.  I talked to the patient.  I do not see evidence of sepsis or acute intra-abdominal process no evidence suggest to suggest acute cardiac ischemia no evidence that suggest any pancreatitis diverticulitis or kidney stone based on my history physical and clinical exam.  She will need further evaluation of this.  Hospitalist nurse practitioner notified we discussed the case stable unremarkable ER course.    Problems Addressed:  Acute kidney injury: complicated acute illness or injury  Weakness: complicated acute illness or injury    Amount and/or Complexity of Data Reviewed  External Data Reviewed: labs and ECG.  Labs: ordered. Decision-making details documented in ED Course.  ECG/medicine tests: ordered and independent interpretation performed.  Decision-making details documented in ED Course.    Risk  Decision regarding hospitalization.        Final diagnoses:   Weakness   Acute kidney injury       ED Disposition  ED Disposition       ED Disposition   Decision to Admit    Condition   --    Comment   Level of Care: Telemetry [5]   Admitting Physician: FOSTER ESPINAL [[id number]]   Attending Physician: FOSTER ESPINAL [[id number]]                 No follow-up provider specified.       Medication List      No changes were made to your prescriptions during this visit.            Rafael De Luna MD  07/13/23 8259

## 2023-07-14 ENCOUNTER — APPOINTMENT (OUTPATIENT)
Dept: MRI IMAGING | Facility: HOSPITAL | Age: 73
DRG: 602 | End: 2023-07-14
Payer: MEDICARE

## 2023-07-14 PROBLEM — M25.861 KNEE JOINT CYST, RIGHT: Status: ACTIVE | Noted: 2023-07-14

## 2023-07-14 PROBLEM — N39.0 ACUTE UTI (URINARY TRACT INFECTION): Status: ACTIVE | Noted: 2023-07-14

## 2023-07-14 PROBLEM — B95.5 BACTEREMIA DUE TO STREPTOCOCCUS: Status: ACTIVE | Noted: 2023-07-14

## 2023-07-14 PROBLEM — L03.115 CELLULITIS OF RIGHT LEG: Status: ACTIVE | Noted: 2023-07-14

## 2023-07-14 PROBLEM — R78.81 BACTEREMIA: Status: ACTIVE | Noted: 2023-07-14

## 2023-07-14 PROBLEM — R60.0 BILATERAL LEG EDEMA: Chronic | Status: ACTIVE | Noted: 2023-07-14

## 2023-07-14 LAB
ANION GAP SERPL CALCULATED.3IONS-SCNC: 11 MMOL/L (ref 5–15)
ANION GAP SERPL CALCULATED.3IONS-SCNC: 12 MMOL/L (ref 5–15)
APTT PPP: 33.1 SECONDS (ref 24–31)
BACTERIA BLD CULT: ABNORMAL
BASOPHILS # BLD AUTO: 0 10*3/MM3 (ref 0–0.2)
BASOPHILS NFR BLD AUTO: 0.1 % (ref 0–1.5)
BOTTLE TYPE: ABNORMAL
BUN SERPL-MCNC: 73 MG/DL (ref 8–23)
BUN SERPL-MCNC: 75 MG/DL (ref 8–23)
BUN/CREAT SERPL: 26.9 (ref 7–25)
BUN/CREAT SERPL: 27.9 (ref 7–25)
CALCIUM SPEC-SCNC: 8.5 MG/DL (ref 8.6–10.5)
CALCIUM SPEC-SCNC: 8.5 MG/DL (ref 8.6–10.5)
CHLORIDE SERPL-SCNC: 108 MMOL/L (ref 98–107)
CHLORIDE SERPL-SCNC: 109 MMOL/L (ref 98–107)
CO2 SERPL-SCNC: 17 MMOL/L (ref 22–29)
CO2 SERPL-SCNC: 18 MMOL/L (ref 22–29)
CREAT SERPL-MCNC: 2.69 MG/DL (ref 0.57–1)
CREAT SERPL-MCNC: 2.71 MG/DL (ref 0.57–1)
DEPRECATED RDW RBC AUTO: 63.9 FL (ref 37–54)
DEPRECATED RDW RBC AUTO: 69.6 FL (ref 37–54)
EGFRCR SERPLBLD CKD-EPI 2021: 17.8 ML/MIN/1.73
EGFRCR SERPLBLD CKD-EPI 2021: 18 ML/MIN/1.73
EOSINOPHIL # BLD AUTO: 0 10*3/MM3 (ref 0–0.4)
EOSINOPHIL NFR BLD AUTO: 0 % (ref 0.3–6.2)
ERYTHROCYTE [DISTWIDTH] IN BLOOD BY AUTOMATED COUNT: 19.5 % (ref 12.3–15.4)
ERYTHROCYTE [DISTWIDTH] IN BLOOD BY AUTOMATED COUNT: 20.8 % (ref 12.3–15.4)
FERRITIN SERPL-MCNC: 216.4 NG/ML (ref 13–150)
FIBRINOGEN PPP-MCNC: 253 MG/DL (ref 210–450)
GLUCOSE SERPL-MCNC: 112 MG/DL (ref 65–99)
GLUCOSE SERPL-MCNC: 92 MG/DL (ref 65–99)
HCT VFR BLD AUTO: 22 % (ref 34–46.6)
HCT VFR BLD AUTO: 23.5 % (ref 34–46.6)
HGB BLD-MCNC: 7.2 G/DL (ref 12–15.9)
HGB BLD-MCNC: 7.5 G/DL (ref 12–15.9)
INR PPP: 1.28 (ref 0.93–1.1)
IRON 24H UR-MRATE: 19 MCG/DL (ref 37–145)
IRON SATN MFR SERPL: 11 % (ref 20–50)
LDH SERPL-CCNC: 160 U/L (ref 135–214)
LYMPHOCYTES # BLD AUTO: 0.7 10*3/MM3 (ref 0.7–3.1)
LYMPHOCYTES NFR BLD AUTO: 6.8 % (ref 19.6–45.3)
MCH RBC QN AUTO: 31.4 PG (ref 26.6–33)
MCH RBC QN AUTO: 31.8 PG (ref 26.6–33)
MCHC RBC AUTO-ENTMCNC: 32 G/DL (ref 31.5–35.7)
MCHC RBC AUTO-ENTMCNC: 33 G/DL (ref 31.5–35.7)
MCV RBC AUTO: 96.5 FL (ref 79–97)
MCV RBC AUTO: 97.8 FL (ref 79–97)
MONOCYTES # BLD AUTO: 0.8 10*3/MM3 (ref 0.1–0.9)
MONOCYTES NFR BLD AUTO: 8.3 % (ref 5–12)
NEUTROPHILS NFR BLD AUTO: 8.3 10*3/MM3 (ref 1.7–7)
NEUTROPHILS NFR BLD AUTO: 84.8 % (ref 42.7–76)
NRBC BLD AUTO-RTO: 0 /100 WBC (ref 0–0.2)
PLATELET # BLD AUTO: 179 10*3/MM3 (ref 140–450)
PLATELET # BLD AUTO: 206 10*3/MM3 (ref 140–450)
PMV BLD AUTO: 7.1 FL (ref 6–12)
PMV BLD AUTO: 7.2 FL (ref 6–12)
POTASSIUM SERPL-SCNC: 4.2 MMOL/L (ref 3.5–5.2)
POTASSIUM SERPL-SCNC: 4.4 MMOL/L (ref 3.5–5.2)
PROTHROMBIN TIME: 13.5 SECONDS (ref 9.6–11.7)
QT INTERVAL: 342 MS
RBC # BLD AUTO: 2.28 10*6/MM3 (ref 3.77–5.28)
RBC # BLD AUTO: 2.4 10*6/MM3 (ref 3.77–5.28)
SODIUM SERPL-SCNC: 137 MMOL/L (ref 136–145)
SODIUM SERPL-SCNC: 138 MMOL/L (ref 136–145)
TIBC SERPL-MCNC: 170 MCG/DL (ref 298–536)
TRANSFERRIN SERPL-MCNC: 114 MG/DL (ref 200–360)
WBC NRBC COR # BLD: 9.8 10*3/MM3 (ref 3.4–10.8)
WBC NRBC COR # BLD: 9.8 10*3/MM3 (ref 3.4–10.8)

## 2023-07-14 PROCEDURE — 86334 IMMUNOFIX E-PHORESIS SERUM: CPT | Performed by: INTERNAL MEDICINE

## 2023-07-14 PROCEDURE — 85384 FIBRINOGEN ACTIVITY: CPT | Performed by: INTERNAL MEDICINE

## 2023-07-14 PROCEDURE — 85610 PROTHROMBIN TIME: CPT | Performed by: INTERNAL MEDICINE

## 2023-07-14 PROCEDURE — 80048 BASIC METABOLIC PNL TOTAL CA: CPT | Performed by: INTERNAL MEDICINE

## 2023-07-14 PROCEDURE — 85025 COMPLETE CBC W/AUTO DIFF WBC: CPT | Performed by: INTERNAL MEDICINE

## 2023-07-14 PROCEDURE — 82728 ASSAY OF FERRITIN: CPT | Performed by: INTERNAL MEDICINE

## 2023-07-14 PROCEDURE — 85362 FIBRIN DEGRADATION PRODUCTS: CPT | Performed by: INTERNAL MEDICINE

## 2023-07-14 PROCEDURE — 36415 COLL VENOUS BLD VENIPUNCTURE: CPT | Performed by: INTERNAL MEDICINE

## 2023-07-14 PROCEDURE — 73721 MRI JNT OF LWR EXTRE W/O DYE: CPT

## 2023-07-14 PROCEDURE — 83010 ASSAY OF HAPTOGLOBIN QUANT: CPT | Performed by: INTERNAL MEDICINE

## 2023-07-14 PROCEDURE — 25010000002 EPOETIN ALFA-EPBX 20000 UNIT/ML SOLUTION: Performed by: INTERNAL MEDICINE

## 2023-07-14 PROCEDURE — 63710000001 PREDNISONE PER 5 MG: Performed by: INTERNAL MEDICINE

## 2023-07-14 PROCEDURE — 80048 BASIC METABOLIC PNL TOTAL CA: CPT | Performed by: NURSE PRACTITIONER

## 2023-07-14 PROCEDURE — 84466 ASSAY OF TRANSFERRIN: CPT | Performed by: INTERNAL MEDICINE

## 2023-07-14 PROCEDURE — 83540 ASSAY OF IRON: CPT | Performed by: INTERNAL MEDICINE

## 2023-07-14 PROCEDURE — 82784 ASSAY IGA/IGD/IGG/IGM EACH: CPT | Performed by: INTERNAL MEDICINE

## 2023-07-14 PROCEDURE — 25010000002 CEFTRIAXONE PER 250 MG: Performed by: INTERNAL MEDICINE

## 2023-07-14 PROCEDURE — 85027 COMPLETE CBC AUTOMATED: CPT | Performed by: INTERNAL MEDICINE

## 2023-07-14 PROCEDURE — 85730 THROMBOPLASTIN TIME PARTIAL: CPT | Performed by: INTERNAL MEDICINE

## 2023-07-14 PROCEDURE — 86335 IMMUNFIX E-PHORSIS/URINE/CSF: CPT | Performed by: INTERNAL MEDICINE

## 2023-07-14 PROCEDURE — 83615 LACTATE (LD) (LDH) ENZYME: CPT | Performed by: INTERNAL MEDICINE

## 2023-07-14 RX ORDER — DILTIAZEM HYDROCHLORIDE 5 MG/ML
10 INJECTION INTRAVENOUS ONCE
Status: COMPLETED | OUTPATIENT
Start: 2023-07-14 | End: 2023-07-14

## 2023-07-14 RX ADMIN — EPOETIN ALFA-EPBX 20000 UNITS: 20000 INJECTION, SOLUTION INTRAVENOUS; SUBCUTANEOUS at 21:06

## 2023-07-14 RX ADMIN — ACETAMINOPHEN 650 MG: 325 TABLET, FILM COATED ORAL at 21:08

## 2023-07-14 RX ADMIN — SODIUM CHLORIDE 75 ML/HR: 9 INJECTION, SOLUTION INTRAVENOUS at 16:35

## 2023-07-14 RX ADMIN — FOLIC ACID 1 MG: 1 TABLET ORAL at 09:05

## 2023-07-14 RX ADMIN — LEVOTHYROXINE SODIUM 50 MCG: 0.05 TABLET ORAL at 05:43

## 2023-07-14 RX ADMIN — DILTIAZEM HYDROCHLORIDE 10 MG: 5 INJECTION INTRAVENOUS at 01:12

## 2023-07-14 RX ADMIN — APIXABAN 2.5 MG: 2.5 TABLET, FILM COATED ORAL at 21:09

## 2023-07-14 RX ADMIN — ACETAMINOPHEN 650 MG: 325 TABLET, FILM COATED ORAL at 01:57

## 2023-07-14 RX ADMIN — Medication 10 ML: at 09:05

## 2023-07-14 RX ADMIN — ATORVASTATIN CALCIUM 20 MG: 20 TABLET, FILM COATED ORAL at 21:08

## 2023-07-14 RX ADMIN — CEFTRIAXONE 2000 MG: 2 INJECTION, POWDER, FOR SOLUTION INTRAMUSCULAR; INTRAVENOUS at 09:05

## 2023-07-14 RX ADMIN — SODIUM CHLORIDE 75 ML/HR: 9 INJECTION, SOLUTION INTRAVENOUS at 01:58

## 2023-07-14 RX ADMIN — APIXABAN 2.5 MG: 2.5 TABLET, FILM COATED ORAL at 09:05

## 2023-07-14 RX ADMIN — SENNOSIDES AND DOCUSATE SODIUM 2 TABLET: 50; 8.6 TABLET ORAL at 21:08

## 2023-07-14 RX ADMIN — PANTOPRAZOLE SODIUM 40 MG: 40 TABLET, DELAYED RELEASE ORAL at 09:05

## 2023-07-14 RX ADMIN — PREDNISONE 15 MG: 5 TABLET ORAL at 09:05

## 2023-07-14 NOTE — CASE MANAGEMENT/SOCIAL WORK
Discharge Planning Assessment  [facility]     Patient Name: Rosario Ortez  MRN: [id number]  Today's Date: 7/14/2023    Admit Date: 7/13/2023    Plan: Anticipate return home with family.   Discharge Needs Assessment       Row Name 07/14/23 1318       Living Environment    People in Home spouse;child(clint), adult    Name(s) of People in Home -Surya, Daughter-Brissa    Current Living Arrangements home    Potentially Unsafe Housing Conditions none    Primary Care Provided by self;spouse/significant other    Provides Primary Care For no one, unable/limited ability to care for self    Family Caregiver if Needed spouse;child(clint), adult    Family Caregiver Names Surya, Brissa    Quality of Family Relationships helpful;involved;supportive    Able to Return to Prior Arrangements yes       Resource/Environmental Concerns    Resource/Environmental Concerns none    Transportation Concerns none       Transition Planning    Patient/Family Anticipates Transition to home with family    Patient/Family Anticipated Services at Transition none    Transportation Anticipated family or friend will provide       Discharge Needs Assessment    Readmission Within the Last 30 Days other (see comments)  Observation stay 6/26-6/29    Equipment Currently Used at Home rollator;wheelchair;shower chair;grab bar    Concerns to be Addressed denies needs/concerns at this time;no discharge needs identified    Anticipated Changes Related to Illness none    Equipment Needed After Discharge none    Provided Post Acute Provider List? N/A    Provided Post Acute Provider Quality & Resource List? N/A                   Discharge Plan       Row Name 07/14/23 1321       Plan    Plan Anticipate return home with family.    Patient/Family in Agreement with Plan yes    Plan Comments CM met with patient and  Surya at bedside. Pt lives at home with spouse and daughter Brissa. She does not drive and requires assistance with ADL's. PCP and pharmacy  confirmed- pt is agreeable to use [facility] at discharge. DME includes rollator, wheelchair, shower chair, grab bars, and elevated commode seat. Pt is not current with [facility] services. During previous hospitalization, referral was made to outpatient PT at [facility] but spouse reports pt has not been. Spouse denies issues affording medications. No needs anticipated at this time but PT/OT ordered. Reviewed IMM letter, provided copy, and signature obtained.             Expected Discharge Date and Time       Expected Discharge Date Expected Discharge Time    Jul 17, 2023            Demographic Summary       Row Name 07/14/23 1318       General Information    Admission Type inpatient    Arrived From emergency department    Required Notices Provided Important Message from Medicare    Referral Source admission list    Reason for Consult discharge planning;care coordination/care conference    Preferred Language English       Contact Information    Permission Granted to Share Info With                    Functional Status       Row Name 07/14/23 1318       Functional Status    Usual Activity Tolerance moderate    Current Activity Tolerance fair       Functional Status, IADL    Medications assistive person    Meal Preparation assistive person    Housekeeping assistive person    Laundry assistive person    Shopping assistive person              Patient Forms       Row Name 07/14/23 1321       Patient Forms    Important Message from Medicare ([facility]) Delivered  IMM 7/14 per CM    Delivered to Support person  -Surya    Method of delivery In person                Megan Naegele, RN     Office Phone: [phone]  Office Cell: [phone]

## 2023-07-14 NOTE — NURSING NOTE
WOCN note:    73 yr old female admitted 7/13/23 with JOSÉ. WOCN consult received for right leg wound. Patient presents with a small lesion to her right anterior lower leg measuring approximately 0.5cm. The wound base is covered with an adherent white slough which was partially removed with light mechanical debridement. There is slight erythema noted to the connie-wound skin but no s/s infection are noted. There was no exudate noted on the old dressing. The wound was cleansed with NS and a silver silicone bordered foam dressing was applied.   Spouse at the bedside is unaware of any trauma to the area. Patient has pitting pedal edema and spouse reports the edema had extended into the lower legs. Pedal pulse is strong. The foot is warm and dry.     Patient also noted to have two areas of erythema to her right knee. The area to the medial knee is raised, firm and fluctuant.      Recommend wound care as above to the lower leg wound and we will continue to follow as needed.

## 2023-07-14 NOTE — PROGRESS NOTES
[facility] Medicine Services   Daily Progress Note    Patient Name: Rosario Ortez  : 1950  MRN: [id number]  Primary Care Physician:  Ian Cordero MD  Date of admission: 2023  Date and Time of Service: 2023 at 9 am      Subjective      Chief Complaint: Abnormal labs.  Sent in from PCP office.  JOSÉ on CKD Stage 3, right leg cellulitis and UTI    Patient seen today.  Doing okay.   at bedside.  Blood cultures show Strep.  Urine culture >100,000 gram negative rods.  Right knee is hurting her with a small painful cyst medial aspect.  Might need to be aspirated.  Getting IVF.    ROS negative except what was mentioned above.      Objective      Vitals:   Temp:  [97.2 °F (36.2 °C)-98 °F (36.7 °C)] 97.3 °F (36.3 °C)  Heart Rate:  [] 71  Resp:  [20-21] 21  BP: ()/(40-79) 98/79    Physical Exam  Constitutional:       General: She is not in acute distress.     Appearance: She is ill-appearing. She is not toxic-appearing.   HENT:      Head: Normocephalic and atraumatic.      Right Ear: Tympanic membrane normal.      Left Ear: Tympanic membrane normal.      Mouth/Throat:      Mouth: Mucous membranes are moist.   Eyes:      Pupils: Pupils are equal, round, and reactive to light.   Cardiovascular:      Rate and Rhythm: Normal rate and regular rhythm.      Pulses: Normal pulses.      Heart sounds: Normal heart sounds.   Pulmonary:      Effort: Pulmonary effort is normal. No respiratory distress.      Breath sounds: Normal breath sounds.   Abdominal:      General: Bowel sounds are normal. There is no distension.      Palpations: Abdomen is soft.   Musculoskeletal:         General: No swelling. Normal range of motion.      Cervical back: Normal range of motion and neck supple.      Right lower leg: Edema present.      Left lower leg: Edema present.   Skin:     General: Skin is warm.      Findings: Erythema and lesion present.      Comments: Painful cyst like structure medial  aspect right thigh.  No open wounds on the skin.  Some dry skin and edema.  Edema looks chronic.   Neurological:      General: No focal deficit present.      Mental Status: She is alert and oriented to person, place, and time. Mental status is at baseline.   Psychiatric:         Mood and Affect: Mood normal.           Result Review    Result Review:  I have personally reviewed the results from the time of this admission to 7/14/2023 12:07 EDT and agree with these findings:  [x]  Laboratory  [x]  Microbiology  [x]  Radiology  [x]  EKG/Telemetry   [x]  Cardiology/Vascular   []  Pathology  [x]  Old records  []  Other:  Most notable findings include: JOSÉ on CKD with Cr 2.7, anemia with Hgb drop to 7.2 toda    Wounds (last 24 hours)       LDA Wound       Row Name 07/14/23 0801 07/13/23 1738          Wound 06/27/23 0145 Right lower leg Abcess    Wound - Properties Group Placement Date: 06/27/23  - Placement Time: 0145  - Present on Hospital Admission: Y  -AH Side: Right  -AH Orientation: lower  -AH Location: leg  -AH Primary Wound Type: Abcess  -AH    Wound Image -- Images linked: 1  -AHA     Dressing Appearance dry;intact  -JT --     Closure Adhesive bandage  -JT --     Base dry;white  -JT --     Drainage Amount none  -JT --     Care, Wound cleansed with;sterile normal saline  -JT --     Dressing Care dressing changed;silicone  -JT --     Retired Wound - Properties Group Placement Date: 06/27/23  - Placement Time: 0145  -AH Present on Hospital Admission: Y  -AH Side: Right  -AH Orientation: lower  -AH Location: leg  -AH Primary Wound Type: Abcess  -AH    Retired Wound - Properties Group Date first assessed: 06/27/23  - Time first assessed: 0145  - Present on Hospital Admission: Y  -AH Side: Right  -AH Location: leg  -AH Primary Wound Type: Abcess  -AH              User Key  (r) = Recorded By, (t) = Taken By, (c) = Cosigned By      Initials Name Provider Type    Dirk Varner RN Registered Nurse    JT  Jayla Bolaños, RN Registered Nurse    Lata Portillo RN Registered Nurse                      Assessment & Plan      Brief Patient Summary:  Rosario Ortez is a 73 y.o. female with past medical history of paroxysmal atrial fibrillation, hypertension, hypothyroidism and fluid accumulation in the abdomen and lower extremities and recently suspected to have sarcoidosis for which she is going to specialized center next week for further diagnosis has been doing at her baseline and went for her blood work at her PCPs office yesterday. PCPs office called her today and told her to come to the hospital because her kidney function looks worse and she has a UTI.  Patient does have JOSÉ on CKD but does not have a UTI at this point.  Her baseline activity is pretty poor and she would walk some very small distances with walker but is mostly wheelchair-bound.  She was also complaining of some area of erythematous lesions with some swelling in her right leg and knee and probably had some bronchitis for which she was started on doxycycline yesterday by her PCP.  Patient's oral intake is very poor as per family.  She has been on water pills with Lasix and she was also started on hydrochlorothiazide yesterday.  She denies any nausea vomiting or abdominal pain but is quite tender in the abdomen.  She denies any fever chills chest pain or shortness of breath but has some productive cough.  She denies any other significant complaints though.         apixaban, 2.5 mg, Oral, BID  atorvastatin, 20 mg, Oral, Nightly  cefTRIAXone, 2,000 mg, Intravenous, Q24H  folic acid, 1 mg, Oral, Daily  levothyroxine, 50 mcg, Oral, Q AM  pantoprazole, 40 mg, Oral, Daily  predniSONE, 15 mg, Oral, Daily  senna-docusate sodium, 2 tablet, Oral, BID  sodium chloride, 10 mL, Intravenous, Q12H       sodium chloride, 75 mL/hr, Last Rate: 75 mL/hr (07/14/23 0904)         Active Hospital Problems:  Active Hospital Problems    Diagnosis     **JOSÉ (acute kidney  injury)     Bacteremia due to Streptococcus     Cellulitis of right leg     Acute UTI (urinary tract infection)     Knee joint cyst, right     Bilateral leg edema      JOSÉ on CKD Stage 3-likely pre-renal in the setting of volume depletion from diuretics.  Continue IVF.  Will consult Nephrology.  STOP all BP medicines now.  HCTZ not a good idea with her anyway due to advanced kidney disease.    2. Right leg cellulitis-continue IV rocephin.  ID consulted to due to strep bacteremia.  Probable source id the right leg.  Consulted wound care also.  Keep leg elevated.      3. Strep bacteremia-continue ceftriaxone.  ID consulted.    4. Right knee cyst-painful to touch and might need aspiration.  Consult Ortho to look at it.    5. B/L leg edema-chronic issue.  Had venous dopplers done June 2023 and negative for any DVT.  Keep leg elevated while in bed.  Has some lymphedema also.    6. UTI with gram negative anna-possible E. Coli.  Continue ceftriaxone.    7. PAF-continue Eliquis 5 mg BID.  Monitor HR.    PLAN:  -AS ABOVE.  NEEDS TO STAY AT LEAST THROUGH THIS WEEKEND.  MADE INPATIENT TODAY.  SPOKE TO  IN DETAIL.    DVT prophylaxis:  Medical DVT prophylaxis orders are present.    CODE STATUS:    Level Of Support Discussed With: Patient; Health Care Surrogate  Code Status (Patient has no pulse and is not breathing): CPR (Attempt to Resuscitate)  Medical Interventions (Patient has pulse or is breathing): Full Support      Disposition:  I expect patient to be discharged 3 DAYS AT LEAST.    This patient has been examined wearing appropriate Personal Protective Equipment and discussed with hospital infection control department. 07/14/23      Electronically signed by Delfino Barbosa MD, 07/14/23, 12:07 EDT.  [facility] Hospitalist Team

## 2023-07-14 NOTE — SIGNIFICANT NOTE
07/14/23 1248   Rehab Time/Intention   Session Not Performed patient/family declined evaluation  (Pt refused evaluation this date, but  reports regardless of functional level patient will dc home as they have an adequate setup and family home to provide 24/7 assistance.)   Recommendation   OT - Next Appointment 07/15/23

## 2023-07-14 NOTE — CONSULTS
Infectious Diseases Consult Note    Referring Provider: Delfino Barbosa MD    Reason for Consultation: Positive blood culture, right leg cellulitis    Patient Care Team:  Ian Cordero MD as PCP - General  Tony Parisi MD as Consulting Physician (Nephrology)    Chief complaint right leg pain-mass to the medial aspect of the right knee, fatigue    Subjective     The patient has been afebrile for the last 24 hours.  The patient is on room air, hemodynamically stable, and is tolerating antimicrobial therapy.    History of present illness:      This is a 73-year-old female who presents to the hospital on 7/13/2023 for primary care office due to worsening creatinine.  Patient is a poor historian but family members at bedside to help with history.  Apparently patient has been worked up and started to be treated for sarcoidosis by her rheumatologist.  Patient's methotrexate was stopped on 7/3/2023 but patient continues to be on prednisone.  Denies fever or chills but is having some general shortness of breath without a productive cough.  Denies GI symptoms other than some weight loss and anorexia recently.  Denies urinary symptoms.  Patient did have some swelling in her lower legs and has an mass to the medial aspect of her right knee that is very painful.  Patient also was recently worked up for worsening abdominal distention.  Patient has a suppose an allergy to cefdinir but appears to be tolerating IV Rocephin without any issues.  Denies coming in with a line or port or having any heart valve surgery in the past.  Patient is mainly wheelchair-bound although she can transfer with a walker      Review of Systems   Review of Systems   HENT: Negative.     Eyes: Negative.    Respiratory:  Positive for cough and shortness of breath.    Cardiovascular:  Positive for leg swelling.   Gastrointestinal: Negative.    Endocrine: Negative.    Genitourinary: Negative.    Musculoskeletal: Negative.    Skin:  Positive for  color change.   Neurological:  Positive for weakness.   Psychiatric/Behavioral: Negative.     All other systems reviewed and are negative.    Medications  Medications Prior to Admission   Medication Sig Dispense Refill Last Dose    apixaban (ELIQUIS) 2.5 MG tablet tablet Take 1 tablet by mouth 2 (Two) Times a Day.       atorvastatin (LIPITOR) 20 MG tablet Take 1 tablet by mouth Daily.       doxycycline (VIBRAMYCIN) 100 MG capsule Take 1 capsule by mouth 2 (Two) Times a Day.       ferrous sulfate 325 (65 FE) MG tablet Take 1 tablet by mouth Daily With Breakfast.       folic acid (FOLVITE) 1 MG tablet Take 1 tablet by mouth Daily.       furosemide (LASIX) 20 MG tablet Take 1 tablet by mouth Daily for 30 days. 30 tablet 0     hydroCHLOROthiazide (HYDRODIURIL) 25 MG tablet Take 1 tablet by mouth Daily.       irbesartan (AVAPRO) 75 MG tablet Take 1 tablet by mouth Every Night. 30 tablet 3     levothyroxine (SYNTHROID, LEVOTHROID) 50 MCG tablet 1 tablet Every Morning.       pantoprazole (PROTONIX) 40 MG EC tablet Take 1 tablet by mouth Daily.       predniSONE (DELTASONE) 5 MG tablet Take 3 tablets by mouth Daily.       traZODone (DESYREL) 50 MG tablet Take 2 tablets by mouth At Night As Needed for Sleep.          History  Past Medical History:   Diagnosis Date    A-fib     Coronary artery disease      Past Surgical History:   Procedure Laterality Date    BREAST BIOPSY      HYSTERECTOMY      JOINT REPLACEMENT Right 2015       Family History  Family History   Problem Relation Age of Onset    Breast cancer Sister        Social History   reports that she has never smoked. She has never been exposed to tobacco smoke. She has never used smokeless tobacco. She reports that she does not drink alcohol and does not use drugs.    Allergies  Codeine, Topiramate, Tramadol, Cefdinir, Levofloxacin, and Lisinopril    Objective     Vital Signs   Vital Signs (last 24 hours)         07/13 0700  07/14 0659 07/14 0700  07/14 1630   Most  Recent      Temp (°F) 97.4 -  98    97.2 -  97.3     97.3 (36.3) 07/14 1157    Heart Rate 64 -  140    70 -  71     71 07/14 1157    Resp 18 -  21    20 -  21     21 07/14 1157    BP 92/58 -  121/53    98/79 -  108/41     98/79 07/14 1157    SpO2 (%) 91 -  99    93 -  95     93 07/14 1157            Physical Exam:  Physical Exam  Vitals reviewed.   Constitutional:       Appearance: She is well-developed. She is ill-appearing.      Comments: Appears thin   HENT:      Head: Normocephalic.   Eyes:      Pupils: Pupils are equal, round, and reactive to light.   Cardiovascular:      Rate and Rhythm: Normal rate and regular rhythm.      Heart sounds: Normal heart sounds.   Pulmonary:      Effort: Pulmonary effort is normal.      Comments: Diminished in lower lobes  Abdominal:      General: Bowel sounds are normal. There is distension.      Palpations: Abdomen is soft.   Musculoskeletal:      Cervical back: Neck supple.   Skin:     General: Skin is warm and dry.      Findings: Erythema present.      Comments: Patient has a fluctuant mass in the medial aspect of the knee which is red and very tender.  Patient also has another small reddened patch to the lateral aspect of the knee with no significant warmth or induration.  No significant swelling or erythema to the rest of the right leg   Neurological:      Mental Status: She is alert.      Comments: Alert and oriented x2 but seems somewhat confused and lethargic       Microbiology  Microbiology Results (last 10 days)       Procedure Component Value - Date/Time    Blood Culture - Blood, Arm, Left [[id number]]  (Abnormal) Collected: 07/13/23 1006    Lab Status: Preliminary result Specimen: Blood from Arm, Left Updated: 07/14/23 0634     Blood Culture Abnormal Stain     Gram Stain Aerobic Bottle Gram positive cocci in chains    Blood Culture ID, PCR - Blood, Arm, Left [[id number]]  (Abnormal) Collected: 07/13/23 1006    Lab Status: Final result Specimen: Blood from Arm, Left  Updated: 07/14/23 0634     BCID, PCR Streptococcus spp, not A, B, or pneumoniae. Identification by BCID2 PCR.     BOTTLE TYPE Aerobic Bottle    Urine Culture - Urine, Straight Cath [[id number]]  (Abnormal) Collected: 07/13/23 0944    Lab Status: Preliminary result Specimen: Urine from Straight Cath Updated: 07/14/23 0751     Urine Culture >100,000 CFU/mL Gram Negative Bacilli    Narrative:      Colonization of the urinary tract without infection is common. Treatment is discouraged unless the patient is symptomatic, pregnant, or undergoing an invasive urologic procedure.    Blood Culture - Blood, Arm, Left [[id number]]  (Normal) Collected: 07/13/23 0852    Lab Status: Preliminary result Specimen: Blood from Arm, Left Updated: 07/14/23 0902     Blood Culture No growth at 24 hours    Narrative:      Less than seven (7) mL's of blood was collected.  Insufficient quantity may yield false negative results.            Laboratory  Results from last 7 days   Lab Units 07/14/23  0310   WBC 10*3/mm3 9.80   HEMOGLOBIN g/dL 7.2*   HEMATOCRIT % 22.0*   PLATELETS 10*3/mm3 179     Results from last 7 days   Lab Units 07/14/23  0309   SODIUM mmol/L 137   POTASSIUM mmol/L 4.2   CHLORIDE mmol/L 108*   CO2 mmol/L 18.0*   BUN mg/dL 73*   CREATININE mg/dL 2.71*   GLUCOSE mg/dL 92   CALCIUM mg/dL 8.5*     Results from last 7 days   Lab Units 07/14/23  0309   SODIUM mmol/L 137   POTASSIUM mmol/L 4.2   CHLORIDE mmol/L 108*   CO2 mmol/L 18.0*   BUN mg/dL 73*   CREATININE mg/dL 2.71*   GLUCOSE mg/dL 92   CALCIUM mg/dL 8.5*                   Radiology  Imaging Results (Last 72 Hours)       Procedure Component Value Units Date/Time    MRI Knee Right Without Contrast [[id number]] Resulted: 07/14/23 1616     Updated: 07/14/23 1617    XR Knee 1 or 2 View Right [[id number]] Collected: 07/13/23 1349     Updated: 07/13/23 1352    Narrative:      XR KNEE 1 OR 2 VW RIGHT    Date of Exam: 7/13/2023 1:22 PM EDT    Indication: swelling    Comparison:  7/8/2023.    Findings:  There is continued severe tricompartmental joint narrowing with moderate spurring. There is a small suprapatellar effusion. There is no fracture or dislocation.      Impression:      Impression:  Degenerative changes. Small effusion.      Electronically Signed: Juliane Baker MD    7/13/2023 1:50 PM EDT    Workstation ID: [id number]            Cardiology      Results Review:  I have reviewed all clinical data, test, lab, and imaging results.       Schedule Meds  apixaban, 2.5 mg, Oral, BID  atorvastatin, 20 mg, Oral, Nightly  cefTRIAXone, 2,000 mg, Intravenous, Q24H  folic acid, 1 mg, Oral, Daily  levothyroxine, 50 mcg, Oral, Q AM  pantoprazole, 40 mg, Oral, Daily  predniSONE, 15 mg, Oral, Daily  senna-docusate sodium, 2 tablet, Oral, BID  sodium chloride, 10 mL, Intravenous, Q12H        Infusion Meds  sodium chloride, 75 mL/hr, Last Rate: 75 mL/hr (07/14/23 0904)        PRN Meds    acetaminophen    senna-docusate sodium **AND** polyethylene glycol **AND** bisacodyl **AND** bisacodyl    ondansetron    [COMPLETED] Insert Peripheral IV **AND** sodium chloride    sodium chloride    sodium chloride    traZODone      Assessment & Plan       Assessment    Positive blood culture in 1 out of 2 sets on admission for Streptococcus species.  Patient had no endovascular device or prior cardiac valve replacement.  We are suspecting contamination.    Bacteriuria.  Urine cultures are growing gram-negative bacilli.  Urinalysis was not significantly abnormal.    Possible right medial knee abscess.    Recent work-up for sarcoidosis by an outpatient rheumatologist.  Patient was taken off methotrexate on 7/3/2023.  Still on prednisone.  Patient is likely still somewhat immunocompromised    Acute kidney injury-nephrology following    Plan    Continue IV Rocephin for now-patient is tolerating without issue although she has a reported allergy to cefdinir  MRI of the right knee was scheduled for later  today  Orthopedic surgery has already been consulted  Waiting on urine culture to finalize  Continue supportive care  A.m. labs  Discussed with hospitalist    Gayathri Briggs, JOE  07/14/23  16:30 EDT    Note is dictated utilizing voice recognition software/Dragon

## 2023-07-14 NOTE — CONSULTS
Orthopaedic & Sports Medicine Surgery  Dr. Sixto Baron MD  [phone]    Orthopaedic Surgery  Consult Note      HPI:  Patient is a 73 y.o. female who presents to the hospital after seeing her PCP but having worsening kidney function as well as UTI.  She has a past medical history of paroxysmal atrial fibrillation, hypertension, hypothyroidism and fluid accumulation in the abdomen lower extremities and recently worked up for sarcoidosis.  She is a poor historian at baseline but family helps with history.  She has JOSÉ on CKD.  She only ambulates small distances with a walker and is mainly wheelchair-bound.  She has some erythematous lesions on the lower extremity and 1 on the medial side of the right knee and orthopedics was consulted.  Sounds like she probably had some bronchitis and was started on doxycycline 2 days ago by her PCP.  Her oral intake was poor for the family.  Because of the red spots and potential abscess orthopedic surgery was consulted.  Per the family these have been present for a little while but gotten a little more worse recently.    MEDICAL HISTORY  Past Medical History:   Diagnosis Date    A-fib     Coronary artery disease      Past Surgical History:   Procedure Laterality Date    BREAST BIOPSY      HYSTERECTOMY      JOINT REPLACEMENT Right 2015     Prior to Admission medications    Medication Sig Start Date End Date Taking? Authorizing Provider   apixaban (ELIQUIS) 2.5 MG tablet tablet Take 1 tablet by mouth 2 (Two) Times a Day.   Yes ProviderJoy MD   atorvastatin (LIPITOR) 20 MG tablet Take 1 tablet by mouth Daily. 9/13/22  Yes Joy Qureshi MD   doxycycline (VIBRAMYCIN) 100 MG capsule Take 1 capsule by mouth 2 (Two) Times a Day. 7/12/23 7/22/23 Yes ProviderJoy MD   ferrous sulfate 325 (65 FE) MG tablet Take 1 tablet by mouth Daily With Breakfast.   Yes ProviderJoy MD   folic acid (FOLVITE) 1 MG tablet Take 1 tablet by mouth Daily.   Yes  "ProviderJoy MD   furosemide (LASIX) 20 MG tablet Take 1 tablet by mouth Daily for 30 days. 6/30/23 7/30/23 Yes Kadie Rice MD   hydroCHLOROthiazide (HYDRODIURIL) 25 MG tablet Take 1 tablet by mouth Daily.   Yes Joy Qureshi MD   irbesartan (AVAPRO) 75 MG tablet Take 1 tablet by mouth Every Night. 5/17/22  Yes Sang Sanford MD   levothyroxine (SYNTHROID, LEVOTHROID) 50 MCG tablet 1 tablet Every Morning. 3/16/23  Yes Joy Qureshi MD   pantoprazole (PROTONIX) 40 MG EC tablet Take 1 tablet by mouth Daily.   Yes Joy Qureshi MD   predniSONE (DELTASONE) 5 MG tablet Take 3 tablets by mouth Daily. 6/5/23  Yes Joy Qureshi MD   traZODone (DESYREL) 50 MG tablet Take 2 tablets by mouth At Night As Needed for Sleep.   Yes Joy Qureshi MD     Allergies   Allergen Reactions    Codeine Nausea And Vomiting, Other (See Comments) and Nausea Only     nausea  nausea      Topiramate Rash    Tramadol Other (See Comments) and Mental Status Change    Cefdinir Unknown - High Severity    Levofloxacin Nausea Only    Lisinopril Cough     Most Recent Immunizations   Administered Date(s) Administered    Tdap 05/14/2022     Social History     Tobacco Use    Smoking status: Never     Passive exposure: Never    Smokeless tobacco: Never   Substance Use Topics    Alcohol use: Never      Social History     Substance and Sexual Activity   Drug Use Never       VITALS: /52 (BP Location: Right arm, Patient Position: Lying)   Pulse 69   Temp 98 °F (36.7 °C) (Oral)   Resp 23   Ht 167.6 cm (66\")   Wt 57.2 kg (126 lb 1.7 oz)   SpO2 93%   BMI 20.35 kg/m²  Body mass index is 20.35 kg/m².    PHYSICAL EXAM:   CONSTITUTIONAL: No acute distress  LUNGS: Equal chest rise, no shortness of air  CARDIOVASCULAR: palpable peripheral pulses  EXTREMITY:   Right lower extremity:  She does have a small fluctuance that appears to be superficial over the medial side of the knee.  There is no knee " effusion.  No redness or warmth of the knee.  She also has a little area of erythema over the distal lateral aspect of the knee but no palpable fluctuance.  She does have some cellulitis redness appearance around the mid tibia over the anterior lateral aspect.  Looks like there is maybe an old blister but no open wound at this time.  She has no real pain with knee range of motion.  Especially no short arc range of motion pain.  Brisk capillary refill.  Appears to be grossly motor or sensory intact in the lower extremity.        RADIOLOGY REVIEW:   XR Knee 1 or 2 View Right    Result Date: 7/13/2023  Impression: Degenerative changes. Small effusion. Electronically Signed: Juliane Baker MD  7/13/2023 1:50 PM EDT  Workstation ID: [id number]    XR Knee 1 or 2 View Right    Result Date: 7/8/2023  Impression: Moderate tricompartment arthritis. No fractures or dislocations. Electronically Signed: Bridgett Gaspar  7/8/2023 2:18 PM EDT  Workstation ID: [id number]    XR Chest 1 View    Result Date: 7/8/2023  Impression: No acute cardiopulmonary abnormality. Electronically Signed: Bridgett Gaspar  7/8/2023 2:17 PM EDT  Workstation ID: [id number]    MRI Knee Right Without Contrast    Result Date: 7/14/2023  Impression: 1.Prominent diffuse edema in the soft tissues surrounding the knee which may indicate skin and soft tissue infection. 2.2.8 x 0.8 x 2.5 cm collection is seen in the subcutaneous tissues along the medial femoral condyle which is nonspecific but could represent an abscess. 3.No significant knee effusion or significant marrow edema signal is seen on this exam to suggest joint infection. If there is high index of clinical concern, aspiration would be advised. 4.Advanced tricompartmental osteoarthritis with mild subchondral edema at the median ridge of the patella. 5.Limited visualization of the menisci on this exam with suspicion for tears of the posterior horns. Electronically Signed: Cj Turk  7/14/2023 4:51 PM EDT   Workstation ID: [id number]     LABS:   Results for the past 24 hours:   Recent Results (from the past 24 hour(s))   ECG 12 Lead Rhythm Change    Collection Time: 07/13/23 11:05 PM   Result Value Ref Range    QT Interval 342 ms   Basic Metabolic Panel    Collection Time: 07/14/23  3:09 AM    Specimen: Blood   Result Value Ref Range    Glucose 92 65 - 99 mg/dL    BUN 73 (H) 8 - 23 mg/dL    Creatinine 2.71 (H) 0.57 - 1.00 mg/dL    Sodium 137 136 - 145 mmol/L    Potassium 4.2 3.5 - 5.2 mmol/L    Chloride 108 (H) 98 - 107 mmol/L    CO2 18.0 (L) 22.0 - 29.0 mmol/L    Calcium 8.5 (L) 8.6 - 10.5 mg/dL    BUN/Creatinine Ratio 26.9 (H) 7.0 - 25.0    Anion Gap 11.0 5.0 - 15.0 mmol/L    eGFR 17.8 (L) >60.0 mL/min/1.73   Ferritin    Collection Time: 07/14/23  3:09 AM    Specimen: Blood   Result Value Ref Range    Ferritin 216.40 (H) 13.00 - 150.00 ng/mL   Iron Profile    Collection Time: 07/14/23  3:09 AM    Specimen: Blood   Result Value Ref Range    Iron 19 (L) 37 - 145 mcg/dL    Iron Saturation (TSAT) 11 (L) 20 - 50 %    Transferrin 114 (L) 200 - 360 mg/dL    TIBC 170 (L) 298 - 536 mcg/dL   CBC (No Diff)    Collection Time: 07/14/23  3:10 AM    Specimen: Blood   Result Value Ref Range    WBC 9.80 3.40 - 10.80 10*3/mm3    RBC 2.28 (L) 3.77 - 5.28 10*6/mm3    Hemoglobin 7.2 (L) 12.0 - 15.9 g/dL    Hematocrit 22.0 (L) 34.0 - 46.6 %    MCV 96.5 79.0 - 97.0 fL    MCH 31.8 26.6 - 33.0 pg    MCHC 33.0 31.5 - 35.7 g/dL    RDW 19.5 (H) 12.3 - 15.4 %    RDW-SD 63.9 (H) 37.0 - 54.0 fl    MPV 7.2 6.0 - 12.0 fL    Platelets 179 140 - 450 10*3/mm3       IMPRESSION:  Patient is a 73 y.o. female with JOSÉ on CKD with UTI and orthopedics consulted for potential abscess in lower extremity.  There is some fluctuance over the anterior medial aspect of the knee.  On exam and on the MRI there does not appear to be an actual effusion.  I do not think this communicates with the knee itself.  I do not think she has a septic knee.  This may be a  superficial fluid collection, potential abscess.  The MRI was obtained.  There is little redness over the distal lateral aspect of the knee but no palpable fluctuance.  On the MRI she does have some soft tissue edema as well.    PLAN:   Admited to: Krzysztof Nj MD  Patient does not appear to have a septic knee.  No orthopedic surgical intervention planned at this time as the knee itself does not appear to be involved in this is a superficial fluid collection.  There is no signs or symptoms of septic knee.  If want to drain the fluid collection defer to infectious disease or interventional radiology.  ID is consulted and currently on ceftriaxone.    Sixto Baron MD  [facility]  Orthopaedic Surgery, Sports Medicine  [phone]

## 2023-07-14 NOTE — CONSULTS
NEPHROLOGY CONSULTATION-----[facility]    [facility]  [phone]  Tony Parisi MD    Patient Care Team:  Ian Cordero MD as PCP - General  Tony Parisi MD as Consulting Physician (Nephrology)    CC/REASON FOR CONSULTATION: Elevated creatinine    PHYSICIAN REQUESTING CONSULTATION: Dr. Barbosa    History of Present Illness  73-year-old female with past medical history of hypertension, paroxysmal atrial fibrillation, recently suspected of sarcoidosis presents to the hospital with worsening renal function.  Her creatinine was noted to be 2.7.  Baseline creatinine 1.2-1.5 at least for over a year.  She was on hydrochlorothiazide, Lasix, irbesartan.  She was also on meloxicam prior to admission.  Denies any vomiting diarrhea.  No dysuria gross hematuria.  No chest pain or shortness of breath.  UA benign.  She has had decreased p.o. intake.  Her blood pressure in the 90s on admission.    Review of Systems   As noted above otherwise systems reviewed and were negative.    Past Medical History:   Diagnosis Date    A-fib     Coronary artery disease        Past Surgical History:   Procedure Laterality Date    BREAST BIOPSY      HYSTERECTOMY      JOINT REPLACEMENT Right 2015       Family History   Problem Relation Age of Onset    Breast cancer Sister        Social History     Tobacco Use    Smoking status: Never     Passive exposure: Never    Smokeless tobacco: Never   Vaping Use    Vaping Use: Never used   Substance Use Topics    Alcohol use: Never    Drug use: Never       Home Meds:   Medications Prior to Admission   Medication Sig Dispense Refill Last Dose    apixaban (ELIQUIS) 2.5 MG tablet tablet Take 1 tablet by mouth 2 (Two) Times a Day.       atorvastatin (LIPITOR) 20 MG tablet Take 1 tablet by mouth Daily.       doxycycline (VIBRAMYCIN) 100 MG capsule Take 1 capsule by mouth 2 (Two) Times a Day.       ferrous sulfate 325 (65 FE) MG tablet Take 1  tablet by mouth Daily With Breakfast.       folic acid (FOLVITE) 1 MG tablet Take 1 tablet by mouth Daily.       furosemide (LASIX) 20 MG tablet Take 1 tablet by mouth Daily for 30 days. 30 tablet 0     hydroCHLOROthiazide (HYDRODIURIL) 25 MG tablet Take 1 tablet by mouth Daily.       irbesartan (AVAPRO) 75 MG tablet Take 1 tablet by mouth Every Night. 30 tablet 3     levothyroxine (SYNTHROID, LEVOTHROID) 50 MCG tablet 1 tablet Every Morning.       pantoprazole (PROTONIX) 40 MG EC tablet Take 1 tablet by mouth Daily.       predniSONE (DELTASONE) 5 MG tablet Take 3 tablets by mouth Daily.       traZODone (DESYREL) 50 MG tablet Take 2 tablets by mouth At Night As Needed for Sleep.          Scheduled Meds:  apixaban, 2.5 mg, Oral, BID  atorvastatin, 20 mg, Oral, Nightly  cefTRIAXone, 2,000 mg, Intravenous, Q24H  folic acid, 1 mg, Oral, Daily  levothyroxine, 50 mcg, Oral, Q AM  pantoprazole, 40 mg, Oral, Daily  predniSONE, 15 mg, Oral, Daily  senna-docusate sodium, 2 tablet, Oral, BID  sodium chloride, 10 mL, Intravenous, Q12H        Continuous Infusions:  sodium chloride, 75 mL/hr, Last Rate: 75 mL/hr (07/14/23 0904)        PRN Meds:    acetaminophen    senna-docusate sodium **AND** polyethylene glycol **AND** bisacodyl **AND** bisacodyl    ondansetron    [COMPLETED] Insert Peripheral IV **AND** sodium chloride    sodium chloride    sodium chloride    traZODone    Allergies:  Codeine, Topiramate, Tramadol, Cefdinir, Levofloxacin, and Lisinopril    OBJECTIVE    Vital Signs  Temp:  [97.2 °F (36.2 °C)-98 °F (36.7 °C)] 97.3 °F (36.3 °C)  Heart Rate:  [] 71  Resp:  [20-21] 21  BP: ()/(40-79) 98/79    No intake/output data recorded.  I/O last 3 completed shifts:  In: 400 [P.O.:150; IV Piggyback:250]  Out: -     Physical Exam:  General Appearance: alert, appears stated age and cooperative  Head: normocephalic, without obvious abnormality and atraumatic  Eyes: conjunctivae and sclerae normal and no icterus  Neck:  supple and no JVD  Lungs: clear to auscultation and respirations regular  Heart: regular rhythm & normal rate and normal S1, S2  Chest Wall: no abnormalities observed  Abdomen: normal bowel sounds and soft, nontender  Extremities: moves extremities well, trace edema, no cyanosis cystic swelling around medial aspect of right knee.  Skin: Erythema right lower extremity and lesion present.  Neurologic: Alert, and oriented. No focal deficits    Results Review:    I reviewed the patient's new clinical results.    WBC WBC   Date Value Ref Range Status   07/14/2023 9.80 3.40 - 10.80 10*3/mm3 Final   07/13/2023 12.60 (H) 3.40 - 10.80 10*3/mm3 Final      HGB Hemoglobin   Date Value Ref Range Status   07/14/2023 7.2 (L) 12.0 - 15.9 g/dL Final   07/13/2023 8.7 (L) 12.0 - 15.9 g/dL Final      HCT Hematocrit   Date Value Ref Range Status   07/14/2023 22.0 (L) 34.0 - 46.6 % Final   07/13/2023 27.4 (L) 34.0 - 46.6 % Final      Platelets No results found for: LABPLAT   MCV MCV   Date Value Ref Range Status   07/14/2023 96.5 79.0 - 97.0 fL Final   07/13/2023 97.3 (H) 79.0 - 97.0 fL Final          Sodium Sodium   Date Value Ref Range Status   07/14/2023 137 136 - 145 mmol/L Final   07/13/2023 135 (L) 136 - 145 mmol/L Final      Potassium Potassium   Date Value Ref Range Status   07/14/2023 4.2 3.5 - 5.2 mmol/L Final   07/13/2023 4.2 3.5 - 5.2 mmol/L Final      Chloride Chloride   Date Value Ref Range Status   07/14/2023 108 (H) 98 - 107 mmol/L Final   07/13/2023 103 98 - 107 mmol/L Final      CO2 CO2   Date Value Ref Range Status   07/14/2023 18.0 (L) 22.0 - 29.0 mmol/L Final   07/13/2023 18.0 (L) 22.0 - 29.0 mmol/L Final      BUN BUN   Date Value Ref Range Status   07/14/2023 73 (H) 8 - 23 mg/dL Final   07/13/2023 82 (H) 8 - 23 mg/dL Final      Creatinine Creatinine   Date Value Ref Range Status   07/14/2023 2.71 (H) 0.57 - 1.00 mg/dL Final   07/13/2023 2.79 (H) 0.57 - 1.00 mg/dL Final      Calcium Calcium   Date Value Ref Range  Status   07/14/2023 8.5 (L) 8.6 - 10.5 mg/dL Final   07/13/2023 9.4 8.6 - 10.5 mg/dL Final      PO4 No results found for: CAPO4   Albumin Albumin   Date Value Ref Range Status   07/13/2023 2.6 (L) 3.5 - 5.2 g/dL Final      Magnesium No results found for: MG   Uric Acid No results found for: URICACID       Imaging Results (Last 72 Hours)       Procedure Component Value Units Date/Time    XR Knee 1 or 2 View Right [[id number]] Collected: 07/13/23 1349     Updated: 07/13/23 1352    Narrative:      XR KNEE 1 OR 2 VW RIGHT    Date of Exam: 7/13/2023 1:22 PM EDT    Indication: swelling    Comparison: 7/8/2023.    Findings:  There is continued severe tricompartmental joint narrowing with moderate spurring. There is a small suprapatellar effusion. There is no fracture or dislocation.      Impression:      Impression:  Degenerative changes. Small effusion.      Electronically Signed: Juliane Baker MD    7/13/2023 1:50 PM EDT    Workstation ID: [id number]              Results for orders placed during the hospital encounter of 07/13/23    XR Knee 1 or 2 View Right    Narrative  XR KNEE 1 OR 2 VW RIGHT    Date of Exam: 7/13/2023 1:22 PM EDT    Indication: swelling    Comparison: 7/8/2023.    Findings:  There is continued severe tricompartmental joint narrowing with moderate spurring. There is a small suprapatellar effusion. There is no fracture or dislocation.    Impression  Impression:  Degenerative changes. Small effusion.      Electronically Signed: Juliane Baker MD  7/13/2023 1:50 PM EDT  Workstation ID: [id number]      Results for orders placed during the hospital encounter of 07/08/23    XR Knee 1 or 2 View Right    Narrative  XR KNEE 1 OR 2 VW RIGHT    Date of Exam: 7/8/2023 1:48 PM EDT    Indication: Knee pain    Comparison: None available.    Findings:  Osteopenia. Valgus angulation. No fracture or dislocation or joint effusion. Moderate tricompartment joint space narrowing.    Impression  Impression:  Moderate  tricompartment arthritis. No fractures or dislocations.      Electronically Signed: Bridgett Chasity  7/8/2023 2:18 PM EDT  Workstation ID: [id number]      XR Chest 1 View    Narrative  XR CHEST 1 VW    Date of Exam: 7/8/2023 1:48 PM EDT    Indication: Dyspnea    Comparison: Chest x-ray 12/2/2022, CT of the chest abdomen pelvis 3/13/2023    Findings:  Lungs normal expanded. Cardiomegaly. Mildly elevated right diaphragm. No pneumothorax or pleural effusion or focal pulmonary parenchymal opacity.    Impression  Impression:  No acute cardiopulmonary abnormality.      Electronically Signed: Bridgett Chasity  7/8/2023 2:17 PM EDT  Workstation ID: [id number]        Results for orders placed during the hospital encounter of 06/26/23    Duplex Venous Lower Extremity - Right CAR    Interpretation Summary    Normal right lower extremity venous duplex scan.      ASSESSMENT / PLAN      JOSÉ (acute kidney injury)    Bacteremia due to Streptococcus    Cellulitis of right leg    Knee joint cyst, right    Bilateral leg edema    Acute UTI (urinary tract infection)    JOSÉ-prerenal in setting of hypotension, diuretics, ARB's.  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  Hold irbesartan.  Avoid ACE inhibitors or ARB's  History of paroxysmal atrial fibrillation  Cellulitis like lower extremity and right knee.  History NSAID use  Questionable history of sarcoidosis  UTI-urine culture growing greater than 100 K colonies of gram-negative rods.  Strep bacteremia    Continue IV fluid  Hold meloxicam/irbesartan/thiazide/Lasix  Avoid hypotension/nephrotoxins  ID to see  closely monitor renal function fluid status electrolytes      I discussed the patient's findings and my recommendations with patient, family, and consulting provider    Will follow along closely. Thank you for allowing us to see this patient in renal consultation.    [facility]  [phone]  MD Tony Thomas MD  07/14/23  13:30 EDT

## 2023-07-14 NOTE — PROGRESS NOTES
Received a call from pharmacy about blood culture positive for strep.  Patient was currently on doxycycline.  We decided to start Rocephin 2 g daily for 5 days and stop the doxycycline.  We will continue to monitor.

## 2023-07-14 NOTE — SIGNIFICANT NOTE
07/14/23 1229   Rehab Time/Intention   Session Not Performed patient/family declined evaluation  (family wants more time for antibiotic to take effect so patient not as painful. plan is to return home with 24/7 assistance regardless of patient's mobility status.)   Recommendation   PT - Next Appointment 07/15/23

## 2023-07-14 NOTE — PLAN OF CARE
Goal Outcome Evaluation:  Plan of Care Reviewed With: patient        Progress: no change  Outcome Evaluation: Patient denies pain so far this shift. ID, ortho, and nephrology consulted. MRI right knee ordered. NS infusing.  at bedside. q2 turn. Pt alert and oriented, sleeping between care. wound care saw pt in regards to wound on right leg. on IV abx. Will monitor.

## 2023-07-14 NOTE — PLAN OF CARE
Problem: Adult Inpatient Plan of Care  Goal: Plan of Care Review  7/13/2023 2237 by Jennie Zelaya RN  Outcome: Ongoing, Progressing  7/13/2023 1859 by Jennie Zelaya RN  Outcome: Ongoing, Progressing  Goal: Patient-Specific Goal (Individualized)  7/13/2023 2237 by Jennie Zelaya RN  Outcome: Ongoing, Progressing  7/13/2023 1859 by Jennie Zelaya RN  Outcome: Ongoing, Progressing  Goal: Absence of Hospital-Acquired Illness or Injury  7/13/2023 2237 by Jennie Zelaya RN  Outcome: Ongoing, Progressing  7/13/2023 1859 by Jennie Zelaya RN  Outcome: Ongoing, Progressing  Intervention: Identify and Manage Fall Risk  Recent Flowsheet Documentation  Taken 7/13/2023 2200 by Jennie Zelaya RN  Safety Promotion/Fall Prevention: safety round/check completed  Taken 7/13/2023 2018 by Jennie Zelaya RN  Safety Promotion/Fall Prevention: safety round/check completed  Taken 7/13/2023 1800 by Jennie Zelaya RN  Safety Promotion/Fall Prevention: safety round/check completed  Taken 7/13/2023 1449 by Jennie Zelaya RN  Safety Promotion/Fall Prevention: safety round/check completed  Goal: Optimal Comfort and Wellbeing  7/13/2023 2237 by Jennie Zelaya RN  Outcome: Ongoing, Progressing  7/13/2023 1859 by Jennie Zelaya RN  Outcome: Ongoing, Progressing  Intervention: Provide Person-Centered Care  Recent Flowsheet Documentation  Taken 7/13/2023 2018 by Jennie Zelaya RN  Trust Relationship/Rapport: care explained  Goal: Readiness for Transition of Care  7/13/2023 2237 by Jennie Zelaya RN  Outcome: Ongoing, Progressing  7/13/2023 1859 by Jennie Zelaya RN  Outcome: Ongoing, Progressing  Intervention: Mutually Develop Transition Plan  Recent Flowsheet Documentation  Taken 7/13/2023 1420 by Jennie Zelaya RN  Equipment Currently Used at Home:   wheelchair   walker, james  Taken 7/13/2023 1417 by Jennie Zelaya RN  Transportation Anticipated: family or friend will provide  Patient/Family  Anticipated Services at Transition:   Patient/Family Anticipates Transition to: home with family     Problem: Skin Injury Risk Increased  Goal: Skin Health and Integrity  7/13/2023 2237 by Jennie Zelaya, RN  Outcome: Ongoing, Progressing  7/13/2023 1859 by Jennie Zelaya, RN  Outcome: Ongoing, Progressing  Intervention: Optimize Skin Protection  Recent Flowsheet Documentation  Taken 7/13/2023 1449 by Jennie Zelaya, RN  Pressure Reduction Devices: pressure-redistributing mattress utilized   Goal Outcome Evaluation:

## 2023-07-15 LAB
BACTERIA SPEC AEROBE CULT: ABNORMAL
BACTERIA SPEC AEROBE CULT: ABNORMAL
GRAM STN SPEC: ABNORMAL
HAPTOGLOB SERPL-MCNC: 173 MG/DL (ref 30–200)
HEMOCCULT STL QL IA: POSITIVE
ISOLATED FROM: ABNORMAL

## 2023-07-15 PROCEDURE — 97162 PT EVAL MOD COMPLEX 30 MIN: CPT

## 2023-07-15 PROCEDURE — 99221 1ST HOSP IP/OBS SF/LOW 40: CPT | Performed by: SURGERY

## 2023-07-15 PROCEDURE — 97535 SELF CARE MNGMENT TRAINING: CPT

## 2023-07-15 PROCEDURE — 25010000002 NA FERRIC GLUC CPLX PER 12.5 MG: Performed by: NURSE PRACTITIONER

## 2023-07-15 PROCEDURE — 25010000002 CEFTRIAXONE PER 250 MG: Performed by: INTERNAL MEDICINE

## 2023-07-15 PROCEDURE — 82274 ASSAY TEST FOR BLOOD FECAL: CPT | Performed by: HOSPITALIST

## 2023-07-15 PROCEDURE — 97530 THERAPEUTIC ACTIVITIES: CPT

## 2023-07-15 PROCEDURE — 63710000001 PREDNISONE PER 5 MG: Performed by: INTERNAL MEDICINE

## 2023-07-15 RX ORDER — SODIUM BICARBONATE 650 MG/1
1300 TABLET ORAL 3 TIMES DAILY
Status: DISCONTINUED | OUTPATIENT
Start: 2023-07-15 | End: 2023-07-16

## 2023-07-15 RX ADMIN — SODIUM BICARBONATE 1300 MG: 650 TABLET ORAL at 20:14

## 2023-07-15 RX ADMIN — Medication 10 ML: at 08:49

## 2023-07-15 RX ADMIN — FOLIC ACID 1 MG: 1 TABLET ORAL at 08:49

## 2023-07-15 RX ADMIN — APIXABAN 2.5 MG: 2.5 TABLET, FILM COATED ORAL at 08:49

## 2023-07-15 RX ADMIN — Medication 10 ML: at 20:14

## 2023-07-15 RX ADMIN — PREDNISONE 15 MG: 5 TABLET ORAL at 08:49

## 2023-07-15 RX ADMIN — CEFTRIAXONE 2000 MG: 2 INJECTION, POWDER, FOR SOLUTION INTRAMUSCULAR; INTRAVENOUS at 06:09

## 2023-07-15 RX ADMIN — TRAZODONE HYDROCHLORIDE 100 MG: 50 TABLET ORAL at 20:14

## 2023-07-15 RX ADMIN — SODIUM BICARBONATE 1300 MG: 650 TABLET ORAL at 16:19

## 2023-07-15 RX ADMIN — SODIUM CHLORIDE 100 ML/HR: 9 INJECTION, SOLUTION INTRAVENOUS at 16:02

## 2023-07-15 RX ADMIN — ATORVASTATIN CALCIUM 20 MG: 20 TABLET, FILM COATED ORAL at 20:14

## 2023-07-15 RX ADMIN — PANTOPRAZOLE SODIUM 40 MG: 40 TABLET, DELAYED RELEASE ORAL at 08:49

## 2023-07-15 RX ADMIN — ACETAMINOPHEN 650 MG: 325 TABLET, FILM COATED ORAL at 04:02

## 2023-07-15 RX ADMIN — SODIUM CHLORIDE 250 MG: 9 INJECTION, SOLUTION INTRAVENOUS at 20:14

## 2023-07-15 RX ADMIN — SODIUM BICARBONATE 1300 MG: 650 TABLET ORAL at 08:49

## 2023-07-15 RX ADMIN — SENNOSIDES AND DOCUSATE SODIUM 2 TABLET: 50; 8.6 TABLET ORAL at 20:14

## 2023-07-15 RX ADMIN — SENNOSIDES AND DOCUSATE SODIUM 2 TABLET: 50; 8.6 TABLET ORAL at 08:49

## 2023-07-15 NOTE — PROGRESS NOTES
"NEPHROLOGY PROGRESS NOTE------[facility]    [facility]  [phone]  Tony Parisi MD      Patient Care Team:  Ian Cordero MD as PCP - Tony Navarro MD as Consulting Physician (Nephrology)      Provider:  Tony Parisi MD  Patient Name: Rosario Ortez  :  1950    SUBJECTIVE:  Follow-up JOSÉ/CKD  No chest pain shortness of breath    Medication:  apixaban, 2.5 mg, Oral, BID  atorvastatin, 20 mg, Oral, Nightly  cefTRIAXone, 2,000 mg, Intravenous, Q24H  folic acid, 1 mg, Oral, Daily  levothyroxine, 50 mcg, Oral, Q AM  pantoprazole, 40 mg, Oral, Daily  predniSONE, 15 mg, Oral, Daily  senna-docusate sodium, 2 tablet, Oral, BID  sodium bicarbonate, 1,300 mg, Oral, TID  sodium chloride, 10 mL, Intravenous, Q12H      sodium chloride, 100 mL/hr, Last Rate: 100 mL/hr (23 1905)        OBJECTIVE    Vital Sign Min/Max for last 24 hours  Temp  Min: 97.3 °F (36.3 °C)  Max: 98 °F (36.7 °C)   BP  Min: 103/48  Max: 120/50   Pulse  Min: 62  Max: 73   Resp  Min: 15  Max: 27   SpO2  Min: 90 %  Max: 96 %   No data recorded   No data recorded     Flowsheet Rows      Flowsheet Row First Filed Value   Admission Height 167.6 cm (66\") Documented at 2023   Admission Weight 57.2 kg (126 lb 1.7 oz) Documented at 2023 0814            I/O this shift:  In: 360 [P.O.:360]  Out: -   I/O last 3 completed shifts:  In: 870 [P.O.:870]  Out: 600 [Urine:600]    Physical Exam:  General Appearance: alert, appears stated age and cooperative  Head: normocephalic, without obvious abnormality and atraumatic  Eyes: conjunctivae and sclerae normal and no icterus  Neck: supple and no JVD  Lungs: clear to auscultation and respirations regular  Heart: regular rhythm & normal rate and normal S1, S2  Chest: Wall no abnormalities observed  Abdomen: normal bowel sounds and soft, nontender  Extremities: moves extremities well, no edema, no cyanosis   Skin: no " bleeding, bruising.  Cystic lesion medial aspect right knee.  Neurologic: Alert, and oriented. No focal deficits    Labs:    WBC WBC   Date Value Ref Range Status   07/14/2023 9.80 3.40 - 10.80 10*3/mm3 Final   07/14/2023 9.80 3.40 - 10.80 10*3/mm3 Final   07/13/2023 12.60 (H) 3.40 - 10.80 10*3/mm3 Final      HGB Hemoglobin   Date Value Ref Range Status   07/14/2023 7.5 (L) 12.0 - 15.9 g/dL Final   07/14/2023 7.2 (L) 12.0 - 15.9 g/dL Final   07/13/2023 8.7 (L) 12.0 - 15.9 g/dL Final      HCT Hematocrit   Date Value Ref Range Status   07/14/2023 23.5 (L) 34.0 - 46.6 % Final   07/14/2023 22.0 (L) 34.0 - 46.6 % Final   07/13/2023 27.4 (L) 34.0 - 46.6 % Final      Platelets No results found for: LABPLAT   MCV MCV   Date Value Ref Range Status   07/14/2023 97.8 (H) 79.0 - 97.0 fL Final   07/14/2023 96.5 79.0 - 97.0 fL Final   07/13/2023 97.3 (H) 79.0 - 97.0 fL Final          Sodium Sodium   Date Value Ref Range Status   07/14/2023 138 136 - 145 mmol/L Final   07/14/2023 137 136 - 145 mmol/L Final   07/13/2023 135 (L) 136 - 145 mmol/L Final      Potassium Potassium   Date Value Ref Range Status   07/14/2023 4.4 3.5 - 5.2 mmol/L Final   07/14/2023 4.2 3.5 - 5.2 mmol/L Final   07/13/2023 4.2 3.5 - 5.2 mmol/L Final      Chloride Chloride   Date Value Ref Range Status   07/14/2023 109 (H) 98 - 107 mmol/L Final   07/14/2023 108 (H) 98 - 107 mmol/L Final   07/13/2023 103 98 - 107 mmol/L Final      CO2 CO2   Date Value Ref Range Status   07/14/2023 17.0 (L) 22.0 - 29.0 mmol/L Final   07/14/2023 18.0 (L) 22.0 - 29.0 mmol/L Final   07/13/2023 18.0 (L) 22.0 - 29.0 mmol/L Final      BUN BUN   Date Value Ref Range Status   07/14/2023 75 (H) 8 - 23 mg/dL Final   07/14/2023 73 (H) 8 - 23 mg/dL Final   07/13/2023 82 (H) 8 - 23 mg/dL Final      Creatinine Creatinine   Date Value Ref Range Status   07/14/2023 2.69 (H) 0.57 - 1.00 mg/dL Final   07/14/2023 2.71 (H) 0.57 - 1.00 mg/dL Final   07/13/2023 2.79 (H) 0.57 - 1.00 mg/dL Final       Calcium Calcium   Date Value Ref Range Status   07/14/2023 8.5 (L) 8.6 - 10.5 mg/dL Final   07/14/2023 8.5 (L) 8.6 - 10.5 mg/dL Final   07/13/2023 9.4 8.6 - 10.5 mg/dL Final      PO4 No components found for: PO4   Albumin Albumin   Date Value Ref Range Status   07/13/2023 2.6 (L) 3.5 - 5.2 g/dL Final      Magnesium No results found for: MG   Uric Acid No components found for: URIC ACID     Imaging Results (Last 72 Hours)       Procedure Component Value Units Date/Time    MRI Knee Right Without Contrast [[id number]] Collected: 07/14/23 1631     Updated: 07/14/23 1653    Narrative:      MRI KNEE RIGHT  WO CONTRAST    Date of Exam: 7/14/2023 4:05 PM EDT    Indication: poss. infected joint.     Comparison: Radiographs July 13, 2023    Technique:  Routine multiplanar/multisequence images of the right knee were obtained without contrast administration.      Findings:  Osseous Structures and Intra-Articular  There is tricompartmental osteophytosis. There is mild subchondral edema of the superior median ridge of the patella presumed to be related to arthropathy. No definite fracture is seen. Alignment appears within normal limits. No definite suspicious   marrow signal abnormality is seen.    There is a small knee effusion.    Ligaments  The anterior cruciate ligament and posterior cruciate ligaments appear intact.  Medial collateral ligament and lateral collateral ligament complex appear intact.    Menisci  Meniscal evaluation is somewhat limited on this exam. There is suspected degenerative changes of the medial and lateral menisci. There is suspicion for partial thickness tear of the lateral meniscus posterior horn involving the free edge near the   posterior root. There is also suspicion for tear of the medial meniscus posterior horn with suspected horizontal defect.    Extensor compartment  The quadriceps and patellar tendons appear intact.    Cartilage  There appears to be advanced tricompartmental chondromalacia.  There is suspicion for high-grade and possibly full-thickness cartilage loss of the central weightbearing medial tibiofemoral articular cartilage. There is also suspected partial-thickness   cartilage loss of the patellofemoral compartment. There is probable small full-thickness cartilage defect at the superior median ridge of the patella with underlying subchondral edema. There is also suspected partial thickness cartilage loss of the   lateral tibiofemoral compartment. Surface type osteophyte is seen at the central lateral femoral condyle.    There are degenerative changes at the proximal tibiofibular articulation.    Soft tissues  There is prominent diffuse edema in the soft tissues surrounding the knee. At the medial aspect of the medial femoral condyle there appears to be a focal subcutaneous fluid signal collection overlying the proximal medial collateral ligament. This   measures approximately 2.8 x 0.8 cm in axial dimensions and up to 2.5 cm craniocaudal. No other definite fluid collection is seen. There is suspected trace amount of fluid in a Baker's cyst. There is mild to moderate muscle atrophy and suspected mild   muscle edema.    Miscellaneous  Popliteus tendon appears intact. Iliotibial band appears intact.      Impression:      Impression:  1.Prominent diffuse edema in the soft tissues surrounding the knee which may indicate skin and soft tissue infection.  2.2.8 x 0.8 x 2.5 cm collection is seen in the subcutaneous tissues along the medial femoral condyle which is nonspecific but could represent an abscess.  3.No significant knee effusion or significant marrow edema signal is seen on this exam to suggest joint infection. If there is high index of clinical concern, aspiration would be advised.  4.Advanced tricompartmental osteoarthritis with mild subchondral edema at the median ridge of the patella.  5.Limited visualization of the menisci on this exam with suspicion for tears of the posterior  christian.        Electronically Signed: Cj Rosalee    7/14/2023 4:51 PM EDT    Workstation ID: [id number]    XR Knee 1 or 2 View Right [[id number]] Collected: 07/13/23 1349     Updated: 07/13/23 1352    Narrative:      XR KNEE 1 OR 2 VW RIGHT    Date of Exam: 7/13/2023 1:22 PM EDT    Indication: swelling    Comparison: 7/8/2023.    Findings:  There is continued severe tricompartmental joint narrowing with moderate spurring. There is a small suprapatellar effusion. There is no fracture or dislocation.      Impression:      Impression:  Degenerative changes. Small effusion.      Electronically Signed: Juliane Baker MD    7/13/2023 1:50 PM EDT    Workstation ID: [id number]            Results for orders placed during the hospital encounter of 07/13/23    XR Knee 1 or 2 View Right    Narrative  XR KNEE 1 OR 2 VW RIGHT    Date of Exam: 7/13/2023 1:22 PM EDT    Indication: swelling    Comparison: 7/8/2023.    Findings:  There is continued severe tricompartmental joint narrowing with moderate spurring. There is a small suprapatellar effusion. There is no fracture or dislocation.    Impression  Impression:  Degenerative changes. Small effusion.      Electronically Signed: Juliane Baker MD  7/13/2023 1:50 PM EDT  Workstation ID: [id number]      Results for orders placed during the hospital encounter of 07/08/23    XR Knee 1 or 2 View Right    Narrative  XR KNEE 1 OR 2 VW RIGHT    Date of Exam: 7/8/2023 1:48 PM EDT    Indication: Knee pain    Comparison: None available.    Findings:  Osteopenia. Valgus angulation. No fracture or dislocation or joint effusion. Moderate tricompartment joint space narrowing.    Impression  Impression:  Moderate tricompartment arthritis. No fractures or dislocations.      Electronically Signed: Bridgett Gaspar  7/8/2023 2:18 PM EDT  Workstation ID: [id number]      XR Chest 1 View    Narrative  XR CHEST 1 VW    Date of Exam: 7/8/2023 1:48 PM EDT    Indication: Dyspnea    Comparison: Chest x-ray 12/2/2022, CT  of the chest abdomen pelvis 3/13/2023    Findings:  Lungs normal expanded. Cardiomegaly. Mildly elevated right diaphragm. No pneumothorax or pleural effusion or focal pulmonary parenchymal opacity.    Impression  Impression:  No acute cardiopulmonary abnormality.      Electronically Signed: Bridgett Gaspar  7/8/2023 2:17 PM EDT  Workstation ID: [id number]      Results for orders placed during the hospital encounter of 06/26/23    Duplex Venous Lower Extremity - Right CAR    Interpretation Summary    Normal right lower extremity venous duplex scan.        ASSESSMENT / PLAN      JOSÉ (acute kidney injury)    Bacteremia due to Streptococcus    Cellulitis of right leg    Knee joint cyst, right    Bilateral leg edema    Acute UTI (urinary tract infection)    JOSÉ-prerenal in setting of hypotension, diuretics, ARB's.  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  Hold irbesartan.  Avoid ACE inhibitors or ARB's  History of paroxysmal atrial fibrillation  Cellulitis like lower extremity and right knee.  History NSAID use  Questionable history of sarcoidosis  UTI-urine culture growing greater than 100 K colonies of gram-negative rods.  Strep bacteremia  Metabolic acidosis-p.o. sodium bicarb     Creatinine still up but stable  Continue IV fluid  Add p.o. sodium bicarb  closely monitor renal function fluid status electrolytes        Tony Parisi MD  [facility]  [phone]  07/15/23  12:22 EDT

## 2023-07-15 NOTE — NURSING NOTE
CM called and informed this nurse that pt went from NS to afib. Provider, Dr. Sanford informed and instructed to continue to monitor.

## 2023-07-15 NOTE — PLAN OF CARE
Goal Outcome Evaluation:  Plan of Care Reviewed With: patient, family, other (see comments) (3 daughters)           Outcome Evaluation: Pt is a 72 YO F admitted with weakness, JOSÉ, anemia, and c/o pain in R knee, imaging shows fluid collection. Pt lives at home with spouse and daughter. Pt has had a recent decline, requiring more assistance and multiple admissions, prior to this pt was ambulating in home with rollator. Pt this date AOx2 and hesistant to participate, requiring significant encouragment. Pt came to sitting EOB with MOD A and tolerated sitting for approx 5 min prior to initiating standing. Pt then stood and had been incontinent of bowels. Stool was very dark, nursing notifed and gathered sample following pt second standing attempt. Pt then had to be returned to supine for self care as she was unable to come to full standing on second attempt. PT recommendation is SNF following d/c and family talked to about recommendation extensively. Pt spouse appears resistant to recommendation, as he wants pt to return home for him to care for. PT to continue to follow while admitted.      Anticipated Discharge Disposition (PT): skilled nursing facility

## 2023-07-15 NOTE — PLAN OF CARE
Goal Outcome Evaluation: No improvement     Patient A&OX4, pleasant and cooperative with cares. With complaints of pain to low back and coccyx. Coccyx is red, blanchable. Mepilex applied at onset of shift. Patient then requested it removed recently citing increased pain.  Mepilex removed as requested. Barrier ointment applied. Patient repositioned onto side. APAP administered. Pt reports inadequate relief at 30 mins post med administration time. Pt has since been  seen resting quietly in bed with respirations even and unlabored. Patient required application of 1L o2 via NC related to o2 desat. Remains on IV ABX related to abscess to right knee. Tolerating well. IVF being administered per order. Continues on TX related to JOSÉ. Pt noted with significant BLE edema. Heels are red bilaterally. Placed off of bed with positioning pillow. Pt with petechia to chest and was noted to be bleeding heavier than expected following IV removal r/t infiltration. On call provider notified, new orders received.  Labs  collected for review by primary care team. Poor PO intake noted. Patient left resting quietly in bed on most recent safety round. Bed locked, low, clwr, fall precautions in place.

## 2023-07-15 NOTE — PROGRESS NOTES
Infectious Diseases Progress Note      LOS: 1 day   Patient Care Team:  Ian Cordero MD as PCP - General  Tony Parisi MD as Consulting Physician (Nephrology)    Chief Complaint: Right leg pain, mass to the medial aspect of the right knee, fatigue    Subjective       The patient has been afebrile for the last 24 hours.  The patient is on 1 L of oxygen by nasal cannula, hemodynamically stable, and is tolerating antimicrobial therapy.      Review of Systems:   Review of Systems   Constitutional:  Positive for fatigue.   HENT: Negative.     Eyes: Negative.    Respiratory:  Positive for cough and shortness of breath.    Cardiovascular: Negative.    Gastrointestinal: Negative.    Endocrine: Negative.    Genitourinary: Negative.    Musculoskeletal: Negative.    Skin:  Positive for color change.   Neurological:  Positive for weakness.   Psychiatric/Behavioral: Negative.     All other systems reviewed and are negative.     Objective     Vital Signs  Temp:  [97.3 °F (36.3 °C)-98 °F (36.7 °C)] 97.5 °F (36.4 °C)  Heart Rate:  [62-73] 73  Resp:  [15-27] 15  BP: (103-120)/(48-56) 107/56    Physical Exam:  Physical Exam  Vitals and nursing note reviewed.   Constitutional:       General: She is not in acute distress.     Appearance: She is well-developed and normal weight. She is ill-appearing. She is not diaphoretic.   HENT:      Head: Normocephalic and atraumatic.   Eyes:      General: No scleral icterus.     Extraocular Movements: Extraocular movements intact.      Conjunctiva/sclera: Conjunctivae normal.      Pupils: Pupils are equal, round, and reactive to light.   Cardiovascular:      Rate and Rhythm: Normal rate and regular rhythm.      Heart sounds: Normal heart sounds, S1 normal and S2 normal. No murmur heard.  Pulmonary:      Effort: Pulmonary effort is normal. No respiratory distress.      Breath sounds: No stridor. No wheezing or rales.      Comments: Diminished throughout  Chest:      Chest wall: No  tenderness.   Abdominal:      General: Bowel sounds are normal. There is distension.      Palpations: Abdomen is soft. There is no mass.      Tenderness: There is no abdominal tenderness. There is no guarding.   Musculoskeletal:         General: No swelling, tenderness or deformity.      Cervical back: Neck supple.   Skin:     General: Skin is warm and dry.      Coloration: Skin is not pale.      Findings: No bruising, erythema or rash.      Comments:  Patient has a fluctuant mass in the medial aspect of the knee which has less erythema but is  today.      The small red patch to the lateral aspect of the knee has almost completely resolved.    No significant swelling or erythema to the rest of the right leg    Neurological:      Mental Status: She is alert and oriented to person, place, and time.      Cranial Nerves: No cranial nerve deficit.        Results Review:    I have reviewed all clinical data, test, lab, and imaging results.     Radiology  MRI Knee Right Without Contrast    Result Date: 7/14/2023  MRI KNEE RIGHT  WO CONTRAST Date of Exam: 7/14/2023 4:05 PM EDT Indication: poss. infected joint.  Comparison: Radiographs July 13, 2023 Technique:  Routine multiplanar/multisequence images of the right knee were obtained without contrast administration. Findings: Osseous Structures and Intra-Articular There is tricompartmental osteophytosis. There is mild subchondral edema of the superior median ridge of the patella presumed to be related to arthropathy. No definite fracture is seen. Alignment appears within normal limits. No definite suspicious marrow signal abnormality is seen. There is a small knee effusion. Ligaments The anterior cruciate ligament and posterior cruciate ligaments appear intact.  Medial collateral ligament and lateral collateral ligament complex appear intact. Menisci Meniscal evaluation is somewhat limited on this exam. There is suspected degenerative changes of the medial and  lateral menisci. There is suspicion for partial thickness tear of the lateral meniscus posterior horn involving the free edge near the posterior root. There is also suspicion for tear of the medial meniscus posterior horn with suspected horizontal defect. Extensor compartment The quadriceps and patellar tendons appear intact. Cartilage There appears to be advanced tricompartmental chondromalacia. There is suspicion for high-grade and possibly full-thickness cartilage loss of the central weightbearing medial tibiofemoral articular cartilage. There is also suspected partial-thickness cartilage loss of the patellofemoral compartment. There is probable small full-thickness cartilage defect at the superior median ridge of the patella with underlying subchondral edema. There is also suspected partial thickness cartilage loss of the lateral tibiofemoral compartment. Surface type osteophyte is seen at the central lateral femoral condyle. There are degenerative changes at the proximal tibiofibular articulation. Soft tissues There is prominent diffuse edema in the soft tissues surrounding the knee. At the medial aspect of the medial femoral condyle there appears to be a focal subcutaneous fluid signal collection overlying the proximal medial collateral ligament. This measures approximately 2.8 x 0.8 cm in axial dimensions and up to 2.5 cm craniocaudal. No other definite fluid collection is seen. There is suspected trace amount of fluid in a Baker's cyst. There is mild to moderate muscle atrophy and suspected mild muscle edema. Miscellaneous Popliteus tendon appears intact. Iliotibial band appears intact.     Impression: 1.Prominent diffuse edema in the soft tissues surrounding the knee which may indicate skin and soft tissue infection. 2.2.8 x 0.8 x 2.5 cm collection is seen in the subcutaneous tissues along the medial femoral condyle which is nonspecific but could represent an abscess. 3.No significant knee effusion or  significant marrow edema signal is seen on this exam to suggest joint infection. If there is high index of clinical concern, aspiration would be advised. 4.Advanced tricompartmental osteoarthritis with mild subchondral edema at the median ridge of the patella. 5.Limited visualization of the menisci on this exam with suspicion for tears of the posterior horns. Electronically Signed: Cj Rosalee  7/14/2023 4:51 PM EDT  Workstation ID: [id number]     Cardiology    Laboratory    Results from last 7 days   Lab Units 07/14/23 2234 07/14/23  0310 07/13/23  0852 07/08/23  1347   WBC 10*3/mm3 9.80 9.80 12.60* 10.60   HEMOGLOBIN g/dL 7.5* 7.2* 8.7* 8.5*   HEMATOCRIT % 23.5* 22.0* 27.4* 26.8*   PLATELETS 10*3/mm3 206 179 254 170     Results from last 7 days   Lab Units 07/14/23 2234 07/14/23  0309 07/13/23 0852 07/08/23  1347   SODIUM mmol/L 138 137 135* 139   POTASSIUM mmol/L 4.4 4.2 4.2 4.0   CHLORIDE mmol/L 109* 108* 103 106   CO2 mmol/L 17.0* 18.0* 18.0* 20.0*   BUN mg/dL 75* 73* 82* 52*   CREATININE mg/dL 2.69* 2.71* 2.79* 1.45*   GLUCOSE mg/dL 112* 92 86 90   ALBUMIN g/dL  --   --  2.6*  --    BILIRUBIN mg/dL  --   --  0.9  --    ALK PHOS U/L  --   --  174*  --    AST (SGOT) U/L  --   --  40*  --    ALT (SGPT) U/L  --   --  23  --    CALCIUM mg/dL 8.5* 8.5* 9.4 9.3                 Microbiology   Microbiology Results (last 10 days)       Procedure Component Value - Date/Time    Blood Culture - Blood, Arm, Left [[id number]]  (Abnormal) Collected: 07/13/23 1006    Lab Status: Final result Specimen: Blood from Arm, Left Updated: 07/15/23 0718     Blood Culture Streptococcus, Alpha Hemolytic     Isolated from Aerobic Bottle     Gram Stain Aerobic Bottle Gram positive cocci in chains    Narrative:      Probable contaminant requires clinical correlation, susceptibility not performed unless requested by physician.      Blood Culture ID, PCR - Blood, Arm, Left [[id number]]  (Abnormal) Collected: 07/13/23 1006    Lab Status: Final  result Specimen: Blood from Arm, Left Updated: 07/14/23 0634     BCID, PCR Streptococcus spp, not A, B, or pneumoniae. Identification by BCID2 PCR.     BOTTLE TYPE Aerobic Bottle    Urine Culture - Urine, Straight Cath [[id number]]  (Abnormal)  (Susceptibility) Collected: 07/13/23 0944    Lab Status: Final result Specimen: Urine from Straight Cath Updated: 07/15/23 0358     Urine Culture >100,000 CFU/mL Klebsiella aerogenes    Narrative:      Colonization of the urinary tract without infection is common. Treatment is discouraged unless the patient is symptomatic, pregnant, or undergoing an invasive urologic procedure.    Susceptibility        Klebsiella aerogenes      ISELA      Cefazolin Resistant      Cefepime Susceptible      Ceftazidime Susceptible      Ceftriaxone Susceptible      Gentamicin Susceptible      Levofloxacin Susceptible      Nitrofurantoin Resistant      Piperacillin + Tazobactam Susceptible      Trimethoprim + Sulfamethoxazole Susceptible                           Blood Culture - Blood, Arm, Left [[id number]]  (Normal) Collected: 07/13/23 0852    Lab Status: Preliminary result Specimen: Blood from Arm, Left Updated: 07/15/23 0901     Blood Culture No growth at 2 days    Narrative:      Less than seven (7) mL's of blood was collected.  Insufficient quantity may yield false negative results.            Medication Review:       Schedule Meds  apixaban, 2.5 mg, Oral, BID  atorvastatin, 20 mg, Oral, Nightly  cefTRIAXone, 2,000 mg, Intravenous, Q24H  folic acid, 1 mg, Oral, Daily  levothyroxine, 50 mcg, Oral, Q AM  pantoprazole, 40 mg, Oral, Daily  predniSONE, 15 mg, Oral, Daily  senna-docusate sodium, 2 tablet, Oral, BID  sodium bicarbonate, 1,300 mg, Oral, TID  sodium chloride, 10 mL, Intravenous, Q12H        Infusion Meds  sodium chloride, 100 mL/hr, Last Rate: 100 mL/hr (07/14/23 1905)        PRN Meds    acetaminophen    senna-docusate sodium **AND** polyethylene glycol **AND** bisacodyl **AND**  bisacodyl    ondansetron    [COMPLETED] Insert Peripheral IV **AND** sodium chloride    sodium chloride    sodium chloride    traZODone        Assessment & Plan       Antimicrobial Therapy   1.  IV Rocephin        2.        3.        4.        5.          Assessment     Positive blood culture in 1 out of 2 sets on admission for Streptococcus species.  Patient had no endovascular device or prior cardiac valve replacement.  We are suspecting contamination.     Bacteriuria.  Urine cultures are growing Klebsiella aerogenes.  urinalysis was not significantly abnormal.     Probable right medial knee abscess.  MRI shows a fluid collection in the subcutaneous tissues along the medial femoral condyle but no findings to suggest a joint infection     Recent work-up for sarcoidosis by an outpatient rheumatologist.  Patient was taken off methotrexate on 7/3/2023.  Still on prednisone.  Patient is likely still somewhat immunocompromised     Acute kidney injury-nephrology following     Plan     Continue IV Rocephin for now-patient is tolerating without issue although she has a reported allergy to cefdinir  We will ask ask general surgery to aspirate the right medial knee fluid collection and send cultures for aerobic and anaerobic cultures  Continue supportive care  A.mJim labs  Discussed patient and family member at bedside          JOE Orlando  07/15/23  13:02 EDT    Note is dictated utilizing voice recognition software/Dragon

## 2023-07-15 NOTE — SIGNIFICANT NOTE
Nursing reported some petechia on the chest and increased bleeding when she removed an IV.  The hemoglobin dropped a little bit.  So ordered a coagulation profile with fibrinogen and fibrinogen split products PT PTT and LDH and CBC with differential to make sure there is not any coagulopathy or microscopic hemolytic anemia.

## 2023-07-15 NOTE — NURSING NOTE
1L via NC applied r/t o2 desat to 84% while on room air. O2 improved to 93%. Patient tolerated well. Lung sounds auscultated. Dim bases noted. Continues with moist productive cough. Pt left resting quietly in bed. Bed locked, low, clwr, fall precautions in place,  at bedside.

## 2023-07-15 NOTE — PLAN OF CARE
Goal Outcome Evaluation:              Outcome Evaluation: Pt is A&Ox2, disoriented to situation and time. Pleasant and cooperative with care. Pt is on regular diet, needs asst with eating,  is bedside and assists her. Able to take pills whole however needs some crushed/AS. Pt is bedfast, incontinent of bowel and bladder, ext cath. PT is recommending rehab. PT had POS occult stool, GI consulted. ID, nephro, ortho, gen surg and the wound nurse is consulting. Pt is resting in bed with call bell within reach and spouse at bedside. No s/s or c/o of pain at this time.

## 2023-07-15 NOTE — THERAPY EVALUATION
Patient Name: Rosario Ortez  : 1950    MRN: [id number]                              Today's Date: 7/15/2023       Admit Date: 2023    Visit Dx:     ICD-10-CM ICD-9-CM   1. Weakness  R53.1 780.79   2. Acute kidney injury  N17.9 584.9     Patient Active Problem List   Diagnosis    Laceration of scalp, initial encounter    Acute kidney injury    Acute cystitis    JOSÉ (acute kidney injury)    Bacteremia due to Streptococcus    Cellulitis of right leg    Knee joint cyst, right    Bilateral leg edema    Acute UTI (urinary tract infection)     Past Medical History:   Diagnosis Date    A-fib     Coronary artery disease      Past Surgical History:   Procedure Laterality Date    BREAST BIOPSY      HYSTERECTOMY      JOINT REPLACEMENT Right       General Information       Row Name 07/15/23 1835          Physical Therapy Time and Intention    Document Type evaluation  -EL     Mode of Treatment physical therapy  -       Row Name 07/15/23 1835          General Information    Patient Profile Reviewed yes  -EL     Prior Level of Function independent:;all household mobility;mod assist:;ADL's  Prior to decline. Pt recently has required much more care at home  -EL       Row Name 07/15/23 1835          Living Environment    People in Home child(clint), adult;spouse  -EL       Row Name 07/15/23 1835          Home Main Entrance    Number of Stairs, Main Entrance four  -EL       Row Name 07/15/23 1835          Stairs Within Home, Primary    Number of Stairs, Within Home, Primary none  -EL       Row Name 07/15/23 1835          Cognition    Orientation Status (Cognition) oriented to;person;place;disoriented to;time  -EL       Row Name 07/15/23 1835          Safety Issues, Functional Mobility    Impairments Affecting Function (Mobility) balance;cognition;endurance/activity tolerance;strength;pain  -EL     Cognitive Impairments, Mobility Safety/Performance awareness, need for assistance;insight into  deficits/self-awareness;problem-solving/reasoning;safety precaution awareness  -EL               User Key  (r) = Recorded By, (t) = Taken By, (c) = Cosigned By      Initials Name Provider Type    Brayden Buchanan PT Physical Therapist                   Mobility       Row Name 07/15/23 1839          Bed Mobility    Bed Mobility supine-sit;sit-supine  -EL     Supine-Sit [place] (Bed Mobility) moderate assist (50% patient effort)  -EL     Sit-Supine [place] (Bed Mobility) moderate assist (50% patient effort)  -EL     Comment, (Bed Mobility) requiring assistance to bring BLE off EOB and assistance to bring trunk over JERAD  -EL       Row Name 07/15/23 1839          Sit-Stand Transfer    Sit-Stand [place] (Transfers) moderate assist (50% patient effort);minimum assist (75% patient effort)  -EL     Comment, (Sit-Stand Transfer) Attempted x2, initially MIN A to stand to rollator, second attempt MOD A and unable to come to full standing.  -EL               User Key  (r) = Recorded By, (t) = Taken By, (c) = Cosigned By      Initials Name Provider Type    Brayden Buchanan, OLEGARIO Physical Therapist                   Obj/Interventions       Row Name 07/15/23 1841          Range of Motion Comprehensive    General Range of Motion bilateral lower extremity ROM WFL  -[facility] Name 07/15/23 1841          Strength Comprehensive (MMT)    General Manual Muscle Testing (MMT) Assessment lower extremity strength deficits identified  -EL     Comment, General Manual Muscle Testing (MMT) Assessment Nishant hip flexion 2/5 gross, remainder of BLE 3/5 gross  -[facility] Name 07/15/23 1841          Balance    Balance Assessment sitting static balance;standing dynamic balance;standing static balance  -EL     Static Sitting Balance contact guard  -EL     Static Standing Balance minimal assist  -EL     Dynamic Standing Balance moderate assist  -EL       Row Name 07/15/23 1841          Sensory Assessment (Somatosensory)    Sensory Assessment  (Somatosensory) sensation intact  -EL               User Key  (r) = Recorded By, (t) = Taken By, (c) = Cosigned By      Initials Name Provider Type    EL Brayden Powers, PT Physical Therapist                   Goals/Plan       Row Name 07/15/23 1848          Bed Mobility Goal 1 (PT)    Activity/Assistive Device (Bed Mobility Goal 1, PT) bed mobility activities, all  -EL     Divide Level/Cues Needed (Bed Mobility Goal 1, PT) standby assist  -EL     Time Frame (Bed Mobility Goal 1, PT) long term goal (LTG);2 weeks  -EL       Row Name 07/15/23 1848          Transfer Goal 1 (PT)    Activity/Assistive Device (Transfer Goal 1, PT) transfers, all;walker, rolling  -EL     Divide Level/Cues Needed (Transfer Goal 1, PT) minimum assist (75% or more patient effort)  -EL     Time Frame (Transfer Goal 1, PT) long term goal (LTG);2 weeks  -EL       Row Name 07/15/23 1848          Gait Training Goal 1 (PT)    Activity/Assistive Device (Gait Training Goal 1, PT) gait (walking locomotion);walker, rolling  -EL     Divide Level (Gait Training Goal 1, PT) contact guard required  -EL     Distance (Gait Training Goal 1, PT) 150  -EL     Time Frame (Gait Training Goal 1, PT) long term goal (LTG);2 weeks  -EL       Row Name 07/15/23 1848          Stairs Goal 1 (PT)    Activity/Assistive Device (Stairs Goal 1, PT) stairs, all skills;walker, rolling  -EL     Divide Level/Cues Needed (Stairs Goal 1, PT) minimum assist (75% or more patient effort)  -EL     Number of Stairs (Stairs Goal 1, PT) 5  -EL     Time Frame (Stairs Goal 1, PT) long term goal (LTG);2 weeks  -EL       Row Name 07/15/23 1848          Therapy Assessment/Plan (PT)    Planned Therapy Interventions (PT) neuromuscular re-education;balance training;bed mobility training;transfer training;gait training;patient/family education;strengthening  -EL               User Key  (r) = Recorded By, (t) = Taken By, (c) = Cosigned By      Initials Name Provider Type    EL  Brayden Powers, PT Physical Therapist                   Clinical Impression       Row Name 07/15/23 1843          Pain    Additional Documentation Pain Scale: FACES Pre/Post-Treatment (Group)  -EL       Row Name 07/15/23 1843          Pain Scale: FACES Pre/Post-Treatment    Pain: FACES Scale, Pretreatment 4-->hurts little more  -EL     Posttreatment Pain Rating 4-->hurts little more  -EL     Pain Location - Side/Orientation Right  -EL     Pain Location - knee  -EL       Row Name 07/15/23 1843          Plan of Care Review    Plan of Care Reviewed With patient;family;other (see comments)  3 daughters  -EL     Outcome Evaluation Pt is a 74 YO F admitted with weakness, JOSÉ, anemia, and c/o pain in R knee, imaging shows fluid collection. Pt lives at home with spouse and daughter. Pt has had a recent decline, requiring more assistance and multiple admissions, prior to this pt was ambulating in home with rollator. Pt this date AOx2 and hesistant to participate, requiring significant encouragment. Pt came to sitting EOB with MOD A and tolerated sitting for approx 5 min prior to initiating standing. Pt then stood and had been incontinent of bowels. Stool was very dark, nursing notifed and gathered sample following pt second standing attempt. Pt then had to be returned to supine for self care as she was unable to come to full standing on second attempt. PT recommendation is SNF following d/c and family talked to about recommendation extensively. Pt spouse appears resistant to recommendation, as he wants pt to return home for him to care for. PT to continue to follow while admitted.  -EL       Row Name 07/15/23 1843          Therapy Assessment/Plan (PT)    Rehab Potential (PT) good, to achieve stated therapy goals  -EL     Criteria for Skilled Interventions Met (PT) yes  -EL     Therapy Frequency (PT) 5 times/wk  -EL     Predicted Duration of Therapy Intervention (PT) until d/c  -EL       Row Name 07/15/23 1843          Vital  Signs    O2 Delivery Pre Treatment room air  -EL     O2 Delivery Intra Treatment room air  -EL     O2 Delivery Post Treatment room air  -EL     Pre Patient Position Supine  -EL     Intra Patient Position Standing  -EL     Post Patient Position Supine  -EL       Row Name 07/15/23 1843          Positioning and Restraints    Pre-Treatment Position in bed  -EL     Post Treatment Position bed  -EL     In Bed notified nsg;supine;call light within reach;encouraged to call for assist;exit alarm on  -EL               User Key  (r) = Recorded By, (t) = Taken By, (c) = Cosigned By      Initials Name Provider Type    Brayden Buchanan, PT Physical Therapist                   Outcome Measures       Row Name 07/15/23 1851 07/15/23 0800       How much help from another person do you currently need...    Turning from your back to your side while in flat bed without using bedrails? 2  -EL 2  -BS    Moving from lying on back to sitting on the side of a flat bed without bedrails? 2  -EL 2  -BS    Moving to and from a bed to a chair (including a wheelchair)? 1  -EL 2  -BS    Standing up from a chair using your arms (e.g., wheelchair, bedside chair)? 2  -EL 2  -BS    Climbing 3-5 steps with a railing? 1  -EL 2  -BS    To walk in hospital room? 1  -EL 2  -BS    AM-PAC 6 Clicks Score (PT) 9  -EL 12  -BS    Highest level of mobility 3 --> Sat at edge of bed  -EL 4 --> Transferred to chair/commode  -BS      Row Name 07/15/23 1851          Functional Assessment    Outcome Measure Options AM-PAC 6 Clicks Basic Mobility (PT)  -EL               User Key  (r) = Recorded By, (t) = Taken By, (c) = Cosigned By      Initials Name Provider Type    Brayden Buchanan, PT Physical Therapist    BS Catalina Seo LPN Licensed Nurse                                 Physical Therapy Education       Title: PT OT SLP Therapies (In Progress)       Topic: Physical Therapy (In Progress)       Point: Mobility training (In Progress)       Learning Progress Summary              Patient Acceptance, E,TB, NR by  at 7/15/2023 1852                         Point: Precautions (In Progress)       Learning Progress Summary             Patient Acceptance, E,TB, NR by  at 7/15/2023 1852                                         User Key       Initials Effective Dates Name Provider Type Discipline     06/23/20 -  Brayden Powers, PT Physical Therapist PT                  PT Recommendation and Plan  Planned Therapy Interventions (PT): neuromuscular re-education, balance training, bed mobility training, transfer training, gait training, patient/family education, strengthening  Plan of Care Reviewed With: patient, family, other (see comments) (3 daughters)  Outcome Evaluation: Pt is a 74 YO F admitted with weakness, JOSÉ, anemia, and c/o pain in R knee, imaging shows fluid collection. Pt lives at home with spouse and daughter. Pt has had a recent decline, requiring more assistance and multiple admissions, prior to this pt was ambulating in home with rollator. Pt this date AOx2 and hesistant to participate, requiring significant encouragment. Pt came to sitting EOB with MOD A and tolerated sitting for approx 5 min prior to initiating standing. Pt then stood and had been incontinent of bowels. Stool was very dark, nursing notifed and gathered sample following pt second standing attempt. Pt then had to be returned to supine for self care as she was unable to come to full standing on second attempt. PT recommendation is SNF following d/c and family talked to about recommendation extensively. Pt spouse appears resistant to recommendation, as he wants pt to return home for him to care for. PT to continue to follow while admitted.     Time Calculation:    PT Charges       Row Name 07/15/23 1852             Time Calculation    Start Time 1431  -EL      Stop Time 1528  -EL      Time Calculation (min) 57 min  -EL      PT Received On 07/15/23  -EL      PT - Next Appointment 07/17/23  -EL      PT Goal Re-Cert Due  Date 07/29/23  -EL         Time Calculation- PT    Total Timed Code Minutes- PT 25 minute(s)  -EL                User Key  (r) = Recorded By, (t) = Taken By, (c) = Cosigned By      Initials Name Provider Type    Brayden Buchanan PT Physical Therapist                  Therapy Charges for Today       Code Description Service Date Service Provider Modifiers Qty    21362030170 HC PT EVAL MOD COMPLEXITY 4 7/15/2023 Brayden Powers, PT GP 1    54303778159 HC PT THERAPEUTIC ACT EA 15 MIN 7/15/2023 Brayden Powers, PT GP 1    01395023865 HC PT SELF CARE/MGMT/TRAIN EA 15 MIN 7/15/2023 Brayden Powers, PT GP 1            PT G-Codes  Outcome Measure Options: AM-PAC 6 Clicks Basic Mobility (PT)  AM-PAC 6 Clicks Score (PT): 9  PT Discharge Summary  Anticipated Discharge Disposition (PT): skilled nursing facility    Brayden Powers PT  7/15/2023

## 2023-07-15 NOTE — PROGRESS NOTES
[facility] Medicine Services   Daily Progress Note    Patient Name: Rosario Ortez  : 1950  MRN: [id number]  Primary Care Physician:  Ian Cordero MD  Date of admission: 2023  Date and Time of Service: 2023 at 9 am      Subjective      Chief Complaint: Abnormal labs.  Sent in from PCP office.  JOSÉ on CKD Stage 3, right leg cellulitis and UTI    Patient seen today.  Doing okay.   at bedside.  Blood cultures show Strep.  Urine culture >100,000 gram negative rods.  Right knee is hurting her with a small painful cyst medial aspect.  Might need to be aspirated.  Getting IVF.    7/15/2023  Pt denies for any short of breath, no nausea or vomiting.no chest pain      ROS negative except what was mentioned above.      Objective      Vitals:   Temp:  [97.3 °F (36.3 °C)-98 °F (36.7 °C)] 97.5 °F (36.4 °C)  Heart Rate:  [62-73] 73  Resp:  [15-27] 15  BP: (103-120)/(48-56) 107/56  Flow (L/min):  [1] 1    Physical Exam  Constitutional:       General: She is not in acute distress.     Appearance: She is ill-appearing. She is not toxic-appearing.   HENT:      Head: Normocephalic and atraumatic.      Right Ear: Tympanic membrane normal.      Left Ear: Tympanic membrane normal.      Mouth/Throat:      Mouth: Mucous membranes are moist.   Eyes:      Pupils: Pupils are equal, round, and reactive to light.   Cardiovascular:      Rate and Rhythm: Normal rate and regular rhythm.      Pulses: Normal pulses.      Heart sounds: Normal heart sounds.   Pulmonary:      Effort: Pulmonary effort is normal. No respiratory distress.      Breath sounds: Normal breath sounds.   Abdominal:      General: Bowel sounds are normal. There is no distension.      Palpations: Abdomen is soft.   Musculoskeletal:         General: No swelling. Normal range of motion.      Cervical back: Normal range of motion and neck supple.      Right lower leg: Edema present.      Left lower leg: Edema present.   Skin:     General:  Skin is warm.      Findings: Erythema and lesion present.      Comments: Painful cyst like structure medial aspect right thigh.  No open wounds on the skin.  Some dry skin and edema.  Edema looks chronic.   Neurological:      General: No focal deficit present.      Mental Status: She is alert and oriented to person, place, and time. Mental status is at baseline.   Psychiatric:         Mood and Affect: Mood normal.           Result Review    Result Review:  I have personally reviewed the results from the time of this admission to 7/15/2023 14:05 EDT and agree with these findings:  [x]  Laboratory  [x]  Microbiology  [x]  Radiology  [x]  EKG/Telemetry   [x]  Cardiology/Vascular   []  Pathology  [x]  Old records  []  Other:  Most notable findings include: JOSÉ on CKD with Cr 2.7, anemia with Hgb drop to 7.2 toda    Wounds (last 24 hours)       LDA Wound       Row Name 07/15/23 0800 07/14/23 2105          Wound 06/27/23 0145 Right lower leg Abcess    Wound - Properties Group Placement Date: 06/27/23  - Placement Time: 0145  - Present on Hospital Admission: Y  -AH Side: Right  -AH Orientation: lower  -AH Location: leg  -AH Primary Wound Type: Abcess  -AH    Dressing Appearance dry;intact  -BS --     Closure Adhesive bandage  -BS Adhesive bandage  -KM     Base dry;white  -BS dry;white  -KM     Drainage Amount none  -BS none  -KM     Dressing Care -- other (see comments)  dressing CDI, no intervenion required  -KM     Retired Wound - Properties Group Placement Date: 06/27/23  - Placement Time: 0145  - Present on Hospital Admission: Y  -AH Side: Right  -AH Orientation: lower  -AH Location: leg  -AH Primary Wound Type: Abcess  -AH    Retired Wound - Properties Group Date first assessed: 06/27/23  - Time first assessed: 0145  - Present on Hospital Admission: Y  -AH Side: Right  -AH Location: leg  -AH Primary Wound Type: Abcess  -AH              User Key  (r) = Recorded By, (t) = Taken By, (c) = Cosigned By       Initials Name Provider Type    Lata Portillo RN Registered Nurse    Cindy Garcia LPN Licensed Nurse    Catalina Mullins LPN Licensed Nurse                      Assessment & Plan      Brief Patient Summary:  Rosario Ortez is a 73 y.o. female with past medical history of paroxysmal atrial fibrillation, hypertension, hypothyroidism and fluid accumulation in the abdomen and lower extremities and recently suspected to have sarcoidosis for which she is going to specialized center next week for further diagnosis has been doing at her baseline and went for her blood work at her PCPs office yesterday. PCPs office called her today and told her to come to the hospital because her kidney function looks worse and she has a UTI.  Patient does have JOSÉ on CKD but does not have a UTI at this point.  Her baseline activity is pretty poor and she would walk some very small distances with walker but is mostly wheelchair-bound.  She was also complaining of some area of erythematous lesions with some swelling in her right leg and knee and probably had some bronchitis for which she was started on doxycycline yesterday by her PCP.  Patient's oral intake is very poor as per family.  She has been on water pills with Lasix and she was also started on hydrochlorothiazide yesterday.  She denies any nausea vomiting or abdominal pain but is quite tender in the abdomen.  She denies any fever chills chest pain or shortness of breath but has some productive cough.  She denies any other significant complaints though.         apixaban, 2.5 mg, Oral, BID  atorvastatin, 20 mg, Oral, Nightly  cefTRIAXone, 2,000 mg, Intravenous, Q24H  folic acid, 1 mg, Oral, Daily  levothyroxine, 50 mcg, Oral, Q AM  pantoprazole, 40 mg, Oral, Daily  predniSONE, 15 mg, Oral, Daily  senna-docusate sodium, 2 tablet, Oral, BID  sodium bicarbonate, 1,300 mg, Oral, TID  sodium chloride, 10 mL, Intravenous, Q12H       sodium chloride, 100 mL/hr, Last Rate: 100  mL/hr (07/14/23 1905)         Active Hospital Problems:  Active Hospital Problems    Diagnosis     **JOSÉ (acute kidney injury)     Bacteremia due to Streptococcus     Cellulitis of right leg     Knee joint cyst, right     Bilateral leg edema     Acute UTI (urinary tract infection)      JOSÉ on CKD Stage 3-likely pre-renal in the setting of volume depletion from diuretics.  Continue IVF.  Will consult Nephrology.  STOP all BP medicines now.  HCTZ not a good idea with her anyway due to advanced kidney disease. Nephrology on board, will follow there recommendation.    2. Right leg cellulitis-continue IV rocephin.  ID consulted to due to strep bacteremia.  Probable source id the right leg.  Consulted wound care also.  Keep leg elevated.      3. Strep bacteremia-continue ceftriaxone.  ID consulted.    4. Right knee cyst-painful to touch and might need aspiration.  Consult Ortho to look at it.    5. B/L leg edema-chronic issue.  Had venous dopplers done June 2023 and negative for any DVT.  Keep leg elevated while in bed.  Has some lymphedema also.    6. UTI with gram negative anna-possible E. Coli.  Continue ceftriaxone.    7. PAF-continue Eliquis 5 mg BID.  Monitor HR.    PLAN:  -AS ABOVE.  NEEDS TO STAY AT LEAST THROUGH THIS WEEKEND.  MADE INPATIENT TODAY.  SPOKE TO  IN DETAIL.    DVT prophylaxis:  Medical DVT prophylaxis orders are present.    CODE STATUS:    Level Of Support Discussed With: Patient; Health Care Surrogate  Code Status (Patient has no pulse and is not breathing): CPR (Attempt to Resuscitate)  Medical Interventions (Patient has pulse or is breathing): Full Support      Disposition:  I expect patient to be discharged 3 DAYS AT LEAST.    This patient has been examined wearing appropriate Personal Protective Equipment and discussed with hospital infection control department. 07/15/23      Electronically signed by Sang Sanford MD, 07/15/23, 14:05 EDT.  [facility] Hospitalist Team

## 2023-07-15 NOTE — CONSULTS
GENERAL SURGERY CONSULT    Referring Provider: Jeri  Reason for Consultation: right knee subcutaneous fluid collection    Patient Care Team:  Ian Cordero MD as PCP - General  Tony Parisi MD as Consulting Physician (Nephrology)    Chief complaint; swelling near right knee    Subjective .     History of present illness: 73-year-old lady with UTI, JOSÉ.  History of A-fib and chronic edema being worked up for sarcoidosis as well.  I was asked to see the patient due to a fluid collection adjacent to the right knee.  Her family is at bedside and state that she did have some redness on both sides of her knee which has largely subsided but there is still fluid adjacent to the medial aspect of the right knee.  MRI was done that showed no involvement of the knee joint itself.  She has no significant pain in the area. No drainage from site. I was asked to see her for possible drainage/sampling of fluid from the area around the right knee.     They are unaware of any trauma to this area.    Review of Systems    Review of Systems   Skin:         Swelling near right knee       History  Past Medical History:   Diagnosis Date    A-fib     Coronary artery disease    ,   Past Surgical History:   Procedure Laterality Date    BREAST BIOPSY      HYSTERECTOMY      JOINT REPLACEMENT Right 2015   ,   Family History   Problem Relation Age of Onset    Breast cancer Sister    ,   Social History     Tobacco Use    Smoking status: Never     Passive exposure: Never    Smokeless tobacco: Never   Vaping Use    Vaping Use: Never used   Substance Use Topics    Alcohol use: Never    Drug use: Never   ,   Medications Prior to Admission   Medication Sig Dispense Refill Last Dose    apixaban (ELIQUIS) 2.5 MG tablet tablet Take 1 tablet by mouth 2 (Two) Times a Day.       atorvastatin (LIPITOR) 20 MG tablet Take 1 tablet by mouth Daily.       doxycycline (VIBRAMYCIN) 100 MG capsule Take 1 capsule by mouth 2 (Two) Times a Day.       ferrous  sulfate 325 (65 FE) MG tablet Take 1 tablet by mouth Daily With Breakfast.       folic acid (FOLVITE) 1 MG tablet Take 1 tablet by mouth Daily.       furosemide (LASIX) 20 MG tablet Take 1 tablet by mouth Daily for 30 days. 30 tablet 0     hydroCHLOROthiazide (HYDRODIURIL) 25 MG tablet Take 1 tablet by mouth Daily.       irbesartan (AVAPRO) 75 MG tablet Take 1 tablet by mouth Every Night. 30 tablet 3     levothyroxine (SYNTHROID, LEVOTHROID) 50 MCG tablet 1 tablet Every Morning.       pantoprazole (PROTONIX) 40 MG EC tablet Take 1 tablet by mouth Daily.       predniSONE (DELTASONE) 5 MG tablet Take 3 tablets by mouth Daily.       traZODone (DESYREL) 50 MG tablet Take 2 tablets by mouth At Night As Needed for Sleep.      , Scheduled Meds:  apixaban, 2.5 mg, Oral, BID  atorvastatin, 20 mg, Oral, Nightly  cefTRIAXone, 2,000 mg, Intravenous, Q24H  folic acid, 1 mg, Oral, Daily  levothyroxine, 50 mcg, Oral, Q AM  pantoprazole, 40 mg, Oral, Daily  predniSONE, 15 mg, Oral, Daily  senna-docusate sodium, 2 tablet, Oral, BID  sodium bicarbonate, 1,300 mg, Oral, TID  sodium chloride, 10 mL, Intravenous, Q12H    , Continuous Infusions:  sodium chloride, 100 mL/hr, Last Rate: 100 mL/hr (07/14/23 1905)    , PRN Meds:    acetaminophen    senna-docusate sodium **AND** polyethylene glycol **AND** bisacodyl **AND** bisacodyl    ondansetron    [COMPLETED] Insert Peripheral IV **AND** sodium chloride    sodium chloride    sodium chloride    traZODone and Allergies:  Codeine, Topiramate, Tramadol, Cefdinir, Levofloxacin, and Lisinopril    Objective     Vital Signs   Temp:  [97.3 °F (36.3 °C)-98 °F (36.7 °C)] 97.5 °F (36.4 °C)  Heart Rate:  [62-73] 73  Resp:  [15-27] 15  BP: (103-120)/(48-56) 107/56    Physical Exam:       General Appearance:    Alert, cooperative, in no acute distress   Head:    Normocephalic, without obvious abnormality, atraumatic   Eyes:            Lids and lashes normal, conjunctivae and sclerae normal, no    icterus, no pallor, corneas clear   Ears:    Ears appear intact with no abnormalities noted   Lungs:   Breathing unlabored   Extremities:   Fluctuance at right medial knee, overlying skin abrasion without erythema   Neurologic:   Cranial nerves 2 - 12 grossly intact, sensation intact       Results Review:  Lab Results (last 72 hours)       Procedure Component Value Units Date/Time    Haptoglobin [[id number]]  (Normal) Collected: 07/14/23 2234    Specimen: Blood Updated: 07/15/23 1251     Haptoglobin 173 mg/dL     Blood Culture - Blood, Arm, Left [[id number]]  (Normal) Collected: 07/13/23 0852    Specimen: Blood from Arm, Left Updated: 07/15/23 0901     Blood Culture No growth at 2 days    Narrative:      Less than seven (7) mL's of blood was collected.  Insufficient quantity may yield false negative results.    Blood Culture - Blood, Arm, Left [[id number]]  (Abnormal) Collected: 07/13/23 1006    Specimen: Blood from Arm, Left Updated: 07/15/23 0718     Blood Culture Streptococcus, Alpha Hemolytic     Isolated from Aerobic Bottle     Gram Stain Aerobic Bottle Gram positive cocci in chains    Narrative:      Probable contaminant requires clinical correlation, susceptibility not performed unless requested by physician.      Urine Culture - Urine, Straight Cath [[id number]]  (Abnormal)  (Susceptibility) Collected: 07/13/23 0944    Specimen: Urine from Straight Cath Updated: 07/15/23 0358     Urine Culture >100,000 CFU/mL Klebsiella aerogenes    Narrative:      Colonization of the urinary tract without infection is common. Treatment is discouraged unless the patient is symptomatic, pregnant, or undergoing an invasive urologic procedure.    Susceptibility        Klebsiella aerogenes      ISELA      Cefazolin Resistant      Cefepime Susceptible      Ceftazidime Susceptible      Ceftriaxone Susceptible      Gentamicin Susceptible      Levofloxacin Susceptible      Nitrofurantoin Resistant      Piperacillin + Tazobactam  Susceptible      Trimethoprim + Sulfamethoxazole Susceptible                           Basic Metabolic Panel [[id number]]  (Abnormal) Collected: 07/14/23 2234    Specimen: Blood Updated: 07/14/23 2313     Glucose 112 mg/dL      BUN 75 mg/dL      Creatinine 2.69 mg/dL      Sodium 138 mmol/L      Potassium 4.4 mmol/L      Chloride 109 mmol/L      CO2 17.0 mmol/L      Calcium 8.5 mg/dL      BUN/Creatinine Ratio 27.9     Anion Gap 12.0 mmol/L      eGFR 18.0 mL/min/1.73     Narrative:      GFR Normal >60  Chronic Kidney Disease <60  Kidney Failure <15    The GFR formula is only valid for adults with stable renal function between ages 18 and 70.    Lactate Dehydrogenase [[id number]]  (Normal) Collected: 07/14/23 2234    Specimen: Blood Updated: 07/14/23 2313      U/L     Protime-INR [[id number]]  (Abnormal) Collected: 07/14/23 2234    Specimen: Blood Updated: 07/14/23 2303     Protime 13.5 Seconds      INR 1.28    aPTT [[id number]]  (Abnormal) Collected: 07/14/23 2234    Specimen: Blood Updated: 07/14/23 2303     PTT 33.1 seconds     Fibrinogen [[id number]]  (Normal) Collected: 07/14/23 2234    Specimen: Blood Updated: 07/14/23 2303     Fibrinogen 253 mg/dL     CBC & Differential [[id number]]  (Abnormal) Collected: 07/14/23 2234    Specimen: Blood Updated: 07/14/23 2248    Narrative:      The following orders were created for panel order CBC & Differential.  Procedure                               Abnormality         Status                     ---------                               -----------         ------                     CBC Auto Differential[[id number]]        Abnormal            Final result                 Please view results for these tests on the individual orders.    CBC Auto Differential [[id number]]  (Abnormal) Collected: 07/14/23 2234    Specimen: Blood Updated: 07/14/23 2248     WBC 9.80 10*3/mm3      RBC 2.40 10*6/mm3      Hemoglobin 7.5 g/dL      Hematocrit 23.5 %      MCV 97.8 fL      MCH 31.4 pg       MCHC 32.0 g/dL      RDW 20.8 %      RDW-SD 69.6 fl      MPV 7.1 fL      Platelets 206 10*3/mm3      Neutrophil % 84.8 %      Lymphocyte % 6.8 %      Monocyte % 8.3 %      Eosinophil % 0.0 %      Basophil % 0.1 %      Neutrophils, Absolute 8.30 10*3/mm3      Lymphocytes, Absolute 0.70 10*3/mm3      Monocytes, Absolute 0.80 10*3/mm3      Eosinophils, Absolute 0.00 10*3/mm3      Basophils, Absolute 0.00 10*3/mm3      nRBC 0.0 /100 WBC     Fibrin Split Products [[id number]] Collected: 07/14/23 2234    Specimen: Blood Updated: 07/14/23 2244    Immunofixation, Serum [[id number]] Collected: 07/14/23 2234    Specimen: Blood Updated: 07/14/23 2243    Immunofixation, Urine - Straight Cath [[id number]] Collected: 07/14/23 2009    Specimen: Urine from Straight Cath Updated: 07/14/23 2019    Ferritin [[id number]]  (Abnormal) Collected: 07/14/23 0309    Specimen: Blood Updated: 07/14/23 1832     Ferritin 216.40 ng/mL     Narrative:      Results may be falsely decreased if patient taking Biotin.      Iron Profile [[id number]]  (Abnormal) Collected: 07/14/23 0309    Specimen: Blood Updated: 07/14/23 1828     Iron 19 mcg/dL      Iron Saturation (TSAT) 11 %      Transferrin 114 mg/dL      TIBC 170 mcg/dL     Blood Culture ID, PCR - Blood, Arm, Left [[id number]]  (Abnormal) Collected: 07/13/23 1006    Specimen: Blood from Arm, Left Updated: 07/14/23 0634     BCID, PCR Streptococcus spp, not A, B, or pneumoniae. Identification by BCID2 PCR.     BOTTLE TYPE Aerobic Bottle    Basic Metabolic Panel [[id number]]  (Abnormal) Collected: 07/14/23 0309    Specimen: Blood Updated: 07/14/23 0433     Glucose 92 mg/dL      BUN 73 mg/dL      Creatinine 2.71 mg/dL      Sodium 137 mmol/L      Potassium 4.2 mmol/L      Chloride 108 mmol/L      CO2 18.0 mmol/L      Calcium 8.5 mg/dL      BUN/Creatinine Ratio 26.9     Anion Gap 11.0 mmol/L      eGFR 17.8 mL/min/1.73     Narrative:      GFR Normal >60  Chronic Kidney Disease <60  Kidney Failure <15    The GFR  formula is only valid for adults with stable renal function between ages 18 and 70.    CBC (No Diff) [[id number]]  (Abnormal) Collected: 07/14/23 0310    Specimen: Blood Updated: 07/14/23 0427     WBC 9.80 10*3/mm3      RBC 2.28 10*6/mm3      Hemoglobin 7.2 g/dL      Hematocrit 22.0 %      MCV 96.5 fL      MCH 31.8 pg      MCHC 33.0 g/dL      RDW 19.5 %      RDW-SD 63.9 fl      MPV 7.2 fL      Platelets 179 10*3/mm3     Urinalysis, Microscopic Only - Straight Cath [[id number]]  (Abnormal) Collected: 07/13/23 0944    Specimen: Urine from Straight Cath Updated: 07/13/23 1027     RBC, UA 0-2 /HPF      WBC, UA 3-5 /HPF      Bacteria, UA 3+ /HPF      Squamous Epithelial Cells, UA 0-2 /HPF      Hyaline Casts, UA 7-12 /LPF      Methodology Manual Light Microscopy    Urinalysis With Microscopic If Indicated (No Culture) - Straight Cath [[id number]]  (Abnormal) Collected: 07/13/23 0944    Specimen: Urine from Straight Cath Updated: 07/13/23 0953     Color, UA Yellow     Appearance, UA Clear     pH, UA <=5.0     Specific Gravity, UA 1.012     Glucose, UA Negative     Ketones, UA Negative     Bilirubin, UA Negative     Blood, UA Negative     Protein, UA Negative     Leuk Esterase, UA Trace     Nitrite, UA Negative     Urobilinogen, UA 0.2 E.U./dL    Comprehensive Metabolic Panel [[id number]]  (Abnormal) Collected: 07/13/23 0852    Specimen: Blood Updated: 07/13/23 0939     Glucose 86 mg/dL      BUN 82 mg/dL      Creatinine 2.79 mg/dL      Sodium 135 mmol/L      Potassium 4.2 mmol/L      Chloride 103 mmol/L      CO2 18.0 mmol/L      Calcium 9.4 mg/dL      Total Protein 5.3 g/dL      Albumin 2.6 g/dL      ALT (SGPT) 23 U/L      AST (SGOT) 40 U/L      Alkaline Phosphatase 174 U/L      Total Bilirubin 0.9 mg/dL      Globulin 2.7 gm/dL      A/G Ratio 1.0 g/dL      BUN/Creatinine Ratio 29.4     Anion Gap 14.0 mmol/L      eGFR 17.2 mL/min/1.73     Narrative:      GFR Normal >60  Chronic Kidney Disease <60  Kidney Failure <15    The GFR  formula is only valid for adults with stable renal function between ages 18 and 70.    CBC & Differential [[id number]]  (Abnormal) Collected: 07/13/23 0852    Specimen: Blood Updated: 07/13/23 0906    Narrative:      The following orders were created for panel order CBC & Differential.  Procedure                               Abnormality         Status                     ---------                               -----------         ------                     CBC Auto Differential[[id number]]        Abnormal            Final result                 Please view results for these tests on the individual orders.    CBC Auto Differential [[id number]]  (Abnormal) Collected: 07/13/23 0852    Specimen: Blood Updated: 07/13/23 0906     WBC 12.60 10*3/mm3      RBC 2.81 10*6/mm3      Hemoglobin 8.7 g/dL      Hematocrit 27.4 %      MCV 97.3 fL      MCH 31.0 pg      MCHC 31.9 g/dL      RDW 20.2 %      RDW-SD 66.9 fl      MPV 7.1 fL      Platelets 254 10*3/mm3      Neutrophil % 87.4 %      Lymphocyte % 6.6 %      Monocyte % 5.9 %      Eosinophil % 0.0 %      Basophil % 0.1 %      Neutrophils, Absolute 11.10 10*3/mm3      Lymphocytes, Absolute 0.80 10*3/mm3      Monocytes, Absolute 0.70 10*3/mm3      Eosinophils, Absolute 0.00 10*3/mm3      Basophils, Absolute 0.00 10*3/mm3      nRBC 0.0 /100 WBC           Imaging Results (Last 72 Hours)       Procedure Component Value Units Date/Time    MRI Knee Right Without Contrast [[id number]] Collected: 07/14/23 1631     Updated: 07/14/23 1653    Narrative:      MRI KNEE RIGHT  WO CONTRAST    Date of Exam: 7/14/2023 4:05 PM EDT    Indication: poss. infected joint.     Comparison: Radiographs July 13, 2023    Technique:  Routine multiplanar/multisequence images of the right knee were obtained without contrast administration.      Findings:  Osseous Structures and Intra-Articular  There is tricompartmental osteophytosis. There is mild subchondral edema of the superior median ridge of the patella  presumed to be related to arthropathy. No definite fracture is seen. Alignment appears within normal limits. No definite suspicious   marrow signal abnormality is seen.    There is a small knee effusion.    Ligaments  The anterior cruciate ligament and posterior cruciate ligaments appear intact.  Medial collateral ligament and lateral collateral ligament complex appear intact.    Menisci  Meniscal evaluation is somewhat limited on this exam. There is suspected degenerative changes of the medial and lateral menisci. There is suspicion for partial thickness tear of the lateral meniscus posterior horn involving the free edge near the   posterior root. There is also suspicion for tear of the medial meniscus posterior horn with suspected horizontal defect.    Extensor compartment  The quadriceps and patellar tendons appear intact.    Cartilage  There appears to be advanced tricompartmental chondromalacia. There is suspicion for high-grade and possibly full-thickness cartilage loss of the central weightbearing medial tibiofemoral articular cartilage. There is also suspected partial-thickness   cartilage loss of the patellofemoral compartment. There is probable small full-thickness cartilage defect at the superior median ridge of the patella with underlying subchondral edema. There is also suspected partial thickness cartilage loss of the   lateral tibiofemoral compartment. Surface type osteophyte is seen at the central lateral femoral condyle.    There are degenerative changes at the proximal tibiofibular articulation.    Soft tissues  There is prominent diffuse edema in the soft tissues surrounding the knee. At the medial aspect of the medial femoral condyle there appears to be a focal subcutaneous fluid signal collection overlying the proximal medial collateral ligament. This   measures approximately 2.8 x 0.8 cm in axial dimensions and up to 2.5 cm craniocaudal. No other definite fluid collection is seen. There is  suspected trace amount of fluid in a Baker's cyst. There is mild to moderate muscle atrophy and suspected mild   muscle edema.    Miscellaneous  Popliteus tendon appears intact. Iliotibial band appears intact.      Impression:      Impression:  1.Prominent diffuse edema in the soft tissues surrounding the knee which may indicate skin and soft tissue infection.  2.2.8 x 0.8 x 2.5 cm collection is seen in the subcutaneous tissues along the medial femoral condyle which is nonspecific but could represent an abscess.  3.No significant knee effusion or significant marrow edema signal is seen on this exam to suggest joint infection. If there is high index of clinical concern, aspiration would be advised.  4.Advanced tricompartmental osteoarthritis with mild subchondral edema at the median ridge of the patella.  5.Limited visualization of the menisci on this exam with suspicion for tears of the posterior horns.        Electronically Signed: Cj Turk    7/14/2023 4:51 PM EDT    Workstation ID: [id number]    XR Knee 1 or 2 View Right [[id number]] Collected: 07/13/23 1349     Updated: 07/13/23 1352    Narrative:      XR KNEE 1 OR 2 VW RIGHT    Date of Exam: 7/13/2023 1:22 PM EDT    Indication: swelling    Comparison: 7/8/2023.    Findings:  There is continued severe tricompartmental joint narrowing with moderate spurring. There is a small suprapatellar effusion. There is no fracture or dislocation.      Impression:      Impression:  Degenerative changes. Small effusion.      Electronically Signed: Juliane Baker MD    7/13/2023 1:50 PM EDT    Workstation ID: [id number]            I reviewed the patient's new clinical results.  I reviewed the patient's new imaging results and agree with the interpretation.  I reviewed the patient's other test results and agree with the interpretation      Assessment & Plan       JOSÉ (acute kidney injury)    Bacteremia due to Streptococcus    Cellulitis of right leg    Knee joint cyst, right     Bilateral leg edema    Acute UTI (urinary tract infection)      73-year-old lady with subcutaneous fluid on right medial knee, on Eliquis.    Had a long discuss with patient and family regarding this fluid collection and the utility of draining the fluid for sampling/culture. She will need to be off of Eliquis for at least a day prior to aspiration or formal drainage.  Likely will plan bedside aspiration if patient is agreeable.    I discussed the patient's findings and my recommendations with patient and family              This document has been electronically signed by Fernando Romo MD on July 15, 2023 15:23 EDT      Fernando Romo MD  07/15/23  15:23 EDT

## 2023-07-16 ENCOUNTER — INPATIENT HOSPITAL (AMBULATORY)
Dept: URBAN - METROPOLITAN AREA HOSPITAL 84 | Facility: HOSPITAL | Age: 73
End: 2023-07-16

## 2023-07-16 DIAGNOSIS — R19.5 OTHER FECAL ABNORMALITIES: ICD-10-CM

## 2023-07-16 LAB
ANION GAP SERPL CALCULATED.3IONS-SCNC: 14 MMOL/L (ref 5–15)
ANISOCYTOSIS BLD QL: ABNORMAL
BUN SERPL-MCNC: 72 MG/DL (ref 8–23)
BUN/CREAT SERPL: 31.3 (ref 7–25)
CALCIUM SPEC-SCNC: 8.4 MG/DL (ref 8.6–10.5)
CHLORIDE SERPL-SCNC: 111 MMOL/L (ref 98–107)
CO2 SERPL-SCNC: 15 MMOL/L (ref 22–29)
CREAT SERPL-MCNC: 2.3 MG/DL (ref 0.57–1)
DEPRECATED RDW RBC AUTO: 72.2 FL (ref 37–54)
EGFRCR SERPLBLD CKD-EPI 2021: 21.7 ML/MIN/1.73
EOSINOPHIL # BLD MANUAL: 0.11 10*3/MM3 (ref 0–0.4)
EOSINOPHIL NFR BLD MANUAL: 1 % (ref 0.3–6.2)
ERYTHROCYTE [DISTWIDTH] IN BLOOD BY AUTOMATED COUNT: 20.4 % (ref 12.3–15.4)
GLUCOSE SERPL-MCNC: 96 MG/DL (ref 65–99)
HCT VFR BLD AUTO: 24.1 % (ref 34–46.6)
HGB BLD-MCNC: 7.7 G/DL (ref 12–15.9)
LARGE PLATELETS: ABNORMAL
LYMPHOCYTES # BLD MANUAL: 0.56 10*3/MM3 (ref 0.7–3.1)
LYMPHOCYTES NFR BLD MANUAL: 5 % (ref 5–12)
MCH RBC QN AUTO: 31 PG (ref 26.6–33)
MCHC RBC AUTO-ENTMCNC: 31.8 G/DL (ref 31.5–35.7)
MCV RBC AUTO: 97.5 FL (ref 79–97)
METAMYELOCYTES NFR BLD MANUAL: 1 % (ref 0–0)
MONOCYTES # BLD: 0.56 10*3/MM3 (ref 0.1–0.9)
NEUTROPHILS # BLD AUTO: 9.86 10*3/MM3 (ref 1.7–7)
NEUTROPHILS NFR BLD MANUAL: 86 % (ref 42.7–76)
NEUTS BAND NFR BLD MANUAL: 2 % (ref 0–5)
PLATELET # BLD AUTO: 219 10*3/MM3 (ref 140–450)
PMV BLD AUTO: 7.1 FL (ref 6–12)
POTASSIUM SERPL-SCNC: 4.3 MMOL/L (ref 3.5–5.2)
RBC # BLD AUTO: 2.47 10*6/MM3 (ref 3.77–5.28)
SCAN SLIDE: NORMAL
SODIUM SERPL-SCNC: 140 MMOL/L (ref 136–145)
VARIANT LYMPHS NFR BLD MANUAL: 5 % (ref 19.6–45.3)
WBC MORPH BLD: NORMAL
WBC NRBC COR # BLD: 11.2 10*3/MM3 (ref 3.4–10.8)

## 2023-07-16 PROCEDURE — 99231 SBSQ HOSP IP/OBS SF/LOW 25: CPT | Performed by: SURGERY

## 2023-07-16 PROCEDURE — 85025 COMPLETE CBC W/AUTO DIFF WBC: CPT | Performed by: NURSE PRACTITIONER

## 2023-07-16 PROCEDURE — 92610 EVALUATE SWALLOWING FUNCTION: CPT

## 2023-07-16 PROCEDURE — 97535 SELF CARE MNGMENT TRAINING: CPT

## 2023-07-16 PROCEDURE — 85007 BL SMEAR W/DIFF WBC COUNT: CPT | Performed by: NURSE PRACTITIONER

## 2023-07-16 PROCEDURE — 97530 THERAPEUTIC ACTIVITIES: CPT

## 2023-07-16 PROCEDURE — 80048 BASIC METABOLIC PNL TOTAL CA: CPT | Performed by: NURSE PRACTITIONER

## 2023-07-16 PROCEDURE — 97166 OT EVAL MOD COMPLEX 45 MIN: CPT

## 2023-07-16 PROCEDURE — 99222 1ST HOSP IP/OBS MODERATE 55: CPT | Performed by: NURSE PRACTITIONER

## 2023-07-16 PROCEDURE — 25010000002 CEFTRIAXONE PER 250 MG: Performed by: INTERNAL MEDICINE

## 2023-07-16 PROCEDURE — 63710000001 PREDNISONE PER 5 MG: Performed by: INTERNAL MEDICINE

## 2023-07-16 PROCEDURE — 25010000002 NA FERRIC GLUC CPLX PER 12.5 MG: Performed by: NURSE PRACTITIONER

## 2023-07-16 RX ORDER — SODIUM BICARBONATE 650 MG/1
1300 TABLET ORAL 3 TIMES DAILY
Status: DISCONTINUED | OUTPATIENT
Start: 2023-07-16 | End: 2023-07-22 | Stop reason: HOSPADM

## 2023-07-16 RX ADMIN — SENNOSIDES AND DOCUSATE SODIUM 2 TABLET: 50; 8.6 TABLET ORAL at 20:12

## 2023-07-16 RX ADMIN — LEVOTHYROXINE SODIUM 50 MCG: 0.05 TABLET ORAL at 05:12

## 2023-07-16 RX ADMIN — ACETAMINOPHEN 650 MG: 325 TABLET, FILM COATED ORAL at 13:44

## 2023-07-16 RX ADMIN — SENNOSIDES AND DOCUSATE SODIUM 2 TABLET: 50; 8.6 TABLET ORAL at 09:40

## 2023-07-16 RX ADMIN — PREDNISONE 15 MG: 5 TABLET ORAL at 09:40

## 2023-07-16 RX ADMIN — PANTOPRAZOLE SODIUM 40 MG: 40 TABLET, DELAYED RELEASE ORAL at 09:40

## 2023-07-16 RX ADMIN — ATORVASTATIN CALCIUM 20 MG: 20 TABLET, FILM COATED ORAL at 20:12

## 2023-07-16 RX ADMIN — Medication 10 ML: at 20:13

## 2023-07-16 RX ADMIN — CEFTRIAXONE 2000 MG: 2 INJECTION, POWDER, FOR SOLUTION INTRAMUSCULAR; INTRAVENOUS at 06:03

## 2023-07-16 RX ADMIN — FOLIC ACID 1 MG: 1 TABLET ORAL at 09:40

## 2023-07-16 RX ADMIN — ACETAMINOPHEN 650 MG: 325 TABLET, FILM COATED ORAL at 20:12

## 2023-07-16 RX ADMIN — SODIUM CHLORIDE 250 MG: 9 INJECTION, SOLUTION INTRAVENOUS at 09:44

## 2023-07-16 RX ADMIN — SODIUM BICARBONATE 1300 MG: 650 TABLET ORAL at 20:12

## 2023-07-16 RX ADMIN — SODIUM BICARBONATE 1300 MG: 650 TABLET ORAL at 09:40

## 2023-07-16 RX ADMIN — SODIUM BICARBONATE 1300 MG: 650 TABLET ORAL at 16:59

## 2023-07-16 RX ADMIN — Medication 10 ML: at 09:40

## 2023-07-16 RX ADMIN — SODIUM BICARBONATE 100 MEQ: 84 INJECTION, SOLUTION INTRAVENOUS at 12:46

## 2023-07-16 NOTE — CONSULTS
GI CONSULT  NOTE:    Referring Provider:   Dr Sanford    Chief complaint:  Hemocult positive    Subjective   Sent by my doctor to ER for bad kidney function     History of present illness: Patient is a 73-year-old female with history of A-fib on Eliquis, iron deficiency anemia, cholecystectomy, chronic kidney disease stage III, right leg cellulitis and possible sarcoidosis who was just seen by our practice during admission in June for anemia & ascites. Patient is followed by Dr Flores for possible sarcoidosis.  Patient underwent paracentesis on 6/27/2023 without any signs of SBP.  Patient had rhinovirus and enterovirus during last admission.  No plans for repeat endoscopies for anemia as patient had just had endoscopies in January 2023 without any explanation for her anemia.  She also was not having any overt bleeding at that time.  Patient had plan to follow-up with [facility] for the liver biopsy and ended up being discharged home 6/29/2023.  Patient returned to the ER on 7/8/2023 with complaint of cough, congestion, chest discomfort and right knee pain.  Patient was given meloxicam and discharge.  Patient returned to the ER on 7/13/2023 because her doctors office called and stated that she had some acute kidney injury from abnormal labs noted.  BUN was 82 and creatinine was 2.79.  Patient was admitted for acute kidney injury on top of chronic kidney disease without any signs of urinary tract infection.  Nephrology was consulted.  Urine cultures came back positive today in her urine showing Klebsiella aerogenes.   Was consulted for Hemoccult positive stool.  Patient's hemoglobin is 7.5, serum iron 19, iron saturation 11%. Hgb is stable at 7.7. Her baseline was 12 last year but she has been low since June 2023. Nursing staff and has been states she has dark stool but she is taking oral iron.  No reports of melena or bright red blood per rectum.  Patient does have a known history of hemorrhoids can  definitely turn Hemoccult positive.   states she has been having increased difficulty swallowing.  Had dilation in January which helped.  Has been states she has difficulty getting her food down and seems to be weak.  Discussed speech therapy.    Labs: Sodium 140, potassium 4.3, creatinine 2.3 improved from 2.69 yesterday.  WBCs 11.2, hemoglobin stable at 7.7, platelets 219.  2 bands.    Patient had previous liver work-up in our office with negative NICOLAS, ferritin, ceruloplasmin, mitochondrial antibody, smooth muscle antibody, and viral hepatitis panel.  GGT was 438.  ACE level elevated at 109.  There was discussion of possible EUS guided liver biopsy. A1At 174, Alk phos isoenzymes: Bone 27, Liver 67, intestinal 7.     Endo History:  1/2023 EGD/colonoscopy by Dr. Flores -gastritis with biopsy showing granulomatous inflammation, benign intrinsic 14.5 mm seen in the cricopharyngeus status post dilation to 48Fr, hyperplastic colon polyps, diverticulosis, and hemorrhoids.    Past Medical History:  Past Medical History:   Diagnosis Date    A-fib     Coronary artery disease        Past Surgical History:  Past Surgical History:   Procedure Laterality Date    BREAST BIOPSY      HYSTERECTOMY      JOINT REPLACEMENT Right 2015       Social History:  Social History     Tobacco Use    Smoking status: Never     Passive exposure: Never    Smokeless tobacco: Never   Vaping Use    Vaping Use: Never used   Substance Use Topics    Alcohol use: Never    Drug use: Never       Family History:  Family History   Problem Relation Age of Onset    Breast cancer Sister        Medications:  Medications Prior to Admission   Medication Sig Dispense Refill Last Dose    apixaban (ELIQUIS) 2.5 MG tablet tablet Take 1 tablet by mouth 2 (Two) Times a Day.       atorvastatin (LIPITOR) 20 MG tablet Take 1 tablet by mouth Daily.       doxycycline (VIBRAMYCIN) 100 MG capsule Take 1 capsule by mouth 2 (Two) Times a Day.       ferrous sulfate 325 (65  FE) MG tablet Take 1 tablet by mouth Daily With Breakfast.       folic acid (FOLVITE) 1 MG tablet Take 1 tablet by mouth Daily.       furosemide (LASIX) 20 MG tablet Take 1 tablet by mouth Daily for 30 days. 30 tablet 0     hydroCHLOROthiazide (HYDRODIURIL) 25 MG tablet Take 1 tablet by mouth Daily.       irbesartan (AVAPRO) 75 MG tablet Take 1 tablet by mouth Every Night. 30 tablet 3     levothyroxine (SYNTHROID, LEVOTHROID) 50 MCG tablet 1 tablet Every Morning.       pantoprazole (PROTONIX) 40 MG EC tablet Take 1 tablet by mouth Daily.       predniSONE (DELTASONE) 5 MG tablet Take 3 tablets by mouth Daily.       traZODone (DESYREL) 50 MG tablet Take 2 tablets by mouth At Night As Needed for Sleep.          Scheduled Meds:atorvastatin, 20 mg, Oral, Nightly  cefTRIAXone, 2,000 mg, Intravenous, Q24H  ferric gluconate, 250 mg, Intravenous, Daily  folic acid, 1 mg, Oral, Daily  levothyroxine, 50 mcg, Oral, Q AM  pantoprazole, 40 mg, Oral, Daily  predniSONE, 15 mg, Oral, Daily  senna-docusate sodium, 2 tablet, Oral, BID  sodium bicarbonate, 1,300 mg, Oral, TID  sodium chloride, 10 mL, Intravenous, Q12H      Continuous Infusions:sodium chloride, 100 mL/hr, Last Rate: 100 mL/hr (07/15/23 1602)    PRN Meds:.  acetaminophen    senna-docusate sodium **AND** polyethylene glycol **AND** bisacodyl **AND** bisacodyl    ondansetron    [COMPLETED] Insert Peripheral IV **AND** sodium chloride    sodium chloride    sodium chloride    traZODone    ALLERGIES:  Codeine, Topiramate, Tramadol, Cefdinir, Levofloxacin, and Lisinopril    ROS:  Review of Systems   Gastrointestinal:  Positive for abdominal pain. Negative for diarrhea, nausea and vomiting.   Neurological:  Positive for dizziness and weakness.   Psychiatric/Behavioral:  Positive for decreased concentration. Negative for agitation.      Objective chronically ill-appearing in bed.  Occupational therapy is at bedside working with patient.    Vital Signs:   Visit Vitals  /55  "(BP Location: Left arm, Patient Position: Lying)   Pulse 70   Temp 98.3 °F (36.8 °C) (Oral)   Resp 16   Ht 167.6 cm (66\")   Wt 63.3 kg (139 lb 8.8 oz)   SpO2 96%   BMI 22.52 kg/m²       Physical Exam:      General Appearance:  Weak and frail appearing in no acute distress   Head:    Normocephalic, without obvious abnormality, atraumatic   Eyes:            Conjunctivae normal, anicteric sclera   Ears:    Ears appear intact with no abnormalities noted   Throat:   No oral lesions, no thrush, oral mucosa moist   Neck:   No adenopathy, supple, no thyromegaly, no JVD   Lungs:     Respirations regular, even and unlabored       Chest Wall:    No abnormalities observed   Abdomen:    Did not examine, getting paracentesis   Rectal:     Deferred   Extremities:   + edema, no cyanosis, no redness   Pulses:   Pulses palpable and equal bilaterally   Skin:   No bleeding, bruising or rash, no jaundice        Neurologic:   Sensation intact       Results Review:   I reviewed the patient's labs and imaging.  CBC  Results from last 7 days   Lab Units 07/16/23  0630 07/14/23 2234 07/14/23  0310 07/13/23  0852   RBC 10*6/mm3 2.47* 2.40* 2.28* 2.81*   WBC 10*3/mm3 11.20* 9.80 9.80 12.60*   HEMOGLOBIN g/dL 7.7* 7.5* 7.2* 8.7*   PLATELETS 10*3/mm3 219 206 179 254         CMP  Results from last 7 days   Lab Units 07/16/23  0152 07/14/23 2234 07/14/23  0309 07/13/23  0852   SODIUM mmol/L 140 138 137 135*   POTASSIUM mmol/L 4.3 4.4 4.2 4.2   CHLORIDE mmol/L 111* 109* 108* 103   CO2 mmol/L 15.0* 17.0* 18.0* 18.0*   BUN mg/dL 72* 75* 73* 82*   CREATININE mg/dL 2.30* 2.69* 2.71* 2.79*   GLUCOSE mg/dL 96 112* 92 86   ALBUMIN g/dL  --   --   --  2.6*   BILIRUBIN mg/dL  --   --   --  0.9   ALK PHOS U/L  --   --   --  174*   AST (SGOT) U/L  --   --   --  40*   ALT (SGPT) U/L  --   --   --  23         Amylase and Lipase        CRP         Imaging Results (Last 24 Hours)       ** No results found for the last 24 hours. **              ASSESSMENT AND " PLAN:  73-year-old female presented to the ER 7/13/23 for abnormal kidney labs. GI was consulted this admission for hemocult positive. Patient was just seen by our group 6/28/23 for ascites, anemia & elevated LFT's.     Hemocult positive stool with known hemorrhoids & polyps   Recurrent ascites  Elevated liver enzymes  Iron deficiency anemia  Renal insufficiency   Possible sarcoidosis  A-fib on Eliquis  History of cholecystectomy  Hypothyroidism  Dysphagia      Plan:  Patient's iron labs are low so will plan IV iron replacement.  Hold on oral iron so we can observe the true color of the stool.  Discussed with  reasoning.  Patient's hemoglobin has been running between 7.1->8.7.  1 year ago she was 12.5.  Her most recent hemoglobin was 7.7.  Patient just had upper and lower endoscopies on January 2023 by Dr. Flores.  Hemoccult could be positive for patient's known hemorrhoids or polyps.  Could also be GI bleed.  Nursing staff are reporting dark stool but patient was on oral iron.   is complaining of dysphagia and weakness and swallowing.  We will ask speech therapy to evaluate patient.  Patient is too sick for endoscopy at this time as well as she would not be able to undergo EGD with dilation for probably 2 more days due to being on Eliquis.  Last dose of Eliquis was 7/15/2023 at 0849.  Continue to monitor H&H and transfuse as needed.    AST and alk phos remain elevated.  Work-up has been negative except for ACE level elevated at 109.  Alpha-1 antitrypsin and fractionated alk phos were normal last admission.  Patient was planning on following up at [facility] for possible liver biopsy.      I discussed the patients findings and my recommendations with the patient.  Robyn Cuello, APRN  07/16/23  08:08 EDT

## 2023-07-16 NOTE — PROGRESS NOTES
"NEPHROLOGY PROGRESS NOTE------[facility]    [facility]  [phone]  Tony Parisi MD      Patient Care Team:  Ian Cordero MD as PCP - Tony Navarro MD as Consulting Physician (Nephrology)      Provider:  Tony Parisi MD  Patient Name: Rosario Oretz  :  1950    SUBJECTIVE:  Follow-up JOSÉ/CKD  No chest pain shortness of breath    Medication:  atorvastatin, 20 mg, Oral, Nightly  cefTRIAXone, 2,000 mg, Intravenous, Q24H  folic acid, 1 mg, Oral, Daily  levothyroxine, 50 mcg, Oral, Q AM  pantoprazole, 40 mg, Oral, Daily  predniSONE, 15 mg, Oral, Daily  senna-docusate sodium, 2 tablet, Oral, BID  sodium chloride, 10 mL, Intravenous, Q12H      sodium bicarbonate drip (greater than 75 mEq/bag), 100 mEq        OBJECTIVE    Vital Sign Min/Max for last 24 hours  Temp  Min: 97.5 °F (36.4 °C)  Max: 98.3 °F (36.8 °C)   BP  Min: 102/50  Max: 134/61   Pulse  Min: 68  Max: 77   Resp  Min: 13  Max: 25   SpO2  Min: 92 %  Max: 96 %   No data recorded   Weight  Min: 63.3 kg (139 lb 8.8 oz)  Max: 63.3 kg (139 lb 8.8 oz)     Flowsheet Rows      Flowsheet Row First Filed Value   Admission Height 167.6 cm (66\") Documented at 2023   Admission Weight 57.2 kg (126 lb 1.7 oz) Documented at 2023 0814            I/O this shift:  In: 150 [P.O.:150]  Out: -   I/O last 3 completed shifts:  In: 1300 [P.O.:1300]  Out: 1250 [Urine:1250]    Physical Exam:  General Appearance: alert, appears stated age and cooperative  Head: normocephalic, without obvious abnormality and atraumatic  Eyes: conjunctivae and sclerae normal and no icterus  Neck: supple and no JVD  Lungs: clear to auscultation and respirations regular  Heart: regular rhythm & normal rate and normal S1, S2  Chest: Wall no abnormalities observed  Abdomen: normal bowel sounds and soft, nontender  Extremities: moves extremities well, no edema, no cyanosis   Skin: no bleeding, bruising.  " Cystic lesion medial aspect right knee.  Neurologic: Alert, and oriented. No focal deficits    Labs:    WBC WBC   Date Value Ref Range Status   07/16/2023 11.20 (H) 3.40 - 10.80 10*3/mm3 Final   07/14/2023 9.80 3.40 - 10.80 10*3/mm3 Final   07/14/2023 9.80 3.40 - 10.80 10*3/mm3 Final      HGB Hemoglobin   Date Value Ref Range Status   07/16/2023 7.7 (L) 12.0 - 15.9 g/dL Final   07/14/2023 7.5 (L) 12.0 - 15.9 g/dL Final   07/14/2023 7.2 (L) 12.0 - 15.9 g/dL Final      HCT Hematocrit   Date Value Ref Range Status   07/16/2023 24.1 (L) 34.0 - 46.6 % Final   07/14/2023 23.5 (L) 34.0 - 46.6 % Final   07/14/2023 22.0 (L) 34.0 - 46.6 % Final      Platelets No results found for: LABPLAT   MCV MCV   Date Value Ref Range Status   07/16/2023 97.5 (H) 79.0 - 97.0 fL Final   07/14/2023 97.8 (H) 79.0 - 97.0 fL Final   07/14/2023 96.5 79.0 - 97.0 fL Final          Sodium Sodium   Date Value Ref Range Status   07/16/2023 140 136 - 145 mmol/L Final   07/14/2023 138 136 - 145 mmol/L Final   07/14/2023 137 136 - 145 mmol/L Final      Potassium Potassium   Date Value Ref Range Status   07/16/2023 4.3 3.5 - 5.2 mmol/L Final     Comment:     Slight hemolysis detected by analyzer. Results may be affected.   07/14/2023 4.4 3.5 - 5.2 mmol/L Final   07/14/2023 4.2 3.5 - 5.2 mmol/L Final      Chloride Chloride   Date Value Ref Range Status   07/16/2023 111 (H) 98 - 107 mmol/L Final   07/14/2023 109 (H) 98 - 107 mmol/L Final   07/14/2023 108 (H) 98 - 107 mmol/L Final      CO2 CO2   Date Value Ref Range Status   07/16/2023 15.0 (L) 22.0 - 29.0 mmol/L Final   07/14/2023 17.0 (L) 22.0 - 29.0 mmol/L Final   07/14/2023 18.0 (L) 22.0 - 29.0 mmol/L Final      BUN BUN   Date Value Ref Range Status   07/16/2023 72 (H) 8 - 23 mg/dL Final   07/14/2023 75 (H) 8 - 23 mg/dL Final   07/14/2023 73 (H) 8 - 23 mg/dL Final      Creatinine Creatinine   Date Value Ref Range Status   07/16/2023 2.30 (H) 0.57 - 1.00 mg/dL Final   07/14/2023 2.69 (H) 0.57 - 1.00  mg/dL Final   07/14/2023 2.71 (H) 0.57 - 1.00 mg/dL Final      Calcium Calcium   Date Value Ref Range Status   07/16/2023 8.4 (L) 8.6 - 10.5 mg/dL Final   07/14/2023 8.5 (L) 8.6 - 10.5 mg/dL Final   07/14/2023 8.5 (L) 8.6 - 10.5 mg/dL Final      PO4 No components found for: PO4   Albumin No results found for: ALBUMIN     Magnesium No results found for: MG   Uric Acid No components found for: URIC ACID     Imaging Results (Last 72 Hours)       Procedure Component Value Units Date/Time    MRI Knee Right Without Contrast [[id number]] Collected: 07/14/23 1631     Updated: 07/14/23 1653    Narrative:      MRI KNEE RIGHT  WO CONTRAST    Date of Exam: 7/14/2023 4:05 PM EDT    Indication: poss. infected joint.     Comparison: Radiographs July 13, 2023    Technique:  Routine multiplanar/multisequence images of the right knee were obtained without contrast administration.      Findings:  Osseous Structures and Intra-Articular  There is tricompartmental osteophytosis. There is mild subchondral edema of the superior median ridge of the patella presumed to be related to arthropathy. No definite fracture is seen. Alignment appears within normal limits. No definite suspicious   marrow signal abnormality is seen.    There is a small knee effusion.    Ligaments  The anterior cruciate ligament and posterior cruciate ligaments appear intact.  Medial collateral ligament and lateral collateral ligament complex appear intact.    Menisci  Meniscal evaluation is somewhat limited on this exam. There is suspected degenerative changes of the medial and lateral menisci. There is suspicion for partial thickness tear of the lateral meniscus posterior horn involving the free edge near the   posterior root. There is also suspicion for tear of the medial meniscus posterior horn with suspected horizontal defect.    Extensor compartment  The quadriceps and patellar tendons appear intact.    Cartilage  There appears to be advanced tricompartmental  chondromalacia. There is suspicion for high-grade and possibly full-thickness cartilage loss of the central weightbearing medial tibiofemoral articular cartilage. There is also suspected partial-thickness   cartilage loss of the patellofemoral compartment. There is probable small full-thickness cartilage defect at the superior median ridge of the patella with underlying subchondral edema. There is also suspected partial thickness cartilage loss of the   lateral tibiofemoral compartment. Surface type osteophyte is seen at the central lateral femoral condyle.    There are degenerative changes at the proximal tibiofibular articulation.    Soft tissues  There is prominent diffuse edema in the soft tissues surrounding the knee. At the medial aspect of the medial femoral condyle there appears to be a focal subcutaneous fluid signal collection overlying the proximal medial collateral ligament. This   measures approximately 2.8 x 0.8 cm in axial dimensions and up to 2.5 cm craniocaudal. No other definite fluid collection is seen. There is suspected trace amount of fluid in a Baker's cyst. There is mild to moderate muscle atrophy and suspected mild   muscle edema.    Miscellaneous  Popliteus tendon appears intact. Iliotibial band appears intact.      Impression:      Impression:  1.Prominent diffuse edema in the soft tissues surrounding the knee which may indicate skin and soft tissue infection.  2.2.8 x 0.8 x 2.5 cm collection is seen in the subcutaneous tissues along the medial femoral condyle which is nonspecific but could represent an abscess.  3.No significant knee effusion or significant marrow edema signal is seen on this exam to suggest joint infection. If there is high index of clinical concern, aspiration would be advised.  4.Advanced tricompartmental osteoarthritis with mild subchondral edema at the median ridge of the patella.  5.Limited visualization of the menisci on this exam with suspicion for tears of the  posterior horns.        Electronically Signed: Cj Rosalee    7/14/2023 4:51 PM EDT    Workstation ID: [id number]    XR Knee 1 or 2 View Right [[id number]] Collected: 07/13/23 1349     Updated: 07/13/23 1352    Narrative:      XR KNEE 1 OR 2 VW RIGHT    Date of Exam: 7/13/2023 1:22 PM EDT    Indication: swelling    Comparison: 7/8/2023.    Findings:  There is continued severe tricompartmental joint narrowing with moderate spurring. There is a small suprapatellar effusion. There is no fracture or dislocation.      Impression:      Impression:  Degenerative changes. Small effusion.      Electronically Signed: Juliane Baker MD    7/13/2023 1:50 PM EDT    Workstation ID: [id number]            Results for orders placed during the hospital encounter of 07/13/23    XR Knee 1 or 2 View Right    Narrative  XR KNEE 1 OR 2 VW RIGHT    Date of Exam: 7/13/2023 1:22 PM EDT    Indication: swelling    Comparison: 7/8/2023.    Findings:  There is continued severe tricompartmental joint narrowing with moderate spurring. There is a small suprapatellar effusion. There is no fracture or dislocation.    Impression  Impression:  Degenerative changes. Small effusion.      Electronically Signed: Juliane Baker MD  7/13/2023 1:50 PM EDT  Workstation ID: [id number]      Results for orders placed during the hospital encounter of 07/08/23    XR Knee 1 or 2 View Right    Narrative  XR KNEE 1 OR 2 VW RIGHT    Date of Exam: 7/8/2023 1:48 PM EDT    Indication: Knee pain    Comparison: None available.    Findings:  Osteopenia. Valgus angulation. No fracture or dislocation or joint effusion. Moderate tricompartment joint space narrowing.    Impression  Impression:  Moderate tricompartment arthritis. No fractures or dislocations.      Electronically Signed: Bridgett Gaspar  7/8/2023 2:18 PM EDT  Workstation ID: [id number]      XR Chest 1 View    Narrative  XR CHEST 1 VW    Date of Exam: 7/8/2023 1:48 PM EDT    Indication: Dyspnea    Comparison: Chest x-ray  12/2/2022, CT of the chest abdomen pelvis 3/13/2023    Findings:  Lungs normal expanded. Cardiomegaly. Mildly elevated right diaphragm. No pneumothorax or pleural effusion or focal pulmonary parenchymal opacity.    Impression  Impression:  No acute cardiopulmonary abnormality.      Electronically Signed: Bridgett Gaspar  7/8/2023 2:17 PM EDT  Workstation ID: [id number]      Results for orders placed during the hospital encounter of 06/26/23    Duplex Venous Lower Extremity - Right CAR    Interpretation Summary    Normal right lower extremity venous duplex scan.        ASSESSMENT / PLAN      JOSÉ (acute kidney injury)    Bacteremia due to Streptococcus    Cellulitis of right leg    Knee joint cyst, right    Bilateral leg edema    Acute UTI (urinary tract infection)    JOSÉ-prerenal in setting of hypotension, diuretics, ARB's.  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  Hold irbesartan.  Avoid ACE inhibitors or ARB's  History of paroxysmal atrial fibrillation  Cellulitis like lower extremity and right knee.  History NSAID use  Questionable history of sarcoidosis  UTI-urine culture growing greater than 100 K colonies of gram-negative rods.  Strep bacteremia  Metabolic acidosis-p.o. sodium bicarb     Creatinine still up but trending down  Continue IV fluid  Add p.o. sodium bicarb  closely monitor renal function fluid status electrolytes        Tony Parisi MD  [facility]  [phone]  07/16/23  12:23 EDT

## 2023-07-16 NOTE — THERAPY EVALUATION
Acute Care - Speech Language Pathology   Swallow Initial Evaluation  Nitin     Patient Name: Rosario Ortez  : 1950  MRN: [id number]  Today's Date: 2023               Admit Date: 2023    Visit Dx:     ICD-10-CM ICD-9-CM   1. Weakness  R53.1 780.79   2. Acute kidney injury  N17.9 584.9     Patient Active Problem List   Diagnosis    Laceration of scalp, initial encounter    Acute kidney injury    Acute cystitis    JOSÉ (acute kidney injury)    Bacteremia due to Streptococcus    Cellulitis of right leg    Knee joint cyst, right    Bilateral leg edema    Acute UTI (urinary tract infection)     Past Medical History:   Diagnosis Date    A-fib     Coronary artery disease      Past Surgical History:   Procedure Laterality Date    BREAST BIOPSY      HYSTERECTOMY      JOINT REPLACEMENT Right 2015       SLP Recommendation and Plan  SLP Swallowing Diagnosis: unable to assess, unable/unwilling to cooperate (23)  SLP Diet Recommendation:  (unable to make recommendation d/t patient was uncooperative.) (23)  Recommended Precautions and Strategies: upright posture during/after eating, small bites of food and sips of liquid, general aspiration precautions, reflux precautions (23)  SLP Rec. for Method of Medication Administration: meds crushed, with puree (23)     Monitor for Signs of Aspiration: yes, notify SLP if any concerns (23)  Recommended Diagnostics: reassess via clinical swallow evaluation (23)  Swallow Criteria for Skilled Therapeutic Interventions Met: demonstrates skilled criteria (23)     Rehab Potential/Prognosis, Swallowing: good, to achieve stated therapy goals (23)  Therapy Frequency (Swallow): PRN (23)  Predicted Duration Therapy Intervention (Days): until discharge (23)       SWALLOW EVALUATION (last 72 hours)       SLP Adult Swallow Evaluation       Row Name 23       Rehab  Evaluation    Document Type evaluation  -CB    Subjective Information no complaints  -CB    Patient Observations alert;cooperative  -CB    Patient/Family/Caregiver Comments/Observations Patient was resistent with complying with swallow evaluation. Patient's daughter and  were present during assessment.  -CB    Patient Effort fair  -CB       General Information    Patient Profile Reviewed yes  -CB    Pertinent History Of Current Problem 73-year-old female with history of A-fib on Eliquis, iron deficiency anemia, cholecystectomy, chronic kidney disease stage III, right leg cellulitis and possible sarcoidosis who was just seen by our practice during admission in June for anemia & new onset ascites. Patient is followed by Dr Flores for possible sarcoidosis.  He has had extensive work-up with plans for liver biopsy but apparently patient wanted to go to [facility] and has not been seen there yet.  Patient came to the ER on July 8 with cough, chest pain and right knee pain.  She was found to have a UTI.  She was noted to have anemia with hemoglobin of 7.5 and had a heme positive stool.  Her iron saturation was 11%.  She had EGD and colonoscopy by Dr. Flores in January of this year showing gastritis with granulomatous inflammation, cricopharyngeal stricture dilated diverticulosis and hemorrhoids.  She has been having trouble eating swallowing.  She denies bright red blood per rectum or melena.  Her abdomen is soft and nontender.  Labs: Sodium 140, potassium 4.3, creatinine 2.3 improved from 2.69 yesterday.  WBCs 11.2, hemoglobin stable at 7.7, platelets 219.  2 bands.  Plan speech therapy evaluation for her dysphagia.  We will also give IV iron replacement.  She will need liver biopsy at some point to evaluate etiology of ascites and possible sarcoidosis but apparently family wants this done at [facility].  We will follow.  -CB    Current Method of Nutrition regular textures;thin liquids  -CB        Pain    Additional Documentation Pain Scale: FACES Pre/Post-Treatment (Group)  -CB       Pain Scale: FACES Pre/Post-Treatment    Pain: FACES Scale, Pretreatment 0-->no hurt  -CB    Posttreatment Pain Rating 2-->hurts little bit  -CB       Oral Motor Structure and Function    Dentition Assessment natural, present and adequate;missing teeth  -CB    Secretion Management requires suctioning to control secretions  -CB    Mucosal Quality moist, healthy  -CB    Gag Response WFL  -CB       Oral Musculature and Cranial Nerve Assessment    Oral Motor General Assessment WFL  -CB    Oral Motor, Comment Oral mechanism examination revealed patient demonstrated full ROM and mobility of lingual and labial structure. Lingual strength was adequate. Noted palatal movement. Gag reflex was present.  -CB       General Eating/Swallowing Observations    Respiratory Support Currently in Use room air  -CB    Eating/Swallowing Skills fed by SLP  -CB    Positioning During Eating upright in bed  -CB    Utensils Used spoon;straw  -CB    Consistencies Trialed mechanical ground textures;pureed;thin liquids  -CB       Clinical Swallow Eval    Clinical Swallow Evaluation Summary Patient was seen for dysphagia evaluation per MD order. Patient was reported c/o pain during swallowing. Patient has hx of esophageal strictures with subsequent dilatation 1/20/23. Patient required a 48f and noted stricture at level of cricopharyngeus. Patient has history of GERD which she takes routine medication for condition. Patient has her own natural teeth with some missing particularly in the lower back teeth. Oral mechanism examination revealed full ROM and mobility of lingual and labial structures. Lingual strength was adequate. Noted palatal movement. Gag reflex was present . Properly positioned patient upright in bed prior to trials. Patient took sips of thins via straw x 3. Oral transit was 2-4 seconds in duration. Noted patient holding bolus before initiating  swallow. She replied that it was to warm the liquid before swallowing as she prefers room temperature water. Noted patient coughing x 2. At one point she self suctioned some phelgm following trials. Provided trials of puree x 1. Patient  held bolus and eventually spitted it out.  Provided trials of mixed consistency x 1. Patient noted swallowing the juice of peach and only chewed peach a few times and ultimately spit it out as well. Patient refused any further trials. Unable to adequately provide any recommendation at this time. Patient is currently on regular diet but essentially is refusing to eat much of anything despite encouragement from family. It is recommended that patient be reevaluated tomorrow in hopes of completing a full evaluation in order to establish safe PO diet and/or further recommendations if indicated.  -CB       SLP Evaluation Clinical Impression    SLP Swallowing Diagnosis unable to assess;unable/unwilling to cooperate  -CB    Functional Impact risk of aspiration/pneumonia  -CB    Rehab Potential/Prognosis, Swallowing good, to achieve stated therapy goals  -CB    Swallow Criteria for Skilled Therapeutic Interventions Met demonstrates skilled criteria  -CB       Recommendations    Therapy Frequency (Swallow) PRN  -CB    Predicted Duration Therapy Intervention (Days) until discharge  -CB    SLP Diet Recommendation --  unable to make recommendation d/t patient was uncooperative.  -CB    Recommended Diagnostics reassess via clinical swallow evaluation  -CB    Recommended Precautions and Strategies upright posture during/after eating;small bites of food and sips of liquid;general aspiration precautions;reflux precautions  -CB    Oral Care Recommendations Oral Care BID/PRN;Swab  -CB    SLP Rec. for Method of Medication Administration meds crushed;with puree  -CB    Monitor for Signs of Aspiration yes;notify SLP if any concerns  -CB       Swallow Goals (SLP)    Swallow LTGs Swallow Long Term Goal  (free text)  -CB    Swallow STGs diet tolerance goal selection (SLP)  -CB    Diet Tolerance Goal Selection (SLP) Swallow Short Term Goal 1  -CB       (LTG) Swallow    (LTG) Swallow Patient will tolerate safest and least restrictive diet without complications from aspiration.  -CB    Time Frame (Swallow Long Term Goal) by discharge  -CB       (STG) Swallow 1    (STG) Swallow 1 Patient will participate in ongoing assessment of swallow including reevaluation clinically and/or including instrumental assessment of swallow if indicated, to further assess swallow function in anticipation of establishing safe PO diet.  -CB    Time Frame (Swallow Short Term Goal 1) 1 week  -CB              User Key  (r) = Recorded By, (t) = Taken By, (c) = Cosigned By      Initials Name Effective Dates    Addie Galloway SLP 09/21/21 -                     EDUCATION  The patient has been educated in the following areas:   Dysphagia (Swallowing Impairment) Oral Care/Hydration.        SLP GOALS       Row Name 07/16/23 1800       (LTG) Swallow    (LTG) Swallow Patient will tolerate safest and least restrictive diet without complications from aspiration.  -CB    Time Frame (Swallow Long Term Goal) by discharge  -CB       (STG) Swallow 1    (STG) Swallow 1 Patient will participate in ongoing assessment of swallow including reevaluation clinically and/or including instrumental assessment of swallow if indicated, to further assess swallow function in anticipation of establishing safe PO diet.  -CB    Time Frame (Swallow Short Term Goal 1) 1 week  -CB              User Key  (r) = Recorded By, (t) = Taken By, (c) = Cosigned By      Initials Name Provider Type    Addie Galloway SLP Speech and Language Pathologist                       Time Calculation:                JUAN Stout  7/16/2023

## 2023-07-16 NOTE — THERAPY EVALUATION
Patient Name: Rosario Ortez  : 1950    MRN: [id number]                              Today's Date: 2023       Admit Date: 2023    Visit Dx:     ICD-10-CM ICD-9-CM   1. Weakness  R53.1 780.79   2. Acute kidney injury  N17.9 584.9     Patient Active Problem List   Diagnosis    Laceration of scalp, initial encounter    Acute kidney injury    Acute cystitis    JOSÉ (acute kidney injury)    Bacteremia due to Streptococcus    Cellulitis of right leg    Knee joint cyst, right    Bilateral leg edema    Acute UTI (urinary tract infection)     Past Medical History:   Diagnosis Date    A-fib     Coronary artery disease      Past Surgical History:   Procedure Laterality Date    BREAST BIOPSY      HYSTERECTOMY      JOINT REPLACEMENT Right 2015      General Information       Row Name [id number]       General Information    Prior Level of Function min assist:;transfer;max assist:;ADL's  Spouse states she sleeps in recliner and once she is assisted to come to stand she can walk unassisted short distances w/ Rolator.  - --    Existing Precautions/Restrictions fall  - fall  -    Barriers to Rehab medically complex;previous functional deficit;cognitive status  - --      Row Name 23          Living Environment    People in Home child(clint), adult;spouse  spouse is retired & (I) though has had 3 back surgeries. Dtr works from home for [employer].  -       Row Name 23          Home Main Entrance    Number of Stairs, Main Entrance four  -       Row Name 23          Stairs Within Home, Primary    Number of Stairs, Within Home, Primary other (see comments)  upstairs bed/bath but pt has been using main level for some time.  -       Row Name 23          Cognition    Orientation Status (Cognition) oriented to;person;place;disoriented to;situation;time;verbal cues/prompts needed for orientation  -       Row Name 23          Safety  Issues, Functional Mobility    Safety Issues Affecting Function (Mobility) at risk behavior observed;insight into deficits/self-awareness;judgment;problem-solving;sequencing abilities  -     Impairments Affecting Function (Mobility) balance;cognition;endurance/activity tolerance;coordination;pain;strength;other (see comments)  edema in hands, feet, lower legs.  -               User Key  (r) = Recorded By, (t) = Taken By, (c) = Cosigned By      Initials Name Provider Type     Charissa Thurston OT Occupational Therapist                     Mobility/ADL's       Row Name 07/16/23 0928          Bed Mobility    Bed Mobility supine-sit  -     Supine-Sit [place] (Bed Mobility) maximum assist (25% patient effort)  -     Bed Mobility, Safety Issues cognitive deficits limit understanding;decreased use of legs for bridging/pushing;decreased use of arms for pushing/pulling  -     Assistive Device (Bed Mobility) draw sheet;bed rails;head of bed elevated  -     Comment, (Bed Mobility) limited motivation for activity. Spouse encouraging.  -       Row Name 07/16/23 0928          Transfers    Transfers sit-stand transfer;bed-chair transfer;stand-sit transfer  -MH       Row Name 07/16/23 0928          Bed-Chair Transfer    Bed-Chair [place] (Transfers) maximum assist (25% patient effort)  or 2-person mod (A). Coached spouse on use of gait belt.  -       Row Name 07/16/23 0928          Sit-Stand Transfer    Sit-Stand [place] (Transfers) moderate assist (50% patient effort)  -       Row Name 07/16/23 0928          Stand-Sit Transfer    Stand-Sit [place] (Transfers) minimum assist (75% patient effort)  -[facility] Name 07/16/23 0928          Functional Mobility    Functional Mobility- Ind. Level not appropriate to assess  -[facility] Name 07/16/23 0928          Activities of Daily Living    BADL Assessment/Intervention lower body dressing;toileting;bathing;feeding;upper body dressing  -        [facility] Name 07/16/23 0928          Lower Body Dressing Assessment/Training    [place] Level (Lower Body Dressing) dependent (less than 25% patient effort);doff;don;pants/bottoms;socks  -     Position (Lower Body Dressing) edge of bed sitting;supine;supported standing  -MH       Row Name 07/16/23 0928          Toileting Assessment/Training    [place] Level (Toileting) toileting skills;dependent (less than 25% patient effort)  -MH       Row Name 07/16/23 0928          Bathing Assessment/Intervention    [place] Level (Bathing) bathing skills;dependent (less than 25% patient effort)  -MH       Row Name 07/16/23 0928          Self-Feeding Assessment/Training    [place] Level (Feeding) liquids to mouth;verbal cues;nonverbal cues (demo/gesture)  -     Position (Self-Feeding) sitting up in bed  -MH       Row Name 07/16/23 0928          Upper Body Dressing Assessment/Training    [place] Level (Upper Body Dressing) doff;don;front opening garment;dependent (less than 25% patient effort)  -               User Key  (r) = Recorded By, (t) = Taken By, (c) = Cosigned By      Initials Name Provider Type     Charissa Thurston, OT Occupational Therapist                   Obj/Interventions       Row Name 07/16/23 0931          Range of Motion Comprehensive    General Range of Motion no range of motion deficits identified  -MH       Row Name 07/16/23 0931          Strength Comprehensive (MMT)    Comment, General Manual Muscle Testing (MMT) Assessment BLE grossly 2/5 & note ankles with limimted active & passive flexion to neutral. BUE grossly 3/5  -               User Key  (r) = Recorded By, (t) = Taken By, (c) = Cosigned By      Initials Name Provider Type     Charissa Thurston, OT Occupational Therapist                   Goals/Plan       [facility] Name 07/16/23 0939          Bed Mobility Goal 1 (OT)    Activity/Assistive Device (Bed Mobility Goal 1, OT) bed mobility activities, all  -     [place] Level/Cues  Needed (Bed Mobility Goal 1, OT) minimum assist (75% or more patient effort)  -     Time Frame (Bed Mobility Goal 1, OT) 2 weeks  -       Row Name 07/16/23 0939          Transfer Goal 1 (OT)    Activity/Assistive Device (Transfer Goal 1, OT) transfers, all  -     [place] Level/Cues Needed (Transfer Goal 1, OT) minimum assist (75% or more patient effort)  -     Time Frame (Transfer Goal 1, OT) 2 weeks  -[facility] Name 07/16/23 0939          Toileting Goal 1 (OT)    Activity/Device (Toileting Goal 1, OT) toileting skills, all  -     [place] Level/Cues Needed (Toileting Goal 1, OT) minimum assist (75% or more patient effort)  -     Time Frame (Toileting Goal 1, OT) 2 weeks  -[facility] Name 07/16/23 0939          Therapy Assessment/Plan (OT)    Planned Therapy Interventions (OT) activity tolerance training;adaptive equipment training;BADL retraining;cognitive/visual perception retraining;occupation/activity based interventions;transfer/mobility retraining;patient/caregiver education/training;strengthening exercise;functional balance retraining;passive ROM/stretching  -               User Key  (r) = Recorded By, (t) = Taken By, (c) = Cosigned By      Initials Name Provider Type     Charissa Thurston OT Occupational Therapist                   Clinical Impression       Row Name 07/16/23 0932          Pain Assessment    Additional Documentation Pain Scale: FACES Pre/Post-Treatment (Group)  -[facility] Name 07/16/23 0932          Pain Scale: FACES Pre/Post-Treatment    Pain: FACES Scale, Pretreatment 4-->hurts little more  -     Posttreatment Pain Rating 4-->hurts little more  -     Pain Location - Side/Orientation Right  -     Pain Location lower  -     Pain Location - extremity  -       Row Name 07/16/23 0932          Plan of Care Review    Plan of Care Reviewed With patient;spouse  -     Progress no change  -     Outcome Evaluation Rosario Ortez is a 73 y.o. female with  past medical history of paroxysmal atrial fibrillation, hypertension, hypothyroidism and fluid accumulation in the abdomen and lower extremities and recently suspected to have sarcoidosis. Pt was at PCP for blood work and they referred her to the ER d/t worsening kidney function. Pt dx with JOSÉ on CKD, weakness, and bacteremia. Pt is to have drainage of a RLE abscess possibly this date. At baseline, patient would ambulate short distances with Rolator but primarily use w/c for mobility and sleeps in a recliner on their main level. Spouse states he assists her with coming to stand and he & their dtr assist with ADL. They would very much like to keep pt at home, but have 4 MAKSIM. OT strongly recommends SNF, but spouse states he will do her exercises with her (OT educated on seated HEP) and will use BSC and hospital bed and gait belt at home. Discussion about the likely eventual if not immediate need for a ramp if pt d/c home in her weakened state. Pt needed max (A) for transfer this date and encouragement for activity. Spouse states he can assist but there are concerns that he has had spinal surgeries & could be high risk for injury. Spouse appears very supportive. If pt elects to d/c home they may benefit from a BSC and hospital bed. Pt is oriented X2 this date.  -       Row Name 07/16/23 0932          Therapy Assessment/Plan (OT)    Rehab Potential (OT) fair, will monitor progress closely  -     Criteria for Skilled Therapeutic Interventions Met (OT) skilled treatment is necessary  -     Therapy Frequency (OT) 3 times/wk  -       Row Name 07/16/23 0932          Therapy Plan Review/Discharge Plan (OT)    Equipment Needs Upon Discharge (OT) hospital bed;commode chair  -     Anticipated Discharge Disposition (OT) skilled nursing facility  -       Row Name 07/16/23 0932          Vital Signs    O2 Delivery Pre Treatment room air  -     O2 Delivery Intra Treatment room air  -     O2 Delivery Post Treatment  room air  -     Pre Patient Position Supine  -MH     Intra Patient Position Standing  -MH     Post Patient Position Sitting  -MH       Row Name 07/16/23 0932          Positioning and Restraints    Pre-Treatment Position in bed  -     Post Treatment Position chair  -MH     In Chair encouraged to call for assist;exit alarm on;with family/caregiver;sitting;call light within reach;with nsg  -               User Key  (r) = Recorded By, (t) = Taken By, (c) = Cosigned By      Initials Name Provider Type     Charissa Thurston, OT Occupational Therapist                   Outcome Measures       Row Name 07/16/23 0946          How much help from another is currently needed...    Putting on and taking off regular lower body clothing? 1  -     Bathing (including washing, rinsing, and drying) 1  -     Toileting (which includes using toilet bed pan or urinal) 1  -     Putting on and taking off regular upper body clothing 1  -     Taking care of personal grooming (such as brushing teeth) 2  -     Eating meals 3  -     AM-PAC 6 Clicks Score (OT) 9  -       Row Name 07/16/23 0946 07/16/23 0900       How much help from another person do you currently need...    Turning from your back to your side while in flat bed without using bedrails? 2  - 1  -BS    Moving from lying on back to sitting on the side of a flat bed without bedrails? 2  - 1  -BS    Moving to and from a bed to a chair (including a wheelchair)? 2  - 1  -BS    Standing up from a chair using your arms (e.g., wheelchair, bedside chair)? 2  - 1  -BS    Climbing 3-5 steps with a railing? 1  - 1  -BS    To walk in hospital room? 1  - 1  -BS    AM-PAC 6 Clicks Score (PT) 10  - 6  -BS    Highest level of mobility 4 --> Transferred to chair/commode  - 2 --> Bed activities/dependent transfer  -BS      Row Name 07/16/23 0946          Functional Assessment    Outcome Measure Options AM-PAC 6 Clicks Daily Activity (OT)  -               User Key   (r) = Recorded By, (t) = Taken By, (c) = Cosigned By      Initials Name Provider Type     Charissa Thurston OT Occupational Therapist    Catalina Mullins LPN Licensed Nurse                    Occupational Therapy Education       Title: PT OT SLP Therapies (In Progress)       Topic: Occupational Therapy (Done)       Point: ADL training (Done)       Description:   Instruct learner(s) on proper safety adaptation and remediation techniques during self care or transfers.   Instruct in proper use of assistive devices.                  Learning Progress Summary             Patient Acceptance, E,TB,D, VU,NR,DU by  at 7/16/2023 0947   Significant Other Acceptance, E,TB,D, VU,NR,DU by  at 7/16/2023 0947                         Point: Home exercise program (Done)       Description:   Instruct learner(s) on appropriate technique for monitoring, assisting and/or progressing therapeutic exercises/activities.                  Learning Progress Summary             Patient Acceptance, E,TB,D, VU,NR,DU by  at 7/16/2023 0947   Significant Other Acceptance, E,TB,D, VU,NR,DU by  at 7/16/2023 0947                         Point: Precautions (Done)       Description:   Instruct learner(s) on prescribed precautions during self-care and functional transfers.                  Learning Progress Summary             Patient Acceptance, E,TB,D, VU,NR,DU by  at 7/16/2023 0947   Significant Other Acceptance, E,TB,D, VU,NR,DU by  at 7/16/2023 0947                         Point: Body mechanics (Done)       Description:   Instruct learner(s) on proper positioning and spine alignment during self-care, functional mobility activities and/or exercises.                  Learning Progress Summary             Patient Acceptance, E,TB,D, VU,NR,DU by  at 7/16/2023 0947   Significant Other Acceptance, E,TB,D, VU,NR,DU by  at 7/16/2023 0947                                         User Key       Initials Effective Dates Name Provider Type  Discipline     06/16/21 -  Charissa Thurston OT Occupational Therapist OT                  OT Recommendation and Plan  Planned Therapy Interventions (OT): activity tolerance training, adaptive equipment training, BADL retraining, cognitive/visual perception retraining, occupation/activity based interventions, transfer/mobility retraining, patient/caregiver education/training, strengthening exercise, functional balance retraining, passive ROM/stretching  Therapy Frequency (OT): 3 times/wk  Plan of Care Review  Plan of Care Reviewed With: patient, spouse  Progress: no change  Outcome Evaluation: Rosario Ortez is a 73 y.o. female with past medical history of paroxysmal atrial fibrillation, hypertension, hypothyroidism and fluid accumulation in the abdomen and lower extremities and recently suspected to have sarcoidosis. Pt was at PCP for blood work and they referred her to the ER d/t worsening kidney function. Pt dx with JOSÉ on CKD, weakness, and bacteremia. Pt is to have drainage of a RLE abscess possibly this date. At baseline, patient would ambulate short distances with Rolator but primarily use w/c for mobility and sleeps in a recliner on their main level. Spouse states he assists her with coming to stand and he & their dtr assist with ADL. They would very much like to keep pt at home, but have 4 MAKSIM. OT strongly recommends SNF, but spouse states he will do her exercises with her (OT educated on seated HEP) and will use BSC and hospital bed and gait belt at home. Discussion about the likely eventual if not immediate need for a ramp if pt d/c home in her weakened state. Pt needed max (A) for transfer this date and encouragement for activity. Spouse states he can assist but there are concerns that he has had spinal surgeries & could be high risk for injury. Spouse appears very supportive. If pt elects to d/c home they may benefit from a BSC and hospital bed. Pt is oriented X2 this date.     Time Calculation:     Time Calculation- OT       Row Name 07/16/23 0947             Time Calculation- OT    OT Start Time 0845  -MH      OT Stop Time 0921  -      OT Time Calculation (min) 36 min  -      Total Timed Code Minutes- OT 23 minute(s)  -      OT Received On 07/16/23  -      OT - Next Appointment 07/18/23  -      OT Goal Re-Cert Due Date 07/30/23  -                User Key  (r) = Recorded By, (t) = Taken By, (c) = Cosigned By      Initials Name Provider Type     Charissa Thurston OT Occupational Therapist                  Therapy Charges for Today       Code Description Service Date Service Provider Modifiers Qty    73637771185 HC OT SELF CARE/MGMT/TRAIN EA 15 MIN 7/16/2023 Charissa Thurston OT GO 1    72993540387 HC OT THERAPEUTIC ACT EA 15 MIN 7/16/2023 Charissa Thurston OT GO 1    13551473002 HC OT EVAL MOD COMPLEXITY 3 7/16/2023 Charissa Thurston OT GO 1                 Charissa Thurston OT  7/16/2023

## 2023-07-16 NOTE — PROGRESS NOTES
[facility]   Daily Progress Note    Patient Name: Rosario Ortez  : 1950  MRN: [id number]  Primary Care Physician:  Ian Cordero MD  Date of admission: 2023  Date and Time of Service: 2023 at 9 am      Subjective      Chief Complaint: Abnormal labs.  Sent in from PCP office.  JOSÉ on CKD Stage 3, right leg cellulitis and UTI    Patient seen today.  Doing okay.   at bedside.  Blood cultures show Strep.  Urine culture >100,000 gram negative rods.  Right knee is hurting her with a small painful cyst medial aspect.  Might need to be aspirated.  Getting IVF.    7/15/2023  Pt denies for any short of breath, no nausea or vomiting.no chest pain    2023  Pt complaining of generalized weakness, denies for any nausea or vomiting, no chest pain.      ROS negative except what was mentioned above.      Objective      Vitals:   Temp:  [97.5 °F (36.4 °C)-98.3 °F (36.8 °C)] 98 °F (36.7 °C)  Heart Rate:  [68-74] 74  Resp:  [13-25] 17  BP: (102-132)/(48-61) 126/61  Flow (L/min):  [1] 1    Physical Exam  Constitutional:       General: She is not in acute distress.     Appearance: She is ill-appearing. She is not toxic-appearing.   HENT:      Head: Normocephalic and atraumatic.      Right Ear: Tympanic membrane normal.      Left Ear: Tympanic membrane normal.      Mouth/Throat:      Mouth: Mucous membranes are moist.   Eyes:      Pupils: Pupils are equal, round, and reactive to light.   Cardiovascular:      Rate and Rhythm: Normal rate and regular rhythm.      Pulses: Normal pulses.      Heart sounds: Normal heart sounds.   Pulmonary:      Effort: Pulmonary effort is normal. No respiratory distress.      Breath sounds: Normal breath sounds.   Abdominal:      General: Bowel sounds are normal. There is no distension.      Palpations: Abdomen is soft.   Musculoskeletal:         General: No swelling. Normal range of motion.      Cervical back: Normal range of motion and neck  supple.      Right lower leg: Edema present.      Left lower leg: Edema present.   Skin:     General: Skin is warm.      Findings: Erythema and lesion present.      Comments: Painful cyst like structure medial aspect right thigh.  No open wounds on the skin.  Some dry skin and edema.  Edema looks chronic.   Neurological:      General: No focal deficit present.      Mental Status: She is alert and oriented to person, place, and time. Mental status is at baseline.   Psychiatric:         Mood and Affect: Mood normal.           Result Review    Result Review:  I have personally reviewed the results from the time of this admission to 7/16/2023 10:25 EDT and agree with these findings:  [x]  Laboratory  [x]  Microbiology  [x]  Radiology  [x]  EKG/Telemetry   [x]  Cardiology/Vascular   []  Pathology  [x]  Old records  []  Other:  Most notable findings include: JOSÉ on CKD with Cr 2.7, anemia with Hgb drop to 7.2 toda    Wounds (last 24 hours)       LDA Wound       Row Name 07/16/23 0900 07/16/23 0700 07/16/23 0651       Wound 06/27/23 0145 Right lower leg Abcess    Wound - Properties Group Placement Date: 06/27/23  - Placement Time: 0145  -AH Present on Hospital Admission: Y  -AH Side: Right  - Orientation: lower  -AH Location: leg  -AH Primary Wound Type: Abcess  -AH    Dressing Appearance dry;intact  -BS -- --    Closure Adhesive bandage  -BS -- --    Base dry;white  -BS -- --    Periwound intact;swelling;redness  -BS -- --    Periwound Temperature cool  -BS -- --    Periwound Skin Turgor soft  -BS -- --    Edges open  -BS -- --    Drainage Characteristics/Odor serous  -BS -- --    Drainage Amount moderate  -BS -- --    Care, Wound cleansed with;soap and water  -BS -- --    Retired Wound - Properties Group Placement Date: 06/27/23  - Placement Time: 0145 -AH Present on Hospital Admission: Y  -AH Side: Right  -AH Orientation: lower  -AH Location: leg  -AH Primary Wound Type: Abcess  -AH    Retired Wound - Properties  Group Date first assessed: 06/27/23  - Time first assessed: 0145  -AH Present on Hospital Admission: Y  -AH Side: Right  -AH Location: leg  -AH Primary Wound Type: Abcess  -AH       Wound 07/16/23 0650 Left coccyx Pressure Injury    Wound - Properties Group Placement Date: 07/16/23  -BS Placement Time: 0650  -BS Present on Hospital Admission: N  -BS Side: Left  -BS Location: coccyx  -BS Primary Wound Type: Pressure inj  -BS    Wound Image -- -- Images linked: 1  -BS    Pressure Injury Stage -- 2  -BS --    Dressing Appearance dry;intact  -BS intact;dry  -BS --    Closure -- Adhesive bandage  -BS --    Base -- moist  -BS --    Periwound -- intact;dry  -BS --    Edges -- open  -BS --    Care, Wound -- cleansed with;soap and water  -BS --    Dressing Care -- dressing applied  -BS --    Periwound Care -- absorptive dressing applied  -BS --    Retired Wound - Properties Group Placement Date: 07/16/23  -BS Placement Time: 0650  -BS Present on Hospital Admission: N  -BS Side: Left  -BS Location: coccyx  -BS Primary Wound Type: Pressure inj  -BS    Retired Wound - Properties Group Date first assessed: 07/16/23  -BS Time first assessed: 0650  -BS Present on Hospital Admission: N  -BS Side: Left  -BS Location: coccyx  -BS Primary Wound Type: Pressure inj  -BS      Row Name 07/15/23 2001             Wound 06/27/23 0145 Right lower leg Abcess    Wound - Properties Group Placement Date: 06/27/23  -AH Placement Time: 0145  -AH Present on Hospital Admission: Y  -AH Side: Right  -AH Orientation: lower  -AH Location: leg  -AH Primary Wound Type: Abcess  -AH    Dressing Appearance dry;intact  -AH      Closure Adhesive bandage  -AH      Base dry;white  -AH      Yellow (%), Wound Tissue Color 100  -AH      Periwound intact;swelling;redness  -      Periwound Temperature cool  -AH      Periwound Skin Turgor soft  -AH      Edges open  -AH      Drainage Characteristics/Odor serous  -AH      Drainage Amount scant  -AH      Care, Wound  cleansed with;soap and water  -AH      Dressing Care dressing changed  -AH      Retired Wound - Properties Group Placement Date: 06/27/23  -AH Placement Time: 0145  -AH Present on Hospital Admission: Y  -AH Side: Right  -AH Orientation: lower  -AH Location: leg  -AH Primary Wound Type: Abcess  -AH    Retired Wound - Properties Group Date first assessed: 06/27/23  -AH Time first assessed: 0145  -AH Present on Hospital Admission: Y  -AH Side: Right  -AH Location: leg  -AH Primary Wound Type: Abcess  -AH       Wound 07/16/23 0650 Left coccyx Pressure Injury    Wound - Properties Group Placement Date: 07/16/23  -BS Placement Time: 0650  -BS Present on Hospital Admission: N  -BS Side: Left  -BS Location: coccyx  -BS Primary Wound Type: Pressure inj  -BS    Retired Wound - Properties Group Placement Date: 07/16/23  -BS Placement Time: 0650  -BS Present on Hospital Admission: N  -BS Side: Left  -BS Location: coccyx  -BS Primary Wound Type: Pressure inj  -BS    Retired Wound - Properties Group Date first assessed: 07/16/23  -BS Time first assessed: 0650  -BS Present on Hospital Admission: N  -BS Side: Left  -BS Location: coccyx  -BS Primary Wound Type: Pressure inj  -BS              User Key  (r) = Recorded By, (t) = Taken By, (c) = Cosigned By      Initials Name Provider Type    Lata Portillo RN Registered Nurse    Catalina Mullins LPN Licensed Nurse                      Assessment & Plan      Brief Patient Summary:  Rosario Ortez is a 73 y.o. female with past medical history of paroxysmal atrial fibrillation, hypertension, hypothyroidism and fluid accumulation in the abdomen and lower extremities and recently suspected to have sarcoidosis for which she is going to specialized center next week for further diagnosis has been doing at her baseline and went for her blood work at her PCPs office yesterday. PCPs office called her today and told her to come to the hospital because her kidney function looks worse and she  has a UTI.  Patient does have JOSÉ on CKD but does not have a UTI at this point.  Her baseline activity is pretty poor and she would walk some very small distances with walker but is mostly wheelchair-bound.  She was also complaining of some area of erythematous lesions with some swelling in her right leg and knee and probably had some bronchitis for which she was started on doxycycline yesterday by her PCP.  Patient's oral intake is very poor as per family.  She has been on water pills with Lasix and she was also started on hydrochlorothiazide yesterday.  She denies any nausea vomiting or abdominal pain but is quite tender in the abdomen.  She denies any fever chills chest pain or shortness of breath but has some productive cough.  She denies any other significant complaints though.         atorvastatin, 20 mg, Oral, Nightly  cefTRIAXone, 2,000 mg, Intravenous, Q24H  ferric gluconate, 250 mg, Intravenous, Daily  folic acid, 1 mg, Oral, Daily  levothyroxine, 50 mcg, Oral, Q AM  pantoprazole, 40 mg, Oral, Daily  predniSONE, 15 mg, Oral, Daily  senna-docusate sodium, 2 tablet, Oral, BID  sodium bicarbonate, 1,300 mg, Oral, TID  sodium chloride, 10 mL, Intravenous, Q12H       sodium bicarbonate drip (greater than 75 mEq/bag), 100 mEq         Active Hospital Problems:  Active Hospital Problems    Diagnosis     **JOSÉ (acute kidney injury)     Bacteremia due to Streptococcus     Cellulitis of right leg     Knee joint cyst, right     Bilateral leg edema     Acute UTI (urinary tract infection)      JOSÉ on CKD Stage 3-likely pre-renal in the setting of volume depletion from diuretics.  Change NS to bicarbonate fluids 75 ml per hour,Nephrology on board, will follow there recommendation.    2. Right leg cellulitis / ? Knee abscess, continue IV rocephin.  ID consulted to due to strep bacteremia.  Probable source id the right leg.  Consulted wound care also.  Keep leg elevated.      3. Strep bacteremia-continue ceftriaxone.  ID  consulted.    4. Right knee cyst-painful to touch and might need aspiration.  Consult Ortho to look at it.    5. B/L leg edema-chronic issue.  Had venous dopplers done June 2023 and negative for any DVT.  Keep leg elevated while in bed.  Has some lymphedema also.    6. UTI with gram negative anna-possible E. Coli.  Continue ceftriaxone.    7. PAF-continue Eliquis 5 mg BID.  Monitor HR.    PLAN:  -AS ABOVE.  NEEDS TO STAY AT LEAST THROUGH THIS WEEKEND.  MADE INPATIENT TODAY.  SPOKE TO  IN DETAIL.    DVT prophylaxis:  No DVT prophylaxis order currently exists.    CODE STATUS:    Level Of Support Discussed With: Patient; Health Care Surrogate  Code Status (Patient has no pulse and is not breathing): CPR (Attempt to Resuscitate)  Medical Interventions (Patient has pulse or is breathing): Full Support      Disposition:  I expect patient to be discharged 3 DAYS AT LEAST.    This patient has been examined wearing appropriate Personal Protective Equipment and discussed with hospital infection control department. 07/16/23      Electronically signed by Sang Sanford MD, 07/16/23, 10:25 EDT.  [facility]ist Team

## 2023-07-16 NOTE — PROGRESS NOTES
GENERAL SURGERY PROGRESS NOTE  Chief Complaint:  Surgery Follow up   LOS: 2 days       Subjective     Interval History:     Seems somewhat confused today.  No family at bedside    Objective     Vital Signs  Temp:  [97.6 °F (36.4 °C)-98.3 °F (36.8 °C)] 97.7 °F (36.5 °C)  Heart Rate:  [68-77] 68  Resp:  [13-25] 16  BP: (115-134)/(48-61) 120/60    Physical Exam:   Stable area of fluctuance on right medial knee  Labs:  Lab Results (last 24 hours)       Procedure Component Value Units Date/Time    Blood Culture - Blood, Arm, Left [[id number]]  (Normal) Collected: 07/13/23 0852    Specimen: Blood from Arm, Left Updated: 07/16/23 0900     Blood Culture No growth at 3 days    Narrative:      Less than seven (7) mL's of blood was collected.  Insufficient quantity may yield false negative results.    Manual Differential [[id number]]  (Abnormal) Collected: 07/16/23 0630    Specimen: Blood Updated: 07/16/23 0736     Neutrophil % 86.0 %      Lymphocyte % 5.0 %      Monocyte % 5.0 %      Eosinophil % 1.0 %      Bands %  2.0 %      Metamyelocyte % 1.0 %      Neutrophils Absolute 9.86 10*3/mm3      Lymphocytes Absolute 0.56 10*3/mm3      Monocytes Absolute 0.56 10*3/mm3      Eosinophils Absolute 0.11 10*3/mm3      Anisocytosis Slight/1+     WBC Morphology Normal     Large Platelets Slight/1+    CBC & Differential [[id number]]  (Abnormal) Collected: 07/16/23 0630    Specimen: Blood Updated: 07/16/23 0736    Narrative:      The following orders were created for panel order CBC & Differential.  Procedure                               Abnormality         Status                     ---------                               -----------         ------                     CBC Auto Differential[[id number]]        Abnormal            Final result               Scan Slide[[id number]]                                       Final result                 Please view results for these tests on the individual orders.    Scan Slide [[id number]] Collected:  07/16/23 0630    Specimen: Blood Updated: 07/16/23 0736     Scan Slide --     Comment: See Manual Differential Results       CBC Auto Differential [[id number]]  (Abnormal) Collected: 07/16/23 0630    Specimen: Blood Updated: 07/16/23 0736     WBC 11.20 10*3/mm3      RBC 2.47 10*6/mm3      Hemoglobin 7.7 g/dL      Hematocrit 24.1 %      MCV 97.5 fL      MCH 31.0 pg      MCHC 31.8 g/dL      RDW 20.4 %      RDW-SD 72.2 fl      MPV 7.1 fL      Platelets 219 10*3/mm3     Narrative:      The previously reported component NRBC is no longer being reported. Previous result was 0.0 /100 WBC (Reference Range: 0.0-0.2 /100 WBC) on 7/16/2023 at 0705 EDT.    Basic Metabolic Panel [[id number]]  (Abnormal) Collected: 07/16/23 0152    Specimen: Blood Updated: 07/16/23 0251     Glucose 96 mg/dL      BUN 72 mg/dL      Creatinine 2.30 mg/dL      Sodium 140 mmol/L      Potassium 4.3 mmol/L      Comment: Slight hemolysis detected by analyzer. Results may be affected.        Chloride 111 mmol/L      CO2 15.0 mmol/L      Calcium 8.4 mg/dL      BUN/Creatinine Ratio 31.3     Anion Gap 14.0 mmol/L      eGFR 21.7 mL/min/1.73     Narrative:      GFR Normal >60  Chronic Kidney Disease <60  Kidney Failure <15    The GFR formula is only valid for adults with stable renal function between ages 18 and 70.    Occult Blood, Fecal By Immunoassay - Stool, Per Rectum [[id number]]  (Abnormal) Collected: 07/15/23 1619    Specimen: Stool from Per Rectum Updated: 07/15/23 1643     Occult Blood, Fecal by Immunoassay Positive             Results Review:     Labs and imaging for today were reviewed.    Assessment & Plan     Rosario Ortez is a 73 y.o. female who has area of fluctuance on right medial knee      Continue to hold anticoagulation.  We will plan for aspiration tomorrow at bedside.          This document has been electronically signed by Fernando Romo MD on July 16, 2023 15:49 EDT        Fernando Romo MD  07/16/23  15:49  EDT

## 2023-07-16 NOTE — PLAN OF CARE
Problem: Adult Inpatient Plan of Care  Goal: Plan of Care Review  Outcome: Ongoing, Not Progressing  Flowsheets (Taken 7/16/2023 0238)  Progress: no change  Plan of Care Reviewed With:   spouse   patient  Goal: Patient-Specific Goal (Individualized)  Outcome: Ongoing, Not Progressing  Goal: Absence of Hospital-Acquired Illness or Injury  Outcome: Ongoing, Not Progressing  Intervention: Identify and Manage Fall Risk  Recent Flowsheet Documentation  Taken 7/16/2023 0215 by Lata Myers RN  Safety Promotion/Fall Prevention:   assistive device/personal items within reach   clutter free environment maintained   nonskid shoes/slippers when out of bed   room organization consistent   safety round/check completed  Taken 7/16/2023 0015 by Lata Myers RN  Safety Promotion/Fall Prevention:   assistive device/personal items within reach   clutter free environment maintained   fall prevention program maintained   nonskid shoes/slippers when out of bed   room organization consistent   safety round/check completed  Taken 7/15/2023 2220 by Lata Myers RN  Safety Promotion/Fall Prevention:   assistive device/personal items within reach   clutter free environment maintained   nonskid shoes/slippers when out of bed   room organization consistent   safety round/check completed   fall prevention program maintained  Taken 7/15/2023 2001 by Lata Myers RN  Safety Promotion/Fall Prevention:   assistive device/personal items within reach   clutter free environment maintained   fall prevention program maintained   nonskid shoes/slippers when out of bed   room organization consistent   safety round/check completed  Intervention: Prevent Skin Injury  Recent Flowsheet Documentation  Taken 7/15/2023 2001 by Lata Myers, RN  Body Position: supine  Intervention: Prevent and Manage VTE (Venous Thromboembolism) Risk  Recent Flowsheet Documentation  Taken 7/15/2023 2001 by Lata Myers, RN  Activity Management: bedrest  VTE  Prevention/Management: sequential compression devices off  Range of Motion: active ROM (range of motion) encouraged  Intervention: Prevent Infection  Recent Flowsheet Documentation  Taken 7/16/2023 0215 by Lata Myers RN  Infection Prevention:   environmental surveillance performed   hand hygiene promoted   rest/sleep promoted   single patient room provided   visitors restricted/screened  Taken 7/16/2023 0015 by Lata Myers RN  Infection Prevention:   environmental surveillance performed   hand hygiene promoted   rest/sleep promoted   single patient room provided   visitors restricted/screened  Taken 7/15/2023 2220 by Lata Myers RN  Infection Prevention:   environmental surveillance performed   hand hygiene promoted   single patient room provided   visitors restricted/screened  Taken 7/15/2023 2001 by Lata Myers RN  Infection Prevention:   environmental surveillance performed   hand hygiene promoted   single patient room provided   visitors restricted/screened  Goal: Optimal Comfort and Wellbeing  Outcome: Ongoing, Not Progressing  Intervention: Provide Person-Centered Care  Recent Flowsheet Documentation  Taken 7/15/2023 2001 by Lata Myers RN  Trust Relationship/Rapport:   care explained   choices provided   questions answered  Goal: Readiness for Transition of Care  Outcome: Ongoing, Not Progressing     Problem: Skin Injury Risk Increased  Goal: Skin Health and Integrity  Outcome: Ongoing, Not Progressing  Intervention: Optimize Skin Protection  Recent Flowsheet Documentation  Taken 7/15/2023 2001 by Lata Myers RN  Pressure Reduction Techniques:   frequent weight shift encouraged   weight shift assistance provided  Head of Bed (HOB) Positioning: HOB at 60-90 degrees  Pressure Reduction Devices: specialty bed utilized     Problem: Fall Injury Risk  Goal: Absence of Fall and Fall-Related Injury  Outcome: Ongoing, Not Progressing  Intervention: Identify and Manage Contributors  Recent  Flowsheet Documentation  Taken 7/15/2023 2220 by Lata Myers, RN  Medication Review/Management:   medications reviewed   high-risk medications identified  Taken 7/15/2023 2001 by Lata Myers RN  Medication Review/Management: medications reviewed  Self-Care Promotion:   BADL personal routines maintained   BADL personal objects within reach  Intervention: Promote Injury-Free Environment  Recent Flowsheet Documentation  Taken 7/16/2023 0215 by Lata Myers RN  Safety Promotion/Fall Prevention:   assistive device/personal items within reach   clutter free environment maintained   nonskid shoes/slippers when out of bed   room organization consistent   safety round/check completed  Taken 7/16/2023 0015 by Lata Myers RN  Safety Promotion/Fall Prevention:   assistive device/personal items within reach   clutter free environment maintained   fall prevention program maintained   nonskid shoes/slippers when out of bed   room organization consistent   safety round/check completed  Taken 7/15/2023 2220 by Lata Myers RN  Safety Promotion/Fall Prevention:   assistive device/personal items within reach   clutter free environment maintained   nonskid shoes/slippers when out of bed   room organization consistent   safety round/check completed   fall prevention program maintained  Taken 7/15/2023 2001 by Lata Myers RN  Safety Promotion/Fall Prevention:   assistive device/personal items within reach   clutter free environment maintained   fall prevention program maintained   nonskid shoes/slippers when out of bed   room organization consistent   safety round/check completed   Goal Outcome Evaluation:  Plan of Care Reviewed With: spouse, patient        Progress: no change     Alert. Disoriented x 3. Able to verbalize needs and wants. Takes medication crushed in applesauce. Total for bed mobility and transfers. C/O pain during swallowing. PT recommending SNF, family refusing at this time. Continues to receive IV  Rocephin, no s/s of adverse/allergic reaction noted. Received IV Ferrous Gluconate, no s/s of adverse/allergic reaction noted. Does not require O2 therapy at this time. GI consulted. Continues to be followed by Surgery, Infectious Disease and Nephrology. No c/o SOB noted. Currently in bed, eyes closed/rise and fall of chest observed. Call bell in reach. Spouse at bedside.

## 2023-07-16 NOTE — PLAN OF CARE
Goal Outcome Evaluation:              Outcome Evaluation: Pt is A&Ox2, disoriented to situation and time. Pleasant and cooperative with care. Pt is on regular diet, needs asst with eating,  is bedside and assists her. Able to take pills whole however needs some crushed/AS. Pt is bedfast, incontinent of bowel and bladder, ext cath. PT is recommending rehab.  GI, ID, nephro, ortho, gen surg and the wound nurse is consulting. Pt is resting in bed with call bell within reach and spouse at bedside. No s/s or c/o of pain at this time.

## 2023-07-16 NOTE — CONSULTS
"Nutrition Services    Patient Name: Rosario Ortez  YOB: 1950  MRN: [id number]  Admission date: 7/13/2023    Continue Regular diet.     Diet modifications per ST if clinically indicated.     Boost Plus BID (Provides 720 kcals, 28 g protein if consumed)     PPE Documentation        PPE Worn By Provider Did not enter room on this encounter    PPE Worn By Patient  None     CLINICAL NUTRITION ASSESSMENT      Reason for Assessment 7/15: MST score 2+   H&P      Past Medical History:   Diagnosis Date    A-fib     Coronary artery disease        Past Surgical History:   Procedure Laterality Date    BREAST BIOPSY      HYSTERECTOMY      JOINT REPLACEMENT Right 2015        Current Problems   JOSÉ on CKD   - nephrology to be consulted     R leg cellulitis   - ID consulted   - wound care to see     Strep bacteremia   - on antibiotics     R knee cyst   - ortho to see     Bilateral leg edema   - chronic, Hx lymphedema    UTI   - on antibiotics     Swallowing difficulty (?)   - ST to see        Encounter Information        Trending Narrative     7/15: Pt assessed due to concern for MST for reported weight loss. Pt working with another discipline at time of attempted visit, but does appear very thin per visual observation. In review of documentation, family has reported very poor PO intake prior to admission, and weight Hx reflects 13.1% weight loss in 1 month -- severe (and 35.5% loss in 1 year). This is C/W malnutrition -- need to follow up for NFPE portion of exam for staging. ST to see pt as well for swallowing evaluation; will watch for outcome of this assessment, as pt could become a candidate for enteral nutrition support.       Anthropometrics        Current Height, Weight Height: 167.6 cm (66\")  Weight: 57.2 kg (126 lb 1.7 oz) (07/13/23 0814)       Ideal Body Weight (IBW) 130 lb   Usual Body Weight (UBW) Historically 195-200 lb       Trending Weight Hx     This admission: 7/15: Scale weight 57.2 kg            "   PTA: 7/15: 13.1% weight loss in 1 month -- severe (and 35.5% loss in 1 year)    Wt Readings from Last 30 Encounters:   07/13/23 0814 57.2 kg (126 lb 1.7 oz)   07/08/23 1316 65.8 kg (145 lb 1 oz)   06/26/23 2129 65.4 kg (144 lb 2.9 oz)   06/26/23 2042 65.4 kg (144 lb 2.9 oz)   06/26/23 1348 65.8 kg (145 lb)   03/20/23 1440 72.1 kg (159 lb)   10/29/22 1419 74.4 kg (164 lb)   06/07/22 1657 88.6 kg (195 lb 5.2 oz)   05/15/22 0259 89 kg (196 lb 4.8 oz)   05/14/22 0542 90.7 kg (200 lb)   03/20/22 1214 91.9 kg (202 lb 9.6 oz)   12/16/21 1158 93.7 kg (206 lb 9.6 oz)   06/25/14 0808 88.9 kg (196 lb 0.2 oz)   06/04/14 0749 92.5 kg (203 lb 15.9 oz)   12/16/13 1525 93.4 kg (206 lb)   08/19/13 1102 89.8 kg (197 lb 15.9 oz)   06/20/13 0810 92.1 kg (203 lb)   05/20/13 0845 92.5 kg (203 lb 15.9 oz)   01/17/13 0945 88 kg (194 lb 0.1 oz)      BMI kg/m2 Body mass index is 20.35 kg/m².       Labs        Pertinent Labs    Results from last 7 days   Lab Units 07/14/23  2234 07/14/23  0309 07/13/23  0852   SODIUM mmol/L 138 137 135*   POTASSIUM mmol/L 4.4 4.2 4.2   CHLORIDE mmol/L 109* 108* 103   CO2 mmol/L 17.0* 18.0* 18.0*   BUN mg/dL 75* 73* 82*   CREATININE mg/dL 2.69* 2.71* 2.79*   CALCIUM mg/dL 8.5* 8.5* 9.4   BILIRUBIN mg/dL  --   --  0.9   ALK PHOS U/L  --   --  174*   ALT (SGPT) U/L  --   --  23   AST (SGOT) U/L  --   --  40*   GLUCOSE mg/dL 112* 92 86     Results from last 7 days   Lab Units 07/14/23 2234   HEMOGLOBIN g/dL 7.5*   HEMATOCRIT % 23.5*     COVID19   Date Value Ref Range Status   06/26/2023 Not Detected Not Detected - Ref. Range Final     Lab Results   Component Value Date    HGBA1C 5.9 (H) 10/16/2019        Medications    Scheduled Medications atorvastatin, 20 mg, Oral, Nightly  cefTRIAXone, 2,000 mg, Intravenous, Q24H  ferric gluconate, 250 mg, Intravenous, Daily  folic acid, 1 mg, Oral, Daily  levothyroxine, 50 mcg, Oral, Q AM  pantoprazole, 40 mg, Oral, Daily  predniSONE, 15 mg, Oral, Daily  senna-docusate  sodium, 2 tablet, Oral, BID  sodium bicarbonate, 1,300 mg, Oral, TID  sodium chloride, 10 mL, Intravenous, Q12H        Infusions sodium chloride, 100 mL/hr, Last Rate: 100 mL/hr (07/15/23 1602)        PRN Medications   acetaminophen    senna-docusate sodium **AND** polyethylene glycol **AND** bisacodyl **AND** bisacodyl    ondansetron    [COMPLETED] Insert Peripheral IV **AND** sodium chloride    sodium chloride    sodium chloride    traZODone     Physical Findings        Trending Physical   Appearance, NFPE 7/15: Likely is malnourished; will follow up for NFPE   --  Edema  3+ legs, ankles, feet      Bowel Function BM 7/15     Tubes No feeding tube in place      Chewing/Swallowing Possible swallowing difficulty - ST to see     Skin Lesion to R lower leg per wound care RN note; no pressure injuries     --  Current Nutrition Orders & Evaluation of Intake       Oral Nutrition     Food Allergies NKFA   Current PO Diet Diet: Regular/House Diet; Texture: Regular Texture (IDDSI 7); Fluid Consistency: Thin (IDDSI 0)   Supplement None ordered   PO Evaluation     Trending % PO Intake 7/15: Variable intake; averaging 50% at recent meals    --  Nutritional Risk Screening        NRS-2002 Score          Nutrition Diagnosis         Nutrition Dx Problem 1 Unintentional weight loss R/t suspected chronically poor intake, as evidenced by greater than 20% weight loss in 1 year and greater than 5% weight loss in one month.       Nutrition Dx Problem 2        Intervention Goal         Intervention Goal(s) Intake improving to at least 75%   Weight stabilizing      Nutrition Intervention        RD Action Will follow up for NFPE portion of exam   Will add ONS     Nutrition Prescription          Diet Prescription Regular   Supplement Prescription Boost Plus TID (provides 1080 kcals, 42 g protein if consumed)      --  Monitor/Evaluation        Monitor PO intake, Supplement intake, Pertinent labs, Weight, Skin status, GI status, POC/GOC,  Swallow function     Electronically signed by:  Lia Gibson RD  07/15/23 21:22 EDT

## 2023-07-16 NOTE — PROGRESS NOTES
Infectious Diseases Progress Note      LOS: 2 days   Patient Care Team:  Ian Cordero MD as PCP - General  Tony Parisi MD as Consulting Physician (Nephrology)    Chief Complaint: Right leg pain, mass to the medial aspect of the right knee, fatigue    Subjective       The patient has been afebrile for the last 24 hours.  The patient is on 1 L of oxygen by nasal cannula, hemodynamically stable, and is tolerating antimicrobial therapy.      Review of Systems:   Review of Systems   Constitutional:  Positive for fatigue.   HENT: Negative.     Eyes: Negative.    Respiratory:  Positive for cough and shortness of breath.    Cardiovascular: Negative.    Gastrointestinal: Negative.    Endocrine: Negative.    Genitourinary: Negative.    Musculoskeletal: Negative.    Skin:  Positive for color change.   Neurological:  Positive for weakness.   Psychiatric/Behavioral: Negative.     All other systems reviewed and are negative.     Objective     Vital Signs  Temp:  [97.6 °F (36.4 °C)-98.3 °F (36.8 °C)] 97.7 °F (36.5 °C)  Heart Rate:  [68-77] 68  Resp:  [13-25] 16  BP: (115-134)/(48-61) 120/60    Physical Exam:  Physical Exam  Vitals and nursing note reviewed.   Constitutional:       General: She is not in acute distress.     Appearance: She is well-developed and normal weight. She is ill-appearing. She is not diaphoretic.   HENT:      Head: Normocephalic and atraumatic.   Eyes:      General: No scleral icterus.     Extraocular Movements: Extraocular movements intact.      Conjunctiva/sclera: Conjunctivae normal.      Pupils: Pupils are equal, round, and reactive to light.   Cardiovascular:      Rate and Rhythm: Normal rate and regular rhythm.      Heart sounds: Normal heart sounds, S1 normal and S2 normal. No murmur heard.  Pulmonary:      Effort: Pulmonary effort is normal. No respiratory distress.      Breath sounds: No stridor. No wheezing or rales.      Comments: Diminished throughout  Chest:      Chest wall: No  tenderness.   Abdominal:      General: Bowel sounds are normal. There is distension.      Palpations: Abdomen is soft. There is no mass.      Tenderness: There is no abdominal tenderness. There is no guarding.   Musculoskeletal:         General: No swelling, tenderness or deformity.      Cervical back: Neck supple.   Skin:     General: Skin is warm and dry.      Coloration: Skin is not pale.      Findings: No bruising, erythema or rash.      Comments:  Patient has a fluctuant mass in the medial aspect of the knee which has less erythema but is  today.      The small red patch to the lateral aspect of the knee has almost completely resolved.    No significant swelling or erythema to the rest of the right leg    Neurological:      Mental Status: She is alert and oriented to person, place, and time.      Cranial Nerves: No cranial nerve deficit.        Results Review:    I have reviewed all clinical data, test, lab, and imaging results.     Radiology  No Radiology Exams Resulted Within Past 24 Hours    Cardiology    Laboratory    Results from last 7 days   Lab Units 07/16/23  0630 07/14/23 2234 07/14/23  0310 07/13/23  0852   WBC 10*3/mm3 11.20* 9.80 9.80 12.60*   HEMOGLOBIN g/dL 7.7* 7.5* 7.2* 8.7*   HEMATOCRIT % 24.1* 23.5* 22.0* 27.4*   PLATELETS 10*3/mm3 219 206 179 254     Results from last 7 days   Lab Units 07/16/23  0152 07/14/23 2234 07/14/23  0309 07/13/23  0852   SODIUM mmol/L 140 138 137 135*   POTASSIUM mmol/L 4.3 4.4 4.2 4.2   CHLORIDE mmol/L 111* 109* 108* 103   CO2 mmol/L 15.0* 17.0* 18.0* 18.0*   BUN mg/dL 72* 75* 73* 82*   CREATININE mg/dL 2.30* 2.69* 2.71* 2.79*   GLUCOSE mg/dL 96 112* 92 86   ALBUMIN g/dL  --   --   --  2.6*   BILIRUBIN mg/dL  --   --   --  0.9   ALK PHOS U/L  --   --   --  174*   AST (SGOT) U/L  --   --   --  40*   ALT (SGPT) U/L  --   --   --  23   CALCIUM mg/dL 8.4* 8.5* 8.5* 9.4                 Microbiology   Microbiology Results (last 10 days)       Procedure  Component Value - Date/Time    Blood Culture - Blood, Arm, Left [[id number]]  (Abnormal) Collected: 07/13/23 1006    Lab Status: Final result Specimen: Blood from Arm, Left Updated: 07/15/23 0718     Blood Culture Streptococcus, Alpha Hemolytic     Isolated from Aerobic Bottle     Gram Stain Aerobic Bottle Gram positive cocci in chains    Narrative:      Probable contaminant requires clinical correlation, susceptibility not performed unless requested by physician.      Blood Culture ID, PCR - Blood, Arm, Left [[id number]]  (Abnormal) Collected: 07/13/23 1006    Lab Status: Final result Specimen: Blood from Arm, Left Updated: 07/14/23 0634     BCID, PCR Streptococcus spp, not A, B, or pneumoniae. Identification by BCID2 PCR.     BOTTLE TYPE Aerobic Bottle    Urine Culture - Urine, Straight Cath [[id number]]  (Abnormal)  (Susceptibility) Collected: 07/13/23 0944    Lab Status: Final result Specimen: Urine from Straight Cath Updated: 07/15/23 0358     Urine Culture >100,000 CFU/mL Klebsiella aerogenes    Narrative:      Colonization of the urinary tract without infection is common. Treatment is discouraged unless the patient is symptomatic, pregnant, or undergoing an invasive urologic procedure.    Susceptibility        Klebsiella aerogenes      ISELA      Cefazolin Resistant      Cefepime Susceptible      Ceftazidime Susceptible      Ceftriaxone Susceptible      Gentamicin Susceptible      Levofloxacin Susceptible      Nitrofurantoin Resistant      Piperacillin + Tazobactam Susceptible      Trimethoprim + Sulfamethoxazole Susceptible                           Blood Culture - Blood, Arm, Left [[id number]]  (Normal) Collected: 07/13/23 0852    Lab Status: Preliminary result Specimen: Blood from Arm, Left Updated: 07/16/23 0900     Blood Culture No growth at 3 days    Narrative:      Less than seven (7) mL's of blood was collected.  Insufficient quantity may yield false negative results.            Medication Review:        Schedule Meds  atorvastatin, 20 mg, Oral, Nightly  cefTRIAXone, 2,000 mg, Intravenous, Q24H  folic acid, 1 mg, Oral, Daily  levothyroxine, 50 mcg, Oral, Q AM  pantoprazole, 40 mg, Oral, Daily  predniSONE, 15 mg, Oral, Daily  senna-docusate sodium, 2 tablet, Oral, BID  sodium bicarbonate, 1,300 mg, Oral, TID  sodium chloride, 10 mL, Intravenous, Q12H        Infusion Meds  sodium bicarbonate drip (greater than 75 mEq/bag), 100 mEq, Last Rate: 100 mEq (07/16/23 1246)        PRN Meds    acetaminophen    senna-docusate sodium **AND** polyethylene glycol **AND** bisacodyl **AND** bisacodyl    ondansetron    [COMPLETED] Insert Peripheral IV **AND** sodium chloride    sodium chloride    sodium chloride    traZODone        Assessment & Plan       Antimicrobial Therapy   1.  IV Rocephin        2.        3.        4.        5.          Assessment     Positive blood culture in 1 out of 2 sets on admission for Streptococcus species.  Patient had no endovascular device or prior cardiac valve replacement.  We are suspecting contamination.     Bacteriuria.  Urine cultures are growing Klebsiella aerogenes.  urinalysis was not significantly abnormal.     Probable right medial knee abscess.  MRI shows a fluid collection in the subcutaneous tissues along the medial femoral condyle but no findings to suggest a joint infection     Recent work-up for sarcoidosis by an outpatient rheumatologist.  Patient was taken off methotrexate on 7/3/2023.  Still on prednisone.  Patient is likely still somewhat immunocompromised     Acute kidney injury-nephrology following     Plan     Continue IV Rocephin for now-patient is tolerating without issue although she has a reported allergy to cefdinir  General surgery service is planning for aspiration or I&D of the right medial knee fluid collection  Continue supportive care  A.m. labs  Discussed patient and family member at bedside          Jordon Wilcox MD  07/16/23  18:37 EDT    Note is dictated  utilizing voice recognition software/Dragon

## 2023-07-16 NOTE — PLAN OF CARE
Goal Outcome Evaluation:      Patient was seen for dysphagia evaluation per MD order. Patient was reported c/o pain during swallowing. Patient has hx of esophageal strictures with subsequent dilatation 1/20/23. Patient required a 48f and noted stricture at level of cricopharyngeus. Patient has history of GERD which she takes routine medication for condition. Patient has her own natural teeth with some missing particularly in the lower back teeth. Oral mechanism examination revealed full ROM and mobility of lingual and labial structures. Lingual strength was adequate. Noted palatal movement. Gag reflex was present . Properly positioned patient upright in bed prior to trials. Patient took sips of thins via straw x 3. Oral transit was 2-4 seconds in duration. Noted patient holding bolus before initiating swallow. She replied that it was to warm the liquid before swallowing as she prefers room temperature water. Noted patient coughing x 2. At one point she self suctioned some phelgm following trials. Provided trials of puree x 1. Patient  held bolus and eventually spitted it out.  Provided trials of mixed consistency x 1. Patient noted swallowing the juice of peach and only chewed peach a few times and ultimately spit it out as well. Patient refused any further trials. Unable to adequately provide any recommendation at this time. Patient is currently on regular diet but essentially is refusing to eat much of anything despite encouragement from family. It is recommended that patient be reevaluated tomorrow in hopes of completing a full evaluation in order to establish safe PO diet and/or further recommendations if indicated.

## 2023-07-16 NOTE — PLAN OF CARE
Goal Outcome Evaluation:  Plan of Care Reviewed With: patient, spouse        Progress: no change  Outcome Evaluation: Rosario Ortez is a 73 y.o. female with past medical history of paroxysmal atrial fibrillation, hypertension, hypothyroidism and fluid accumulation in the abdomen and lower extremities and recently suspected to have sarcoidosis. Pt was at PCP for blood work and they referred her to the ER d/t worsening kidney function. Pt dx with JOSÉ on CKD, weakness, and bacteremia. Pt is to have drainage of a RLE abscess possibly this date. At baseline, patient would ambulate short distances with Rolator but primarily use w/c for mobility and sleeps in a recliner on their main level. Spouse states he assists her with coming to stand and he & their dtr assist with ADL. They would very much like to keep pt at home, but have 4 MAKSIM. OT strongly recommends SNF, but spouse states he will do her exercises with her (OT educated on seated HEP) and will use BSC and hospital bed and gait belt at home. Discussion about the likely eventual if not immediate need for a ramp if pt d/c home in her weakened state. Pt needed max (A) for transfer this date and encouragement for activity. Spouse states he can assist but there are concerns that he has had spinal surgeries & could be high risk for injury. Spouse appears very supportive. If pt elects to d/c home they may benefit from a BSC and hospital bed. Pt is oriented X2 this date.      Anticipated Discharge Disposition (OT): skilled nursing facility

## 2023-07-17 ENCOUNTER — INPATIENT HOSPITAL (AMBULATORY)
Dept: URBAN - METROPOLITAN AREA HOSPITAL 84 | Facility: HOSPITAL | Age: 73
End: 2023-07-17

## 2023-07-17 DIAGNOSIS — R63.30 FEEDING DIFFICULTIES, UNSPECIFIED: ICD-10-CM

## 2023-07-17 DIAGNOSIS — D50.0 IRON DEFICIENCY ANEMIA SECONDARY TO BLOOD LOSS (CHRONIC): ICD-10-CM

## 2023-07-17 DIAGNOSIS — R13.10 DYSPHAGIA, UNSPECIFIED: ICD-10-CM

## 2023-07-17 DIAGNOSIS — R74.01 ELEVATION OF LEVELS OF LIVER TRANSAMINASE LEVELS: ICD-10-CM

## 2023-07-17 DIAGNOSIS — R19.5 OTHER FECAL ABNORMALITIES: ICD-10-CM

## 2023-07-17 DIAGNOSIS — R18.8 OTHER ASCITES: ICD-10-CM

## 2023-07-17 PROBLEM — D86.9 SARCOIDOSIS: Chronic | Status: ACTIVE | Noted: 2023-07-17

## 2023-07-17 PROBLEM — B96.89 UTI DUE TO KLEBSIELLA SPECIES: Status: ACTIVE | Noted: 2023-07-14

## 2023-07-17 PROBLEM — E87.20 METABOLIC ACIDOSIS: Status: ACTIVE | Noted: 2023-07-17

## 2023-07-17 PROBLEM — I48.0 PAROXYSMAL ATRIAL FIBRILLATION: Chronic | Status: ACTIVE | Noted: 2023-07-17

## 2023-07-17 LAB
ABO GROUP BLD: NORMAL
ALBUMIN SERPL-MCNC: 1.9 G/DL (ref 3.5–5.2)
ALBUMIN/GLOB SERPL: 0.7 G/DL
ALP SERPL-CCNC: 137 U/L (ref 39–117)
ALT SERPL W P-5'-P-CCNC: 15 U/L (ref 1–33)
ANION GAP SERPL CALCULATED.3IONS-SCNC: 9 MMOL/L (ref 5–15)
ANISOCYTOSIS BLD QL: ABNORMAL
APTT PPP: 47 SECONDS (ref 24–31)
AST SERPL-CCNC: 26 U/L (ref 1–32)
BILIRUB SERPL-MCNC: 0.3 MG/DL (ref 0–1.2)
BLD GP AB SCN SERPL QL: NEGATIVE
BUN SERPL-MCNC: 75 MG/DL (ref 8–23)
BUN/CREAT SERPL: 34.4 (ref 7–25)
CALCIUM SPEC-SCNC: 8.5 MG/DL (ref 8.6–10.5)
CHLORIDE SERPL-SCNC: 110 MMOL/L (ref 98–107)
CO2 SERPL-SCNC: 21 MMOL/L (ref 22–29)
CREAT SERPL-MCNC: 2.18 MG/DL (ref 0.57–1)
CRP SERPL-MCNC: 5.47 MG/DL (ref 0–0.5)
DEPRECATED RDW RBC AUTO: 73.9 FL (ref 37–54)
EGFRCR SERPLBLD CKD-EPI 2021: 23.1 ML/MIN/1.73
ERYTHROCYTE [DISTWIDTH] IN BLOOD BY AUTOMATED COUNT: 21.3 % (ref 12.3–15.4)
ERYTHROCYTE [SEDIMENTATION RATE] IN BLOOD: 11 MM/HR (ref 0–30)
FSP PPP-MCNC: 10 UG/ML
GLOBULIN UR ELPH-MCNC: 2.7 GM/DL
GLUCOSE SERPL-MCNC: 101 MG/DL (ref 65–99)
HCT VFR BLD AUTO: 23.8 % (ref 34–46.6)
HCT VFR BLD AUTO: 28.3 % (ref 34–46.6)
HGB BLD-MCNC: 7.4 G/DL (ref 12–15.9)
HGB BLD-MCNC: 9.1 G/DL (ref 12–15.9)
IGA SERPL-MCNC: 243 MG/DL (ref 64–422)
IGG SERPL-MCNC: 905 MG/DL (ref 586–1602)
IGM SERPL-MCNC: 35 MG/DL (ref 26–217)
INR PPP: 1.25 (ref 0.93–1.1)
LYMPHOCYTES # BLD MANUAL: 2.49 10*3/MM3 (ref 0.7–3.1)
LYMPHOCYTES NFR BLD MANUAL: 3 % (ref 5–12)
MACROCYTES BLD QL SMEAR: ABNORMAL
MAGNESIUM SERPL-MCNC: 2.1 MG/DL (ref 1.6–2.4)
MCH RBC QN AUTO: 31.2 PG (ref 26.6–33)
MCHC RBC AUTO-ENTMCNC: 31.1 G/DL (ref 31.5–35.7)
MCV RBC AUTO: 100.4 FL (ref 79–97)
METAMYELOCYTES NFR BLD MANUAL: 2 % (ref 0–0)
MONOCYTES # BLD: 0.39 10*3/MM3 (ref 0.1–0.9)
NEUTROPHILS # BLD AUTO: 9.96 10*3/MM3 (ref 1.7–7)
NEUTROPHILS NFR BLD MANUAL: 73 % (ref 42.7–76)
NEUTS BAND NFR BLD MANUAL: 3 % (ref 0–5)
PLAT MORPH BLD: NORMAL
PLATELET # BLD AUTO: 238 10*3/MM3 (ref 140–450)
PMV BLD AUTO: 7.1 FL (ref 6–12)
POIKILOCYTOSIS BLD QL SMEAR: ABNORMAL
POTASSIUM SERPL-SCNC: 3.7 MMOL/L (ref 3.5–5.2)
PROT PATTERN SERPL IFE-IMP: ABNORMAL
PROT SERPL-MCNC: 4.6 G/DL (ref 6–8.5)
PROTHROMBIN TIME: 13.2 SECONDS (ref 9.6–11.7)
RBC # BLD AUTO: 2.37 10*6/MM3 (ref 3.77–5.28)
RH BLD: POSITIVE
SCAN SLIDE: NORMAL
SODIUM SERPL-SCNC: 140 MMOL/L (ref 136–145)
T&S EXPIRATION DATE: NORMAL
VARIANT LYMPHS NFR BLD MANUAL: 19 % (ref 19.6–45.3)
WBC MORPH BLD: NORMAL
WBC NRBC COR # BLD: 13.1 10*3/MM3 (ref 3.4–10.8)

## 2023-07-17 PROCEDURE — 85025 COMPLETE CBC W/AUTO DIFF WBC: CPT | Performed by: FAMILY MEDICINE

## 2023-07-17 PROCEDURE — 85730 THROMBOPLASTIN TIME PARTIAL: CPT | Performed by: RADIOLOGY

## 2023-07-17 PROCEDURE — 86140 C-REACTIVE PROTEIN: CPT | Performed by: FAMILY MEDICINE

## 2023-07-17 PROCEDURE — 85610 PROTHROMBIN TIME: CPT | Performed by: RADIOLOGY

## 2023-07-17 PROCEDURE — 80053 COMPREHEN METABOLIC PANEL: CPT | Performed by: FAMILY MEDICINE

## 2023-07-17 PROCEDURE — 86900 BLOOD TYPING SEROLOGIC ABO: CPT

## 2023-07-17 PROCEDURE — 99233 SBSQ HOSP IP/OBS HIGH 50: CPT | Mod: GW | Performed by: NURSE PRACTITIONER

## 2023-07-17 PROCEDURE — 85007 BL SMEAR W/DIFF WBC COUNT: CPT | Performed by: FAMILY MEDICINE

## 2023-07-17 PROCEDURE — 83735 ASSAY OF MAGNESIUM: CPT | Performed by: FAMILY MEDICINE

## 2023-07-17 PROCEDURE — 92526 ORAL FUNCTION THERAPY: CPT

## 2023-07-17 PROCEDURE — 86901 BLOOD TYPING SEROLOGIC RH(D): CPT | Performed by: NURSE PRACTITIONER

## 2023-07-17 PROCEDURE — 86900 BLOOD TYPING SEROLOGIC ABO: CPT | Performed by: NURSE PRACTITIONER

## 2023-07-17 PROCEDURE — 85018 HEMOGLOBIN: CPT | Performed by: FAMILY MEDICINE

## 2023-07-17 PROCEDURE — 25010000002 CEFTRIAXONE PER 250 MG: Performed by: INTERNAL MEDICINE

## 2023-07-17 PROCEDURE — P9016 RBC LEUKOCYTES REDUCED: HCPCS

## 2023-07-17 PROCEDURE — 85652 RBC SED RATE AUTOMATED: CPT | Performed by: FAMILY MEDICINE

## 2023-07-17 PROCEDURE — 86850 RBC ANTIBODY SCREEN: CPT | Performed by: NURSE PRACTITIONER

## 2023-07-17 PROCEDURE — 85014 HEMATOCRIT: CPT | Performed by: FAMILY MEDICINE

## 2023-07-17 PROCEDURE — 36430 TRANSFUSION BLD/BLD COMPNT: CPT

## 2023-07-17 PROCEDURE — 86923 COMPATIBILITY TEST ELECTRIC: CPT

## 2023-07-17 RX ORDER — SODIUM CHLORIDE 9 MG/ML
40 INJECTION, SOLUTION INTRAVENOUS AS NEEDED
Status: DISCONTINUED | OUTPATIENT
Start: 2023-07-17 | End: 2023-07-22 | Stop reason: HOSPADM

## 2023-07-17 RX ADMIN — LEVOTHYROXINE SODIUM 50 MCG: 0.05 TABLET ORAL at 06:15

## 2023-07-17 RX ADMIN — CEFTRIAXONE 2000 MG: 2 INJECTION, POWDER, FOR SOLUTION INTRAMUSCULAR; INTRAVENOUS at 06:15

## 2023-07-17 RX ADMIN — Medication 10 ML: at 09:00

## 2023-07-17 RX ADMIN — SODIUM BICARBONATE 100 MEQ: 84 INJECTION, SOLUTION INTRAVENOUS at 20:41

## 2023-07-17 RX ADMIN — Medication 10 ML: at 20:41

## 2023-07-17 NOTE — PLAN OF CARE
Problem: Adult Inpatient Plan of Care  Goal: Plan of Care Review  Outcome: Ongoing, Progressing  Goal: Patient-Specific Goal (Individualized)  Outcome: Ongoing, Progressing  Goal: Absence of Hospital-Acquired Illness or Injury  Outcome: Ongoing, Progressing  Intervention: Identify and Manage Fall Risk  Recent Flowsheet Documentation  Taken 7/17/2023 1400 by Jennie Zelaya RN  Safety Promotion/Fall Prevention: safety round/check completed  Taken 7/17/2023 1200 by Jennie Zelaya RN  Safety Promotion/Fall Prevention: safety round/check completed  Taken 7/17/2023 1000 by Jennie Zelaya RN  Safety Promotion/Fall Prevention: safety round/check completed  Taken 7/17/2023 0838 by Jennie Zelaya RN  Safety Promotion/Fall Prevention: safety round/check completed  Goal: Optimal Comfort and Wellbeing  Outcome: Ongoing, Progressing  Goal: Readiness for Transition of Care  Outcome: Ongoing, Progressing     Problem: Skin Injury Risk Increased  Goal: Skin Health and Integrity  Outcome: Ongoing, Progressing     Problem: Fall Injury Risk  Goal: Absence of Fall and Fall-Related Injury  Outcome: Ongoing, Progressing  Intervention: Promote Injury-Free Environment  Recent Flowsheet Documentation  Taken 7/17/2023 1400 by Jennie Zelaya RN  Safety Promotion/Fall Prevention: safety round/check completed  Taken 7/17/2023 1200 by Jennie Zelaya RN  Safety Promotion/Fall Prevention: safety round/check completed  Taken 7/17/2023 1000 by Jennie Zelaya RN  Safety Promotion/Fall Prevention: safety round/check completed  Taken 7/17/2023 0838 by Jennie Zelaya RN  Safety Promotion/Fall Prevention: safety round/check completed   Goal Outcome Evaluation:

## 2023-07-17 NOTE — DISCHARGE PLACEMENT REQUEST
"Rosario Mccord (73 y.o. Female)       Date of Birth   1950    Social Security Number       Address   [address] [place] [address] [place] [facility]    Home Phone   [phone]    MRN   [id number]       Mu-ism   Islam    Marital Status                               Admission Date   7/13/23    Admission Type   Emergency    Admitting Provider   Krzysztof Nj MD    Attending Provider   Armando Adams MD    Department, Room/Bed   [facility] 3C MEDICAL INPATIENT, 376/1       Discharge Date       Discharge Disposition       Discharge Destination                                 Attending Provider: Armando Adams MD    Allergies: Codeine, Topiramate, Tramadol, Cefdinir, Levofloxacin, Lisinopril    Isolation: None   Infection: None   Code Status: CPR    Ht: 167.6 cm (66\")   Wt: 63.3 kg (139 lb 8.8 oz)    Admission Cmt: None   Principal Problem: JOSÉ (acute kidney injury) [N17.9]                   Active Insurance as of 7/13/2023       Primary Coverage       Payor Plan Insurance Group Employer/Plan Group    HU[employer] MEDICARE REPLACEMENT HU[employer] MEDICARE REPLACEMENT [id number]       Payor Plan Address Payor Plan Phone Number Payor Plan Fax Number Effective Dates    [address] [address] [phone]  3/1/2023 - None Entered    [facility] [id number]         Subscriber Name Subscriber Birth Date Member ID       ROSARIO MCCORD 1950 [id number]                     Emergency Contacts        (Rel.) Home Phone Work Phone Mobile Phone    WILMAR MCCORD (Spouse) -- -- [phone]    Marily Mccord (Daughter) [phone] -- --          "

## 2023-07-17 NOTE — PROGRESS NOTES
" LOS: 3 days   Patient Care Team:  Ian Cordero MD as PCP - General  Tony Parisi MD as Consulting Physician (Nephrology)      Subjective \"I am not hungry\"    Interval History:  at bedside who helps with recent history.    Subjective: She reports she cannot chew well or swallow with somewhat painful swallowing.  No appetite.  Significant weakness, right knee pain    ROS:   No chest pain, shortness of breath, or cough.     Medication Review:     Current Facility-Administered Medications:     acetaminophen (TYLENOL) tablet 650 mg, 650 mg, Oral, Q6H PRN, Abhinav Boyd MD, 650 mg at 07/16/23 2012    atorvastatin (LIPITOR) tablet 20 mg, 20 mg, Oral, Nightly, Abhinav Boyd MD, 20 mg at 07/16/23 2012    sennosides-docusate (PERICOLACE) 8.6-50 MG per tablet 2 tablet, 2 tablet, Oral, BID, 2 tablet at 07/16/23 2012 **AND** polyethylene glycol (MIRALAX) packet 17 g, 17 g, Oral, Daily PRN **AND** bisacodyl (DULCOLAX) EC tablet 5 mg, 5 mg, Oral, Daily PRN **AND** bisacodyl (DULCOLAX) suppository 10 mg, 10 mg, Rectal, Daily PRN, Abhinav Boyd MD    cefTRIAXone (ROCEPHIN) 2,000 mg in sodium chloride 0.9 % 100 mL IVPB, 2,000 mg, Intravenous, Q24H, Armando Adams MD, Last Rate: 200 mL/hr at 07/17/23 0615, 2,000 mg at 07/17/23 0615    folic acid (FOLVITE) tablet 1 mg, 1 mg, Oral, Daily, Abhinav Boyd MD, 1 mg at 07/16/23 0940    levothyroxine (SYNTHROID, LEVOTHROID) tablet 50 mcg, 50 mcg, Oral, Q AM, Abhinav Boyd MD, 50 mcg at 07/17/23 0615    ondansetron (ZOFRAN) injection 4 mg, 4 mg, Intravenous, Q6H PRN, Abhinav Boyd MD    pantoprazole (PROTONIX) EC tablet 40 mg, 40 mg, Oral, Daily, Abhinav Boyd MD, 40 mg at 07/16/23 0940    predniSONE (DELTASONE) tablet 15 mg, 15 mg, Oral, Daily, Abhinav Boyd MD, 15 mg at 07/16/23 0940    sodium bicarbonate 8.4 % 100 mEq in dextrose (D5W) 5 % 1,000 mL infusion (greater than 75 mEq), 100 mEq, Intravenous, Continuous, Sang Sanford MD, Last Rate: 75 mL/hr at 07/16/23 " 1246, 100 mEq at 07/16/23 1246    sodium bicarbonate tablet 1,300 mg, 1,300 mg, Oral, TID, Tony Parisi MD, 1,300 mg at 07/16/23 2012    [COMPLETED] Insert Peripheral IV, , , Once **AND** sodium chloride 0.9 % flush 10 mL, 10 mL, Intravenous, PRN, Rafael De Luna MD    sodium chloride 0.9 % flush 10 mL, 10 mL, Intravenous, Q12H, Abhinav Boyd MD, 10 mL at 07/16/23 2013    sodium chloride 0.9 % flush 10 mL, 10 mL, Intravenous, PRN, Abhinav Boyd MD    sodium chloride 0.9 % infusion 40 mL, 40 mL, Intravenous, PRN, Abhinav Boyd MD    traZODone (DESYREL) tablet 100 mg, 100 mg, Oral, Nightly PRN, Abhinav Boyd MD, 100 mg at 07/15/23 2014      Objective chronically ill-appearing,  at bedside    Vital Signs  Vitals:    07/16/23 1528 07/16/23 1949 07/16/23 2345 07/17/23 0848   BP: 120/60 125/61 (!) 132/107 113/50   BP Location: Right arm Left arm Left arm Left arm   Patient Position: Lying Lying Lying Lying   Pulse: 68 82 88 68   Resp: 16 11 22 19   Temp: 97.7 °F (36.5 °C) 97.6 °F (36.4 °C) 97.6 °F (36.4 °C) 97.3 °F (36.3 °C)   TempSrc: Oral Oral Oral Oral   SpO2: 94% 93% 94% 94%   Weight:       Height:           Physical Exam:     General Appearance:    Awake and alert, in no acute distress although chronically ill-appearing   Head:    Normocephalic, without obvious abnormality   Eyes:          Conjunctivae normal, anicteric sclera   Throat:   No oral lesions, no thrush, oral mucosa moist   Neck:   supple, no JVD   Lungs:     respirations regular, even and unlabored   Abdomen:     Soft, non-tender   Rectal:     Deferred   Extremities:    edema, right forearm cellulitis, right knee pain   Skin:   No bruising or rash, no jaundice        Results Review:    CBC    Results from last 7 days   Lab Units 07/16/23  0630 07/14/23  2234 07/14/23  0310 07/13/23  0852   WBC 10*3/mm3 11.20* 9.80 9.80 12.60*   HEMOGLOBIN g/dL 7.7* 7.5* 7.2* 8.7*   PLATELETS 10*3/mm3 219 206 179 254     CMP   Results from last 7 days   Lab  Units 07/16/23  0152 07/14/23  2234 07/14/23  0309 07/13/23  0852   SODIUM mmol/L 140 138 137 135*   POTASSIUM mmol/L 4.3 4.4 4.2 4.2   CHLORIDE mmol/L 111* 109* 108* 103   CO2 mmol/L 15.0* 17.0* 18.0* 18.0*   BUN mg/dL 72* 75* 73* 82*   CREATININE mg/dL 2.30* 2.69* 2.71* 2.79*   GLUCOSE mg/dL 96 112* 92 86   ALBUMIN g/dL  --   --   --  2.6*   BILIRUBIN mg/dL  --   --   --  0.9   ALK PHOS U/L  --   --   --  174*   AST (SGOT) U/L  --   --   --  40*   ALT (SGPT) U/L  --   --   --  23     Cr Clearance Estimated Creatinine Clearance: 21.1 mL/min (A) (by C-G formula based on SCr of 2.3 mg/dL (H)).  Coag   Results from last 7 days   Lab Units 07/14/23 2234   INR  1.28*   APTT seconds 33.1*     HbA1C   Lab Results   Component Value Date    HGBA1C 5.9 (H) 10/16/2019     Blood Glucose No results found for: POCGLU  Infection   Results from last 7 days   Lab Units 07/13/23  1006 07/13/23  0944 07/13/23  0852   BLOODCX  Streptococcus, Alpha Hemolytic*  --  No growth at 4 days   URINECX   --  >100,000 CFU/mL Klebsiella aerogenes*  --    BCIDPCR  Streptococcus spp, not A, B, or pneumoniae. Identification by BCID2 PCR.*  --   --      UA    Results from last 7 days   Lab Units 07/13/23  0944   NITRITE UA  Negative   WBC UA /HPF 3-5*   BACTERIA UA /HPF 3+*   SQUAM EPITHEL UA /HPF 0-2   URINECX  >100,000 CFU/mL Klebsiella aerogenes*     Radiology(recent) No radiology results for the last day       Assessment & Plan   73-year-old female presented to the ER 7/13/23 for abnormal renal labs. GI was consulted this admission for hemocult positive.  Followed by Dr. Flores as an outpatient for abnormal liver enzymes and ascites    Dysphagia with feeding difficulties  Hemoccult positive stool/iron deficiency anemia  Recurrent ascites  Elevated liver enzymes  JOSÉ/CKD  Possible sarcoidosis  A-fib on Eliquis  Bacteriuria  History of cholecystectomy  Hypothyroidism     Plan:  Patient is well-known to Dr. Flores as an outpatient with elevated  LFTs, iron deficiency anemia and recurrent ascites with concern for possible sarcoidosis.  She is pending evaluation at [facility] with appointment on Thursday.  Now with JOSÉ on CKD with probable right medial knee abscess/fluid collection with plan for IR drainage today.  She reports painful swallowing with dysphagia and anorexia with plan for video swallow today.  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  We will await video swallow and consider repeat EGD with dilation tomorrow and possible temporary Dobbhoff placement for nutritional support.  Last dose of Eliquis 7/15/2023.  Liver work-up has been unremarkable except for ACE level elevated at 109.  Alpha-1 antitrypsin and fractionated alk phos were normal last admission.  Continue supportive care and we will follow closely.     Electronically signed by JOE Teran, 07/17/23, 11:00 AM EDT.

## 2023-07-17 NOTE — PLAN OF CARE
"Goal Outcome Evaluation: declining    Patient A&OX4, lethargic. Cooperative with cares but complains that, \"She just wants to be done with it.\" Remains on IV ABX for UTI and right knee abscess. Scheduled for bedside I&D. Remains on IV sodium bicarb r/t acidosis, tolerating administration without issue. Pt declined nutrition supplement drink. Very poor PO intake. Remains on Q2 turns r/t wound to coccyx. Heels floated and remain boggy/red/blanchable. Pt with complaints of pain to coccyx wound. Treated per mar. Effective at reducing pain. Pt left resting quietly in bed on most recent safety round.  at bedside. Bed locked, low, clwr, fall precautions in place.                         "

## 2023-07-17 NOTE — NURSING NOTE
Patient has not had urine output this shift. Bladder scan performed at 0625. 236 noted via scan. Patient declines feeling the urge to urinate. Will notify oncoming shift nurse to allow further followup and monitoring

## 2023-07-17 NOTE — THERAPY RE-EVALUATION
Acute Care - Speech Language Pathology   Swallow Re-Evaluation  Nitin     Patient Name: Rosario Ortez  : 1950  MRN: [id number]  Today's Date: 2023               Admit Date: 2023    Visit Dx:     ICD-10-CM ICD-9-CM   1. Weakness  R53.1 780.79   2. Acute kidney injury  N17.9 584.9     Patient Active Problem List   Diagnosis    Laceration of scalp, initial encounter    Acute kidney injury    Acute cystitis    JOSÉ (acute kidney injury)    Bacteremia due to Streptococcus    Cellulitis of right leg    Knee joint cyst, right    Bilateral leg edema    Acute UTI (urinary tract infection)     Past Medical History:   Diagnosis Date    A-fib     Coronary artery disease      Past Surgical History:   Procedure Laterality Date    BREAST BIOPSY      HYSTERECTOMY      JOINT REPLACEMENT Right 2015       SLP Recommendation and Plan  SLP Swallowing Diagnosis: unable to assess, unable/unwilling to cooperate (23)  SLP Diet Recommendation:  (unable to make recommendation d/t patient was uncooperative.) (23)  Recommended Precautions and Strategies: upright posture during/after eating, small bites of food and sips of liquid, general aspiration precautions, reflux precautions (23)  SLP Rec. for Method of Medication Administration: meds crushed, with puree (23)     Monitor for Signs of Aspiration: yes, notify SLP if any concerns (23)  Recommended Diagnostics: reassess via clinical swallow evaluation (23)  Swallow Criteria for Skilled Therapeutic Interventions Met: demonstrates skilled criteria (23)     Rehab Potential/Prognosis, Swallowing: good, to achieve stated therapy goals (23)  Therapy Frequency (Swallow): PRN (23)  Predicted Duration Therapy Intervention (Days): until discharge (23)       SWALLOW EVALUATION (last 72 hours)            EDUCATION  The patient has been educated in the following areas:    Dysphagia (Swallowing Impairment) Oral Care/Hydration NPO rationale.        SLP GOALS       Row Name 07/17/23 1000       (LTG) Swallow    (LTG) Swallow Patient will tolerate safest and least restrictive diet without complications from aspiration.  -CB    Time Frame (Swallow Long Term Goal) by discharge  -CB       (STG) Swallow 1    (STG) Swallow 1 Patient will participate in ongoing assessment of swallow including reevaluation clinically and/or including instrumental assessment of swallow if indicated, to further assess swallow function in anticipation of establishing safe PO diet.  -CB    Time Frame (Swallow Short Term Goal 1) 1 week  -CB    Comment (Swallow Short Term Goal 1) Patient was seen for reevaluation of swallow functions. Patient's  reported that GI doctor was in earlier and stated that he was going to have a EGD done tomorrow to determie if patient had stricture. Patient definitely has anxiety with swallowing and is hesitant with swallowing anything.  Properly positioned patient upright in bed prior to trials. Patient took sips of thins via straw x 3 without s/s of aspiration. No wet vocal quality was detected. Oral transit was approximately 3 seconds in duration.   Patient took small bite of pudding x 2. Oral transit was slow but functional. No overt s/s of aspiration was observed. Vocal quality remained clear. Patient took 1 small bite of mixed consistency x 1. Patient swallowed the juice from watermelon only. Patient chewed up piece but did not swallow it and ended up spitting it out. Patient is NPO except for ensure until completion of EGD.   Encouraged patient to continue drinking water and/or ice chips in accordance with [place] water protocol. ST will f/u tomorrow once EGD has been completed.  -CB              User Key  (r) = Recorded By, (t) = Taken By, (c) = Cosigned By      Initials Name Provider Type    Addie Galloway, SLP Speech and Language Pathologist                        Time Calculation:       Therapy Charges for Today       Code Description Service Date Service Provider Modifiers Qty    [id number] HC ST EVAL ORAL PHARYNG SWALLOW 6 7/16/2023 Addie Amador, SLP GN 1                 Addie Amador, SLP  7/17/2023

## 2023-07-17 NOTE — PROGRESS NOTES
Will ask IR to aspirate fluid from right knee today assuming patient is still agreeable to same. If further surgical input is needed please recall.          This document has been electronically signed by Fernando Romo MD on July 17, 2023 10:16 EDT

## 2023-07-17 NOTE — CASE MANAGEMENT/SOCIAL WORK
Continued Stay Note   Nitin     Patient Name: Rosario Ortez  MRN: [id number]  Today's Date: 7/17/2023    Admit Date: 7/13/2023    Plan: Referrals to [facility] pending. Will require precert. PASRR approved 7/17.   Discharge Plan       Row Name 07/17/23 1441       Plan    Plan Referrals to [facility] pending. Will require precert. PASRR approved 7/17.    Patient/Family in Agreement with Plan yes    Provided Post Acute Provider List? Yes    Post Acute Provider List Home Health;Nursing Home    Delivered To Patient    Method of Delivery In person    Plan Comments CM received phone call from patient's  Surya requesting meeting with CM once daughters were here. Scheduled for 13:00. CM met with  and 2 daughters Marily and Aga at bedside. Reviewed that therapy's recommendation is for SNF placement but if spouse wanted to take pt home, HHC could be an option as well. Provided SNF list and daughters reviewed choices and which facilities accept [employer] insurance. Daughter requested referral be made to [facility] and [facility] for insurance benefits to be checked. Also reviewed facilities in [place] that would be close to daughters. Additional choices of [facility] and [facility] obtained. Referral sent to [facility] [facility] [place] and [facility], pending review.             Megan Naegele, RN     Office Phone: [phone]  Office Cell: [phone]

## 2023-07-17 NOTE — PROGRESS NOTES
Infectious Diseases Progress Note      LOS: 3 days   Patient Care Team:  Ian Cordero MD as PCP - General  Tony Parisi MD as Consulting Physician (Nephrology)    Chief Complaint: Right leg pain, mass to the medial aspect of the right knee, fatigue    Subjective       The patient has been afebrile for the last 24 hours.  The patient is on 1 L of oxygen by nasal cannula, hemodynamically stable, and is tolerating antimicrobial therapy.      Review of Systems:   Review of Systems   Constitutional:  Positive for fatigue.   HENT: Negative.     Eyes: Negative.    Respiratory:  Positive for cough and shortness of breath.    Cardiovascular: Negative.    Gastrointestinal: Negative.    Endocrine: Negative.    Genitourinary: Negative.    Musculoskeletal: Negative.    Skin:  Positive for color change.   Neurological:  Positive for weakness.   Psychiatric/Behavioral: Negative.     All other systems reviewed and are negative.     Objective     Vital Signs  Temp:  [97.3 °F (36.3 °C)-98.1 °F (36.7 °C)] 97.8 °F (36.6 °C)  Heart Rate:  [67-88] 69  Resp:  [11-22] 18  BP: (105-132)/() 112/45    Physical Exam:  Physical Exam  Vitals and nursing note reviewed.   Constitutional:       General: She is not in acute distress.     Appearance: She is well-developed and normal weight. She is ill-appearing. She is not diaphoretic.   HENT:      Head: Normocephalic and atraumatic.   Eyes:      General: No scleral icterus.     Extraocular Movements: Extraocular movements intact.      Conjunctiva/sclera: Conjunctivae normal.      Pupils: Pupils are equal, round, and reactive to light.   Cardiovascular:      Rate and Rhythm: Normal rate and regular rhythm.      Heart sounds: Normal heart sounds, S1 normal and S2 normal. No murmur heard.  Pulmonary:      Effort: Pulmonary effort is normal. No respiratory distress.      Breath sounds: No stridor. No wheezing or rales.      Comments: Diminished throughout  Chest:      Chest wall: No  tenderness.   Abdominal:      General: Bowel sounds are normal. There is distension.      Palpations: Abdomen is soft. There is no mass.      Tenderness: There is no abdominal tenderness. There is no guarding.   Musculoskeletal:         General: No swelling, tenderness or deformity.      Cervical back: Neck supple.   Skin:     General: Skin is warm and dry.      Coloration: Skin is not pale.      Findings: No bruising, erythema or rash.      Comments:  Patient has a fluctuant mass in the medial aspect of the knee which has less erythema but is  today.      The small red patch to the lateral aspect of the knee has almost completely resolved.    No significant swelling or erythema to the rest of the right leg    Neurological:      Mental Status: She is alert and oriented to person, place, and time.      Cranial Nerves: No cranial nerve deficit.        Results Review:    I have reviewed all clinical data, test, lab, and imaging results.     Radiology  No Radiology Exams Resulted Within Past 24 Hours    Cardiology    Laboratory    Results from last 7 days   Lab Units 07/17/23  1114 07/16/23  0630 07/14/23  2234 07/14/23  0310 07/13/23  0852   WBC 10*3/mm3 13.10* 11.20* 9.80 9.80 12.60*   HEMOGLOBIN g/dL 7.4* 7.7* 7.5* 7.2* 8.7*   HEMATOCRIT % 23.8* 24.1* 23.5* 22.0* 27.4*   PLATELETS 10*3/mm3 238 219 206 179 254       Results from last 7 days   Lab Units 07/17/23  1114 07/16/23  0152 07/14/23  2234 07/14/23  0309 07/13/23  0852   SODIUM mmol/L 140 140 138 137 135*   POTASSIUM mmol/L 3.7 4.3 4.4 4.2 4.2   CHLORIDE mmol/L 110* 111* 109* 108* 103   CO2 mmol/L 21.0* 15.0* 17.0* 18.0* 18.0*   BUN mg/dL 75* 72* 75* 73* 82*   CREATININE mg/dL 2.18* 2.30* 2.69* 2.71* 2.79*   GLUCOSE mg/dL 101* 96 112* 92 86   ALBUMIN g/dL 1.9*  --   --   --  2.6*   BILIRUBIN mg/dL 0.3  --   --   --  0.9   ALK PHOS U/L 137*  --   --   --  174*   AST (SGOT) U/L 26  --   --   --  40*   ALT (SGPT) U/L 15  --   --   --  23   CALCIUM  mg/dL 8.5* 8.4* 8.5* 8.5* 9.4           Results from last 7 days   Lab Units 07/17/23  1114   SED RATE mm/hr 11         Microbiology   Microbiology Results (last 10 days)       Procedure Component Value - Date/Time    Blood Culture - Blood, Arm, Left [[id number]]  (Abnormal) Collected: 07/13/23 1006    Lab Status: Final result Specimen: Blood from Arm, Left Updated: 07/15/23 0718     Blood Culture Streptococcus, Alpha Hemolytic     Isolated from Aerobic Bottle     Gram Stain Aerobic Bottle Gram positive cocci in chains    Narrative:      Probable contaminant requires clinical correlation, susceptibility not performed unless requested by physician.      Blood Culture ID, PCR - Blood, Arm, Left [[id number]]  (Abnormal) Collected: 07/13/23 1006    Lab Status: Final result Specimen: Blood from Arm, Left Updated: 07/14/23 0634     BCID, PCR Streptococcus spp, not A, B, or pneumoniae. Identification by BCID2 PCR.     BOTTLE TYPE Aerobic Bottle    Urine Culture - Urine, Straight Cath [[id number]]  (Abnormal)  (Susceptibility) Collected: 07/13/23 0944    Lab Status: Final result Specimen: Urine from Straight Cath Updated: 07/15/23 0358     Urine Culture >100,000 CFU/mL Klebsiella aerogenes    Narrative:      Colonization of the urinary tract without infection is common. Treatment is discouraged unless the patient is symptomatic, pregnant, or undergoing an invasive urologic procedure.    Susceptibility        Klebsiella aerogenes      ISELA      Cefazolin Resistant      Cefepime Susceptible      Ceftazidime Susceptible      Ceftriaxone Susceptible      Gentamicin Susceptible      Levofloxacin Susceptible      Nitrofurantoin Resistant      Piperacillin + Tazobactam Susceptible      Trimethoprim + Sulfamethoxazole Susceptible                           Blood Culture - Blood, Arm, Left [[id number]]  (Normal) Collected: 07/13/23 0852    Lab Status: Preliminary result Specimen: Blood from Arm, Left Updated: 07/17/23 0915     Blood  Culture No growth at 4 days    Narrative:      Less than seven (7) mL's of blood was collected.  Insufficient quantity may yield false negative results.            Medication Review:       Schedule Meds  atorvastatin, 20 mg, Oral, Nightly  cefTRIAXone, 2,000 mg, Intravenous, Q24H  folic acid, 1 mg, Oral, Daily  levothyroxine, 50 mcg, Oral, Q AM  pantoprazole, 40 mg, Oral, Daily  predniSONE, 15 mg, Oral, Daily  senna-docusate sodium, 2 tablet, Oral, BID  sodium bicarbonate, 1,300 mg, Oral, TID  sodium chloride, 10 mL, Intravenous, Q12H        Infusion Meds  sodium bicarbonate drip (greater than 75 mEq/bag), 100 mEq, Last Rate: 100 mEq (07/16/23 1246)        PRN Meds    acetaminophen    senna-docusate sodium **AND** polyethylene glycol **AND** bisacodyl **AND** bisacodyl    ondansetron    [COMPLETED] Insert Peripheral IV **AND** sodium chloride    sodium chloride    sodium chloride    sodium chloride    traZODone        Assessment & Plan       Antimicrobial Therapy   1.  IV Rocephin        2.        3.        4.        5.          Assessment     Positive blood culture in 1 out of 2 sets on admission for Streptococcus species.  Patient had no endovascular device or prior cardiac valve replacement.  We are suspecting contamination.     Bacteriuria.  Urine cultures are growing Klebsiella aerogenes.  urinalysis was not significantly abnormal.     Probable right medial knee abscess.  MRI shows a fluid collection in the subcutaneous tissues along the medial femoral condyle but no findings to suggest a joint infection     Recent work-up for sarcoidosis by an outpatient rheumatologist.  Patient was taken off methotrexate on 7/3/2023.  Still on prednisone.  Patient is likely still somewhat immunocompromised     Acute kidney injury-nephrology following     Plan     Continue IV Rocephin for now-patient is tolerating without issue although she has a reported allergy to cefdinir  General surgery service-IR to aspirate the right  medial knee fluid collection  Patient may also go down for an EGD with dilation tomorrow  Continue supportive care  A.m. labs  Discussed patient and family member at bedside          JOE Orlando  07/17/23  15:53 EDT    Note is dictated utilizing voice recognition software/Dragon

## 2023-07-17 NOTE — PROGRESS NOTES
"NEPHROLOGY PROGRESS NOTE------[facility]    [facility]  [phone]  Tony Parisi MD      Patient Care Team:  Ian Cordero MD as PCP - Tony Navarro MD as Consulting Physician (Nephrology)      Provider:  Tony Parisi MD  Patient Name: Rosario Ortez  :  1950    SUBJECTIVE:  Follow-up JOSÉ/CKD  No chest pain shortness of breath    Medication:  atorvastatin, 20 mg, Oral, Nightly  cefTRIAXone, 2,000 mg, Intravenous, Q24H  folic acid, 1 mg, Oral, Daily  levothyroxine, 50 mcg, Oral, Q AM  pantoprazole, 40 mg, Oral, Daily  predniSONE, 15 mg, Oral, Daily  senna-docusate sodium, 2 tablet, Oral, BID  sodium bicarbonate, 1,300 mg, Oral, TID  sodium chloride, 10 mL, Intravenous, Q12H      sodium bicarbonate drip (greater than 75 mEq/bag), 100 mEq, Last Rate: 100 mEq (23 1246)        OBJECTIVE    Vital Sign Min/Max for last 24 hours  Temp  Min: 97.3 °F (36.3 °C)  Max: 98.1 °F (36.7 °C)   BP  Min: 113/49  Max: 132/107   Pulse  Min: 68  Max: 88   Resp  Min: 11  Max: 22   SpO2  Min: 92 %  Max: 94 %   No data recorded   No data recorded     Flowsheet Rows      Flowsheet Row First Filed Value   Admission Height 167.6 cm (66\") Documented at 2023   Admission Weight 57.2 kg (126 lb 1.7 oz) Documented at 2023 0814            No intake/output data recorded.  I/O last 3 completed shifts:  In: 550 [P.O.:550]  Out: 650 [Urine:650]    Physical Exam:  General Appearance: alert, appears stated age and cooperative  Head: normocephalic, without obvious abnormality and atraumatic  Eyes: conjunctivae and sclerae normal and no icterus  Neck: supple and no JVD  Lungs: clear to auscultation and respirations regular  Heart: regular rhythm & normal rate and normal S1, S2  Chest: Wall no abnormalities observed  Abdomen: normal bowel sounds and soft, nontender  Extremities: moves extremities well, no edema, no cyanosis   Skin: no bleeding, " bruising.  Cystic lesion medial aspect right knee.  Neurologic: Alert, and oriented. No focal deficits    Labs:    WBC WBC   Date Value Ref Range Status   07/17/2023 13.10 (H) 3.40 - 10.80 10*3/mm3 Preliminary   07/16/2023 11.20 (H) 3.40 - 10.80 10*3/mm3 Final   07/14/2023 9.80 3.40 - 10.80 10*3/mm3 Final      HGB Hemoglobin   Date Value Ref Range Status   07/17/2023 7.4 (L) 12.0 - 15.9 g/dL Preliminary   07/16/2023 7.7 (L) 12.0 - 15.9 g/dL Final   07/14/2023 7.5 (L) 12.0 - 15.9 g/dL Final      HCT Hematocrit   Date Value Ref Range Status   07/17/2023 23.8 (L) 34.0 - 46.6 % Preliminary   07/16/2023 24.1 (L) 34.0 - 46.6 % Final   07/14/2023 23.5 (L) 34.0 - 46.6 % Final      Platelets No results found for: LABPLAT   MCV MCV   Date Value Ref Range Status   07/17/2023 100.4 (H) 79.0 - 97.0 fL Preliminary   07/16/2023 97.5 (H) 79.0 - 97.0 fL Final   07/14/2023 97.8 (H) 79.0 - 97.0 fL Final          Sodium Sodium   Date Value Ref Range Status   07/16/2023 140 136 - 145 mmol/L Final   07/14/2023 138 136 - 145 mmol/L Final      Potassium Potassium   Date Value Ref Range Status   07/16/2023 4.3 3.5 - 5.2 mmol/L Final     Comment:     Slight hemolysis detected by analyzer. Results may be affected.   07/14/2023 4.4 3.5 - 5.2 mmol/L Final      Chloride Chloride   Date Value Ref Range Status   07/16/2023 111 (H) 98 - 107 mmol/L Final   07/14/2023 109 (H) 98 - 107 mmol/L Final      CO2 CO2   Date Value Ref Range Status   07/16/2023 15.0 (L) 22.0 - 29.0 mmol/L Final   07/14/2023 17.0 (L) 22.0 - 29.0 mmol/L Final      BUN BUN   Date Value Ref Range Status   07/16/2023 72 (H) 8 - 23 mg/dL Final   07/14/2023 75 (H) 8 - 23 mg/dL Final      Creatinine Creatinine   Date Value Ref Range Status   07/16/2023 2.30 (H) 0.57 - 1.00 mg/dL Final   07/14/2023 2.69 (H) 0.57 - 1.00 mg/dL Final      Calcium Calcium   Date Value Ref Range Status   07/16/2023 8.4 (L) 8.6 - 10.5 mg/dL Final   07/14/2023 8.5 (L) 8.6 - 10.5 mg/dL Final      PO4 No  components found for: PO4   Albumin No results found for: ALBUMIN     Magnesium No results found for: MG   Uric Acid No components found for: URIC ACID     Imaging Results (Last 72 Hours)       Procedure Component Value Units Date/Time    MRI Knee Right Without Contrast [[id number]] Collected: 07/14/23 1631     Updated: 07/14/23 1653    Narrative:      MRI KNEE RIGHT  WO CONTRAST    Date of Exam: 7/14/2023 4:05 PM EDT    Indication: poss. infected joint.     Comparison: Radiographs July 13, 2023    Technique:  Routine multiplanar/multisequence images of the right knee were obtained without contrast administration.      Findings:  Osseous Structures and Intra-Articular  There is tricompartmental osteophytosis. There is mild subchondral edema of the superior median ridge of the patella presumed to be related to arthropathy. No definite fracture is seen. Alignment appears within normal limits. No definite suspicious   marrow signal abnormality is seen.    There is a small knee effusion.    Ligaments  The anterior cruciate ligament and posterior cruciate ligaments appear intact.  Medial collateral ligament and lateral collateral ligament complex appear intact.    Menisci  Meniscal evaluation is somewhat limited on this exam. There is suspected degenerative changes of the medial and lateral menisci. There is suspicion for partial thickness tear of the lateral meniscus posterior horn involving the free edge near the   posterior root. There is also suspicion for tear of the medial meniscus posterior horn with suspected horizontal defect.    Extensor compartment  The quadriceps and patellar tendons appear intact.    Cartilage  There appears to be advanced tricompartmental chondromalacia. There is suspicion for high-grade and possibly full-thickness cartilage loss of the central weightbearing medial tibiofemoral articular cartilage. There is also suspected partial-thickness   cartilage loss of the patellofemoral compartment.  There is probable small full-thickness cartilage defect at the superior median ridge of the patella with underlying subchondral edema. There is also suspected partial thickness cartilage loss of the   lateral tibiofemoral compartment. Surface type osteophyte is seen at the central lateral femoral condyle.    There are degenerative changes at the proximal tibiofibular articulation.    Soft tissues  There is prominent diffuse edema in the soft tissues surrounding the knee. At the medial aspect of the medial femoral condyle there appears to be a focal subcutaneous fluid signal collection overlying the proximal medial collateral ligament. This   measures approximately 2.8 x 0.8 cm in axial dimensions and up to 2.5 cm craniocaudal. No other definite fluid collection is seen. There is suspected trace amount of fluid in a Baker's cyst. There is mild to moderate muscle atrophy and suspected mild   muscle edema.    Miscellaneous  Popliteus tendon appears intact. Iliotibial band appears intact.      Impression:      Impression:  1.Prominent diffuse edema in the soft tissues surrounding the knee which may indicate skin and soft tissue infection.  2.2.8 x 0.8 x 2.5 cm collection is seen in the subcutaneous tissues along the medial femoral condyle which is nonspecific but could represent an abscess.  3.No significant knee effusion or significant marrow edema signal is seen on this exam to suggest joint infection. If there is high index of clinical concern, aspiration would be advised.  4.Advanced tricompartmental osteoarthritis with mild subchondral edema at the median ridge of the patella.  5.Limited visualization of the menisci on this exam with suspicion for tears of the posterior horns.        Electronically Signed: Cj Turk    7/14/2023 4:51 PM EDT    Workstation ID: [id number]            Results for orders placed during the hospital encounter of 07/13/23    XR Knee 1 or 2 View Right    Narrative  XR KNEE 1 OR 2 VW  RIGHT    Date of Exam: 7/13/2023 1:22 PM EDT    Indication: swelling    Comparison: 7/8/2023.    Findings:  There is continued severe tricompartmental joint narrowing with moderate spurring. There is a small suprapatellar effusion. There is no fracture or dislocation.    Impression  Impression:  Degenerative changes. Small effusion.      Electronically Signed: Juliane Baker MD  7/13/2023 1:50 PM EDT  Workstation ID: [id number]      Results for orders placed during the hospital encounter of 07/08/23    XR Knee 1 or 2 View Right    Narrative  XR KNEE 1 OR 2 VW RIGHT    Date of Exam: 7/8/2023 1:48 PM EDT    Indication: Knee pain    Comparison: None available.    Findings:  Osteopenia. Valgus angulation. No fracture or dislocation or joint effusion. Moderate tricompartment joint space narrowing.    Impression  Impression:  Moderate tricompartment arthritis. No fractures or dislocations.      Electronically Signed: Bridgett Gaspar  7/8/2023 2:18 PM EDT  Workstation ID: [id number]      XR Chest 1 View    Narrative  XR CHEST 1 VW    Date of Exam: 7/8/2023 1:48 PM EDT    Indication: Dyspnea    Comparison: Chest x-ray 12/2/2022, CT of the chest abdomen pelvis 3/13/2023    Findings:  Lungs normal expanded. Cardiomegaly. Mildly elevated right diaphragm. No pneumothorax or pleural effusion or focal pulmonary parenchymal opacity.    Impression  Impression:  No acute cardiopulmonary abnormality.      Electronically Signed: Bridgett Gaspar  7/8/2023 2:17 PM EDT  Workstation ID: [id number]      Results for orders placed during the hospital encounter of 06/26/23    Duplex Venous Lower Extremity - Right CAR    Interpretation Summary    Normal right lower extremity venous duplex scan.        ASSESSMENT / PLAN      JOSÉ (acute kidney injury)    Bacteremia due to Streptococcus    Cellulitis of right leg    Knee joint cyst, right    Bilateral leg edema    Acute UTI (urinary tract infection)    Dysphagia    Feeding difficulties    JOSÉ-prerenal in  setting of hypotension, diuretics, ARB's.  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  Hold irbesartan.  Avoid ACE inhibitors or ARB's  History of paroxysmal atrial fibrillation  Cellulitis like lower extremity and right knee.  History NSAID use  Questionable history of sarcoidosis  UTI-urine culture growing greater than 100 K colonies of gram-negative rods.  Strep bacteremia  Metabolic acidosis-p.o. sodium bicarb     Labs pending this morning  Continue IV fluids with bicarb  IR to drain cystic fluid from left knee.  closely monitor renal function fluid status electrolytes        Tony Parisi MD  [facility]  [phone]  07/17/23  12:08 EDT

## 2023-07-17 NOTE — PROGRESS NOTES
[facility] Medicine Services   Daily Progress Note    Patient Name: Rosario Ortez  : 1950  MRN: [id number]  Primary Care Physician:  Ian Cordero MD  Date of admission: 2023  Date and Time of Service: 2023 at 9 am      Subjective      Chief Complaint: Abnormal labs.  Sent in from PCP office.  JOSÉ on CKD Stage 3, right leg cellulitis and UTI    Patient seen today.  Doing okay.   at bedside.  Blood cultures show Strep.  Urine culture >100,000 gram negative rods.  Right knee is hurting her with a small painful cyst medial aspect.  Might need to be aspirated.  Getting IVF.    7/15/2023  Pt denies for any short of breath, no nausea or vomiting.no chest pain    2023  Pt complaining of generalized weakness, denies for any nausea or vomiting, no chest pain.    2023  Did okay overnight.  Remains somewhat somnolent and is not eating very well.   is at the bedside.  Had some issues with urinary retention overnight.  Attempted straight cath, but unsuccessful with only 400+ in her bladder.  Urine cultures growing Klebsiella.  Blood cultures are growing strep species, possible contaminant per infectious disease.  However, she does have a right knee infection, possibly an underlying abscess at the joint.  She continues to have right knee pain.  She had positive fecal occult blood screening yesterday.  Labs have not yet been drawn today.  She is typically on Eliquis, but this is being held as we await potential right knee intervention today.  Respiratory panel was positive for rhinovirus on admission as well.  There was concern for dysphagia, and speech therapy attempted to work with her today but did not have adequate participation it seems.  GI is considering an EGD for further evaluation and possibly NG tube feeds.  She is currently being followed by nephrology, infectious disease, surgery, GI, and orthopedics.  Extensive chart review for service turnover today.  Discussed the case with the bedside nursing staff. No other acute concerns.    Objective      Vitals:   Temp:  [97.6 °F (36.4 °C)-97.7 °F (36.5 °C)] 97.6 °F (36.4 °C)  Heart Rate:  [68-88] 88  Resp:  [11-22] 22  BP: (120-134)/() 132/107  Flow (L/min):  [1] 1    [id number] Score: 4 (7/17/2023  3:09 PM)    Physical Exam  Constitutional:       General: She is not in acute distress.     Appearance: She is ill-appearing. She is not toxic-appearing.   HENT:      Head: Normocephalic and atraumatic.      Right Ear: Tympanic membrane normal.      Left Ear: Tympanic membrane normal.      Mouth/Throat:      Mouth: Mucous membranes are moist.   Eyes:      Pupils: Pupils are equal, round, and reactive to light.   Cardiovascular:      Rate and Rhythm: Normal rate and regular rhythm.      Pulses: Normal pulses.      Heart sounds: Normal heart sounds.   Pulmonary:      Effort: Pulmonary effort is normal. No respiratory distress.      Breath sounds: Normal breath sounds.   Abdominal:      General: Bowel sounds are normal. There is no distension.      Palpations: Abdomen is soft.   Musculoskeletal:         General: No swelling. Normal range of motion.      Cervical back: Normal range of motion and neck supple.      Right lower leg: Edema present.      Left lower leg: Edema present.   Skin:     General: Skin is warm.      Findings: Erythema and lesion present.      Comments: Painful cyst like structure medial aspect right thigh.  No open wounds on the skin.  Some dry skin and edema.  Edema looks chronic.   Neurological:      General: No focal deficit present.      Mental Status: She is alert and oriented to person, place, and time. Mental status is at baseline.   Psychiatric:         Mood and Affect: Mood normal.        Result Review    I have personally reviewed the results from the time of this admission to 7/17/2023 08:47 EDT and agree with these findings:  [x]  Laboratory  [x]  Microbiology  [x]  Radiology  [x]   EKG/Telemetry   [x]  Cardiology/Vascular   []  Pathology  [x]  Old records  []  Other:      Wounds (last 24 hours)       LDA Wound       Row Name 07/16/23 2015 07/16/23 0900          Wound 06/27/23 0145 Right lower leg Abcess    Wound - Properties Group Placement Date: 06/27/23  - Placement Time: 0145  -AH Present on Hospital Admission: Y  -AH Side: Right  -AH Orientation: lower  -AH Location: leg  -AH Primary Wound Type: Abcess  -AH    Dressing Appearance -- dry;intact  -BS     Closure Adhesive bandage  -KM Adhesive bandage  -BS     Base dry;white  -KM dry;white  -BS     Periwound intact;swelling;redness  -KM intact;swelling;redness  -BS     Periwound Temperature cool  -KM cool  -BS     Periwound Skin Turgor soft  -KM soft  -BS     Edges -- open  -BS     Drainage Characteristics/Odor -- serous  -BS     Drainage Amount -- moderate  -BS     Care, Wound -- cleansed with;soap and water  -BS     Retired Wound - Properties Group Placement Date: 06/27/23  - Placement Time: 0145  -AH Present on Hospital Admission: Y  -AH Side: Right  -AH Orientation: lower  -AH Location: leg  -AH Primary Wound Type: Abcess  -AH    Retired Wound - Properties Group Date first assessed: 06/27/23  - Time first assessed: 0145  - Present on Hospital Admission: Y  -AH Side: Right  -AH Location: leg  -AH Primary Wound Type: Abcess  -AH       Wound 07/16/23 0650 Left coccyx Pressure Injury    Wound - Properties Group Placement Date: 07/16/23  -BS Placement Time: 0650  -BS Present on Hospital Admission: N  -BS Side: Left  -BS Location: coccyx  -BS Primary Wound Type: Pressure inj  -BS    Dressing Appearance -- dry;intact  -BS     Closure Adhesive bandage  -KM --     Base moist  -KM --     Periwound intact;dry  -KM --     Edges open  -KM --     Periwound Care barrier ointment applied  -KM --     Retired Wound - Properties Group Placement Date: 07/16/23  -BS Placement Time: 0650  -BS Present on Hospital Admission: N  -BS Side: Left  -BS  Location: coccyx  -BS Primary Wound Type: Pressure inj  -BS    Retired Wound - Properties Group Date first assessed: 07/16/23  -BS Time first assessed: 0650  -BS Present on Hospital Admission: N  -BS Side: Left  -BS Location: coccyx  -BS Primary Wound Type: Pressure inj  -BS              User Key  (r) = Recorded By, (t) = Taken By, (c) = Cosigned By      Initials Name Provider Type    Lata Portillo RN Registered Nurse    Cindy Garcia LPN Licensed Nurse    Catalina Mullins LPN Licensed Nurse                  Assessment & Plan      atorvastatin, 20 mg, Oral, Nightly  cefTRIAXone, 2,000 mg, Intravenous, Q24H  folic acid, 1 mg, Oral, Daily  levothyroxine, 50 mcg, Oral, Q AM  pantoprazole, 40 mg, Oral, Daily  predniSONE, 15 mg, Oral, Daily  senna-docusate sodium, 2 tablet, Oral, BID  sodium bicarbonate, 1,300 mg, Oral, TID  sodium chloride, 10 mL, Intravenous, Q12H       sodium bicarbonate drip (greater than 75 mEq/bag), 100 mEq, Last Rate: 100 mEq (07/16/23 1246)       Active Hospital Problems    Diagnosis  POA    Paroxysmal atrial fibrillation [I48.0]  Yes    Sarcoidosis [D86.9]  Yes    Metabolic acidosis [E87.20]  Yes    Bacteremia due to Streptococcus [R78.81, B95.5]  Yes    Cellulitis of right leg [L03.115]  Yes    Knee joint cyst, right [M25.861]  Yes    Bilateral leg edema [R60.0]  Yes    UTI due to Klebsiella species [N39.0, B96.89]  Yes    JOSÉ (acute kidney injury) [N17.9]  Yes    Dysphagia [R13.10]  Yes    Feeding difficulties [R63.30]  Yes      Resolved Hospital Problems   No resolved problems to display.         Plan:  JOSÉ on CKD Stage 3 with metabolic acidosis-likely pre-renal in the setting of volume depletion from diuretics.  Bicarbonate fluids 75 ml per hour,Nephrology following.    2. Right leg cellulitis / ? Knee abscess, continue IV rocephin.  ID consulted to due to strep bacteremia.  Probable source is the right leg.  Consulted wound care also.  Keep leg elevated.      3. Strep  bacteremia-continue ceftriaxone.  ID consulted.    4. Right knee cyst-painful to touch and might need aspiration.  Ortho consulted for further evaluation and intervention, hopefully today    5. B/L leg edema-chronic issue.  Had venous dopplers done June 2023 and negative for any DVT.  Keep leg elevated while in bed.  Has some lymphedema also.    6.  Klebsiella UTI.  Continue ceftriaxone.    7. PAF-continue Eliquis 5 mg BID, holding for now pending right knee intervention.  Monitor HR.    PT/OT    DVT prophylaxis:  Mechanical DVT prophylaxis orders are present.    CODE STATUS:    Level Of Support Discussed With: Patient; Health Care Surrogate  Code Status (Patient has no pulse and is not breathing): CPR (Attempt to Resuscitate)  Medical Interventions (Patient has pulse or is breathing): Full Support    Disposition:  She will very likely need skilled rehab at discharge.  I would not expect discharge for at least a few days.  Referrals to [facility] and [facility], will require pre-CERT.    Electronically signed by Armando Adams MD, 07/17/23, 08:47 EDT.  [facility] Hospitalist Team

## 2023-07-17 NOTE — NURSING NOTE
WOCN note:     73 yr old female admitted 7/13/23 with JOSÉ. WOCN consult received for possible hospital acquired sacral pressure injury. Patient presents with a stage 1 non-blanchable erythema to her low sacrum. She currently has a silicone bordered foam dressing in place as well as a connie-wick incontinence device and Ultrasorb pads.   Patient is positioned on her side with foam wedge and there is a low air loss pump in place to her bed surface. Patient and spouse were instructed on side to side positioning and avoiding the supine position. Recommend to continue current plan of care with pressure injury prevention measures. We will follow as needed.

## 2023-07-18 ENCOUNTER — APPOINTMENT (OUTPATIENT)
Dept: INTERVENTIONAL RADIOLOGY/VASCULAR | Facility: HOSPITAL | Age: 73
DRG: 602 | End: 2023-07-18
Payer: MEDICARE

## 2023-07-18 ENCOUNTER — APPOINTMENT (OUTPATIENT)
Dept: GENERAL RADIOLOGY | Facility: HOSPITAL | Age: 73
DRG: 602 | End: 2023-07-18
Payer: MEDICARE

## 2023-07-18 ENCOUNTER — APPOINTMENT (OUTPATIENT)
Dept: CT IMAGING | Facility: HOSPITAL | Age: 73
DRG: 602 | End: 2023-07-18
Payer: MEDICARE

## 2023-07-18 ENCOUNTER — APPOINTMENT (OUTPATIENT)
Dept: RESPIRATORY THERAPY | Facility: HOSPITAL | Age: 73
DRG: 602 | End: 2023-07-18
Payer: MEDICARE

## 2023-07-18 ENCOUNTER — INPATIENT HOSPITAL (AMBULATORY)
Dept: URBAN - METROPOLITAN AREA HOSPITAL 84 | Facility: HOSPITAL | Age: 73
End: 2023-07-18

## 2023-07-18 DIAGNOSIS — E44.0 MODERATE PROTEIN-CALORIE MALNUTRITION: ICD-10-CM

## 2023-07-18 DIAGNOSIS — R13.10 DYSPHAGIA, UNSPECIFIED: ICD-10-CM

## 2023-07-18 DIAGNOSIS — R63.30 FEEDING DIFFICULTIES, UNSPECIFIED: ICD-10-CM

## 2023-07-18 LAB
ALBUMIN SERPL-MCNC: 2.1 G/DL (ref 3.5–5.2)
ANION GAP SERPL CALCULATED.3IONS-SCNC: 11 MMOL/L (ref 5–15)
BACTERIA SPEC AEROBE CULT: NORMAL
BH BB BLOOD EXPIRATION DATE: NORMAL
BH BB BLOOD TYPE BARCODE: 5100
BH BB DISPENSE STATUS: NORMAL
BH BB PRODUCT CODE: NORMAL
BH BB UNIT NUMBER: NORMAL
BUN SERPL-MCNC: 65 MG/DL (ref 8–23)
BUN/CREAT SERPL: 37.8 (ref 7–25)
CALCIUM SPEC-SCNC: 8.3 MG/DL (ref 8.6–10.5)
CHLORIDE SERPL-SCNC: 109 MMOL/L (ref 98–107)
CO2 SERPL-SCNC: 21 MMOL/L (ref 22–29)
CREAT SERPL-MCNC: 1.72 MG/DL (ref 0.57–1)
CROSSMATCH INTERPRETATION: NORMAL
DACRYOCYTES BLD QL SMEAR: ABNORMAL
DEPRECATED RDW RBC AUTO: 75.7 FL (ref 37–54)
EGFRCR SERPLBLD CKD-EPI 2021: 30.7 ML/MIN/1.73
ERYTHROCYTE [DISTWIDTH] IN BLOOD BY AUTOMATED COUNT: 22.9 % (ref 12.3–15.4)
GLUCOSE SERPL-MCNC: 101 MG/DL (ref 65–99)
HCT VFR BLD AUTO: 26.1 % (ref 34–46.6)
HGB BLD-MCNC: 8.3 G/DL (ref 12–15.9)
INTERPRETATION UR IFE-IMP: NORMAL
LYMPHOCYTES # BLD MANUAL: 0.83 10*3/MM3 (ref 0.7–3.1)
LYMPHOCYTES NFR BLD MANUAL: 11 % (ref 5–12)
MCH RBC QN AUTO: 28.8 PG (ref 26.6–33)
MCHC RBC AUTO-ENTMCNC: 31.7 G/DL (ref 31.5–35.7)
MCV RBC AUTO: 90.8 FL (ref 79–97)
MONOCYTES # BLD: 1.31 10*3/MM3 (ref 0.1–0.9)
NEUTROPHILS # BLD AUTO: 9.76 10*3/MM3 (ref 1.7–7)
NEUTROPHILS NFR BLD MANUAL: 79 % (ref 42.7–76)
NEUTS BAND NFR BLD MANUAL: 3 % (ref 0–5)
NRBC SPEC MANUAL: 1 /100 WBC (ref 0–0.2)
PHOSPHATE SERPL-MCNC: 3.1 MG/DL (ref 2.5–4.5)
PLATELET # BLD AUTO: 215 10*3/MM3 (ref 140–450)
PMV BLD AUTO: 6.9 FL (ref 6–12)
POIKILOCYTOSIS BLD QL SMEAR: ABNORMAL
POTASSIUM SERPL-SCNC: 3.4 MMOL/L (ref 3.5–5.2)
RBC # BLD AUTO: 2.87 10*6/MM3 (ref 3.77–5.28)
SCAN SLIDE: NORMAL
SMALL PLATELETS BLD QL SMEAR: ADEQUATE
SODIUM SERPL-SCNC: 141 MMOL/L (ref 136–145)
UNIT  ABO: NORMAL
UNIT  RH: NORMAL
VARIANT LYMPHS NFR BLD MANUAL: 7 % (ref 19.6–45.3)
WBC MORPH BLD: NORMAL
WBC NRBC COR # BLD: 11.9 10*3/MM3 (ref 3.4–10.8)

## 2023-07-18 PROCEDURE — 07DR3ZX EXTRACTION OF ILIAC BONE MARROW, PERCUTANEOUS APPROACH, DIAGNOSTIC: ICD-10-PCS | Performed by: RADIOLOGY

## 2023-07-18 PROCEDURE — 079T3ZX DRAINAGE OF BONE MARROW, PERCUTANEOUS APPROACH, DIAGNOSTIC: ICD-10-PCS | Performed by: RADIOLOGY

## 2023-07-18 PROCEDURE — 88341 IMHCHEM/IMCYTCHM EA ADD ANTB: CPT

## 2023-07-18 PROCEDURE — 87147 CULTURE TYPE IMMUNOLOGIC: CPT | Performed by: SURGERY

## 2023-07-18 PROCEDURE — 88311 DECALCIFY TISSUE: CPT | Performed by: FAMILY MEDICINE

## 2023-07-18 PROCEDURE — 88342 IMHCHEM/IMCYTCHM 1ST ANTB: CPT

## 2023-07-18 PROCEDURE — 88313 SPECIAL STAINS GROUP 2: CPT | Performed by: FAMILY MEDICINE

## 2023-07-18 PROCEDURE — 99152 MOD SED SAME PHYS/QHP 5/>YRS: CPT

## 2023-07-18 PROCEDURE — 87186 SC STD MICRODIL/AGAR DIL: CPT | Performed by: SURGERY

## 2023-07-18 PROCEDURE — 25010000002 CEFTRIAXONE PER 250 MG: Performed by: FAMILY MEDICINE

## 2023-07-18 PROCEDURE — 87070 CULTURE OTHR SPECIMN AEROBIC: CPT | Performed by: SURGERY

## 2023-07-18 PROCEDURE — 88305 TISSUE EXAM BY PATHOLOGIST: CPT

## 2023-07-18 PROCEDURE — 25010000002 FENTANYL CITRATE (PF) 50 MCG/ML SOLUTION: Performed by: RADIOLOGY

## 2023-07-18 PROCEDURE — 74018 RADEX ABDOMEN 1 VIEW: CPT

## 2023-07-18 PROCEDURE — 82525 ASSAY OF COPPER: CPT | Performed by: NURSE PRACTITIONER

## 2023-07-18 PROCEDURE — 76942 ECHO GUIDE FOR BIOPSY: CPT

## 2023-07-18 PROCEDURE — 85007 BL SMEAR W/DIFF WBC COUNT: CPT | Performed by: NURSE PRACTITIONER

## 2023-07-18 PROCEDURE — 85025 COMPLETE CBC W/AUTO DIFF WBC: CPT | Performed by: NURSE PRACTITIONER

## 2023-07-18 PROCEDURE — 87205 SMEAR GRAM STAIN: CPT | Performed by: SURGERY

## 2023-07-18 PROCEDURE — 88305 TISSUE EXAM BY PATHOLOGIST: CPT | Performed by: FAMILY MEDICINE

## 2023-07-18 PROCEDURE — 88323 CONSLTJ&REPRT MATRL PREP SLD: CPT

## 2023-07-18 PROCEDURE — 88333 PATH CONSLTJ SURG CYTO XM 1: CPT | Performed by: FAMILY MEDICINE

## 2023-07-18 PROCEDURE — 83521 IG LIGHT CHAINS FREE EACH: CPT | Performed by: NURSE PRACTITIONER

## 2023-07-18 PROCEDURE — 0 LIDOCAINE 1 % SOLUTION: Performed by: RADIOLOGY

## 2023-07-18 PROCEDURE — 3E0G76Z INTRODUCTION OF NUTRITIONAL SUBSTANCE INTO UPPER GI, VIA NATURAL OR ARTIFICIAL OPENING: ICD-10-PCS | Performed by: INTERNAL MEDICINE

## 2023-07-18 PROCEDURE — 25010000002 MIDAZOLAM PER 1 MG: Performed by: RADIOLOGY

## 2023-07-18 PROCEDURE — 25010000002 ONDANSETRON PER 1 MG: Performed by: RADIOLOGY

## 2023-07-18 PROCEDURE — 77075 RADEX OSSEOUS SURVEY COMPL: CPT

## 2023-07-18 PROCEDURE — 0DH98UZ INSERTION OF FEEDING DEVICE INTO DUODENUM, VIA NATURAL OR ARTIFICIAL OPENING ENDOSCOPIC: ICD-10-PCS | Performed by: INTERNAL MEDICINE

## 2023-07-18 PROCEDURE — 0S9C3ZZ DRAINAGE OF RIGHT KNEE JOINT, PERCUTANEOUS APPROACH: ICD-10-PCS | Performed by: RADIOLOGY

## 2023-07-18 PROCEDURE — 80069 RENAL FUNCTION PANEL: CPT | Performed by: INTERNAL MEDICINE

## 2023-07-18 PROCEDURE — 43246 EGD PLACE GASTROSTOMY TUBE: CPT | Performed by: INTERNAL MEDICINE

## 2023-07-18 PROCEDURE — 77012 CT SCAN FOR NEEDLE BIOPSY: CPT

## 2023-07-18 PROCEDURE — 88312 SPECIAL STAINS GROUP 1: CPT | Performed by: FAMILY MEDICINE

## 2023-07-18 DEVICE — DEV CLIP ENDO RESOLUTION360 CONTRL ROT 235CM: Type: IMPLANTABLE DEVICE | Site: DUODENUM | Status: FUNCTIONAL

## 2023-07-18 RX ORDER — MIDAZOLAM HYDROCHLORIDE 1 MG/ML
INJECTION INTRAMUSCULAR; INTRAVENOUS AS NEEDED
Status: COMPLETED | OUTPATIENT
Start: 2023-07-18 | End: 2023-07-18

## 2023-07-18 RX ORDER — SODIUM CHLORIDE 0.9 % (FLUSH) 0.9 %
3 SYRINGE (ML) INJECTION EVERY 12 HOURS SCHEDULED
Status: DISCONTINUED | OUTPATIENT
Start: 2023-07-18 | End: 2023-07-20

## 2023-07-18 RX ORDER — LIDOCAINE HYDROCHLORIDE 10 MG/ML
INJECTION, SOLUTION INFILTRATION; PERINEURAL AS NEEDED
Status: COMPLETED | OUTPATIENT
Start: 2023-07-18 | End: 2023-07-18

## 2023-07-18 RX ORDER — POTASSIUM CHLORIDE 20 MEQ/1
40 TABLET, EXTENDED RELEASE ORAL ONCE
Status: DISCONTINUED | OUTPATIENT
Start: 2023-07-18 | End: 2023-07-18

## 2023-07-18 RX ORDER — ONDANSETRON 2 MG/ML
INJECTION INTRAMUSCULAR; INTRAVENOUS AS NEEDED
Status: COMPLETED | OUTPATIENT
Start: 2023-07-18 | End: 2023-07-18

## 2023-07-18 RX ORDER — SODIUM CHLORIDE 0.9 % (FLUSH) 0.9 %
3-10 SYRINGE (ML) INJECTION AS NEEDED
Status: DISCONTINUED | OUTPATIENT
Start: 2023-07-18 | End: 2023-07-20

## 2023-07-18 RX ORDER — FENTANYL CITRATE 50 UG/ML
INJECTION, SOLUTION INTRAMUSCULAR; INTRAVENOUS AS NEEDED
Status: COMPLETED | OUTPATIENT
Start: 2023-07-18 | End: 2023-07-18

## 2023-07-18 RX ORDER — POTASSIUM CHLORIDE 1.5 G/1.58G
40 POWDER, FOR SOLUTION ORAL ONCE
Status: COMPLETED | OUTPATIENT
Start: 2023-07-18 | End: 2023-07-19

## 2023-07-18 RX ADMIN — LIDOCAINE HYDROCHLORIDE 5 ML: 10 INJECTION, SOLUTION INFILTRATION; PERINEURAL at 15:57

## 2023-07-18 RX ADMIN — Medication 10 ML: at 08:54

## 2023-07-18 RX ADMIN — SODIUM BICARBONATE 1300 MG: 650 TABLET ORAL at 20:30

## 2023-07-18 RX ADMIN — Medication 3 ML: at 20:32

## 2023-07-18 RX ADMIN — Medication 10 ML: at 20:31

## 2023-07-18 RX ADMIN — CEFTRIAXONE 2000 MG: 2 INJECTION, POWDER, FOR SOLUTION INTRAMUSCULAR; INTRAVENOUS at 06:00

## 2023-07-18 RX ADMIN — ATORVASTATIN CALCIUM 20 MG: 20 TABLET, FILM COATED ORAL at 20:31

## 2023-07-18 RX ADMIN — FENTANYL CITRATE 50 MCG: 50 INJECTION, SOLUTION INTRAMUSCULAR; INTRAVENOUS at 15:40

## 2023-07-18 RX ADMIN — FENTANYL CITRATE 50 MCG: 50 INJECTION, SOLUTION INTRAMUSCULAR; INTRAVENOUS at 15:34

## 2023-07-18 RX ADMIN — LIDOCAINE HYDROCHLORIDE 10 ML: 10 INJECTION, SOLUTION INFILTRATION; PERINEURAL at 15:41

## 2023-07-18 RX ADMIN — SODIUM BICARBONATE 100 MEQ: 84 INJECTION, SOLUTION INTRAVENOUS at 17:12

## 2023-07-18 RX ADMIN — MIDAZOLAM 0.5 MG: 1 INJECTION INTRAMUSCULAR; INTRAVENOUS at 15:40

## 2023-07-18 RX ADMIN — ONDANSETRON 4 MG: 2 INJECTION INTRAMUSCULAR; INTRAVENOUS at 15:33

## 2023-07-18 RX ADMIN — SENNOSIDES AND DOCUSATE SODIUM 2 TABLET: 50; 8.6 TABLET ORAL at 20:31

## 2023-07-18 NOTE — PROGRESS NOTES
Nutrition Services    Patient Name: Rosario Ortez  YOB: 1950  MRN: [id number]  Admission date: 7/13/2023    *See MSA at bottom of this note.     PPE Documentation        PPE Worn By Provider Gloves    PPE Worn By Patient  N/A     PROGRESS NOTE      Encounter Information: Pt visited today for NFPE and discussion. Pt not verbal at this time - SO provided all information. Pt was just recently seen by dietetic intern Amanda on 6/29/23, who provided comprehensive education on low sodium diet and was able to gather diet recall. Since them, per comparison of diet recall and discussion with SO, pt's intake has dropped sharply -- is eating 1/2 size portions compared to a few weeks ago. Weight has been trending down quickly, with 3.8% loss in 1 week and overall downtrend over the last year. Pt has been having increasingly difficult time swallowing, which has been the main barrier to intake. EGD with dilation is planned for tomorrow 7/18, per GI, and DHT also will be placed for EN support.     Nutrition problem statement: Moderate chronic Dz related malnutrition R/t ongoing swallowing difficulty inhibiting adequate intake; as evidenced by diet recall reflective of intakes less than 75% estimated needs for at least 1 month, and moderate  muscle and fat loss per NFPE.       PO Diet: NPO Diet NPO Type: Strict NPO  NPO Diet NPO Type: Strict NPO   PO Supplements: None currently   PO Intake:  NPO       Current nutrition support: DHT is planned for 7/18   Nutrition support review:        Labs (reviewed below): Reviewed and C/W clinical course        GI Function:  Stool Output  Stool Unmeasured Occurrence: 1 (07/16/23 2112)  Bowel Incontinence: Yes (07/16/23 2112)  Stool Amount: moderate (07/16/23 2112)          Nutrition Intervention Updates: When enteral access obtained, if TF to begin, suggest starting with:     Initial Goal:  *initial goal conservative d/t risk of RFS    Isosource 1.5 at 25mL/hr + 30mL/hr water  "flush      End Goal:  Isosource 1.5 at 55 mL/hr; will adjust water flush per clinical picture      Calories  1815 kcals (100%)    Protein  82 g (100%)    Free water  920 mL    Flushes  Will adjust per clinical picture      The above end goal rate is for 22 hrs/day to assume interruptions for ADLs. Water flushes adjusted based on clinical picture + Rx flushes/IV fluids      Estimated/Assessed Needs    Assessed 7/17/23   Energy Requirements    Height for Calculation  Height: 167.6 cm (66\")   Weight for Calculation 63.3 kg    Method for Estimation  30-35 kcal/kg   EST Needs (kcal/day) 8683-4496 kcal/day       Protein Requirements    Weight for Calculation 63.3 kg   EST Protein Needs (g/kg) 1.3 g/kg   EST Daily Needs (g/day) 82 g/day       Fluid Requirements     Estimated Needs (mL/day) 1mL/kcal - monitor hydration status        Fluid Deficit    Current Na Level (mEq/L)    Desired Na Level (mEq/L)    Estimated Fluid Deficit (L)       Results from last 7 days   Lab Units 07/17/23  1114 07/16/23  0152 07/14/23  2234 07/14/23  0309 07/13/23  0852   SODIUM mmol/L 140 140 138   < > 135*   POTASSIUM mmol/L 3.7 4.3 4.4   < > 4.2   CHLORIDE mmol/L 110* 111* 109*   < > 103   CO2 mmol/L 21.0* 15.0* 17.0*   < > 18.0*   BUN mg/dL 75* 72* 75*   < > 82*   CREATININE mg/dL 2.18* 2.30* 2.69*   < > 2.79*   CALCIUM mg/dL 8.5* 8.4* 8.5*   < > 9.4   BILIRUBIN mg/dL 0.3  --   --   --  0.9   ALK PHOS U/L 137*  --   --   --  174*   ALT (SGPT) U/L 15  --   --   --  23   AST (SGOT) U/L 26  --   --   --  40*   GLUCOSE mg/dL 101* 96 112*   < > 86    < > = values in this interval not displayed.     Results from last 7 days   Lab Units 07/17/23  1859 07/17/23  1114   MAGNESIUM mg/dL  --  2.1   HEMOGLOBIN g/dL 9.1* 7.4*   HEMATOCRIT % 28.3* 23.8*     COVID19   Date Value Ref Range Status   06/26/2023 Not Detected Not Detected - Ref. Range Final     Lab Results   Component Value Date    HGBA1C 5.9 (H) 10/16/2019       Malnutrition Severity Assessment "      Patient meets criteria for : Moderate (non-severe) Malnutrition  Malnutrition Type (last 8 hours)       Malnutrition Severity Assessment       Row Name 07/17/23 2317       Malnutrition Severity Assessment    Malnutrition Type Chronic Disease - Related Malnutrition      Row Name 07/17/23 2317       Insufficient Energy Intake     Insufficient Energy Intake Findings Moderate    Insufficient Energy Intake  <75% of est. energy requirement for > or equal to 1 month      Row Name 07/17/23 2317       Muscle Loss    Loss of Muscle Mass Findings Moderate    [place] Region Moderate - slight depression    Clavicle Bone Region Moderate - some protrusion in females, visible in males    Acromion Bone Region Moderate - acromion may slightly protrude    Dorsal Hand Region Severe - prominent depression    Patellar Region Moderate - patella more prominent, less muscle definition around patella    Anterior Thigh Region Moderate - mild depression on inner thigh    Posterior Calf Region Moderate - some roundness, slight firmness      Row Name 07/17/23 2317       Fat Loss    Subcutaneous Fat Loss Findings Moderate    Orbital Region  Moderate -  somewhat hollowness, slightly dark circles    Upper Arm Region Moderate - some fat tissue, not ample      Row Name 07/17/23 2317       Criteria Met (Must meet criteria for severity in at least 2 of these categories: M Wasting, Fat Loss, Fluid, Secondary Signs, Wt. Status, Intake)    Patient meets criteria for  Moderate (non-severe) Malnutrition                       RD to follow up per protocol.    Electronically signed by:  Lia Gibson RD  07/17/23

## 2023-07-18 NOTE — CASE MANAGEMENT/SOCIAL WORK
Continued Stay Note  YAQUELIN Taveras     Patient Name: Rosario Ortez  MRN: [id number]  Today's Date: 7/18/2023    Admit Date: 7/13/2023    Plan: Referral to [facility] pending. Will require precert. PASRR approved 7/17.   Discharge Plan       Row Name 07/18/23 1437       Plan    Plan Referral to [facility] pending. Will require precert. PASRR approved 7/17.    Patient/Family in Agreement with Plan yes    Plan Comments CM received update from [facility] liaison Nellie that [facility] is in network with patient's insurance, but [facility] is not. Nellie spoke to patient's daughter. Pt had EGD w/ dobhoff tube placement today, will start tube feeds. Pt to have knee aspiration today with IR. New consult for Hem/Onc; GI, Renal, and ID currently following.           Megan Naegele, RN     Office Phone: [phone]  Office Cell: [phone]

## 2023-07-18 NOTE — SIGNIFICANT NOTE
Pt is off the unit for an EGD and then to IR to drain fluid off right knee.  Will resume PT tomorrow

## 2023-07-18 NOTE — CONSULTS
"Nutrition Services    Patient Name: Rosario Ortez  YOB: 1950  MRN: [id number]  Admission date: 7/13/2023        PPE Documentation        PPE Worn By Provider Gloves    PPE Worn By Patient  N/A     PROGRESS NOTE      Encounter Information: Received TF consult today. Pt had an EGD scheduled today related to her dysphagia; however a esophageal dilation was not performed due to the esophageal lumen being widely patent per Gastroenterology's note. They did place an NDT so the pt can receive enteral nutrition support, they bridled the NDT to prevent pulling. Per the recommendation of the RD that worked up the TF order yesterday, we will start the pt off on Isosource 1.5 at 25mL/hr + 30mL/hr water flush.        PO Diet: NPO Diet NPO Type: Strict NPO   PO Supplements: None currently   PO Intake:  NPO       Current nutrition support: None   Nutrition support review: N/A       Labs (reviewed below): Reviewed -- potassium is low, sent secure message to attending to see if her potassium should be replaced. Attending approved having the potassium replaced, added RN to chat to order and replace potassium.       GI Function:  Last documented BM: 7/17         Nutrition Intervention Updates: If TF to begin, suggest starting with:     Initial Goal:  *initial goal conservative d/t risk of RFS    Isosource 1.5 at 25mL/hr + 30mL/hr water flush      End Goal:  Isosource 1.5 at 55 mL/hr; will adjust water flush per clinical picture      Calories  1815 kcals (100%)    Protein  82 g (100%)    Free water  920 mL    Flushes  Will adjust per clinical picture      The above end goal rate is for 22 hrs/day to assume interruptions for ADLs. Water flushes adjusted based on clinical picture + Rx flushes/IV fluids      Estimated/Assessed Needs    Assessed 7/17/23   Energy Requirements    Height for Calculation  Height: 167.6 cm (66\")   Weight for Calculation 63.3 kg    Method for Estimation  30-35 kcal/kg   EST Needs (kcal/day) " 8064-5411 kcal/day       Protein Requirements    Weight for Calculation 63.3 kg   EST Protein Needs (g/kg) 1.3 g/kg   EST Daily Needs (g/day) 82 g/day       Fluid Requirements     Estimated Needs (mL/day) 1mL/kcal - monitor hydration status        Fluid Deficit    Current Na Level (mEq/L)    Desired Na Level (mEq/L)    Estimated Fluid Deficit (L)       Results from last 7 days   Lab Units 07/18/23  0125 07/17/23  1114 07/16/23  0152 07/14/23  0309 07/13/23  0852   SODIUM mmol/L 141 140 140   < > 135*   POTASSIUM mmol/L 3.4* 3.7 4.3   < > 4.2   CHLORIDE mmol/L 109* 110* 111*   < > 103   CO2 mmol/L 21.0* 21.0* 15.0*   < > 18.0*   BUN mg/dL 65* 75* 72*   < > 82*   CREATININE mg/dL 1.72* 2.18* 2.30*   < > 2.79*   CALCIUM mg/dL 8.3* 8.5* 8.4*   < > 9.4   BILIRUBIN mg/dL  --  0.3  --   --  0.9   ALK PHOS U/L  --  137*  --   --  174*   ALT (SGPT) U/L  --  15  --   --  23   AST (SGOT) U/L  --  26  --   --  40*   GLUCOSE mg/dL 101* 101* 96   < > 86    < > = values in this interval not displayed.       Results from last 7 days   Lab Units 07/18/23  0125 07/17/23  1859 07/17/23  1114   MAGNESIUM mg/dL  --   --  2.1   PHOSPHORUS mg/dL 3.1  --   --    HEMOGLOBIN g/dL 8.3*   < > 7.4*   HEMATOCRIT % 26.1*   < > 23.8*    < > = values in this interval not displayed.       COVID19   Date Value Ref Range Status   06/26/2023 Not Detected Not Detected - Ref. Range Final     Lab Results   Component Value Date    HGBA1C 5.9 (H) 10/16/2019       RD to follow up per protocol.    Electronically signed by:  Delma Patel RD  07/18/23

## 2023-07-18 NOTE — CONSULTS
Hematology/Oncology Inpatient Consultation    Patient name: Rosario Ortez  : 1950  MRN: [id number]  Referring Provider: ANTHONY Flores MD  Reason for Consultation: IgG Kappa monoclonal gammopathy    Chief complaint: Abnormal labs    History of present illness:    73 y.o. female presented to [facility] ER on 2023 upon the direction of her primary care provider for abnormal labs.  She had seen her PCP the day prior and was started on doxycycline for bronchitis and was found to have persistent anemia with a hemoglobin of 8.5 and creatinine 1.45.  SPEP was abnormal.  Prior to admission, in May 2023, she was seen in follow-up by Dr. Flores for recurrent ascites and possible sarcoidosis.  She was on methotrexate with folic acid and dexamethasone.  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  Her ACE level was elevated at 109 and it was felt that she may have sarcoidosis.  Ascitic fluid in 2023, was transudate with negative cytology.  Her Lasix was increased.  Her hepatitis panel and autoimmune work-up was negative in the past.  It was recommended that she undergo a liver biopsy when her ascites improved.  She was referred to the [facility] and had an appointment on 23.  Discharged from [facility] on 2023, following a 3-day admission for syncope and JOSÉ.  Cardiology was consulted. Stress test was negative with a EF of 74%.  Bilateral lower extremity venous Dopplers were negative.  Stool heme was positive,  (135-214) and  PTH 53.2 (15-65). CMP remarkable for creatinine of 1.37 and AST of 37 (1-33).  White count 4.6, hemoglobin 8.2, MCV 95, platelets 210,000.  Iron studies revealed an iron saturation of 28% (20-50), ferritin of 220 (), and TIBC 189 (298-536).  Iron was 53 ().  Vitamin B12 873 (211-946). Paracentesis was done with removal of 6.5 L of clear yellow fluid.  She stopped taking methotrexate shortly  after she was discharged.  In the ER, CMP remarkable for creatinine of 2.79, albumin 2.6 and AST 40.  White count was 12.6, hemoglobin 8.7, MCV 97.3 and platelets 254,000.  PT 13.5, PTT 33.1, fibrinogen 253 (210-450).  Repeat iron studies showed an iron of 19 (), iron saturation 11 (20-50), TIBC 170 (298-536), and ferritin of 216 (13-1 50).  IgA 243 (), IgG 905 (580-1602), and IgM 35 ().  CXR was unremarkable.  She reported pain in her right knee and MRI showed prominent diffuse edema in the soft tissue surrounding the knee suggesting a skin and soft tissue infection.  There was a 2.8 x 0.80 x 2.5 cm collection in the subcu tissues along the medial femoral condyle which was nonspecific but could represent an abscess.  There was no significant knee effusion or significant marrow edema to suggest joint infection.  There was advanced right compartmental osteoarthritis and suspicion for tears of the posterior horns of the menisci was seen.  Urine culture grew Klebsiella pneumonia and blood culture from 1 bottle grew alphahemolytic strep.  SIFE showed IgG kappa monoclonal gammopathy.  Haptoglobin 173 ().  Surgery, GI, nephrology, orthopedics and infectious disease were consulted.  On 7/15/2023, her stool heme was positive and ESR was 11 (0-30).  She was transfused 1 unit pRBCs on 7/17/2023 for hemoglobin drop to 7.4.  On the day of consultation, her white count was 11.9, hemoglobin 8.3, MCV 90.8 and platelets 215.  Chemistries were significant for potassium 3.4 and creatinine 1.72.  PT 13.2 (9.6-11.7), and PTT 47 (24-31).  It was planned that she undergo an EGD and have an aspirate performed on her presumed right knee abscess.    07/18/23  Hematology/Oncology was consulted for her IgG kappa monoclonal gammopathy.    Past Medical History: CKD, anemia.  Hypothyroidism, A-fib 2021.  Hyperlipidemia and hypertension for years.  Bilateral lower extremity neuropathy.  Surgical History: Tonsillectomy and  appendectomy.  Cholecystectomy 2006.  Possible hysterectomy.  EGD and colonoscopy 2023.  Social History: She lives in [place] with her .  She used to work as a .  No history of smoking or alcohol abuse.  Family History: Her sister had breast cancer at age 75.  Allergies: Codeine causes nausea.  Topiramate causes a rash.  Tramadol causes mental status changes.  Cefdinir, Levaquin and lisinopril.    PCP: Ian Cordero MD    History:  Past Medical History:   Diagnosis Date    A-fib     Coronary artery disease     Paroxysmal atrial fibrillation 07/17/2023    Sarcoidosis 07/17/2023    Suspected   ,   Past Surgical History:   Procedure Laterality Date    BREAST BIOPSY      HYSTERECTOMY      JOINT REPLACEMENT Right 2015   ,   Family History   Problem Relation Age of Onset    Breast cancer Sister    ,   Social History     Tobacco Use    Smoking status: Never     Passive exposure: Never    Smokeless tobacco: Never   Vaping Use    Vaping Use: Never used   Substance Use Topics    Alcohol use: Never    Drug use: Never   ,   Medications Prior to Admission   Medication Sig Dispense Refill Last Dose    apixaban (ELIQUIS) 2.5 MG tablet tablet Take 1 tablet by mouth 2 (Two) Times a Day.       atorvastatin (LIPITOR) 20 MG tablet Take 1 tablet by mouth Daily.       doxycycline (VIBRAMYCIN) 100 MG capsule Take 1 capsule by mouth 2 (Two) Times a Day.       ferrous sulfate 325 (65 FE) MG tablet Take 1 tablet by mouth Daily With Breakfast.       folic acid (FOLVITE) 1 MG tablet Take 1 tablet by mouth Daily.       furosemide (LASIX) 20 MG tablet Take 1 tablet by mouth Daily for 30 days. 30 tablet 0     hydroCHLOROthiazide (HYDRODIURIL) 25 MG tablet Take 1 tablet by mouth Daily.       irbesartan (AVAPRO) 75 MG tablet Take 1 tablet by mouth Every Night. 30 tablet 3     levothyroxine (SYNTHROID, LEVOTHROID) 50 MCG tablet 1 tablet Every Morning.       pantoprazole (PROTONIX) 40 MG EC tablet Take 1 tablet by mouth  Daily.       predniSONE (DELTASONE) 5 MG tablet Take 3 tablets by mouth Daily.       traZODone (DESYREL) 50 MG tablet Take 2 tablets by mouth At Night As Needed for Sleep.      , Scheduled Meds:  atorvastatin, 20 mg, Oral, Nightly  cefTRIAXone, 2,000 mg, Intravenous, Q24H  folic acid, 1 mg, Oral, Daily  levothyroxine, 50 mcg, Oral, Q AM  pantoprazole, 40 mg, Oral, Daily  predniSONE, 15 mg, Oral, Daily  senna-docusate sodium, 2 tablet, Oral, BID  sodium bicarbonate, 1,300 mg, Oral, TID  sodium chloride, 10 mL, Intravenous, Q12H    , Continuous Infusions:  sodium bicarbonate drip (greater than 75 mEq/bag), 100 mEq, Last Rate: 100 mEq (07/17/23 2041)    , PRN Meds:    acetaminophen    senna-docusate sodium **AND** polyethylene glycol **AND** bisacodyl **AND** bisacodyl    ondansetron    [COMPLETED] Insert Peripheral IV **AND** sodium chloride    sodium chloride    sodium chloride    sodium chloride    traZODone   Allergies:  Codeine, Topiramate, Tramadol, Cefdinir, Levofloxacin, and Lisinopril    ROS:  Review of Systems   Constitutional:  Negative for activity change, chills, fatigue, fever and unexpected weight change.   HENT:  Negative for congestion, dental problem, hearing loss, mouth sores, nosebleeds, sore throat and trouble swallowing.    Eyes:  Negative for photophobia and visual disturbance.   Respiratory:  Positive for cough. Negative for chest tightness and shortness of breath.    Cardiovascular:  Negative for chest pain, palpitations and leg swelling.   Gastrointestinal:  Negative for abdominal distention, abdominal pain, blood in stool, constipation, diarrhea, nausea and vomiting.   Endocrine: Negative for cold intolerance and heat intolerance.   Genitourinary:  Negative for decreased urine volume, difficulty urinating, frequency, hematuria and urgency.   Musculoskeletal:  Positive for joint swelling (Right knee with redness.). Negative for arthralgias and gait problem.   Skin:  Negative for rash and  "wound.   Neurological:  Negative for dizziness, tremors, seizures, weakness, light-headedness, numbness and headaches.   Hematological:  Negative for adenopathy. Does not bruise/bleed easily.   Psychiatric/Behavioral:  Negative for confusion and hallucinations. The patient is not nervous/anxious.    All other systems reviewed and are negative.     Objective     Vital Signs:   /56 (BP Location: Right arm, Patient Position: Lying)   Pulse 66   Temp 98.2 °F (36.8 °C) (Oral)   Resp 18   Ht 167.6 cm (66\")   Wt 81 kg (178 lb 9.2 oz)   SpO2 95%   BMI 28.82 kg/m²     Physical Exam:  Physical Exam  Vitals and nursing note reviewed.   Constitutional:       General: She is not in acute distress.     Appearance: Normal appearance. She is well-developed. She is not diaphoretic.      Comments: Sleepy and lethargic.   HENT:      Head: Normocephalic and atraumatic.      Comments: Hair thinning present.     Nose: Nose normal.      Comments: Oxygen per nasal cannula.     Mouth/Throat:      Mouth: Mucous membranes are moist.      Pharynx: Oropharynx is clear. No oropharyngeal exudate or posterior oropharyngeal erythema.      Comments: Dental fillings.  Eyes:      General: No scleral icterus.     Extraocular Movements: Extraocular movements intact.      Conjunctiva/sclera: Conjunctivae normal.      Pupils: Pupils are equal, round, and reactive to light.   Cardiovascular:      Rate and Rhythm: Normal rate and regular rhythm.      Heart sounds: Normal heart sounds. No murmur heard.     Comments: Cardiac monitor leads.  Pulmonary:      Effort: Pulmonary effort is normal. No respiratory distress.      Breath sounds: Normal breath sounds. No stridor. No wheezing or rales.   Abdominal:      General: Bowel sounds are normal. There is no distension.      Palpations: Abdomen is soft. There is no mass.      Tenderness: There is no abdominal tenderness. There is no guarding.      Comments: Degenerative changes of both knees.  Right " greater than left.   Genitourinary:     Comments: Barroso catheter.  Musculoskeletal:         General: Swelling (1+ edema in right lower extremity and trace edema in left lower extremity.) present. No tenderness or deformity. Normal range of motion.      Cervical back: Normal range of motion and neck supple.      Right lower leg: No edema.      Left lower leg: No edema.   Lymphadenopathy:      Cervical: No cervical adenopathy.      Upper Body:      Right upper body: No supraclavicular adenopathy.      Left upper body: No supraclavicular adenopathy.   Skin:     General: Skin is warm and dry.      Coloration: Skin is not pale.      Findings: No bruising, erythema or rash.      Comments: Right upper extremity IV.   Neurological:      General: No focal deficit present.      Mental Status: She is alert and oriented to person, place, and time.      Coordination: Coordination normal.   Psychiatric:         Mood and Affect: Mood normal.         Behavior: Behavior normal.         Thought Content: Thought content normal.        Results Review:  Lab Results (last 48 hours)       Procedure Component Value Units Date/Time    Blood Culture - Blood, Arm, Left [[id number]]  (Normal) Collected: 07/13/23 0852    Specimen: Blood from Arm, Left Updated: 07/18/23 0915     Blood Culture No growth at 5 days    Narrative:      Less than seven (7) mL's of blood was collected.  Insufficient quantity may yield false negative results.    Manual Differential [[id number]]  (Abnormal) Collected: 07/18/23 0125    Specimen: Blood Updated: 07/18/23 0236     Neutrophil % 79.0 %      Lymphocyte % 7.0 %      Monocyte % 11.0 %      Bands %  3.0 %      Neutrophils Absolute 9.76 10*3/mm3      Lymphocytes Absolute 0.83 10*3/mm3      Monocytes Absolute 1.31 10*3/mm3      nRBC 1.0 /100 WBC      Dacrocytes Slight/1+     Poikilocytes Slight/1+     WBC Morphology Normal     Platelet Estimate Adequate    CBC & Differential [[id number]]  (Abnormal) Collected: 07/18/23  0125    Specimen: Blood Updated: 07/18/23 0236    Narrative:      The following orders were created for panel order CBC & Differential.  Procedure                               Abnormality         Status                     ---------                               -----------         ------                     CBC Auto Differential[[id number]]        Abnormal            Final result               Scan Slide[[id number]]                                       Final result                 Please view results for these tests on the individual orders.    CBC Auto Differential [[id number]]  (Abnormal) Collected: 07/18/23 0125    Specimen: Blood Updated: 07/18/23 0236     WBC 11.90 10*3/mm3      RBC 2.87 10*6/mm3      Hemoglobin 8.3 g/dL      Hematocrit 26.1 %      MCV 90.8 fL      MCH 28.8 pg      MCHC 31.7 g/dL      RDW 22.9 %      RDW-SD 75.7 fl      MPV 6.9 fL      Platelets 215 10*3/mm3     Narrative:      The previously reported component NRBC is no longer being reported. Previous result was 0.1 /100 WBC (Reference Range: 0.0-0.2 /100 WBC) on 7/18/2023 at 0210 EDT.    Scan Slide [[id number]] Collected: 07/18/23 0125    Specimen: Blood Updated: 07/18/23 0236     Scan Slide --     Comment: See Manual Differential Results       Renal Function Panel [[id number]]  (Abnormal) Collected: 07/18/23 0125    Specimen: Blood Updated: 07/18/23 0216     Glucose 101 mg/dL      BUN 65 mg/dL      Creatinine 1.72 mg/dL      Sodium 141 mmol/L      Potassium 3.4 mmol/L      Chloride 109 mmol/L      CO2 21.0 mmol/L      Calcium 8.3 mg/dL      Albumin 2.1 g/dL      Phosphorus 3.1 mg/dL      Anion Gap 11.0 mmol/L      BUN/Creatinine Ratio 37.8     eGFR 30.7 mL/min/1.73     Narrative:      GFR Normal >60  Chronic Kidney Disease <60  Kidney Failure <15    The GFR formula is only valid for adults with stable renal function between ages 18 and 70.    Hemoglobin & Hematocrit, Blood [[id number]]  (Abnormal) Collected: 07/17/23 8829    Specimen: Blood  Updated: 07/17/23 1914     Hemoglobin 9.1 g/dL      Hematocrit 28.3 %     Fibrin Split Products [[id number]]  (Abnormal) Collected: 07/14/23 2234    Specimen: Blood Updated: 07/17/23 1611     FDP 10 ug/mL     Narrative:      Performed at:  [address] [place] [address]  [id number]  : Jesse Ritter MD, Phone:  [phone]    Manual Differential [[id number]]  (Abnormal) Collected: 07/17/23 1114    Specimen: Blood Updated: 07/17/23 1227     Neutrophil % 73.0 %      Lymphocyte % 19.0 %      Monocyte % 3.0 %      Bands %  3.0 %      Metamyelocyte % 2.0 %      Neutrophils Absolute 9.96 10*3/mm3      Lymphocytes Absolute 2.49 10*3/mm3      Monocytes Absolute 0.39 10*3/mm3      Anisocytosis Slight/1+     Macrocytes Slight/1+     Poikilocytes Slight/1+     WBC Morphology Normal     Platelet Morphology Normal    CBC & Differential [[id number]]  (Abnormal) Collected: 07/17/23 1114    Specimen: Blood Updated: 07/17/23 1227    Narrative:      The following orders were created for panel order CBC & Differential.  Procedure                               Abnormality         Status                     ---------                               -----------         ------                     CBC Auto Differential[[id number]]        Abnormal            Final result               Scan Slide[[id number]]                                       Final result                 Please view results for these tests on the individual orders.    Scan Slide [[id number]] Collected: 07/17/23 1114    Specimen: Blood Updated: 07/17/23 1227     Scan Slide --     Comment: See Manual Differential Results       CBC Auto Differential [[id number]]  (Abnormal) Collected: 07/17/23 1114    Specimen: Blood Updated: 07/17/23 1227     WBC 13.10 10*3/mm3      RBC 2.37 10*6/mm3      Hemoglobin 7.4 g/dL      Hematocrit 23.8 %      .4 fL      MCH 31.2 pg      MCHC 31.1 g/dL      RDW 21.3 %      RDW-SD 73.9 fl      MPV 7.1 fL       Platelets 238 10*3/mm3     Narrative:      The previously reported component NRBC is no longer being reported. Previous result was 0.1 /100 WBC (Reference Range: 0.0-0.2 /100 WBC) on 7/17/2023 at 1156 EDT.    aPTT [[id number]]  (Abnormal) Collected: 07/17/23 1114    Specimen: Blood Updated: 07/17/23 1220     PTT 47.0 seconds     Protime-INR [[id number]]  (Abnormal) Collected: 07/17/23 1114    Specimen: Blood Updated: 07/17/23 1220     Protime 13.2 Seconds      INR 1.25    Sedimentation Rate [[id number]]  (Normal) Collected: 07/17/23 1114    Specimen: Blood Updated: 07/17/23 1215     Sed Rate 11 mm/hr     Magnesium [[id number]]  (Normal) Collected: 07/17/23 1114    Specimen: Blood Updated: 07/17/23 1214     Magnesium 2.1 mg/dL     Comprehensive Metabolic Panel [[id number]]  (Abnormal) Collected: 07/17/23 1114    Specimen: Blood Updated: 07/17/23 1214     Glucose 101 mg/dL      BUN 75 mg/dL      Creatinine 2.18 mg/dL      Sodium 140 mmol/L      Potassium 3.7 mmol/L      Chloride 110 mmol/L      CO2 21.0 mmol/L      Calcium 8.5 mg/dL      Total Protein 4.6 g/dL      Albumin 1.9 g/dL      ALT (SGPT) 15 U/L      AST (SGOT) 26 U/L      Alkaline Phosphatase 137 U/L      Total Bilirubin 0.3 mg/dL      Globulin 2.7 gm/dL      A/G Ratio 0.7 g/dL      BUN/Creatinine Ratio 34.4     Anion Gap 9.0 mmol/L      eGFR 23.1 mL/min/1.73     Narrative:      GFR Normal >60  Chronic Kidney Disease <60  Kidney Failure <15    The GFR formula is only valid for adults with stable renal function between ages 18 and 70.    C-reactive Protein [[id number]]  (Abnormal) Collected: 07/17/23 1114    Specimen: Blood Updated: 07/17/23 1214     C-Reactive Protein 5.47 mg/dL     Immunofixation, Serum [[id number]]  (Abnormal) Collected: 07/14/23 2234    Specimen: Blood Updated: 07/17/23 1209     Immunofixation Result, Serum Comment     Comment: Immunofixation shows IgG monoclonal protein with kappa light chain  specificity.   PLEASE NOTE:   Samples from patients  "receiving DARZALEX(R) (daratumumab) or   SARCLISA(R)(isatuximab-irfc) treatment can appear as an   \"IgG kappa\" and mask a complete response (CR). If this patient   is receiving these therapies, this WILL assay interference   can be removed by ordering test number [id number]-\"Immunofixation,   Daratumumab-Specific, Serum\" or [id number]-\"Immunofixation,   Isatuximab-Specific, Serum\" and submitting a new sample for   testing or by calling the lab to add this test to the current   sample.  Lambda appears asymmetrical.        IgG 905 mg/dL      IgA 243 mg/dL      IgM 35 mg/dL     Narrative:      Performed at:  [address] [place] [address]  [id number]  : Edward Ulloa PhD, Phone:  [phone]             Pending Results: U WILL, bone survey, light chain ratio, copper and bone marrow aspiration.    Imaging Reviewed:   XR Knee 1 or 2 View Right    Result Date: 7/13/2023  Impression: Degenerative changes. Small effusion. Electronically Signed: Juliane Baker MD  7/13/2023 1:50 PM EDT  Workstation ID: [id number]    MRI Knee Right Without Contrast    Result Date: 7/14/2023  Impression: 1.Prominent diffuse edema in the soft tissues surrounding the knee which may indicate skin and soft tissue infection. 2.2.8 x 0.8 x 2.5 cm collection is seen in the subcutaneous tissues along the medial femoral condyle which is nonspecific but could represent an abscess. 3.No significant knee effusion or significant marrow edema signal is seen on this exam to suggest joint infection. If there is high index of clinical concern, aspiration would be advised. 4.Advanced tricompartmental osteoarthritis with mild subchondral edema at the median ridge of the patella. 5.Limited visualization of the menisci on this exam with suspicion for tears of the posterior horns. Electronically Signed: Cj Turk  7/14/2023 4:51 PM EDT  Workstation ID: [id number]     I have reviewed the patient's labs, imaging, reports, and other clinician " documentation.         Assessment & Plan       ASSESSMENT  IgG kappa monoclonal gammopathy-incidental finding on work-up for CKD and anemia.  We will proceed with a 24-hour UIFE, bone survey, light chain ratio and order bone marrow aspiration to be done in the morning.  On prednisone as an outpatient, which was continued.  Anemia and OLAF-onset June 2023, associated with iron deficiency, CKD, monoclonal gammopathy, recent methotrexate use and heme positive stools.  On folic acid and Protonix.  S/p 2 doses of Ferrlecit 250 mg each and 1 unit PRBCs (7/17).  Will give additional Ferrlecit 250 mg to replace iron deficit.  Prior work-up revealed normal B12 and haptoglobin.  Retic was suppressed.  We will check copper level and await endoscopy results.  Coagulopathy-acute.  Coags high.  Platelets and fibrinogen both normal.  Mildly elevated AST.  Likely due to factor deficiency, liver disease.  Recurrent ascites, chronic epigastric pain, noncaseating granuloma of the stomach and elevated LFTs- in work-up for sarcoidosis per Dr. Flores.  She has been [facility] to the [facility].  CKD, A-fib, hypertension, hyperlipidemia and hypothyroidism-On levothyroxine.  Per PMD and nephrology.  UTI, right knee cellulitis and leukocytosis-on Rocephin and IR has been asked to drain her right knee abscess.  Per infectious disease.    PLAN  Obtain SPEP from primary care provider as outpatient.  Obtain records from patient's rheumatologist.  Ferrlecit 250 mg IV x1.  24-hour UIFE.  Bone survey.  Bone marrow aspiration and biopsy in IR.  Light chain ratio.  Check Copper level.    Patient seen and evaluated by Dr. Ayala.  Electronically signed by JOE Goddard, 07/18/23, 11:54 AM EDT.        I have personally performed a face-to-face diagnostic evaluation on this patient. I have performed a complete history and physical examination, reviewed laboratory studies, and radiographic examinations.  I have completed the majority and  substantive portion of the medical decision making.  I have formulated the assessment on this patient and the plan of action as noted above. I have discussed the case with Raquel Guevara NP, have edited/reviewed the note, and agree with the care plan.  The patient was hospitalized from her PCPs office with abnormal labs revealing anemia and acute renal failure.  On examination, she has degenerative changes of the joint with right knee swelling, 2+ swelling of the right lower and 1+ of the left lower extremity.  SPEP has revealed IgG kappa monoclonal gammopathy.  She has recently been found to have noncaseating granulomas on gastric biopsy and had an appointment to be seen at the [facility] for possible diagnosis of sarcoidosis.  We will proceed with myeloma work-up and look for other reasons for the anemia also.        I discussed the patient's findings and my recommendations with patient and spouse.    Thank you for this consult.  We will be happy to follow along in the care of this patient.     Part of this note may be an electronic transcription/translation of spoken language to printed text using the Dragon Dictation System.    Electronically signed by Rc Ayala MD, 07/18/23, 5:59 PM EDT.

## 2023-07-18 NOTE — PROGRESS NOTES
Infectious Diseases Progress Note      LOS: 4 days   Patient Care Team:  Ian Cordero MD as PCP - General  Tony Parisi MD as Consulting Physician (Nephrology)  Rc Ayala MD as Consulting Physician (Hematology and Oncology)    Chief Complaint: Right leg pain, mass to the medial aspect of the right knee, fatigue    Subjective       The patient has been afebrile for the last 24 hours.  The patient is on 1 L of oxygen by nasal cannula, hemodynamically stable, and is tolerating antimicrobial therapy.  Patient is just back from a bone biopsy, EGD and aspiration of the right knee      Review of Systems:   Review of Systems   Constitutional:  Positive for fatigue.   HENT: Negative.     Eyes: Negative.    Respiratory:  Positive for cough and shortness of breath.    Cardiovascular: Negative.    Gastrointestinal: Negative.    Endocrine: Negative.    Genitourinary: Negative.    Musculoskeletal: Negative.    Skin:  Positive for color change.   Neurological:  Positive for weakness.   Psychiatric/Behavioral: Negative.     All other systems reviewed and are negative.     Objective     Vital Signs  Temp:  [95.1 °F (35.1 °C)-98.2 °F (36.8 °C)] 97.7 °F (36.5 °C)  Heart Rate:  [63-84] 70  Resp:  [10-20] 15  BP: (115-210)/(43-72) 115/55    Physical Exam:  Physical Exam  Vitals and nursing note reviewed.   Constitutional:       General: She is not in acute distress.     Appearance: She is well-developed and normal weight. She is ill-appearing. She is not diaphoretic.   HENT:      Head: Normocephalic and atraumatic.   Eyes:      General: No scleral icterus.     Extraocular Movements: Extraocular movements intact.      Conjunctiva/sclera: Conjunctivae normal.      Pupils: Pupils are equal, round, and reactive to light.   Cardiovascular:      Rate and Rhythm: Normal rate and regular rhythm.      Heart sounds: Normal heart sounds, S1 normal and S2 normal. No murmur heard.  Pulmonary:      Effort: Pulmonary effort is  normal. No respiratory distress.      Breath sounds: No stridor. No wheezing or rales.      Comments: Diminished throughout  Chest:      Chest wall: No tenderness.   Abdominal:      General: Bowel sounds are normal. There is distension.      Palpations: Abdomen is soft. There is no mass.      Tenderness: There is no abdominal tenderness. There is no guarding.   Musculoskeletal:         General: No swelling, tenderness or deformity.      Cervical back: Neck supple.   Skin:     General: Skin is warm and dry.      Coloration: Skin is not pale.      Findings: No bruising, erythema or rash.      Comments:  Patient has a fluctuant mass in the medial aspect of the knee-less fluctuant today    The small red patch to the lateral aspect of the knee has almost completely resolved.    No significant swelling or erythema to the rest of the right leg    Neurological:      Mental Status: She is alert and oriented to person, place, and time.      Cranial Nerves: No cranial nerve deficit.        Results Review:    I have reviewed all clinical data, test, lab, and imaging results.     Radiology  XR Bone Survey Complete    Result Date: 7/18/2023  XR BONE SURVEY COMPLETE Date of Exam: 7/18/2023 2:54 PM EDT Indication: IgG Kappa MGUS. Comparison: None available. Technique:  A complete adult bone survey of the head, neck, chest, abdomen, pelvis and extremities were performed per protocol. Findings: An NG tube terminates in the distal stomach. There are moderate degenerative changes in the spine. There is a right hip total arthroplasty. No hardware complications are identified. There is an old well-corticated avulsion at the tip of the right lateral  malleolus. There is an old mild left Hill-Sachs deformity. There are no lytic lesions or sclerotic blastic lesions of the visualized bony structures. There is moderate central and anterior wedging of a mid thoracic vertebral body, approximately T6. There is mild retrolisthesis of L3 on L4.      Impression: Compression fracture of T6, likely old. Other chronic findings as detailed. Electronically Signed: Juliane Baker MD  7/18/2023 3:30 PM EDT  Workstation ID: [id number]    XR Abdomen KUB    Result Date: 7/18/2023  XR ABDOMEN KUB Date of Exam: 7/18/2023 1:07 PM EDT Indication: Dobhoff placement verification Comparison: None available. Findings: See impression     Impression: Antegrade oriented feeding tube tip within distal stomach. Electronically Signed: Pedro Garcia MD  7/18/2023 1:11 PM EDT  Workstation ID: [id number]    CT Bone marrow biopsy and aspiration    Result Date: 7/18/2023  DATE OF EXAM: 7/18/2023 3:29 PM EDT PROCEDURE: CT BONE MARROW ASPIRATION AND BIOPSY INDICATIONS: IgG MGUS COMPARISON: No Comparisons Available FLUOROSCOPIC TIME: 2 seconds PHYSICIAN MONITORED CONSCIOUS SEDATION TIME: 12 minutes TECHNIQUE: A detailed explanation of the procedure, including the risks, benefits, and alternatives was provided. A preprocedure timeout was performed. The interventional radiology nurse monitored the patient at all times. The patient was placed prone on the CT fluoroscopy table and the left  posterior superior iliac spine was marked and prepped and draped in the usual sterile fashion. The skin was anesthetized with 1% lidocaine. Next, under CT fluoroscopic guidance an 11-gauge OnControl bone biopsy needle was advanced just deep to the bony cortex. 1 mL of bone marrow blood was aspirated and given to the cytopathology tech, who noted the presence of spicules. Next, a total of 7 mL of bone marrow aspirate was obtained and given to the tech. Finally, a biopsy was obtained using the 11-gauge needle. The needle was then removed. The patient tolerated the procedure well without immediate complication. FINDINGS: See above     1. Successful CT-guided bone marrow biopsy Electronically Signed: Cameron Boudreaux  7/18/2023 4:08 PM EDT  Workstation ID: [id number]    IR Inject/asp large joint or bursa    Result  Date: 7/18/2023  DATE OF EXAM: 7/18/2023 3:49 PM EDT PROCEDURE: IR INJECT/ASP LARGE JOINT OR BURSA INDICATIONS: aspirate/culture fluid from right knee COMPARISON: No Comparisons Available FLUOROSCOPIC TIME: fluoro time minutes PHYSICIAN MONITORED CONSCIOUS SEDATION TIME: None minutes TECHNIQUE: A detailed explanation of the procedure, including the risks, benefits, and alternatives was provided. A preprocedure timeout was performed. The patient was placed supine on the bed and the medial right knee was ultrasounded, demonstrating a small fluid collection. The area was prepped and draped in the usual sterile fashion. The skin was anesthetized with 1% lidocaine. Next under ultrasound guidance an 18-gauge needle was advanced into the collection. A total of 8 mL of purulent material was aspirated and the needle was removed. The patient tolerated the procedure well without immediate complication. FINDINGS: See above     1. Successful aspiration of right medial knee abscess yielding 8 mL of purulent material. Electronically Signed: Cameron Boudreaux  7/18/2023 4:41 PM EDT  Workstation ID: [id number]     Cardiology    Laboratory    Results from last 7 days   Lab Units 07/18/23  0125 07/17/23  1859 07/17/23  1114 07/16/23  0630 07/14/23 2234 07/14/23  0310 07/13/23  0852   WBC 10*3/mm3 11.90*  --  13.10* 11.20* 9.80 9.80 12.60*   HEMOGLOBIN g/dL 8.3* 9.1* 7.4* 7.7* 7.5* 7.2* 8.7*   HEMATOCRIT % 26.1* 28.3* 23.8* 24.1* 23.5* 22.0* 27.4*   PLATELETS 10*3/mm3 215  --  238 219 206 179 254       Results from last 7 days   Lab Units 07/18/23  0125 07/17/23  1114 07/16/23  0152 07/14/23 2234 07/14/23  0309 07/13/23  0852   SODIUM mmol/L 141 140 140 138 137 135*   POTASSIUM mmol/L 3.4* 3.7 4.3 4.4 4.2 4.2   CHLORIDE mmol/L 109* 110* 111* 109* 108* 103   CO2 mmol/L 21.0* 21.0* 15.0* 17.0* 18.0* 18.0*   BUN mg/dL 65* 75* 72* 75* 73* 82*   CREATININE mg/dL 1.72* 2.18* 2.30* 2.69* 2.71* 2.79*   GLUCOSE mg/dL 101* 101* 96 112* 92 86    ALBUMIN g/dL 2.1* 1.9*  --   --   --  2.6*   BILIRUBIN mg/dL  --  0.3  --   --   --  0.9   ALK PHOS U/L  --  137*  --   --   --  174*   AST (SGOT) U/L  --  26  --   --   --  40*   ALT (SGPT) U/L  --  15  --   --   --  23   CALCIUM mg/dL 8.3* 8.5* 8.4* 8.5* 8.5* 9.4           Results from last 7 days   Lab Units 07/17/23  1114   SED RATE mm/hr 11           Microbiology   Microbiology Results (last 10 days)       Procedure Component Value - Date/Time    Blood Culture - Blood, Arm, Left [[id number]]  (Abnormal) Collected: 07/13/23 1006    Lab Status: Final result Specimen: Blood from Arm, Left Updated: 07/15/23 0718     Blood Culture Streptococcus, Alpha Hemolytic     Isolated from Aerobic Bottle     Gram Stain Aerobic Bottle Gram positive cocci in chains    Narrative:      Probable contaminant requires clinical correlation, susceptibility not performed unless requested by physician.      Blood Culture ID, PCR - Blood, Arm, Left [[id number]]  (Abnormal) Collected: 07/13/23 1006    Lab Status: Final result Specimen: Blood from Arm, Left Updated: 07/14/23 0634     BCID, PCR Streptococcus spp, not A, B, or pneumoniae. Identification by BCID2 PCR.     BOTTLE TYPE Aerobic Bottle    Urine Culture - Urine, Straight Cath [[id number]]  (Abnormal)  (Susceptibility) Collected: 07/13/23 0944    Lab Status: Final result Specimen: Urine from Straight Cath Updated: 07/15/23 0358     Urine Culture >100,000 CFU/mL Klebsiella aerogenes    Narrative:      Colonization of the urinary tract without infection is common. Treatment is discouraged unless the patient is symptomatic, pregnant, or undergoing an invasive urologic procedure.    Susceptibility        Klebsiella aerogenes      ISELA      Cefazolin Resistant      Cefepime Susceptible      Ceftazidime Susceptible      Ceftriaxone Susceptible      Gentamicin Susceptible      Levofloxacin Susceptible      Nitrofurantoin Resistant      Piperacillin + Tazobactam Susceptible      Trimethoprim  + Sulfamethoxazole Susceptible                           Blood Culture - Blood, Arm, Left [[id number]]  (Normal) Collected: 07/13/23 0852    Lab Status: Final result Specimen: Blood from Arm, Left Updated: 07/18/23 0915     Blood Culture No growth at 5 days    Narrative:      Less than seven (7) mL's of blood was collected.  Insufficient quantity may yield false negative results.            Medication Review:       Schedule Meds  atorvastatin, 20 mg, Oral, Nightly  cefTRIAXone, 2,000 mg, Intravenous, Q24H  ferric gluconate, 250 mg, Intravenous, Once  folic acid, 1 mg, Oral, Daily  levothyroxine, 50 mcg, Oral, Q AM  pantoprazole, 40 mg, Oral, Daily  potassium chloride, 40 mEq, Oral, Once  predniSONE, 15 mg, Oral, Daily  senna-docusate sodium, 2 tablet, Oral, BID  sodium bicarbonate, 1,300 mg, Oral, TID  sodium chloride, 10 mL, Intravenous, Q12H  sodium chloride, 3 mL, Intravenous, Q12H        Infusion Meds  sodium bicarbonate drip (greater than 75 mEq/bag), 100 mEq, Last Rate: 100 mEq (07/18/23 1712)        PRN Meds    acetaminophen    senna-docusate sodium **AND** polyethylene glycol **AND** bisacodyl **AND** bisacodyl    ondansetron    Potassium Replacement - Follow Nurse / BPA Driven Protocol    [COMPLETED] Insert Peripheral IV **AND** sodium chloride    sodium chloride    sodium chloride    sodium chloride    sodium chloride    traZODone        Assessment & Plan       Antimicrobial Therapy   1.  IV Rocephin        2.        3.        4.        5.          Assessment     Positive blood culture in 1 out of 2 sets on admission for Streptococcus species.  Patient had no endovascular device or prior cardiac valve replacement.  We are suspecting contamination.     Bacteriuria.  Urine cultures are growing Klebsiella aerogenes.  urinalysis was not significantly abnormal.     Probable right medial knee abscess.  MRI shows a fluid collection in the subcutaneous tissues along the medial femoral condyle but no findings to  suggest a joint infection  -Aspiration of aspect by IR on 7/18/2023     Recent work-up for sarcoidosis by an outpatient rheumatologist.  Patient was taken off methotrexate on 7/3/2023.  Still on prednisone.  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 labs      Gayathri Briggs, JOE  07/18/23  18:09 EDT    Note is dictated utilizing voice recognition software/Dragon

## 2023-07-18 NOTE — PROGRESS NOTES
[facility] Medicine Services   Daily Progress Note    Patient Name: Rosario Ortez  : 1950  MRN: [id number]  Primary Care Physician:  Ian Cordero MD  Date of admission: 2023  Date and Time of Service: 2023 at 9 am      Subjective      Chief Complaint: Abnormal labs.  Sent in from PCP office.  JOSÉ on CKD Stage 3, right leg cellulitis and UTI    Patient seen today.  Doing okay.   at bedside.  Blood cultures show Strep.  Urine culture >100,000 gram negative rods.  Right knee is hurting her with a small painful cyst medial aspect.  Might need to be aspirated.  Getting IVF.    7/15/2023  Pt denies for any short of breath, no nausea or vomiting.no chest pain    2023  Pt complaining of generalized weakness, denies for any nausea or vomiting, no chest pain.    2023  Did okay overnight.  Remains somewhat somnolent and is not eating very well.   is at the bedside.  Had some issues with urinary retention overnight.  Attempted straight cath, but unsuccessful with only 400+ in her bladder.  Urine cultures growing Klebsiella.  Blood cultures are growing strep species, possible contaminant per infectious disease.  However, she does have a right knee infection, possibly an underlying abscess at the joint.  She continues to have right knee pain.  She had positive fecal occult blood screening yesterday.  Labs have not yet been drawn today.  She is typically on Eliquis, but this is being held as we await potential right knee intervention today.  Respiratory panel was positive for rhinovirus on admission as well.  There was concern for dysphagia, and speech therapy attempted to work with her today but did not have adequate participation it seems.  GI is considering an EGD for further evaluation and possibly NG tube feeds.  She is currently being followed by nephrology, infectious disease, surgery, GI, and orthopedics.  Extensive chart review for service turnover today.  Discussed the case with the bedside nursing staff. No other acute concerns.    7/18/2023  No acute issues overnight.  Awaiting both EGD with dilatation and right knee aspiration today.  Hemoglobin is 8.3.  She remains sleepy.  GI consulted oncology yesterday for IgG kappa monoclonal gammopathy.  Awaiting consultation with oncology today.  She remains on antibiotics.  Discussed the case with the bedside nursing staff. No other acute concerns.    Objective      Vitals:   Temp:  [95.1 °F (35.1 °C)-98.2 °F (36.8 °C)] 97.7 °F (36.5 °C)  Heart Rate:  [63-84] 70  Resp:  [10-20] 15  BP: (115-210)/(43-72) 115/55  Flow (L/min):  [2-4] 3    [id number] Score: 4 (7/17/2023  3:09 PM)    Physical Exam  Constitutional:       General: She is not in acute distress.     Appearance: She is ill-appearing. She is not toxic-appearing.   HENT:      Head: Normocephalic and atraumatic.      Right Ear: Tympanic membrane normal.      Left Ear: Tympanic membrane normal.      Mouth/Throat:      Mouth: Mucous membranes are moist.   Eyes:      Pupils: Pupils are equal, round, and reactive to light.   Cardiovascular:      Rate and Rhythm: Normal rate and regular rhythm.      Pulses: Normal pulses.      Heart sounds: Normal heart sounds.   Pulmonary:      Effort: Pulmonary effort is normal. No respiratory distress.      Breath sounds: Normal breath sounds.   Abdominal:      General: Bowel sounds are normal. There is no distension.      Palpations: Abdomen is soft.   Musculoskeletal:         General: No swelling. Normal range of motion.      Cervical back: Normal range of motion and neck supple.      Right lower leg: Edema present.      Left lower leg: Edema present.   Skin:     General: Skin is warm.      Findings: Erythema and lesion present.      Comments: Painful cyst like structure medial aspect right thigh.  No open wounds on the skin.  Some dry skin and edema.  Edema looks chronic.   Neurological:      General: No focal deficit present.       Mental Status: She is alert and oriented to person, place, and time. Mental status is at baseline.   Psychiatric:         Mood and Affect: Mood normal.        Result Review    I have personally reviewed the results from the time of this admission to 7/18/2023 17:17 EDT and agree with these findings:  [x]  Laboratory  [x]  Microbiology  [x]  Radiology  [x]  EKG/Telemetry   [x]  Cardiology/Vascular   []  Pathology  [x]  Old records  []  Other:      Wounds (last 24 hours)       LDA Wound       Row Name 07/18/23 0840 07/17/23 2020          Wound 06/27/23 0145 Right lower leg Abcess    Wound - Properties Group Placement Date: 06/27/23  -AH Placement Time: 0145  -AH Present on Hospital Admission: Y  -AH Side: Right  -AH Orientation: lower  -AH Location: leg  -AH Primary Wound Type: Abcess  -AH    Dressing Appearance -- dry;intact  -AH     Closure Open to air  -MM Adhesive bandage  -AH     Base red;pink  -MM dressing in place, unable to visualize  -AH     Retired Wound - Properties Group Placement Date: 06/27/23  -AH Placement Time: 0145  -AH Present on Hospital Admission: Y  -AH Side: Right  -AH Orientation: lower  -AH Location: leg  -AH Primary Wound Type: Abcess  -AH    Retired Wound - Properties Group Date first assessed: 06/27/23  -AH Time first assessed: 0145  -AH Present on Hospital Admission: Y  -AH Side: Right  -AH Location: leg  -AH Primary Wound Type: Abcess  -AH       Wound 07/16/23 0650 Left coccyx Pressure Injury    Wound - Properties Group Placement Date: 07/16/23  -BS Placement Time: 0650  -BS Present on Hospital Admission: N  -BS Side: Left  -BS Location: coccyx  -BS Primary Wound Type: Pressure inj  -BS    Dressing Appearance dry;intact  -MM --     Closure Adhesive bandage  -MM Adhesive bandage  -AH     Base dry;pink  -MM dressing in place, unable to visualize  -AH     Retired Wound - Properties Group Placement Date: 07/16/23  -BS Placement Time: 0650  -BS Present on Hospital Admission: N  -BS Side: Left   -BS Location: coccyx  -BS Primary Wound Type: Pressure inj  -BS    Retired Wound - Properties Group Date first assessed: 07/16/23  -BS Time first assessed: 0650  -BS Present on Hospital Admission: N  -BS Side: Left  -BS Location: coccyx  -BS Primary Wound Type: Pressure inj  -BS              User Key  (r) = Recorded By, (t) = Taken By, (c) = Cosigned By      Initials Name Provider Type    Lata Portillo, RN Registered Nurse    Ashley Sykes RN Registered Nurse    BS Catalina Seo LPN Licensed Nurse                  Assessment & Plan      atorvastatin, 20 mg, Oral, Nightly  cefTRIAXone, 2,000 mg, Intravenous, Q24H  ferric gluconate, 250 mg, Intravenous, Once  folic acid, 1 mg, Oral, Daily  levothyroxine, 50 mcg, Oral, Q AM  pantoprazole, 40 mg, Oral, Daily  potassium chloride, 40 mEq, Oral, Once  predniSONE, 15 mg, Oral, Daily  senna-docusate sodium, 2 tablet, Oral, BID  sodium bicarbonate, 1,300 mg, Oral, TID  sodium chloride, 10 mL, Intravenous, Q12H  sodium chloride, 3 mL, Intravenous, Q12H       sodium bicarbonate drip (greater than 75 mEq/bag), 100 mEq, Last Rate: 100 mEq (07/18/23 1712)       Active Hospital Problems    Diagnosis  POA    Paroxysmal atrial fibrillation [I48.0]  Yes    Sarcoidosis [D86.9]  Yes    Metabolic acidosis [E87.20]  Yes    Bacteremia due to Streptococcus [R78.81, B95.5]  Yes    Cellulitis of right leg [L03.115]  Yes    Knee joint cyst, right [M25.861]  Yes    Bilateral leg edema [R60.0]  Yes    UTI due to Klebsiella species [N39.0, B96.89]  Yes    JOSÉ (acute kidney injury) [N17.9]  Yes    Dysphagia [R13.10]  Yes    Feeding difficulties [R63.30]  Yes      Resolved Hospital Problems   No resolved problems to display.         Plan:  JOSÉ on CKD Stage 3 with metabolic acidosis-likely pre-renal in the setting of volume depletion from diuretics.  Bicarbonate fluids 75 ml per hour,Nephrology following.    2. Right leg cellulitis / ? Knee abscess, continue IV rocephin.  ID consulted to due  to strep bacteremia.  Probable source is the right leg.  Consulted wound care also.  Keep leg elevated.      3. Strep bacteremia-continue ceftriaxone.  ID consulted.    4. Right knee cyst-painful to touch and might need aspiration.  Planning for IR aspiration today.    5. B/L leg edema-chronic issue.  Had venous dopplers done June 2023 and negative for any DVT.  Keep leg elevated while in bed.  Has some lymphedema also.    6.  Klebsiella UTI.  Continue ceftriaxone.    7. PAF-continue Eliquis 5 mg BID, holding for now pending right knee intervention.  Monitor HR.    8. Dysphagia/occult positive stool--EGD/dilatation today, will likely start tube feeds afterward, as her nutrition is not optimal at this point.    PT/OT    DVT prophylaxis:  Mechanical DVT prophylaxis orders are present.    CODE STATUS:    Level Of Support Discussed With: Patient; Health Care Surrogate  Code Status (Patient has no pulse and is not breathing): CPR (Attempt to Resuscitate)  Medical Interventions (Patient has pulse or is breathing): Full Support    Disposition:  She will very likely need skilled rehab at discharge.  I would not expect discharge for at least a few days.  Referrals to [facility] and [facility], will require pre-CERT.    Electronically signed by Armando Adams MD, 07/18/23, 17:17 EDT.  [facility] Hospitalist Team

## 2023-07-18 NOTE — PLAN OF CARE
Goal Outcome Evaluation:   Patient has not required pain medication during shift. Patient has had EGD procedure and tube feeds started. Patient gotten XR during shift, patient received bone marrow biopsy and right knee aspiration of fluid. Patients family at bedside.

## 2023-07-18 NOTE — PROGRESS NOTES
"NEPHROLOGY PROGRESS NOTE------[facility]    [facility]  [phone]  Tony Parisi MD      Patient Care Team:  Ian Cordero MD as PCP - Tony Navarro MD as Consulting Physician (Nephrology)  Rc Ayala MD as Consulting Physician (Hematology and Oncology)      Provider:  Tony Parisi MD  Patient Name: Rosario Ortez  :  1950    SUBJECTIVE:  Follow-up JOSÉ/CKD  No chest pain shortness of breath    Medication:  atorvastatin, 20 mg, Oral, Nightly  cefTRIAXone, 2,000 mg, Intravenous, Q24H  ferric gluconate, 250 mg, Intravenous, Once  folic acid, 1 mg, Oral, Daily  levothyroxine, 50 mcg, Oral, Q AM  pantoprazole, 40 mg, Oral, Daily  predniSONE, 15 mg, Oral, Daily  senna-docusate sodium, 2 tablet, Oral, BID  sodium bicarbonate, 1,300 mg, Oral, TID  sodium chloride, 10 mL, Intravenous, Q12H      sodium bicarbonate drip (greater than 75 mEq/bag), 100 mEq, Last Rate: 100 mEq (23)        OBJECTIVE    Vital Sign Min/Max for last 24 hours  Temp  Min: 97.8 °F (36.6 °C)  Max: 98.3 °F (36.8 °C)   BP  Min: 105/46  Max: 126/50   Pulse  Min: 63  Max: 92   Resp  Min: 14  Max: 20   SpO2  Min: 90 %  Max: 97 %   No data recorded   Weight  Min: 81 kg (178 lb 9.2 oz)  Max: 81 kg (178 lb 9.2 oz)     Flowsheet Rows      Flowsheet Row First Filed Value   Admission Height 167.6 cm (66\") Documented at 2023   Admission Weight 57.2 kg (126 lb 1.7 oz) Documented at 2023 08            No intake/output data recorded.  I/O last 3 completed shifts:  In: 713.8 [P.O.:240; Blood:473.8]  Out: -     Physical Exam:  General Appearance: alert, appears stated age and cooperative  Head: normocephalic, without obvious abnormality and atraumatic  Eyes: conjunctivae and sclerae normal and no icterus  Neck: supple and no JVD  Lungs: clear to auscultation and respirations regular  Heart: regular rhythm & normal rate and normal S1, " S2  Chest: Wall no abnormalities observed  Abdomen: normal bowel sounds and soft, nontender  Extremities: moves extremities well, no edema, no cyanosis   Skin: no bleeding, bruising.  Cystic lesion medial aspect right knee.  Neurologic: Alert, and oriented. No focal deficits    Labs:    WBC WBC   Date Value Ref Range Status   07/18/2023 11.90 (H) 3.40 - 10.80 10*3/mm3 Final   07/17/2023 13.10 (H) 3.40 - 10.80 10*3/mm3 Final   07/16/2023 11.20 (H) 3.40 - 10.80 10*3/mm3 Final      HGB Hemoglobin   Date Value Ref Range Status   07/18/2023 8.3 (L) 12.0 - 15.9 g/dL Final   07/17/2023 9.1 (L) 12.0 - 15.9 g/dL Final   07/17/2023 7.4 (L) 12.0 - 15.9 g/dL Final   07/16/2023 7.7 (L) 12.0 - 15.9 g/dL Final      HCT Hematocrit   Date Value Ref Range Status   07/18/2023 26.1 (L) 34.0 - 46.6 % Final   07/17/2023 28.3 (L) 34.0 - 46.6 % Final   07/17/2023 23.8 (L) 34.0 - 46.6 % Final   07/16/2023 24.1 (L) 34.0 - 46.6 % Final      Platelets No results found for: LABPLAT   MCV MCV   Date Value Ref Range Status   07/18/2023 90.8 79.0 - 97.0 fL Final   07/17/2023 100.4 (H) 79.0 - 97.0 fL Final   07/16/2023 97.5 (H) 79.0 - 97.0 fL Final          Sodium Sodium   Date Value Ref Range Status   07/18/2023 141 136 - 145 mmol/L Final   07/17/2023 140 136 - 145 mmol/L Final   07/16/2023 140 136 - 145 mmol/L Final      Potassium Potassium   Date Value Ref Range Status   07/18/2023 3.4 (L) 3.5 - 5.2 mmol/L Final   07/17/2023 3.7 3.5 - 5.2 mmol/L Final   07/16/2023 4.3 3.5 - 5.2 mmol/L Final     Comment:     Slight hemolysis detected by analyzer. Results may be affected.      Chloride Chloride   Date Value Ref Range Status   07/18/2023 109 (H) 98 - 107 mmol/L Final   07/17/2023 110 (H) 98 - 107 mmol/L Final   07/16/2023 111 (H) 98 - 107 mmol/L Final      CO2 CO2   Date Value Ref Range Status   07/18/2023 21.0 (L) 22.0 - 29.0 mmol/L Final   07/17/2023 21.0 (L) 22.0 - 29.0 mmol/L Final   07/16/2023 15.0 (L) 22.0 - 29.0 mmol/L Final      BUN BUN    Date Value Ref Range Status   07/18/2023 65 (H) 8 - 23 mg/dL Final   07/17/2023 75 (H) 8 - 23 mg/dL Final   07/16/2023 72 (H) 8 - 23 mg/dL Final      Creatinine Creatinine   Date Value Ref Range Status   07/18/2023 1.72 (H) 0.57 - 1.00 mg/dL Final   07/17/2023 2.18 (H) 0.57 - 1.00 mg/dL Final   07/16/2023 2.30 (H) 0.57 - 1.00 mg/dL Final      Calcium Calcium   Date Value Ref Range Status   07/18/2023 8.3 (L) 8.6 - 10.5 mg/dL Final   07/17/2023 8.5 (L) 8.6 - 10.5 mg/dL Final   07/16/2023 8.4 (L) 8.6 - 10.5 mg/dL Final      PO4 No components found for: PO4   Albumin Albumin   Date Value Ref Range Status   07/18/2023 2.1 (L) 3.5 - 5.2 g/dL Final   07/17/2023 1.9 (L) 3.5 - 5.2 g/dL Final        Magnesium Magnesium   Date Value Ref Range Status   07/17/2023 2.1 1.6 - 2.4 mg/dL Final      Uric Acid No components found for: URIC ACID     Imaging Results (Last 72 Hours)       ** No results found for the last 72 hours. **            Results for orders placed during the hospital encounter of 07/13/23    XR Knee 1 or 2 View Right    Narrative  XR KNEE 1 OR 2 VW RIGHT    Date of Exam: 7/13/2023 1:22 PM EDT    Indication: swelling    Comparison: 7/8/2023.    Findings:  There is continued severe tricompartmental joint narrowing with moderate spurring. There is a small suprapatellar effusion. There is no fracture or dislocation.    Impression  Impression:  Degenerative changes. Small effusion.      Electronically Signed: Juliane Baker MD  7/13/2023 1:50 PM EDT  Workstation ID: [id number]      Results for orders placed during the hospital encounter of 07/08/23    XR Knee 1 or 2 View Right    Narrative  XR KNEE 1 OR 2 VW RIGHT    Date of Exam: 7/8/2023 1:48 PM EDT    Indication: Knee pain    Comparison: None available.    Findings:  Osteopenia. Valgus angulation. No fracture or dislocation or joint effusion. Moderate tricompartment joint space narrowing.    Impression  Impression:  Moderate tricompartment arthritis. No fractures  or dislocations.      Electronically Signed: Bridgett Gaspar  7/8/2023 2:18 PM EDT  Workstation ID: [id number]      XR Chest 1 View    Narrative  XR CHEST 1 VW    Date of Exam: 7/8/2023 1:48 PM EDT    Indication: Dyspnea    Comparison: Chest x-ray 12/2/2022, CT of the chest abdomen pelvis 3/13/2023    Findings:  Lungs normal expanded. Cardiomegaly. Mildly elevated right diaphragm. No pneumothorax or pleural effusion or focal pulmonary parenchymal opacity.    Impression  Impression:  No acute cardiopulmonary abnormality.      Electronically Signed: Bridgett Camposmaddie  7/8/2023 2:17 PM EDT  Workstation ID: [id number]      Results for orders placed during the hospital encounter of 06/26/23    Duplex Venous Lower Extremity - Right CAR    Interpretation Summary    Normal right lower extremity venous duplex scan.        ASSESSMENT / PLAN      JOSÉ (acute kidney injury)    Bacteremia due to Streptococcus    Cellulitis of right leg    Knee joint cyst, right    Bilateral leg edema    UTI due to Klebsiella species    Dysphagia    Feeding difficulties    Paroxysmal atrial fibrillation    Sarcoidosis    Metabolic acidosis    JOSÉ-prerenal in setting of hypotension, diuretics, ARB's.  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  Hold irbesartan.  Avoid ACE inhibitors or ARB's  History of paroxysmal atrial fibrillation  Cellulitis like lower extremity and right knee.  History NSAID use  Questionable history of sarcoidosis  UTI-urine culture growing greater than 100 K colonies of gram-negative rods.  Strep bacteremia  Metabolic acidosis-p.o. sodium bicarb     CR better  Continue IV fluids with bicarb  closely monitor renal function fluid status electrolytes        Tony Parisi MD  [facility]  [phone]  07/18/23  12:11 EDT

## 2023-07-18 NOTE — OP NOTE
ESOPHAGOGASTRODUODENOSCOPY  Procedure Report    Patient Name:  Rosario Ortez  YOB: 1950    Date of Surgery:  7/18/2023     Indications: Moderate malnutrition  Feeding difficulties  Dysphagia    Pre-op Diagnosis:   Dysphagia, unspecified type [R13.10]  Feeding difficulties [R63.30]         Post-op Diagnosis:  Normal esophagus without stricture identified  Normal upper GI endoscopy with placement of nasoduodenal tube as described      Procedure(s):  ESOPHAGOGASTRODUODENOSCOPY with dobhoff placement and securement of feeding tube to duodenum using endoscopic clipping x 1    Staff:  Surgeon(s):  Zeyad Flores MD         Anesthesia: Monitored Anesthesia Care    Estimated Blood Loss: None  Implants:    Implant Name Type Inv. Item Serial No.  Lot No. LRB No. Used Action   DEV CLIP ENDO [id number] CONTRL ROT 235CM - [id number] Implant DEV CLIP ENDO [id number] CONTRL ROT 235CM  [employer] [id number] N/A 1 Implanted       Specimen:          None    Complications:  No immediate    Description of Procedure:  Informed consent was obtained from the patient.  They were placed in the left lateral decubitus position and IV conscious sedation was administered by anesthesia while being monitored continuously throughout the procedure.  The video Olympus endoscope was introduced into the esophagus and was slowly advanced to the GE junction.  There is no erosive esophagitis varices or any stricturing of the esophageal lumen which is wide open.  At the scope enters the stomach, insufflation was carried out and inspection of the antrum body fundus and cardia revealed no gastric mucosal lesions.  No hiatal hernia was seen today.  The pylorus is widely patent.  The duodenal bulb and first and second portion of the duodenum were completely normal.  The scope was removed and she tolerated this portion of the examination well.  Next a 12 Portuguese Dobbhoff tube with a 0 silk loop suture on  the tip was lubricated and passed through the right nares and then with the aid of the endoscope was carried through the cricopharyngeus down in the stomach and into the second portion of the duodenum a resolution clip was then utilized to clip the suture to the duodenal wall to prevent the tube from being removed upon withdrawal of the scope.  The scope was removed she tolerated the procedure well and a bridle was placed in the nose and attached to the nasoduodenal tube to prevent pulling.  She tolerated the procedure well returned recovery good condition.  Esophageal dilation was not performed as the esophageal lumen was widely patent.    Impression:  Feeding difficulty with moderate malnutrition status post EGD and placement of nasoduodenal tube for feeds    Recommendations:  Call for any postop pain fever bleeding  Dietary consultation and begin tube feeds ASAP  Medications can be given with a sip of water  Tube should be used for feeds only  We will follow.      Zeyad Flores MD     Date: 7/18/2023  Time: 13:00 EDT

## 2023-07-18 NOTE — H&P
[facility]   Interventional Radiology H&P    Patient Name: Rosario Ortez  : 1950  MRN: [id number]  Primary Care Physician:  Ian Cordero MD  Referring Physician: Ian Cordero MD  Date of admission: 2023    Subjective   Subjective     HPI:  Rosario Ortez is a 73 y.o. female here for bone marrow biopsy.    Review of Systems:   Constitutional no fever,  no weight loss       Otolaryngeal no difficulty swallowing   Cardiovascular no chest pain   Pulmonary no cough, no sputum production   Gastrointestinal no constipation, no diarrhea                         Personal History       Past Medical/Surgical History:   Past Medical History:   Diagnosis Date    A-fib     Coronary artery disease     Paroxysmal atrial fibrillation 2023    Sarcoidosis 2023    Suspected     Past Surgical History:   Procedure Laterality Date    BREAST BIOPSY      HYSTERECTOMY      JOINT REPLACEMENT Right        Social History:  reports that she has never smoked. She has never been exposed to tobacco smoke. She has never used smokeless tobacco. She reports that she does not drink alcohol and does not use drugs.    Medications:  Medications Prior to Admission   Medication Sig Dispense Refill Last Dose    apixaban (ELIQUIS) 2.5 MG tablet tablet Take 1 tablet by mouth 2 (Two) Times a Day.       atorvastatin (LIPITOR) 20 MG tablet Take 1 tablet by mouth Daily.       doxycycline (VIBRAMYCIN) 100 MG capsule Take 1 capsule by mouth 2 (Two) Times a Day.       ferrous sulfate 325 (65 FE) MG tablet Take 1 tablet by mouth Daily With Breakfast.       folic acid (FOLVITE) 1 MG tablet Take 1 tablet by mouth Daily.       furosemide (LASIX) 20 MG tablet Take 1 tablet by mouth Daily for 30 days. 30 tablet 0     hydroCHLOROthiazide (HYDRODIURIL) 25 MG tablet Take 1 tablet by mouth Daily.       irbesartan (AVAPRO) 75 MG tablet Take 1 tablet by mouth Every Night. 30 tablet 3     levothyroxine (SYNTHROID, LEVOTHROID) 50 MCG  tablet 1 tablet Every Morning.       pantoprazole (PROTONIX) 40 MG EC tablet Take 1 tablet by mouth Daily.       predniSONE (DELTASONE) 5 MG tablet Take 3 tablets by mouth Daily.       traZODone (DESYREL) 50 MG tablet Take 2 tablets by mouth At Night As Needed for Sleep.        Current medications:  atorvastatin, 20 mg, Oral, Nightly  cefTRIAXone, 2,000 mg, Intravenous, Q24H  ferric gluconate, 250 mg, Intravenous, Once  folic acid, 1 mg, Oral, Daily  levothyroxine, 50 mcg, Oral, Q AM  pantoprazole, 40 mg, Oral, Daily  potassium chloride, 40 mEq, Oral, Once  predniSONE, 15 mg, Oral, Daily  senna-docusate sodium, 2 tablet, Oral, BID  sodium bicarbonate, 1,300 mg, Oral, TID  sodium chloride, 10 mL, Intravenous, Q12H  sodium chloride, 3 mL, Intravenous, Q12H      Current IV drips:  sodium bicarbonate drip (greater than 75 mEq/bag), 100 mEq, Last Rate: 100 mEq (07/17/23 2041)        Allergies:  Allergies   Allergen Reactions    Codeine Nausea And Vomiting, Other (See Comments) and Nausea Only     nausea  nausea      Topiramate Rash    Tramadol Other (See Comments) and Mental Status Change    Cefdinir Unknown - High Severity    Levofloxacin Nausea Only    Lisinopril Cough       Objective    Objective     Vitals:   Temp:  [95.1 °F (35.1 °C)-98.3 °F (36.8 °C)] 95.1 °F (35.1 °C)  Heart Rate:  [63-92] 70  Resp:  [14-20] 15  BP: (116-154)/(43-56) 154/47  Flow (L/min):  [2-4] 3      Physical Exam:   Constitutional: Awake, alert, No acute distress    Respiratory: Clear to auscultation bilaterally, nonlabored respirations    Cardiovascular: RRR, no murmurs, rubs, or gallops, palpable pedal pulses bilaterally   Gastrointestinal: Positive bowel sounds, soft, nontender, nondistended        ASA SCALE ASSESSMENT:  2-Mild to moderate systemic disease, medically well controlled, with no functional limitation    MALLAMPATI CLASSIFICATION:  2-Able to visualize the soft palate, fauces, uvula. The anterior & posterior tonsilar pillars are  hidden by the tongue.       Result Review        Result Review:     Sodium   Date Value Ref Range Status   07/18/2023 141 136 - 145 mmol/L Final   07/17/2023 140 136 - 145 mmol/L Final   07/16/2023 140 136 - 145 mmol/L Final       Potassium   Date Value Ref Range Status   07/18/2023 3.4 (L) 3.5 - 5.2 mmol/L Final   07/17/2023 3.7 3.5 - 5.2 mmol/L Final   07/16/2023 4.3 3.5 - 5.2 mmol/L Final     Comment:     Slight hemolysis detected by analyzer. Results may be affected.       Chloride   Date Value Ref Range Status   07/18/2023 109 (H) 98 - 107 mmol/L Final   07/17/2023 110 (H) 98 - 107 mmol/L Final   07/16/2023 111 (H) 98 - 107 mmol/L Final       No results found for: PLASMABICARB    BUN   Date Value Ref Range Status   07/18/2023 65 (H) 8 - 23 mg/dL Final   07/17/2023 75 (H) 8 - 23 mg/dL Final   07/16/2023 72 (H) 8 - 23 mg/dL Final       Creatinine   Date Value Ref Range Status   07/18/2023 1.72 (H) 0.57 - 1.00 mg/dL Final   07/17/2023 2.18 (H) 0.57 - 1.00 mg/dL Final   07/16/2023 2.30 (H) 0.57 - 1.00 mg/dL Final       Calcium   Date Value Ref Range Status   07/18/2023 8.3 (L) 8.6 - 10.5 mg/dL Final   07/17/2023 8.5 (L) 8.6 - 10.5 mg/dL Final   07/16/2023 8.4 (L) 8.6 - 10.5 mg/dL Final           No components found for: GLUCOSE.*  Results from last 7 days   Lab Units 07/18/23  0125   WBC 10*3/mm3 11.90*   HEMOGLOBIN g/dL 8.3*   HEMATOCRIT % 26.1*   PLATELETS 10*3/mm3 215      Results from last 7 days   Lab Units 07/17/23  1114   INR  1.25*           Assessment / Plan     Assesment:   Bone mrarow biopsy      Plan:   Bone marrow biopsy.    The risks and benefits of the procedure were discussed with the patient.    Electronically signed by Cameron Boudreaux MD, 07/18/23, 3:26 PM EDT.

## 2023-07-18 NOTE — PLAN OF CARE
Problem: Adult Inpatient Plan of Care  Goal: Plan of Care Review  Outcome: Ongoing, Not Progressing  Flowsheets (Taken 7/18/2023 0216)  Progress: declining  Plan of Care Reviewed With:   patient   spouse  Goal: Patient-Specific Goal (Individualized)  Outcome: Ongoing, Not Progressing  Goal: Absence of Hospital-Acquired Illness or Injury  Outcome: Ongoing, Not Progressing  Intervention: Identify and Manage Fall Risk  Recent Flowsheet Documentation  Taken 7/18/2023 0215 by Lata Myers RN  Safety Promotion/Fall Prevention:   assistive device/personal items within reach   clutter free environment maintained   fall prevention program maintained   nonskid shoes/slippers when out of bed   room organization consistent   safety round/check completed  Taken 7/18/2023 0013 by Lata Myers RN  Safety Promotion/Fall Prevention:   assistive device/personal items within reach   clutter free environment maintained   nonskid shoes/slippers when out of bed   room organization consistent   safety round/check completed   fall prevention program maintained  Taken 7/17/2023 2230 by Lata Myers RN  Safety Promotion/Fall Prevention:   assistive device/personal items within reach   clutter free environment maintained   nonskid shoes/slippers when out of bed   room organization consistent   safety round/check completed  Taken 7/17/2023 2020 by Lata Myers RN  Safety Promotion/Fall Prevention:   assistive device/personal items within reach   clutter free environment maintained   fall prevention program maintained   nonskid shoes/slippers when out of bed   room organization consistent   safety round/check completed  Intervention: Prevent Skin Injury  Recent Flowsheet Documentation  Taken 7/17/2023 2020 by Lata Myers RN  Body Position:   supine   turned   left  Intervention: Prevent and Manage VTE (Venous Thromboembolism) Risk  Recent Flowsheet Documentation  Taken 7/17/2023 2020 by Lata Myers, RN  Activity Management:  bedrest  VTE Prevention/Management: sequential compression devices off  Range of Motion: active ROM (range of motion) encouraged  Intervention: Prevent Infection  Recent Flowsheet Documentation  Taken 7/18/2023 0215 by Lata Myers RN  Infection Prevention:   environmental surveillance performed   hand hygiene promoted   rest/sleep promoted   single patient room provided   visitors restricted/screened  Taken 7/18/2023 0013 by Lata Myers RN  Infection Prevention:   environmental surveillance performed   hand hygiene promoted   rest/sleep promoted   single patient room provided   visitors restricted/screened  Taken 7/17/2023 2230 by Lata Myers RN  Infection Prevention:   environmental surveillance performed   hand hygiene promoted   single patient room provided   visitors restricted/screened  Taken 7/17/2023 2020 by Lata Myers RN  Infection Prevention:   environmental surveillance performed   hand hygiene promoted   single patient room provided   visitors restricted/screened  Goal: Optimal Comfort and Wellbeing  Outcome: Ongoing, Not Progressing  Intervention: Provide Person-Centered Care  Recent Flowsheet Documentation  Taken 7/17/2023 2020 by Lata Myers RN  Trust Relationship/Rapport:   care explained   choices provided   questions answered  Goal: Readiness for Transition of Care  Outcome: Ongoing, Not Progressing     Problem: Skin Injury Risk Increased  Goal: Skin Health and Integrity  Outcome: Ongoing, Not Progressing  Intervention: Optimize Skin Protection  Recent Flowsheet Documentation  Taken 7/17/2023 2020 by Lata Myers RN  Head of Bed (HOB) Positioning: HOB at 30-45 degrees  Pressure Reduction Devices: specialty bed utilized     Problem: Fall Injury Risk  Goal: Absence of Fall and Fall-Related Injury  Outcome: Ongoing, Not Progressing  Intervention: Identify and Manage Contributors  Recent Flowsheet Documentation  Taken 7/18/2023 0013 by Lata Myers RN  Medication  Review/Management:   medications reviewed   high-risk medications identified  Taken 7/17/2023 2230 by Lata Myers RN  Medication Review/Management:   medications reviewed   high-risk medications identified  Taken 7/17/2023 2020 by Lata Myers RN  Medication Review/Management:   medications reviewed   high-risk medications identified  Self-Care Promotion:   BADL personal objects within reach   BADL personal routines maintained  Intervention: Promote Injury-Free Environment  Recent Flowsheet Documentation  Taken 7/18/2023 0215 by Lata Myers RN  Safety Promotion/Fall Prevention:   assistive device/personal items within reach   clutter free environment maintained   fall prevention program maintained   nonskid shoes/slippers when out of bed   room organization consistent   safety round/check completed  Taken 7/18/2023 0013 by Lata Myers RN  Safety Promotion/Fall Prevention:   assistive device/personal items within reach   clutter free environment maintained   nonskid shoes/slippers when out of bed   room organization consistent   safety round/check completed   fall prevention program maintained  Taken 7/17/2023 2230 by Lata Myers RN  Safety Promotion/Fall Prevention:   assistive device/personal items within reach   clutter free environment maintained   nonskid shoes/slippers when out of bed   room organization consistent   safety round/check completed  Taken 7/17/2023 2020 by Lata Myers RN  Safety Promotion/Fall Prevention:   assistive device/personal items within reach   clutter free environment maintained   fall prevention program maintained   nonskid shoes/slippers when out of bed   room organization consistent   safety round/check completed     Problem: Malnutrition  Goal: Improved Nutritional Intake  Outcome: Ongoing, Not Progressing   Goal Outcome Evaluation:  Plan of Care Reviewed With: patient, spouse        Progress: declining     Alert. Disoriented x 2. Able to verbalize needs and  ramya. Remains NPO for EGD in AM, consent signed and in chart. No c/o pain/discomfort/SOB noted. O2 therapy initiated at 2 LPM via NC HS, patient desaturated to 88% on room air. Spouse at bedside. [place] in place and patent. Currently receiving sodium bicarb 8.4% at 75 ml/hr and tolerating well. Continues to receive IV Rocephin, no s/s of adverse/allergic reaction noted. Incontinent of bowel. Total for bed mobility and transfers. Currently in bed, eyes closed/rise and fall of chest observed. Call bell in reach. Discharge plan: [facility] or [facility].

## 2023-07-18 NOTE — SIGNIFICANT NOTE
07/18/23 1153   OTHER   Discipline occupational therapist   Rehab Time/Intention   Session Not Performed unable to treat, medical status change  (Off the floor. Per nursing, pt with EGD then to IR.)   Recommendation   OT - Next Appointment 07/19/23

## 2023-07-19 ENCOUNTER — INPATIENT HOSPITAL (AMBULATORY)
Dept: URBAN - METROPOLITAN AREA HOSPITAL 84 | Facility: HOSPITAL | Age: 73
End: 2023-07-19

## 2023-07-19 DIAGNOSIS — R13.10 DYSPHAGIA, UNSPECIFIED: ICD-10-CM

## 2023-07-19 DIAGNOSIS — R74.01 ELEVATION OF LEVELS OF LIVER TRANSAMINASE LEVELS: ICD-10-CM

## 2023-07-19 DIAGNOSIS — D50.0 IRON DEFICIENCY ANEMIA SECONDARY TO BLOOD LOSS (CHRONIC): ICD-10-CM

## 2023-07-19 DIAGNOSIS — R63.39 OTHER FEEDING DIFFICULTIES: ICD-10-CM

## 2023-07-19 DIAGNOSIS — R19.5 OTHER FECAL ABNORMALITIES: ICD-10-CM

## 2023-07-19 DIAGNOSIS — E44.0 MODERATE PROTEIN-CALORIE MALNUTRITION: ICD-10-CM

## 2023-07-19 LAB
ALBUMIN SERPL-MCNC: 2.1 G/DL (ref 3.5–5.2)
ALBUMIN/GLOB SERPL: 0.7 G/DL
ALP SERPL-CCNC: 204 U/L (ref 39–117)
ALT SERPL W P-5'-P-CCNC: 17 U/L (ref 1–33)
ANION GAP SERPL CALCULATED.3IONS-SCNC: 10 MMOL/L (ref 5–15)
AST SERPL-CCNC: 47 U/L (ref 1–32)
BILIRUB SERPL-MCNC: 0.5 MG/DL (ref 0–1.2)
BUN SERPL-MCNC: 60 MG/DL (ref 8–23)
BUN/CREAT SERPL: 37 (ref 7–25)
CALCIUM SPEC-SCNC: 8.4 MG/DL (ref 8.6–10.5)
CHLORIDE SERPL-SCNC: 108 MMOL/L (ref 98–107)
CO2 SERPL-SCNC: 23 MMOL/L (ref 22–29)
CREAT SERPL-MCNC: 1.62 MG/DL (ref 0.57–1)
DEPRECATED RDW RBC AUTO: 70.4 FL (ref 37–54)
EGFRCR SERPLBLD CKD-EPI 2021: 33 ML/MIN/1.73
EOSINOPHIL # BLD MANUAL: 0.82 10*3/MM3 (ref 0–0.4)
EOSINOPHIL NFR BLD MANUAL: 5 % (ref 0.3–6.2)
ERYTHROCYTE [DISTWIDTH] IN BLOOD BY AUTOMATED COUNT: 22.2 % (ref 12.3–15.4)
GLOBULIN UR ELPH-MCNC: 3.2 GM/DL
GLUCOSE SERPL-MCNC: 151 MG/DL (ref 65–99)
HCT VFR BLD AUTO: 29.3 % (ref 34–46.6)
HGB BLD-MCNC: 9.4 G/DL (ref 12–15.9)
LYMPHOCYTES # BLD MANUAL: 0.33 10*3/MM3 (ref 0.7–3.1)
LYMPHOCYTES NFR BLD MANUAL: 8 % (ref 5–12)
MCH RBC QN AUTO: 29.9 PG (ref 26.6–33)
MCHC RBC AUTO-ENTMCNC: 32.1 G/DL (ref 31.5–35.7)
MCV RBC AUTO: 93 FL (ref 79–97)
MONOCYTES # BLD: 1.3 10*3/MM3 (ref 0.1–0.9)
NEUTROPHILS # BLD AUTO: 13.86 10*3/MM3 (ref 1.7–7)
NEUTROPHILS NFR BLD MANUAL: 81 % (ref 42.7–76)
NEUTS BAND NFR BLD MANUAL: 4 % (ref 0–5)
PHOSPHATE SERPL-MCNC: 2.3 MG/DL (ref 2.5–4.5)
PLATELET # BLD AUTO: 243 10*3/MM3 (ref 140–450)
PMV BLD AUTO: 7.1 FL (ref 6–12)
POLYCHROMASIA BLD QL SMEAR: ABNORMAL
POTASSIUM SERPL-SCNC: 4.3 MMOL/L (ref 3.5–5.2)
PROT SERPL-MCNC: 5.3 G/DL (ref 6–8.5)
RBC # BLD AUTO: 3.15 10*6/MM3 (ref 3.77–5.28)
SCAN SLIDE: NORMAL
SMALL PLATELETS BLD QL SMEAR: ADEQUATE
SODIUM SERPL-SCNC: 141 MMOL/L (ref 136–145)
VARIANT LYMPHS NFR BLD MANUAL: 2 % (ref 19.6–45.3)
WBC MORPH BLD: NORMAL
WBC NRBC COR # BLD: 16.3 10*3/MM3 (ref 3.4–10.8)

## 2023-07-19 PROCEDURE — 85025 COMPLETE CBC W/AUTO DIFF WBC: CPT | Performed by: NURSE PRACTITIONER

## 2023-07-19 PROCEDURE — 97112 NEUROMUSCULAR REEDUCATION: CPT

## 2023-07-19 PROCEDURE — 85007 BL SMEAR W/DIFF WBC COUNT: CPT | Performed by: NURSE PRACTITIONER

## 2023-07-19 PROCEDURE — 97110 THERAPEUTIC EXERCISES: CPT

## 2023-07-19 PROCEDURE — 25010000002 CEFTRIAXONE PER 250 MG: Performed by: FAMILY MEDICINE

## 2023-07-19 PROCEDURE — 63710000001 PREDNISONE PER 5 MG: Performed by: INTERNAL MEDICINE

## 2023-07-19 PROCEDURE — 80053 COMPREHEN METABOLIC PANEL: CPT | Performed by: NURSE PRACTITIONER

## 2023-07-19 PROCEDURE — 92526 ORAL FUNCTION THERAPY: CPT

## 2023-07-19 PROCEDURE — 99233 SBSQ HOSP IP/OBS HIGH 50: CPT | Performed by: NURSE PRACTITIONER

## 2023-07-19 PROCEDURE — 84100 ASSAY OF PHOSPHORUS: CPT | Performed by: INTERNAL MEDICINE

## 2023-07-19 PROCEDURE — 97530 THERAPEUTIC ACTIVITIES: CPT

## 2023-07-19 RX ORDER — FENTANYL/ROPIVACAINE/NS/PF 2-625MCG/1
15 PLASTIC BAG, INJECTION (ML) EPIDURAL ONCE
Status: COMPLETED | OUTPATIENT
Start: 2023-07-19 | End: 2023-07-19

## 2023-07-19 RX ADMIN — ACETAMINOPHEN 650 MG: 325 TABLET, FILM COATED ORAL at 02:02

## 2023-07-19 RX ADMIN — Medication 3 ML: at 09:47

## 2023-07-19 RX ADMIN — POTASSIUM PHOSPHATE, MONOBASIC AND POTASSIUM PHOSPHATE, DIBASIC 15 MMOL: 224; 236 INJECTION, SOLUTION, CONCENTRATE INTRAVENOUS at 06:26

## 2023-07-19 RX ADMIN — PREDNISONE 15 MG: 5 TABLET ORAL at 09:47

## 2023-07-19 RX ADMIN — SENNOSIDES AND DOCUSATE SODIUM 2 TABLET: 50; 8.6 TABLET ORAL at 09:47

## 2023-07-19 RX ADMIN — ATORVASTATIN CALCIUM 20 MG: 20 TABLET, FILM COATED ORAL at 20:47

## 2023-07-19 RX ADMIN — LEVOTHYROXINE SODIUM 50 MCG: 0.05 TABLET ORAL at 05:13

## 2023-07-19 RX ADMIN — CEFTRIAXONE 2000 MG: 2 INJECTION, POWDER, FOR SOLUTION INTRAMUSCULAR; INTRAVENOUS at 06:02

## 2023-07-19 RX ADMIN — SODIUM BICARBONATE 1300 MG: 650 TABLET ORAL at 16:45

## 2023-07-19 RX ADMIN — SENNOSIDES AND DOCUSATE SODIUM 2 TABLET: 50; 8.6 TABLET ORAL at 20:47

## 2023-07-19 RX ADMIN — PANTOPRAZOLE SODIUM 40 MG: 40 TABLET, DELAYED RELEASE ORAL at 09:47

## 2023-07-19 RX ADMIN — SODIUM BICARBONATE 1300 MG: 650 TABLET ORAL at 09:47

## 2023-07-19 RX ADMIN — Medication 10 ML: at 09:47

## 2023-07-19 RX ADMIN — SODIUM BICARBONATE 1300 MG: 650 TABLET ORAL at 22:45

## 2023-07-19 RX ADMIN — TRAZODONE HYDROCHLORIDE 100 MG: 50 TABLET ORAL at 20:47

## 2023-07-19 RX ADMIN — FOLIC ACID 1 MG: 1 TABLET ORAL at 09:47

## 2023-07-19 RX ADMIN — Medication 3 ML: at 20:54

## 2023-07-19 RX ADMIN — POTASSIUM CHLORIDE 40 MEQ: 1.5 POWDER, FOR SOLUTION ORAL at 02:02

## 2023-07-19 RX ADMIN — Medication 10 ML: at 20:47

## 2023-07-19 NOTE — PLAN OF CARE
Goal Outcome Evaluation:      Rosario Ortez presents with functional mobility impairments which indicate the need for skilled intervention. Tolerating session today without incident. Pt completed right/left rolling requiring MOD A x 2 while PT/CNA performed pericare needs, removed soiled chucks, and replaced with clean chucks/bedding. Pt then completed supine to/from sit transfer requiring MAX A with 7 minute dynamic sitting bout EOB requiring SBA/CGA while performing BLE therapeutic exercise and BUE dynamic balance activity. Recommending SNF at d/c and will continue to follow and progress as tolerated. PPE: joselyn Hernandez, PT, DPT

## 2023-07-19 NOTE — PROGRESS NOTES
[facility]   Daily Progress Note    Patient Name: Rosario Ortez  : 1950  MRN: [id number]  Primary Care Physician:  Ian Cordero MD  Date of admission: 2023  Date and Time of Service: 2023 at 9 am      Subjective      Chief Complaint: Abnormal labs.  Sent in from PCP office.  JOSÉ on CKD Stage 3, right leg cellulitis and UTI    Patient seen today.  Doing okay.   at bedside.  Blood cultures show Strep.  Urine culture >100,000 gram negative rods.  Right knee is hurting her with a small painful cyst medial aspect.  Might need to be aspirated.  Getting IVF.    7/15/2023  Pt denies for any short of breath, no nausea or vomiting.no chest pain    2023  Pt complaining of generalized weakness, denies for any nausea or vomiting, no chest pain.    2023  Did okay overnight.  Remains somewhat somnolent and is not eating very well.   is at the bedside.  Had some issues with urinary retention overnight.  Attempted straight cath, but unsuccessful with only 400+ in her bladder.  Urine cultures growing Klebsiella.  Blood cultures are growing strep species, possible contaminant per infectious disease.  However, she does have a right knee infection, possibly an underlying abscess at the joint.  She continues to have right knee pain.  She had positive fecal occult blood screening yesterday.  Labs have not yet been drawn today.  She is typically on Eliquis, but this is being held as we await potential right knee intervention today.  Respiratory panel was positive for rhinovirus on admission as well.  There was concern for dysphagia, and speech therapy attempted to work with her today but did not have adequate participation it seems.  GI is considering an EGD for further evaluation and possibly NG tube feeds.  She is currently being followed by nephrology, infectious disease, surgery, GI, and orthopedics.  Extensive chart review for service turnover today.  Discussed the case with the bedside nursing staff. No other acute concerns.    7/18/2023  No acute issues overnight.  Awaiting both EGD with dilatation and right knee aspiration today.  Hemoglobin is 8.3.  She remains sleepy.  GI consulted oncology yesterday for IgG kappa monoclonal gammopathy.  Awaiting consultation with oncology today.  She remains on antibiotics.  Discussed the case with the bedside nursing staff. No other acute concerns.    7/19/2023  Did OK overnight. EGD did not show anything significant, did not need dilatation. Dobhoff was placed and tube feeds have been started. Underwent bone marrow bx as well. Received more iron yesterday. Also had knee aspirated finally (8cc purulent fluid drained, cultures pending). On 2LNC currently. Discussed the case with the bedside nursing staff. No other acute concerns.    Objective      Vitals:   Temp:  [95.1 °F (35.1 °C)-99 °F (37.2 °C)] 98.2 °F (36.8 °C)  Heart Rate:  [69-85] 76  Resp:  [10-20] 19  BP: (102-210)/(41-72) 103/45  Flow (L/min):  [2-4] 2    No data recorded    Physical Exam  Constitutional:       General: She is not in acute distress.     Appearance: She is ill-appearing. She is not toxic-appearing.   HENT:      Head: Normocephalic and atraumatic.      Right Ear: Tympanic membrane normal.      Left Ear: Tympanic membrane normal.      Mouth/Throat:      Mouth: Mucous membranes are moist.   Eyes:      Pupils: Pupils are equal, round, and reactive to light.   Cardiovascular:      Rate and Rhythm: Normal rate and regular rhythm.      Pulses: Normal pulses.      Heart sounds: Normal heart sounds.   Pulmonary:      Effort: Pulmonary effort is normal. No respiratory distress.      Breath sounds: Normal breath sounds.   Abdominal:      General: Bowel sounds are normal. There is no distension.      Palpations: Abdomen is soft.   Musculoskeletal:         General: No swelling. Normal range of motion.      Cervical back: Normal range of motion and neck supple.       Right lower leg: Edema present.      Left lower leg: Edema present.   Skin:     General: Skin is warm.      Findings: Erythema and lesion present.      Comments: Painful cyst like structure medial aspect right thigh.  No open wounds on the skin.  Some dry skin and edema.  Edema looks chronic.   Neurological:      General: No focal deficit present.      Mental Status: She is alert and oriented to person, place, and time. Mental status is at baseline.   Psychiatric:         Mood and Affect: Mood normal.        Result Review    I have personally reviewed the results from the time of this admission to 7/19/2023 09:45 EDT and agree with these findings:  [x]  Laboratory  [x]  Microbiology  [x]  Radiology  [x]  EKG/Telemetry   [x]  Cardiology/Vascular   []  Pathology  [x]  Old records  []  Other:      Wounds (last 24 hours)       LDA Wound       Row Name 07/19/23 0850 07/18/23 2218          Wound 06/27/23 0145 Right lower leg Abcess    Wound - Properties Group Placement Date: 06/27/23  - Placement Time: 0145  -AH Present on Hospital Admission: Y  -AH Side: Right  -AH Orientation: lower  -AH Location: leg  -AH Primary Wound Type: Abcess  -AH    Dressing Appearance -- dry;intact  -AH     Closure Open to air  -MM Adhesive bandage  -AH     Base red;pink  -MM dressing in place, unable to visualize  -AH     Retired Wound - Properties Group Placement Date: 06/27/23  - Placement Time: 0145  -AH Present on Hospital Admission: Y  -AH Side: Right  -AH Orientation: lower  -AH Location: leg  -AH Primary Wound Type: Abcess  -AH    Retired Wound - Properties Group Date first assessed: 06/27/23  - Time first assessed: 0145  -AH Present on Hospital Admission: Y  -AH Side: Right  -AH Location: leg  -AH Primary Wound Type: Abcess  -AH       Wound 07/16/23 0650 Left coccyx Pressure Injury    Wound - Properties Group Placement Date: 07/16/23  -BS Placement Time: 0650  -BS Present on Hospital Admission: N  -BS Side: Left  -BS  Location: coccyx  -BS Primary Wound Type: Pressure inj  -BS    Closure Adhesive bandage  -MM --     Base dry;pink  -MM --     Retired Wound - Properties Group Placement Date: 07/16/23  -BS Placement Time: 0650  -BS Present on Hospital Admission: N  -BS Side: Left  -BS Location: coccyx  -BS Primary Wound Type: Pressure inj  -BS    Retired Wound - Properties Group Date first assessed: 07/16/23  -BS Time first assessed: 0650  -BS Present on Hospital Admission: N  -BS Side: Left  -BS Location: coccyx  -BS Primary Wound Type: Pressure inj  -BS              User Key  (r) = Recorded By, (t) = Taken By, (c) = Cosigned By      Initials Name Provider Type    Lata Portillo, RN Registered Nurse    Ashley Sykes RN Registered Nurse    Catalina Mullins LPN Licensed Nurse                  Assessment & Plan      atorvastatin, 20 mg, Oral, Nightly  cefTRIAXone, 2,000 mg, Intravenous, Q24H  ferric gluconate, 250 mg, Intravenous, Once  folic acid, 1 mg, Oral, Daily  levothyroxine, 50 mcg, Oral, Q AM  pantoprazole, 40 mg, Oral, Daily  predniSONE, 15 mg, Oral, Daily  senna-docusate sodium, 2 tablet, Oral, BID  sodium bicarbonate, 1,300 mg, Oral, TID  sodium chloride, 10 mL, Intravenous, Q12H  sodium chloride, 3 mL, Intravenous, Q12H       sodium bicarbonate drip (greater than 75 mEq/bag), 100 mEq, Last Rate: 100 mEq (07/18/23 1712)       Active Hospital Problems    Diagnosis  POA    Paroxysmal atrial fibrillation [I48.0]  Yes    Sarcoidosis [D86.9]  Yes    Metabolic acidosis [E87.20]  Yes    Bacteremia due to Streptococcus [R78.81, B95.5]  Yes    Cellulitis of right leg [L03.115]  Yes    Knee joint cyst, right [M25.861]  Yes    Bilateral leg edema [R60.0]  Yes    UTI due to Klebsiella species [N39.0, B96.89]  Yes    JOSÉ (acute kidney injury) [N17.9]  Yes    Dysphagia [R13.10]  Yes    Feeding difficulties [R63.30]  Yes      Resolved Hospital Problems   No resolved problems to display.         Plan:  JOSÉ on CKD Stage 3 with  metabolic acidosis-likely pre-renal in the setting of volume depletion from diuretics.  Bicarbonate fluids, Nephrology following.    2. Right leg cellulitis / ? Knee abscess, continue IV rocephin.  ID consulted to due to strep bacteremia.  Probable source is the right leg.  Consulted wound care also.  Keep leg elevated.  Aspirated, culture pending    3. Strep bacteremia-continue ceftriaxone.  ID consulted.    4. B/L leg edema-chronic issue.  Had venous dopplers done June 2023 and negative for any DVT.  Keep leg elevated while in bed.  Has some lymphedema also.    5.  Klebsiella UTI.  Continue ceftriaxone.    6. PAF-continue Eliquis 5 mg BID, holding for now pending right knee intervention.  Monitor HR. Should be able to restart tomorrow.     7. Dysphagia/occult positive stool--EGD/dilatation done, no concerning findings. ST following, puréed diet along with NG tube feeds.      PT/OT    DVT prophylaxis:  Mechanical DVT prophylaxis orders are present.    CODE STATUS:    Level Of Support Discussed With: Patient; Health Care Surrogate  Code Status (Patient has no pulse and is not breathing): CPR (Attempt to Resuscitate)  Medical Interventions (Patient has pulse or is breathing): Full Support    Disposition:  I would not expect discharge for at least a few days.  Referrals to [facility], will require pre-CERT.    Electronically signed by Armando Adams MD, 07/19/23, 09:45 EDT.  [facility] Hospitalist Team

## 2023-07-19 NOTE — THERAPY RE-EVALUATION
Acute Care - Speech Language Pathology   Swallow Re-Evaluation [facility]     Patient Name: Rosario Ortez  : 1950  MRN: [id number]  Today's Date: 2023               Admit Date: 2023    Visit Dx:     ICD-10-CM ICD-9-CM   1. Weakness  R53.1 780.79   2. Acute kidney injury  N17.9 584.9   3. Dysphagia, unspecified type  R13.10 787.20   4. Feeding difficulties  R63.30 783.3     Patient Active Problem List   Diagnosis    Laceration of scalp, initial encounter    Acute kidney injury    Acute cystitis    JOSÉ (acute kidney injury)    Bacteremia due to Streptococcus    Cellulitis of right leg    Knee joint cyst, right    Bilateral leg edema    UTI due to Klebsiella species    Dysphagia    Feeding difficulties    Paroxysmal atrial fibrillation    Sarcoidosis    Metabolic acidosis     Past Medical History:   Diagnosis Date    A-fib     Coronary artery disease     Paroxysmal atrial fibrillation 2023    Sarcoidosis 2023    Suspected     Past Surgical History:   Procedure Laterality Date    BREAST BIOPSY      ENDOSCOPY N/A 2023    Procedure: ESOPHAGOGASTRODUODENOSCOPY with dobhoff placement and securement of feeding tube to duodenum using endoscopic clipping x 1;  Surgeon: Zeyad Flores MD;  Location: [facility];  Service: Gastroenterology;  Laterality: N/A;    HYSTERECTOMY      JOINT REPLACEMENT Right 2015       SLP Recommendation and Plan  SLP Swallowing Diagnosis: functional pharyngeal phase, R/O pharyngeal dysphagia, mild, oral dysphagia (23 1300)  SLP Diet Recommendation: puree, thin liquids (23 1300)  Recommended Precautions and Strategies: upright posture during/after eating, general aspiration precautions, fatigue precautions, assist with feeding (23 1300)  SLP Rec. for Method of Medication Administration: meds crushed, with puree (23 1300)     Monitor for Signs of Aspiration: yes, notify SLP if any concerns (23 1300)  Recommended  Diagnostics: reassess via clinical swallow evaluation (07/19/23 1300)  Swallow Criteria for Skilled Therapeutic Interventions Met: demonstrates skilled criteria (07/19/23 1300)     Rehab Potential/Prognosis, Swallowing: good, to achieve stated therapy goals (07/19/23 1300)  Therapy Frequency (Swallow): PRN (07/19/23 1300)  Predicted Duration Therapy Intervention (Days): until discharge (07/19/23 1300)  Oral Care Recommendations: Oral Care BID/PRN, Oral Care before breakfast, after meals and PRN (07/19/23 1300)                                      Oral Care Recommendations: Oral Care BID/PRN, Oral Care before breakfast, after meals and PRN (07/19/23 1300)           SWALLOW EVALUATION (last 72 hours)       SLP Adult Swallow Evaluation       Row Name 07/19/23 1300       Rehab Evaluation    Document Type re-evaluation  -EC    Subjective Information complains of;pain  states her throat hurts  -EC    Patient Observations alert;cooperative;agree to therapy  -EC    Patient/Family/Caregiver Comments/Observations --    Patient Effort fair  -EC    Comment Pt seen for re-eval of swallow this date. Pt is currently NPO w/DHT for nutrition. Pt repositioned to be sititng upright for PO trials. Pt very resistant to PO trials but does take trials of puree consistency pudding and thin water via straw w/encouragement from clinician and spouse. Pt takes very small bites and sips w/mild bolus holding. Pt c/o pharyngeal pain though swallow appears functional w/no overt s/s of aspiration demonstrated at anytime. Pt adequately clears oral cavity between small bites. No further trials administered d/t resistance from pt and c/o throat pain. Recommend pt initiate a puree diet w/thin liquids at this time. ST to continue to follow for diet tolerance w/further recommendations as indicated.  -EC    Symptoms Noted During/After Treatment none  -EC       General Information    Patient Profile Reviewed yes  -EC       SLP Evaluation Clinical  Impression    SLP Swallowing Diagnosis functional pharyngeal phase;R/O pharyngeal dysphagia;mild;oral dysphagia  -EC    Functional Impact risk of malnutrition  -EC    Rehab Potential/Prognosis, Swallowing good, to achieve stated therapy goals  -EC    Swallow Criteria for Skilled Therapeutic Interventions Met demonstrates skilled criteria  -EC       SLP Treatment Clinical Impressions    Care Plan Review evaluation/treatment results reviewed;patient/other agree to care plan  -EC    Care Plan Review, Other Participant(s) spouse  -EC       Recommendations    Therapy Frequency (Swallow) PRN  -EC    Predicted Duration Therapy Intervention (Days) until discharge  -EC    SLP Diet Recommendation puree;thin liquids  -EC    Recommended Diagnostics reassess via clinical swallow evaluation  -EC    Recommended Precautions and Strategies upright posture during/after eating;general aspiration precautions;fatigue precautions;assist with feeding  -EC    Oral Care Recommendations Oral Care BID/PRN;Oral Care before breakfast, after meals and PRN  -EC    SLP Rec. for Method of Medication Administration meds crushed;with puree  -EC    Monitor for Signs of Aspiration yes;notify SLP if any concerns  -EC       Swallow Goals (SLP)    Swallow LTGs Swallow Long Term Goal (free text)  -EC    Swallow STGs diet tolerance goal selection (SLP)  -EC    Diet Tolerance Goal Selection (SLP) Swallow Short Term Goal 1  -EC       (LTG) Swallow    (LTG) Swallow Patient will tolerate safest and least restrictive diet without complications from aspiration.  -EC    Time Frame (Swallow Long Term Goal) by discharge  -EC    Progress/Outcomes (Swallow Long Term Goal) goal ongoing;continuing progress toward goal  -EC       (STG) Swallow 1    (STG) Swallow 1 Patient will participate in ongoing assessment of swallow including reevaluation clinically and/or including instrumental assessment of swallow if indicated, to further assess swallow function in anticipation  of establishing safe PO diet.  -EC    Time Frame (Swallow Short Term Goal 1) 1 week  -EC    Progress/Outcomes (Swallow Short Term Goal 1) goal ongoing;continuing progress toward goal  -EC              User Key  (r) = Recorded By, (t) = Taken By, (c) = Cosigned By      Initials Name Effective Dates    EC Jazzmine Krishnath 06/16/21 -     CB Addie Amador, SLP 09/21/21 -                     EDUCATION  The patient has been educated in the following areas:   Dysphagia (Swallowing Impairment).        SLP GOALS       Row Name 07/19/23 1300 07/17/23 1000 07/16/23 1800       (LTG) Swallow    (LTG) Swallow Patient will tolerate safest and least restrictive diet without complications from aspiration.  -EC Patient will tolerate safest and least restrictive diet without complications from aspiration.  -CB Patient will tolerate safest and least restrictive diet without complications from aspiration.  -CB    Time Frame (Swallow Long Term Goal) by discharge  -EC by discharge  -CB by discharge  -CB    Progress/Outcomes (Swallow Long Term Goal) goal ongoing;continuing progress toward goal  -EC -- --       (STG) Swallow 1    (STG) Swallow 1 Patient will participate in ongoing assessment of swallow including reevaluation clinically and/or including instrumental assessment of swallow if indicated, to further assess swallow function in anticipation of establishing safe PO diet.  -EC Patient will participate in ongoing assessment of swallow including reevaluation clinically and/or including instrumental assessment of swallow if indicated, to further assess swallow function in anticipation of establishing safe PO diet.  -CB Patient will participate in ongoing assessment of swallow including reevaluation clinically and/or including instrumental assessment of swallow if indicated, to further assess swallow function in anticipation of establishing safe PO diet.  -CB    Time Frame (Swallow Short Term Goal 1) 1 week  -EC 1 week  -CB 1  week  -CB    Progress/Outcomes (Swallow Short Term Goal 1) goal ongoing;continuing progress toward goal  -EC -- --    Comment (Swallow Short Term Goal 1) -- Patient was seen for reevaluation of swallow functions. Patient's  reported that GI doctor was in earlier and stated that he was going to have a EGD done tomorrow to determie if patient had stricture. Patient definitely has anxiety with swallowing and is hesitant with swallowing anything.  Properly positioned patient upright in bed prior to trials. Patient took sips of thins via straw x 3 without s/s of aspiration. No wet vocal quality was detected. Oral transit was approximately 3 seconds in duration.   Patient took small bite of pudding x 2. Oral transit was slow but functional. No overt s/s of aspiration was observed. Vocal quality remained clear. Patient took 1 small bite of mixed consistency x 1. Patient swallowed the juice from watermelon only. Patient chewed up piece but did not swallow it and ended up spitting it out. Patient is NPO except for ensure until completion of EGD.   Encouraged patient to continue drinking water and/or ice chips in accordance with [place] water protocol. ST will f/u tomorrow once EGD has been completed.  -CB --              User Key  (r) = Recorded By, (t) = Taken By, (c) = Cosigned By      Initials Name Provider Type    Francisca Thomason Speech and Language Pathologist    Addie Galloway, SLP Speech and Language Pathologist                       Time Calculation:                Francisca Krishna  7/19/2023

## 2023-07-19 NOTE — THERAPY TREATMENT NOTE
"Subjective: Pt reluctant but agreeable to participate with encouragement from spouse and education on importance of mobility. Pt noted to be lying in loose stool.    Objective:     Bed mobility - Pt completed right/left rolling requiring MOD A x 2 while PT/CNA performed pericare needs, removed soiled chucks, and replaced with clean chucks/bedding. Pt then completed supine to/from sit transfer requiring MAX A.  Transfers - N/A or Not attempted. (Pt refused this date)  Ambulation -  N/A or Not attempted.    Therapeutic Exercise - Pt completed 1x15 seated ankle pumps and 1x6 seated LAQ requiring Vc'ing and demonstration for techinque with rest breaks between reps 2*/2 to fatigue.    Sitting balance - Pt completed 7 minute static/dynamic sitting bout EOB, completed seated BLE as well as reaching in various planes w/ BUEs to facilitate improved dynamic sitting balance and requiring SBA/CGA.    Vitals: WNL    Pain: Does not rate but winces/complains of pain in perineum during pericare  Intervention for pain: Repositioned    Education: Provided education on the importance of mobility in the acute care setting and Verbal/Tactile Cues    Assessment: Rosario Ortez presents with functional mobility impairments which indicate the need for skilled intervention. Tolerating session today without incident. Pt completed right/left rolling requiring MOD A x 2 while PT/CNA performed pericare needs, removed soiled chucks, and replaced with clean chucks/bedding. Pt then completed supine to/from sit transfer requiring MAX A with 7 minute dynamic sitting bout EOB requiring SBA/CGA while performing BLE therapeutic exercise and BUE dynamic balance activity. Recommending SNF at d/c and will continue to follow and progress as tolerated. PPE: gloves    Plan/Recommendations:   Moderate Intensity Therapy recommended post-acute care. This is recommended as therapy feels the patient would require 3-4 days per week and wouldn't tolerate \"3 hour " "daily\" rehab intensity. SNF would be the preferred choice. If the patient does not agree to SNF, arrange HH or OP depending on home bound status. If patient is medically complex, consider LTACH.. Pt requires no DME at discharge.     Pt desires Skilled Rehab placement at discharge. Pt cooperative; agreeable to therapeutic recommendations and plan of care.         Basic Mobility 6-click:  Rollin = Total, A lot = 2, A little = 3; 4 = None  Supine>Sit:   1 = Total, A lot = 2, A little = 3; 4 = None   Sit>Stand with arms:  1 = Total, A lot = 2, A little = 3; 4 = None  Bed>Chair:   1 = Total, A lot = 2, A little = 3; 4 = None  Ambulate in room:  1 = Total, A lot = 2, A little = 3; 4 = None  3-5 Steps with railin = Total, A lot = 2, A little = 3; 4 = None  Score: 8    Post-Tx Position: Supine with HOB Elevated, Alarms activated, and Call light and personal items within reach  PPE: gloves     "

## 2023-07-19 NOTE — PROGRESS NOTES
Hematology/Oncology Inpatient Progress Note    PATIENT NAME: Rosario Ortez  : 1950  MRN: [id number]    CHIEF COMPLAINT: IgG kappa monoclonal gammopathy, iron deficiency anemia and anemia of chronic disease due to CKD    HISTORY OF PRESENT ILLNESS:  73 y.o. female presented to [facility] ER on 2023 upon the direction of her primary care provider for abnormal labs.  She had seen her PCP the day prior and was started on doxycycline for bronchitis and was found to have persistent anemia with a hemoglobin of 8.5 and creatinine 1.45.  SPEP was abnormal.  Prior to admission, in May 2023, she was seen in follow-up by Dr. Flores for recurrent ascites and possible sarcoidosis.  She was on methotrexate with folic acid and dexamethasone.  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  Her ACE level was elevated at 109 and it was felt that she may have sarcoidosis.  Ascitic fluid in 2023, was transudate with negative cytology.  Her Lasix was increased.  Her hepatitis panel and autoimmune work-up was negative in the past.  It was recommended that she undergo a liver biopsy when her ascites improved.  She was referred to the [facility] and had an appointment on 23.  Discharged from [facility] on 2023, following a 3-day admission for syncope and JOSÉ.  Cardiology was consulted. Stress test was negative with a EF of 74%.  Bilateral lower extremity venous Dopplers were negative.  Stool heme was positive,  (135-214) and  PTH 53.2 (15-65). CMP remarkable for creatinine of 1.37 and AST of 37 (1-33).  White count 4.6, hemoglobin 8.2, MCV 95, platelets 210,000.  Iron studies revealed an iron saturation of 28% (20-50), ferritin of 220 (), and TIBC 189 (298-536).  Iron was 53 ().  Vitamin B12 873 (211-946). Paracentesis was done with removal of 6.5 L of clear yellow fluid.  She stopped taking methotrexate shortly after she was  discharged.  In the ER, CMP remarkable for creatinine of 2.79, albumin 2.6 and AST 40.  White count was 12.6, hemoglobin 8.7, MCV 97.3 and platelets 254,000.  PT 13.5, PTT 33.1, fibrinogen 253 (210-450).  Repeat iron studies showed an iron of 19 (), iron saturation 11 (20-50), TIBC 170 (298-536), and ferritin of 216 (13-1 50).  IgA 243 (), IgG 905 (580-1602), and IgM 35 ().  CXR was unremarkable.  She reported pain in her right knee and MRI showed prominent diffuse edema in the soft tissue surrounding the knee suggesting a skin and soft tissue infection.  There was a 2.8 x 0.80 x 2.5 cm collection in the subcu tissues along the medial femoral condyle which was nonspecific but could represent an abscess.  There was no significant knee effusion or significant marrow edema to suggest joint infection.  There was advanced right compartmental osteoarthritis and suspicion for tears of the posterior horns of the menisci was seen.  Urine culture grew Klebsiella pneumonia and blood culture from 1 bottle grew alphahemolytic strep.  SIFE showed IgG kappa monoclonal gammopathy.  Haptoglobin 173 ().  Surgery, GI, nephrology, orthopedics and infectious disease were consulted.  On 7/15/2023, her stool heme was positive and ESR was 11 (0-30).  She was transfused 1 unit pRBCs on 7/17/2023 for hemoglobin drop to 7.4.  On the day of consultation, her white count was 11.9, hemoglobin 8.3, MCV 90.8 and platelets 215.  Chemistries were significant for potassium 3.4 and creatinine 1.72.  PT 13.2 (9.6-11.7), and PTT 47 (24-31).  It was planned that she undergo an EGD and have an aspirate performed on her presumed right knee abscess.     07/18/23  Hematology/Oncology was consulted for her IgG kappa monoclonal gammopathy.     Past Medical History: CKD, anemia.  Hypothyroidism, A-fib 2021.  Hyperlipidemia and hypertension for years. Bilateral lower extremity neuropathy.  Surgical History: Tonsillectomy and appendectomy.  " Cholecystectomy 2006.  Possible hysterectomy.  EGD and colonoscopy 2023.  Social History: She lives in [place] with her .  She used to work as a .  No history of smoking or alcohol abuse.  Family History: Her sister had breast cancer at age 75.  Allergies: Codeine causes nausea.  Topiramate causes a rash.  Tramadol causes mental status changes.  Cefdinir, Levaquin and lisinopril.     PCP: Ian Cordero MD    INTERVAL HISTORY:  7/18/2023-Ferrlecit 250 mg IV x1.  Patient underwent right knee aspiration and bone marrow aspiration per IR.  EGD performed by Dr. Flores which was normal and Dobbhoff tube was placed.  7/19/2023-White count 16.3, hemoglobin 9.4, MCV 93 and platelets 243.  Creatinine 1.62.  AST 47 (1-32).  Bone survey showed an old T6 compression fracture only.  Right knee aspirate with many gram-positive cocci in pairs.  Spot urine WILL showed no monoclonal protein.    History of present illness reviewed since last visit and changes noted on 07/19/23.    Subjective   denies complaints, although her  states she does not complain as she does not want any \"poking\".    ROS:  Review of Systems   Constitutional:  Negative for activity change, chills, fatigue, fever and unexpected weight change.   HENT:  Negative for congestion, dental problem, hearing loss, mouth sores, nosebleeds, sore throat and trouble swallowing.    Eyes:  Negative for photophobia and visual disturbance.   Respiratory:  Negative for cough, chest tightness and shortness of breath.    Cardiovascular:  Negative for chest pain, palpitations and leg swelling.   Gastrointestinal:  Negative for abdominal distention, abdominal pain, blood in stool, constipation, diarrhea, nausea and vomiting.   Endocrine: Negative for cold intolerance and heat intolerance.   Genitourinary:  Negative for decreased urine volume, difficulty urinating, frequency, hematuria and urgency.   Musculoskeletal:  Negative for arthralgias and " "gait problem.   Skin:  Negative for rash and wound.   Neurological:  Negative for dizziness, tremors, seizures, weakness, light-headedness, numbness and headaches.   Hematological:  Negative for adenopathy. Does not bruise/bleed easily.   Psychiatric/Behavioral:  Negative for confusion and hallucinations. The patient is not nervous/anxious.    All other systems reviewed and are negative.     MEDICATIONS:    Scheduled Meds:  atorvastatin, 20 mg, Oral, Nightly  cefTRIAXone, 2,000 mg, Intravenous, Q24H  ferric gluconate, 250 mg, Intravenous, Once  folic acid, 1 mg, Oral, Daily  levothyroxine, 50 mcg, Oral, Q AM  pantoprazole, 40 mg, Oral, Daily  predniSONE, 15 mg, Oral, Daily  senna-docusate sodium, 2 tablet, Oral, BID  sodium bicarbonate, 1,300 mg, Oral, TID  sodium chloride, 10 mL, Intravenous, Q12H  sodium chloride, 3 mL, Intravenous, Q12H       Continuous Infusions:  sodium bicarbonate drip (greater than 75 mEq/bag), 100 mEq, Last Rate: 100 mEq (07/18/23 1712)       PRN Meds:    acetaminophen    senna-docusate sodium **AND** polyethylene glycol **AND** bisacodyl **AND** bisacodyl    ondansetron    Potassium Replacement - Follow Nurse / BPA Driven Protocol    [COMPLETED] Insert Peripheral IV **AND** sodium chloride    sodium chloride    sodium chloride    sodium chloride    sodium chloride    traZODone     ALLERGIES:    Allergies   Allergen Reactions    Codeine Nausea And Vomiting, Other (See Comments) and Nausea Only     nausea  nausea      Topiramate Rash    Tramadol Other (See Comments) and Mental Status Change    Cefdinir Unknown - High Severity    Levofloxacin Nausea Only    Lisinopril Cough       Objective    VITALS:   /45 (BP Location: Right arm, Patient Position: Lying)   Pulse 76   Temp 98.2 °F (36.8 °C) (Oral)   Resp 19   Ht 167.6 cm (66\")   Wt 83 kg (182 lb 15.7 oz)   SpO2 95%   BMI 29.53 kg/m²     PHYSICAL EXAM:  Physical Exam  Vitals and nursing note reviewed.   Constitutional:       " General: She is not in acute distress.     Appearance: Normal appearance. She is well-developed and normal weight. She is not diaphoretic.      Comments: Not as lethargic as yesterday.   HENT:      Head: Normocephalic and atraumatic.      Nose: Nose normal.      Comments: Dobbhoff tube and nasal cannula.     Mouth/Throat:      Mouth: Mucous membranes are moist.      Pharynx: Oropharynx is clear. No oropharyngeal exudate or posterior oropharyngeal erythema.      Comments: Dental fillings.  Eyes:      General: No scleral icterus.     Extraocular Movements: Extraocular movements intact.      Conjunctiva/sclera: Conjunctivae normal.      Pupils: Pupils are equal, round, and reactive to light.   Cardiovascular:      Rate and Rhythm: Normal rate and regular rhythm.      Heart sounds: Normal heart sounds. No murmur heard.  Pulmonary:      Effort: Pulmonary effort is normal. No respiratory distress.      Breath sounds: Normal breath sounds. No stridor. No wheezing or rales.   Abdominal:      General: Bowel sounds are normal. There is no distension.      Palpations: Abdomen is soft. There is no mass.      Tenderness: There is no abdominal tenderness. There is no guarding.   Genitourinary:     Comments: Barroso catheter.  Musculoskeletal:         General: Swelling (Left hand, 1+ right lower extremity and trace left lower extremity.) present. No tenderness or deformity. Normal range of motion.      Cervical back: Normal range of motion and neck supple.      Right lower leg: No edema.      Left lower leg: No edema.      Comments: Left upper extremity O2 monitor.  Right knee swollen with medial dressing.   Lymphadenopathy:      Cervical: No cervical adenopathy.      Upper Body:      Right upper body: No supraclavicular adenopathy.      Left upper body: No supraclavicular adenopathy.   Skin:     General: Skin is warm and dry.      Coloration: Skin is not pale.      Findings: No bruising, erythema or rash.      Comments: Right upper  extremity IV.  Left forearm with bandage and swelling of left hand.   Neurological:      General: No focal deficit present.      Mental Status: She is oriented to person, place, and time.      Coordination: Coordination normal.   Psychiatric:         Mood and Affect: Mood normal.         Behavior: Behavior normal.         Thought Content: Thought content normal.         Judgment: Judgment normal.         RECENT LABS:  Lab Results (last 24 hours)       Procedure Component Value Units Date/Time    Body Fluid Culture - Body Fluid, Knee, Right [[id number]] Collected: 07/18/23 1559    Specimen: Body Fluid from Knee, Right Updated: 07/19/23 0840     Body Fluid Culture Growth present, too young to evaluate     Gram Stain Many (4+) WBCs per low power field      Many (4+) Gram positive cocci in pairs    Manual Differential [[id number]]  (Abnormal) Collected: 07/19/23 0310    Specimen: Blood Updated: 07/19/23 0412     Neutrophil % 81.0 %      Lymphocyte % 2.0 %      Monocyte % 8.0 %      Eosinophil % 5.0 %      Bands %  4.0 %      Neutrophils Absolute 13.86 10*3/mm3      Lymphocytes Absolute 0.33 10*3/mm3      Monocytes Absolute 1.30 10*3/mm3      Eosinophils Absolute 0.82 10*3/mm3      Polychromasia Slight/1+     WBC Morphology Normal     Platelet Estimate Adequate    CBC & Differential [[id number]]  (Abnormal) Collected: 07/19/23 0310    Specimen: Blood Updated: 07/19/23 0412    Narrative:      The following orders were created for panel order CBC & Differential.  Procedure                               Abnormality         Status                     ---------                               -----------         ------                     CBC Auto Differential[[id number]]        Abnormal            Final result               Scan Slide[[id number]]                                       Final result                 Please view results for these tests on the individual orders.    Scan Slide [[id number]] Collected: 07/19/23 0310     Specimen: Blood Updated: 07/19/23 0412     Scan Slide --     Comment: See Manual Differential Results       CBC Auto Differential [[id number]]  (Abnormal) Collected: 07/19/23 0310    Specimen: Blood Updated: 07/19/23 0412     WBC 16.30 10*3/mm3      RBC 3.15 10*6/mm3      Hemoglobin 9.4 g/dL      Hematocrit 29.3 %      MCV 93.0 fL      MCH 29.9 pg      MCHC 32.1 g/dL      RDW 22.2 %      RDW-SD 70.4 fl      MPV 7.1 fL      Platelets 243 10*3/mm3     Narrative:      The previously reported component NRBC is no longer being reported. Previous result was 0.1 /100 WBC (Reference Range: 0.0-0.2 /100 WBC) on 7/19/2023 at [facility] EDT.    Phosphorus [[id number]]  (Abnormal) Collected: 07/19/23 0310    Specimen: Blood Updated: 07/19/23 0410     Phosphorus 2.3 mg/dL     Comprehensive Metabolic Panel [[id number]]  (Abnormal) Collected: 07/19/23 0310    Specimen: Blood Updated: 07/19/23 0410     Glucose 151 mg/dL      BUN 60 mg/dL      Creatinine 1.62 mg/dL      Sodium 141 mmol/L      Potassium 4.3 mmol/L      Comment: Result checked          Chloride 108 mmol/L      CO2 23.0 mmol/L      Calcium 8.4 mg/dL      Total Protein 5.3 g/dL      Albumin 2.1 g/dL      ALT (SGPT) 17 U/L      AST (SGOT) 47 U/L      Alkaline Phosphatase 204 U/L      Total Bilirubin 0.5 mg/dL      Globulin 3.2 gm/dL      A/G Ratio 0.7 g/dL      BUN/Creatinine Ratio 37.0     Anion Gap 10.0 mmol/L      eGFR 33.0 mL/min/1.73     Narrative:      GFR Normal >60  Chronic Kidney Disease <60  Kidney Failure <15    The GFR formula is only valid for adults with stable renal function between ages 18 and 70.    Immunoglobulin Free LT Chains Blood [[id number]] Collected: 07/18/23 1853    Specimen: Blood Updated: 07/18/23 1906    Copper, Serum [[id number]] Collected: 07/18/23 1853    Specimen: Blood Updated: 07/18/23 1906    Immunofixation, Urine - Straight Cath [[id number]] Collected: 07/14/23 2009    Specimen: Urine from Straight Cath Updated: 07/18/23 1309     WILL  Interpretation:U Comment     Comment: No monoclonality detected.       Narrative:      Performed at:  [address]  [id number]  : Edward Ulloa PhD, Phone:  [phone]            PENDING RESULTS: 24-hour UIFE, light chain ratio, bone marrow results, knee aspirate culture and copper.    IMAGING REVIEWED:  XR Bone Survey Complete    Result Date: 7/18/2023  Impression: Compression fracture of T6, likely old. Other chronic findings as detailed. Electronically Signed: Juliane Baker MD  7/18/2023 3:30 PM EDT  Workstation ID: [id number]    XR Abdomen KUB    Result Date: 7/18/2023  Impression: Antegrade oriented feeding tube tip within distal stomach. Electronically Signed: Pedro Garcia MD  7/18/2023 1:11 PM EDT  Workstation ID: [id number]    CT Bone marrow biopsy and aspiration    Result Date: 7/18/2023  1. Successful CT-guided bone marrow biopsy Electronically Signed: Cameron Boudreaux  7/18/2023 4:08 PM EDT  Workstation ID: [id number]    IR Inject/asp large joint or bursa    Result Date: 7/18/2023  1. Successful aspiration of right medial knee abscess yielding 8 mL of purulent material. Electronically Signed: Cameron Boudreaux  7/18/2023 4:41 PM EDT  Workstation ID: [id number]     I have reviewed the patient's labs, imaging, reports, and other clinician documentation.    Assessment & Plan   ASSESSMENT:  IgG kappa monoclonal gammopathy-incidental finding on work-up for CKD and anemia.  Spot urine WILL with no gammopathy.  Creatinine improving.  Bone survey showed an old T6 fracture.  Immunoglobulins normal.  Bone marrow aspiration performed 7/18-await results.  Will order 24-hour UIFE.  On prednisone as an outpatient, which was continued.  Anemia and OLAF-onset June 2023, associated with iron deficiency, CKD, monoclonal gammopathy, recent methotrexate use and heme positive stools.  On folic acid and Protonix.  S/p 3 doses of Ferrlecit 250 mg each and 1 unit pRBCs (7/17).  Prior work-up  revealed normal B12 and haptoglobin.  Retic was suppressed.  EGD was unremarkable.  Copper level pending.  Improved.  Recurrent ascites, liver disease, chronic epigastric pain, noncaseating granuloma of the stomach, acute coagulopathy and elevated LFTs- in work-up for sarcoidosis per Dr. Flores.  She has been [facility] to the [facility].  Acute hepatitis panel, platelets and fibrinogen all normal.  Mildly elevated AST.  Likely due to factor deficiency from liver disease.  No esophageal varices on recent EGD.   CKD with electrolyte imbalances, A-fib, hypertension, hyperlipidemia and hypothyroidism-On levothyroxine.  Per PMD and nephrology.  UTI, right knee cellulitis and leukocytosis-on Rocephin.  Knee aspirate showed infection.  On Rocephin per ID.     PLAN  Obtain SPEP from primary care provider as outpatient.  Obtain records from patient's rheumatologist.  24-hour UIFE.  Bone marrow aspiration and biopsy path pending.   Light chain ratio pending.  Copper level pending.  DEXA scan as outpatient.  Colonoscopy as outpatient.  Coags in AM.    Patient seen and evaluated by Dr. Ayala.  Electronically signed by JOE Goddard, 07/19/23, 9:58 AM EDT.        I have personally performed a face-to-face diagnostic evaluation on this patient. I have performed a complete history and physical examination, reviewed laboratory studies, and radiographic examinations.  I have completed the majority and substantive portion of the medical decision making.  I have formulated the assessment on this patient and the plan of action as noted above. I have discussed the case with Raquel Guevara NP, have edited/reviewed the note, and agree with the care plan.  She is denying any problems but does grimace on touching her right knee.  On examination, she does have a Dobbhoff and O2 by nasal cannula present.  Hemoglobin is 9.4 and spot UIFE does not reveal a monoclonal protein.  Await bone marrow pathology results.        I  discussed the patient's findings and my recommendations with patient and spouse.    Part of this note may be an electronic transcription/translation of spoken language to printed text using the Dragon Dictation System.    Electronically signed by Rc Ayala MD, 07/19/23, 1:46 PM EDT.

## 2023-07-19 NOTE — PROGRESS NOTES
LOS: 5 days   Patient Care Team:  Ian Cordero MD as PCP - General  Tony Parisi MD as Consulting Physician (Nephrology)  Rc Ayala MD as Consulting Physician (Hematology and Oncology)      Subjective no change    Interval History:     Subjective: Continues with minimal oral intake.  Tolerating tube feeds.  Some abdominal discomfort.    ROS:   No chest pain, shortness of breath, or cough.      Medication Review:     Current Facility-Administered Medications:     acetaminophen (TYLENOL) tablet 650 mg, 650 mg, Oral, Q6H PRN, Abhinav Boyd MD, 650 mg at 07/19/23 0202    atorvastatin (LIPITOR) tablet 20 mg, 20 mg, Oral, Nightly, Abhinav Boyd MD, 20 mg at 07/18/23 2031    sennosides-docusate (PERICOLACE) 8.6-50 MG per tablet 2 tablet, 2 tablet, Oral, BID, 2 tablet at 07/19/23 0947 **AND** polyethylene glycol (MIRALAX) packet 17 g, 17 g, Oral, Daily PRN **AND** bisacodyl (DULCOLAX) EC tablet 5 mg, 5 mg, Oral, Daily PRN **AND** bisacodyl (DULCOLAX) suppository 10 mg, 10 mg, Rectal, Daily PRN, Abhinav Boyd MD    cefTRIAXone (ROCEPHIN) 2,000 mg in sodium chloride 0.9 % 100 mL IVPB, 2,000 mg, Intravenous, Q24H, Armando Adams MD, Last Rate: 200 mL/hr at 07/19/23 0602, 2,000 mg at 07/19/23 0602    ferric gluconate (FERRLECIT) 250 MG in sodium chloride 0.9% 250 mL IVPB, 250 mg, Intravenous, Once, Raquel Guevara APRN    folic acid (FOLVITE) tablet 1 mg, 1 mg, Oral, Daily, Abhinav Boyd MD, 1 mg at 07/19/23 0947    levothyroxine (SYNTHROID, LEVOTHROID) tablet 50 mcg, 50 mcg, Oral, Q AM, Abhinav Boyd MD, 50 mcg at 07/19/23 0513    ondansetron (ZOFRAN) injection 4 mg, 4 mg, Intravenous, Q6H PRN, Abhinav Boyd MD    pantoprazole (PROTONIX) EC tablet 40 mg, 40 mg, Oral, Daily, Abhinav Boyd MD, 40 mg at 07/19/23 0947    Potassium Replacement - Follow Nurse / BPA Driven Protocol, , Does not apply, PRN, Armando Adams MD    predniSONE (DELTASONE) tablet 15 mg, 15 mg, Oral, Daily, Abhinav Boyd MD, 15 mg  at 07/19/23 0947    sodium bicarbonate 8.4 % 100 mEq in dextrose (D5W) 5 % 1,000 mL infusion (greater than 75 mEq), 100 mEq, Intravenous, Continuous, Sang Sanford MD, Last Rate: 75 mL/hr at 07/18/23 1712, 100 mEq at 07/18/23 1712    sodium bicarbonate tablet 1,300 mg, 1,300 mg, Oral, TID, Tony Parisi MD, 1,300 mg at 07/19/23 0947    [COMPLETED] Insert Peripheral IV, , , Once **AND** sodium chloride 0.9 % flush 10 mL, 10 mL, Intravenous, PRN, Rafael De Luna MD    sodium chloride 0.9 % flush 10 mL, 10 mL, Intravenous, Q12H, Abhinav Boyd MD, 10 mL at 07/19/23 0947    sodium chloride 0.9 % flush 10 mL, 10 mL, Intravenous, PRN, Abhinav Boyd MD    sodium chloride 0.9 % flush 3 mL, 3 mL, Intravenous, Q12H, Kacie White APRN, 3 mL at 07/19/23 0947    sodium chloride 0.9 % flush 3-10 mL, 3-10 mL, Intravenous, PRN, Kacie White APRN    sodium chloride 0.9 % infusion 40 mL, 40 mL, Intravenous, PRN, Abhinav Boyd MD, 100 mL at 07/18/23 1236    sodium chloride 0.9 % infusion 40 mL, 40 mL, Intravenous, PRN, Kacie White APRN    traZODone (DESYREL) tablet 100 mg, 100 mg, Oral, Nightly PRN, Abhinav Boyd MD, 100 mg at 07/15/23 2014      Objective chronically ill-appearing but no acute distress,  and speech therapy at bedside    Vital Signs  Vitals:    07/19/23 0004 07/19/23 0023 07/19/23 0346 07/19/23 0813   BP: 110/45  102/41 103/45   BP Location: Left arm  Right arm Right arm   Patient Position: Lying  Lying Lying   Pulse: 82  85 76   Resp: 11 14 19   Temp: 99 °F (37.2 °C)  98 °F (36.7 °C) 98.2 °F (36.8 °C)   TempSrc: Oral  Oral Oral   SpO2: 94% 94% 94% 95%   Weight:   83 kg (182 lb 15.7 oz)    Height:           Physical Exam:     General Appearance:    Awake and alert, in no acute distress although chronically ill-appearing   Head:    Normocephalic, without obvious abnormality, Dobbhoff intact with tube feeds running   Eyes:          Conjunctivae normal, anicteric sclera   Throat:   No oral  lesions, no thrush, oral mucosa moist   Neck:   supple, no JVD   Lungs:     respirations regular, even and unlabored   Abdomen:     Soft, mild generalized tenderness without rebound or guarding, mild to moderate distention   Rectal:     Deferred   Extremities:   no cyanosis   Skin:   No bruising or rash, no jaundice        Results Review:    CBC    Results from last 7 days   Lab Units 07/19/23  0310 07/18/23  0125 07/17/23  1859 07/17/23  1114 07/16/23  0630 07/14/23  2234 07/14/23  0310 07/13/23  0852   WBC 10*3/mm3 16.30* 11.90*  --  13.10* 11.20* 9.80 9.80 12.60*   HEMOGLOBIN g/dL 9.4* 8.3* 9.1* 7.4* 7.7* 7.5* 7.2* 8.7*   PLATELETS 10*3/mm3 243 215  --  238 219 206 179 254     CMP   Results from last 7 days   Lab Units 07/19/23  0310 07/18/23  0125 07/17/23  1114 07/16/23  0152 07/14/23  2234 07/14/23  0309 07/13/23  0852   SODIUM mmol/L 141 141 140 140 138 137 135*   POTASSIUM mmol/L 4.3 3.4* 3.7 4.3 4.4 4.2 4.2   CHLORIDE mmol/L 108* 109* 110* 111* 109* 108* 103   CO2 mmol/L 23.0 21.0* 21.0* 15.0* 17.0* 18.0* 18.0*   BUN mg/dL 60* 65* 75* 72* 75* 73* 82*   CREATININE mg/dL 1.62* 1.72* 2.18* 2.30* 2.69* 2.71* 2.79*   GLUCOSE mg/dL 151* 101* 101* 96 112* 92 86   ALBUMIN g/dL 2.1* 2.1* 1.9*  --   --   --  2.6*   BILIRUBIN mg/dL 0.5  --  0.3  --   --   --  0.9   ALK PHOS U/L 204*  --  137*  --   --   --  174*   AST (SGOT) U/L 47*  --  26  --   --   --  40*   ALT (SGPT) U/L 17  --  15  --   --   --  23   MAGNESIUM mg/dL  --   --  2.1  --   --   --   --    PHOSPHORUS mg/dL 2.3* 3.1  --   --   --   --   --      Cr Clearance Estimated Creatinine Clearance: 32.6 mL/min (A) (by C-G formula based on SCr of 1.62 mg/dL (H)).  Coag   Results from last 7 days   Lab Units 07/17/23  1114 07/14/23  2234   INR  1.25* 1.28*   APTT seconds 47.0* 33.1*     HbA1C   Lab Results   Component Value Date    HGBA1C 5.9 (H) 10/16/2019     Blood Glucose No results found for: POCGLU  Infection   Results from last 7 days   Lab Units  07/18/23  1559 07/13/23  1006 07/13/23  0944 07/13/23  0852   BLOODCX   --  Streptococcus, Alpha Hemolytic*  --  No growth at 5 days   BODYFLDCX  Growth present, too young to evaluate  --   --   --    URINECX   --   --  >100,000 CFU/mL Klebsiella aerogenes*  --    BCIDPCR   --  Streptococcus spp, not A, B, or pneumoniae. Identification by BCID2 PCR.*  --   --      UA    Results from last 7 days   Lab Units 07/13/23  0944   NITRITE UA  Negative   WBC UA /HPF 3-5*   BACTERIA UA /HPF 3+*   SQUAM EPITHEL UA /HPF 0-2   URINECX  >100,000 CFU/mL Klebsiella aerogenes*     Radiology(recent) XR Bone Survey Complete    Result Date: 7/18/2023  Impression: Compression fracture of T6, likely old. Other chronic findings as detailed. Electronically Signed: Juliane Baker MD  7/18/2023 3:30 PM EDT  Workstation ID: [id number]    XR Abdomen KUB    Result Date: 7/18/2023  Impression: Antegrade oriented feeding tube tip within distal stomach. Electronically Signed: Pedro Garcia MD  7/18/2023 1:11 PM EDT  Workstation ID: [id number]    CT Bone marrow biopsy and aspiration    Result Date: 7/18/2023  1. Successful CT-guided bone marrow biopsy Electronically Signed: Cameron Boudreaux  7/18/2023 4:08 PM EDT  Workstation ID: [id number]    IR Inject/asp large joint or bursa    Result Date: 7/18/2023  1. Successful aspiration of right medial knee abscess yielding 8 mL of purulent material. Electronically Signed: Cameron Boudreaux  7/18/2023 4:41 PM EDT  Workstation ID: [id number]        Assessment & Plan   73-year-old female presented to the ER 7/13/23 for abnormal renal labs. GI was consulted this admission for hemocult positive.  Followed by Dr. Flores as an outpatient for abnormal liver enzymes and ascites     Dysphagia with feeding difficulties  Hemoccult positive stool/iron deficiency anemia  Recurrent ascites  Elevated liver enzymes  JOSÉ/CKD  Possible sarcoidosis  A-fib on Eliquis  Bacteriuria  History of cholecystectomy  Hypothyroidism      Plan:  Patient is well-known to Dr. Flores as an outpatient with elevated LFTs, iron deficiency anemia and recurrent ascites with concern for possible sarcoidosis.  She is pending evaluation at [facility] with appointment on Thursday.  Now with JOSÉ on CKD with probable right medial knee abscess/fluid collection.  She had bone marrow biopsy yesterday, oncology following to rule out multiple myeloma.  EGD yesterday with normal esophagus without evidence of stricture and placement of nasal feeding tube for nutritional support.  She is tolerating tube feeds and speech therapy is following.  She is allowed a diet but is taking in minimal.  Continue attritional support.  She is on low-dose prednisone for suspected sarcoidosis as well.  Continue supportive care.    Electronically signed by JOE Teran, 07/19/23, 10:35 AM EDT.

## 2023-07-19 NOTE — CASE MANAGEMENT/SOCIAL WORK
Continued Stay Note  YAQUELIN Garciayd     Patient Name: Rosario Ortez  MRN: [id number]  Today's Date: 7/19/2023    Admit Date: 7/13/2023    Plan: [facility] following. Referrals to [facility] and [facility] pending. Will require precert. [facility] approved 7/17.   Discharge Plan       Row Name 07/19/23 1621       Plan    Plan [facility] following. Referrals to [facility] and [facility] pending. Will require precert. [facility] approved 7/17.    Patient/Family in Agreement with Plan yes    Plan Comments CM spoke to patient's  Surya at nurse's station. He requested referrals be made to [facility], [facility] and [facility]. [facility] liaison Kathleen notified but came in to visit with pt/ at bedside. Referrals sent to [facility] and [facility] in [facility]. Will contact buildings tomorrow.             Megan Naegele, RN     Office Phone: [phone]  Office Cell: [phone]

## 2023-07-19 NOTE — PROGRESS NOTES
Infectious Diseases Progress Note      LOS: 5 days   Patient Care Team:  Ian Cordero MD as PCP - General  Tony Parisi MD as Consulting Physician (Nephrology)  Rc Ayala MD as Consulting Physician (Hematology and Oncology)    Chief Complaint: Right leg pain, mass to the medial aspect of the right knee, fatigue    Subjective       The patient has been afebrile for the last 24 hours.  The patient is on 1 L of oxygen by nasal cannula, hemodynamically stable, and is tolerating antimicrobial therapy.  Patient is complaining of fatigue and pain to her buttocks region      Review of Systems:   Review of Systems   Constitutional:  Positive for fatigue.   HENT: Negative.     Eyes: Negative.    Respiratory:  Positive for cough and shortness of breath.    Cardiovascular: Negative.    Gastrointestinal: Negative.    Endocrine: Negative.    Genitourinary: Negative.    Musculoskeletal: Negative.    Skin:  Positive for color change.   Neurological:  Positive for weakness.   Psychiatric/Behavioral: Negative.     All other systems reviewed and are negative.     Objective     Vital Signs  Temp:  [97.7 °F (36.5 °C)-99 °F (37.2 °C)] 97.8 °F (36.6 °C)  Heart Rate:  [73-85] 80  Resp:  [11-19] 15  BP: (102-121)/(41-57) 121/57    Physical Exam:  Physical Exam  Vitals and nursing note reviewed.   Constitutional:       General: She is not in acute distress.     Appearance: She is well-developed and normal weight. She is ill-appearing. She is not diaphoretic.   HENT:      Head: Normocephalic and atraumatic.   Eyes:      General: No scleral icterus.     Extraocular Movements: Extraocular movements intact.      Conjunctiva/sclera: Conjunctivae normal.      Pupils: Pupils are equal, round, and reactive to light.   Cardiovascular:      Rate and Rhythm: Normal rate and regular rhythm.      Heart sounds: Normal heart sounds, S1 normal and S2 normal. No murmur heard.  Pulmonary:      Effort: Pulmonary effort is normal. No  respiratory distress.      Breath sounds: No stridor. No wheezing or rales.      Comments: Diminished throughout  Chest:      Chest wall: No tenderness.   Abdominal:      General: Bowel sounds are normal. There is distension.      Palpations: Abdomen is soft. There is no mass.      Tenderness: There is no abdominal tenderness. There is no guarding.   Musculoskeletal:         General: No swelling, tenderness or deformity.      Cervical back: Neck supple.   Skin:     General: Skin is warm and dry.      Coloration: Skin is not pale.      Findings: No bruising, erythema or rash.      Comments:  Patient has a fluctuant mass in the medial aspect of the knee-less fluctuant today    The small red patch to the lateral aspect of the knee has almost completely resolved.    No significant swelling or erythema to the rest of the right leg    Neurological:      Mental Status: She is alert and oriented to person, place, and time.      Cranial Nerves: No cranial nerve deficit.        Results Review:    I have reviewed all clinical data, test, lab, and imaging results.     Radiology  No Radiology Exams Resulted Within Past 24 Hours    Cardiology    Laboratory    Results from last 7 days   Lab Units 07/19/23 0310 07/18/23  0125 07/17/23  1859 07/17/23  1114 07/16/23  0630 07/14/23 2234 07/14/23  0310 07/13/23  0852   WBC 10*3/mm3 16.30* 11.90*  --  13.10* 11.20* 9.80 9.80 12.60*   HEMOGLOBIN g/dL 9.4* 8.3* 9.1* 7.4* 7.7* 7.5* 7.2* 8.7*   HEMATOCRIT % 29.3* 26.1* 28.3* 23.8* 24.1* 23.5* 22.0* 27.4*   PLATELETS 10*3/mm3 243 215  --  238 219 206 179 254       Results from last 7 days   Lab Units 07/19/23  0310 07/18/23  0125 07/17/23  1114 07/16/23  0152 07/14/23 2234 07/14/23  0309 07/13/23  0852   SODIUM mmol/L 141 141 140 140 138 137 135*   POTASSIUM mmol/L 4.3 3.4* 3.7 4.3 4.4 4.2 4.2   CHLORIDE mmol/L 108* 109* 110* 111* 109* 108* 103   CO2 mmol/L 23.0 21.0* 21.0* 15.0* 17.0* 18.0* 18.0*   BUN mg/dL 60* 65* 75* 72* 75* 73* 82*    CREATININE mg/dL 1.62* 1.72* 2.18* 2.30* 2.69* 2.71* 2.79*   GLUCOSE mg/dL 151* 101* 101* 96 112* 92 86   ALBUMIN g/dL 2.1* 2.1* 1.9*  --   --   --  2.6*   BILIRUBIN mg/dL 0.5  --  0.3  --   --   --  0.9   ALK PHOS U/L 204*  --  137*  --   --   --  174*   AST (SGOT) U/L 47*  --  26  --   --   --  40*   ALT (SGPT) U/L 17  --  15  --   --   --  23   CALCIUM mg/dL 8.4* 8.3* 8.5* 8.4* 8.5* 8.5* 9.4           Results from last 7 days   Lab Units 07/17/23  1114   SED RATE mm/hr 11           Microbiology   Microbiology Results (last 10 days)       Procedure Component Value - Date/Time    Body Fluid Culture - Body Fluid, Knee, Right [[id number]] Collected: 07/18/23 1559    Lab Status: Preliminary result Specimen: Body Fluid from Knee, Right Updated: 07/19/23 0840     Body Fluid Culture Growth present, too young to evaluate     Gram Stain Many (4+) WBCs per low power field      Many (4+) Gram positive cocci in pairs    Blood Culture - Blood, Arm, Left [[id number]]  (Abnormal) Collected: 07/13/23 1006    Lab Status: Final result Specimen: Blood from Arm, Left Updated: 07/15/23 0718     Blood Culture Streptococcus, Alpha Hemolytic     Isolated from Aerobic Bottle     Gram Stain Aerobic Bottle Gram positive cocci in chains    Narrative:      Probable contaminant requires clinical correlation, susceptibility not performed unless requested by physician.      Blood Culture ID, PCR - Blood, Arm, Left [[id number]]  (Abnormal) Collected: 07/13/23 1006    Lab Status: Final result Specimen: Blood from Arm, Left Updated: 07/14/23 0634     BCID, PCR Streptococcus spp, not A, B, or pneumoniae. Identification by BCID2 PCR.     BOTTLE TYPE Aerobic Bottle    Urine Culture - Urine, Straight Cath [[id number]]  (Abnormal)  (Susceptibility) Collected: 07/13/23 0944    Lab Status: Final result Specimen: Urine from Straight Cath Updated: 07/15/23 0358     Urine Culture >100,000 CFU/mL Klebsiella aerogenes    Narrative:      Colonization of the  urinary tract without infection is common. Treatment is discouraged unless the patient is symptomatic, pregnant, or undergoing an invasive urologic procedure.    Susceptibility        Klebsiella aerogenes      ISELA      Cefazolin Resistant      Cefepime Susceptible      Ceftazidime Susceptible      Ceftriaxone Susceptible      Gentamicin Susceptible      Levofloxacin Susceptible      Nitrofurantoin Resistant      Piperacillin + Tazobactam Susceptible      Trimethoprim + Sulfamethoxazole Susceptible                           Blood Culture - Blood, Arm, Left [[id number]]  (Normal) Collected: 07/13/23 0852    Lab Status: Final result Specimen: Blood from Arm, Left Updated: 07/18/23 0915     Blood Culture No growth at 5 days    Narrative:      Less than seven (7) mL's of blood was collected.  Insufficient quantity may yield false negative results.            Medication Review:       Schedule Meds  atorvastatin, 20 mg, Oral, Nightly  cefTRIAXone, 2,000 mg, Intravenous, Q24H  ferric gluconate, 250 mg, Intravenous, Once  folic acid, 1 mg, Oral, Daily  levothyroxine, 50 mcg, Oral, Q AM  pantoprazole, 40 mg, Oral, Daily  predniSONE, 15 mg, Oral, Daily  senna-docusate sodium, 2 tablet, Oral, BID  sodium bicarbonate, 1,300 mg, Oral, TID  sodium chloride, 10 mL, Intravenous, Q12H  sodium chloride, 3 mL, Intravenous, Q12H        Infusion Meds  sodium bicarbonate drip (greater than 75 mEq/bag), 100 mEq, Last Rate: 100 mEq (07/18/23 1712)        PRN Meds    acetaminophen    senna-docusate sodium **AND** polyethylene glycol **AND** bisacodyl **AND** bisacodyl    ondansetron    Potassium Replacement - Follow Nurse / BPA Driven Protocol    [COMPLETED] Insert Peripheral IV **AND** sodium chloride    sodium chloride    sodium chloride    sodium chloride    sodium chloride    traZODone        Assessment & Plan       Antimicrobial Therapy   1.  IV Rocephin        2.        3.        4.        5.          Assessment     Positive blood  culture in 1 out of 2 sets on admission for Streptococcus species.  Patient had no endovascular device or prior cardiac valve replacement.  We are suspecting contamination.     Bacteriuria.  Urine cultures are growing Klebsiella aerogenes.  urinalysis was not significantly abnormal.     Probable right medial knee abscess.  MRI shows a fluid collection in the subcutaneous tissues along the medial femoral condyle but no findings to suggest a joint infection  -Aspiration of aspect by IR on 7/18/2023     Recent work-up for sarcoidosis by an outpatient rheumatologist.  Patient was taken off methotrexate on 7/3/2023.  Still on prednisone.  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 labs  Case discussed with patient and family member at bedside  Case discussed with YCNDY Briggs, JOE  07/19/23  16:40 EDT    Note is dictated utilizing voice recognition software/Dragon   no...

## 2023-07-19 NOTE — CONSULTS
Nutrition Services    Patient Name: Rosario Ortez  YOB: 1950  MRN: [id number]  Admission date: 7/13/2023        PPE Documentation        PPE Worn By Provider Gloves    PPE Worn By Patient  N/A     PROGRESS NOTE      Encounter Information: Checking in on TF initiation. Pt s/p bone biopsy and EGD with NDT placement. Pt's nurse noted pt complaining of abdominal discomfort and has a minimally distended abdomen. Pt on standard formula at this time, will monitor for need to change to specialized TF formula if TF intolerance continues.        PO Diet: NPO Diet NPO Type: Strict NPO   PO Supplements: Boost Plus TID (provides 1080 kcals, 42 g protein if consumed); per SLP's documentation, pt is NPO except for ONS. SLP to continue following. RD will leave ONS order in place.      PO Intake:  0% of meals consumed since 7/15 per documentation, some NPO during that time       Current nutrition support: Isosource 1.5@ 25 mL/hr with 30 mL/hr FWF   Nutrition support review: Infusing as ordered as of 7/18 2014       Labs (reviewed below): Reviewed, management per attending.    Hypophosphatemia - replacement given       GI Function:  Last documented BM: 7/17         Nutrition Intervention Updates: Advance TF to goal rate of 55 mL/hr. Decrease FWF to 10 mL/hr.     Results from last 7 days   Lab Units 07/19/23  0310 07/18/23  0125 07/17/23  1114 07/14/23  0309 07/13/23  0852   SODIUM mmol/L 141 141 140   < > 135*   POTASSIUM mmol/L 4.3 3.4* 3.7   < > 4.2   CHLORIDE mmol/L 108* 109* 110*   < > 103   CO2 mmol/L 23.0 21.0* 21.0*   < > 18.0*   BUN mg/dL 60* 65* 75*   < > 82*   CREATININE mg/dL 1.62* 1.72* 2.18*   < > 2.79*   CALCIUM mg/dL 8.4* 8.3* 8.5*   < > 9.4   BILIRUBIN mg/dL 0.5  --  0.3  --  0.9   ALK PHOS U/L 204*  --  137*  --  174*   ALT (SGPT) U/L 17  --  15  --  23   AST (SGOT) U/L 47*  --  26  --  40*   GLUCOSE mg/dL 151* 101* 101*   < > 86    < > = values in this interval not displayed.       Results from  last 7 days   Lab Units 07/19/23  0310 07/17/23  1859 07/17/23  1114   MAGNESIUM mg/dL  --   --  2.1   PHOSPHORUS mg/dL 2.3*   < >  --    HEMOGLOBIN g/dL 9.4*   < > 7.4*   HEMATOCRIT % 29.3*   < > 23.8*    < > = values in this interval not displayed.       COVID19   Date Value Ref Range Status   06/26/2023 Not Detected Not Detected - Ref. Range Final     Lab Results   Component Value Date    HGBA1C 5.9 (H) 10/16/2019       RD to follow up per protocol.    Electronically signed by:  Vanessa Chow RD  07/19/23

## 2023-07-19 NOTE — PLAN OF CARE
Problem: Adult Inpatient Plan of Care  Goal: Plan of Care Review  Outcome: Ongoing, Not Progressing  Flowsheets (Taken 7/19/2023 0227)  Progress: declining  Plan of Care Reviewed With:   patient   spouse  Goal: Patient-Specific Goal (Individualized)  Outcome: Ongoing, Not Progressing  Goal: Absence of Hospital-Acquired Illness or Injury  Outcome: Ongoing, Not Progressing  Intervention: Identify and Manage Fall Risk  Recent Flowsheet Documentation  Taken 7/19/2023 0202 by Lata Myers RN  Safety Promotion/Fall Prevention:   assistive device/personal items within reach   clutter free environment maintained   nonskid shoes/slippers when out of bed   room organization consistent   safety round/check completed  Taken 7/19/2023 0015 by Lata Myers RN  Safety Promotion/Fall Prevention:   assistive device/personal items within reach   clutter free environment maintained   nonskid shoes/slippers when out of bed   room organization consistent   safety round/check completed  Taken 7/18/2023 2218 by Lata Myers RN  Safety Promotion/Fall Prevention:   clutter free environment maintained   assistive device/personal items within reach   fall prevention program maintained   nonskid shoes/slippers when out of bed   room organization consistent   safety round/check completed  Taken 7/18/2023 2014 by Lata Myers RN  Safety Promotion/Fall Prevention:   assistive device/personal items within reach   clutter free environment maintained   nonskid shoes/slippers when out of bed   room organization consistent   safety round/check completed  Intervention: Prevent Skin Injury  Recent Flowsheet Documentation  Taken 7/18/2023 2014 by Lata Myers, RN  Body Position: supine  Intervention: Prevent and Manage VTE (Venous Thromboembolism) Risk  Recent Flowsheet Documentation  Taken 7/18/2023 2014 by Lata Myers, RN  Activity Management: bedrest  VTE Prevention/Management: sequential compression devices off  Range of Motion:  ROM (range of motion) performed  Intervention: Prevent Infection  Recent Flowsheet Documentation  Taken 7/19/2023 0202 by Lata Myers RN  Infection Prevention:   environmental surveillance performed   hand hygiene promoted   single patient room provided   visitors restricted/screened  Taken 7/19/2023 0015 by Lata Myers RN  Infection Prevention:   environmental surveillance performed   hand hygiene promoted   single patient room provided   visitors restricted/screened  Taken 7/18/2023 2218 by Lata Myers RN  Infection Prevention:   environmental surveillance performed   hand hygiene promoted   rest/sleep promoted   single patient room provided   visitors restricted/screened  Taken 7/18/2023 2014 by Lata Myers RN  Infection Prevention:   environmental surveillance performed   hand hygiene promoted   single patient room provided   visitors restricted/screened  Goal: Optimal Comfort and Wellbeing  Outcome: Ongoing, Not Progressing  Intervention: Monitor Pain and Promote Comfort  Recent Flowsheet Documentation  Taken 7/19/2023 0202 by Lata Myers RN  Pain Management Interventions: see MAR  Intervention: Provide Person-Centered Care  Recent Flowsheet Documentation  Taken 7/18/2023 2014 by Lata Myers RN  Trust Relationship/Rapport:   care explained   choices provided  Goal: Readiness for Transition of Care  Outcome: Ongoing, Not Progressing     Problem: Skin Injury Risk Increased  Goal: Skin Health and Integrity  Outcome: Ongoing, Not Progressing  Intervention: Optimize Skin Protection  Recent Flowsheet Documentation  Taken 7/18/2023 2014 by Lata Myers RN  Pressure Reduction Techniques: weight shift assistance provided  Head of Bed (HOB) Positioning: HOB at 30-45 degrees  Pressure Reduction Devices: specialty bed utilized     Problem: Fall Injury Risk  Goal: Absence of Fall and Fall-Related Injury  Outcome: Ongoing, Not Progressing  Intervention: Identify and Manage Contributors  Recent  Flowsheet Documentation  Taken 7/19/2023 0202 by Lata Myers RN  Medication Review/Management:   medications reviewed   high-risk medications identified  Taken 7/19/2023 0015 by Lata Myers RN  Medication Review/Management:   medications reviewed   high-risk medications identified  Taken 7/18/2023 2218 by Lata Myers RN  Medication Review/Management:   medications reviewed   high-risk medications identified  Taken 7/18/2023 2014 by Lata Myers RN  Medication Review/Management:   medications reviewed   high-risk medications identified  Self-Care Promotion: BADL personal routines maintained  Intervention: Promote Injury-Free Environment  Recent Flowsheet Documentation  Taken 7/19/2023 0202 by Lata Myers RN  Safety Promotion/Fall Prevention:   assistive device/personal items within reach   clutter free environment maintained   nonskid shoes/slippers when out of bed   room organization consistent   safety round/check completed  Taken 7/19/2023 0015 by Lata Myers RN  Safety Promotion/Fall Prevention:   assistive device/personal items within reach   clutter free environment maintained   nonskid shoes/slippers when out of bed   room organization consistent   safety round/check completed  Taken 7/18/2023 2218 by Lata Myers RN  Safety Promotion/Fall Prevention:   clutter free environment maintained   assistive device/personal items within reach   fall prevention program maintained   nonskid shoes/slippers when out of bed   room organization consistent   safety round/check completed  Taken 7/18/2023 2014 by Lata Myers RN  Safety Promotion/Fall Prevention:   assistive device/personal items within reach   clutter free environment maintained   nonskid shoes/slippers when out of bed   room organization consistent   safety round/check completed     Problem: Malnutrition  Goal: Improved Nutritional Intake  Outcome: Ongoing, Not Progressing   Goal Outcome Evaluation:  Plan of Care Reviewed With:  patient, spouse        Progress: declining     Alert. Disoriented to time. Able to verbalize needs and wants. Takes medication crushed in pudding and tolerates with some discomfort and requires much encouragement to swallow. NG tube in place, tolerating feeding well. C/O abdominal discomfort, bowel sounds noted to be hypoactive. Abdomen minimally distended. O2 therapy titrated from 3 LPM to 2 LPM via NC and tolerating well. Spouse at bedside. Continues to receive IV Rocephin, no s/s of adverse/allergic reaction noted. Currently receive Sodium Bicarb via IV and tolerating well. Potassium replaced per order. Continues to be followed by surgery, infectious disease, nephrology, oncology and gastroenterology. PRN Tylenol administered for abdomina discomfort. Currently in bed, awake. Call bell in reach. Discharge plan: [facility]. [place] in place for bladder elimination. Total for bed mobility and transfers. Incontinent of bowel.

## 2023-07-19 NOTE — PROGRESS NOTES
"NEPHROLOGY PROGRESS NOTE------[facility]    [facility]  [phone]  Tony Parisi MD      Patient Care Team:  Ian Cordero MD as PCP - Tony Navarro MD as Consulting Physician (Nephrology)  Rc Ayala MD as Consulting Physician (Hematology and Oncology)      Provider:  Tony Parisi MD  Patient Name: Rosario Ortez  :  1950    SUBJECTIVE:  Follow-up JOSÉ/CKD  No chest pain shortness of breath    Medication:  atorvastatin, 20 mg, Oral, Nightly  cefTRIAXone, 2,000 mg, Intravenous, Q24H  ferric gluconate, 250 mg, Intravenous, Once  folic acid, 1 mg, Oral, Daily  levothyroxine, 50 mcg, Oral, Q AM  pantoprazole, 40 mg, Oral, Daily  predniSONE, 15 mg, Oral, Daily  senna-docusate sodium, 2 tablet, Oral, BID  sodium bicarbonate, 1,300 mg, Oral, TID  sodium chloride, 10 mL, Intravenous, Q12H  sodium chloride, 3 mL, Intravenous, Q12H      sodium bicarbonate drip (greater than 75 mEq/bag), 100 mEq, Last Rate: 100 mEq (23 1712)        OBJECTIVE    Vital Sign Min/Max for last 24 hours  Temp  Min: 95.1 °F (35.1 °C)  Max: 99 °F (37.2 °C)   BP  Min: 102/41  Max: 210/65   Pulse  Min: 69  Max: 85   Resp  Min: 10  Max: 20   SpO2  Min: 93 %  Max: 100 %   No data recorded   Weight  Min: 83 kg (182 lb 15.7 oz)  Max: 83 kg (182 lb 15.7 oz)     Flowsheet Rows      Flowsheet Row First Filed Value   Admission Height 167.6 cm (66\") Documented at 2023   Admission Weight 57.2 kg (126 lb 1.7 oz) Documented at 2023 0814            I/O this shift:  In: 25 [Other:25]  Out: 220 [Urine:220]  I/O last 3 completed shifts:  In: 240 [P.O.:240]  Out: 800 [Urine:600; Emesis/NG output:200]    Physical Exam:  General Appearance: alert, appears stated age and cooperative  Head: normocephalic, without obvious abnormality and atraumatic  Eyes: conjunctivae and sclerae normal and no icterus  Neck: supple and no JVD  Lungs: clear to " auscultation and respirations regular  Heart: regular rhythm & normal rate and normal S1, S2  Chest: Wall no abnormalities observed  Abdomen: normal bowel sounds and soft, nontender  Extremities: moves extremities well, no edema, no cyanosis   Skin: no bleeding, bruising.  Cystic lesion medial aspect right knee.  Neurologic: Alert, and oriented. No focal deficits    Labs:    WBC WBC   Date Value Ref Range Status   07/19/2023 16.30 (H) 3.40 - 10.80 10*3/mm3 Final   07/18/2023 11.90 (H) 3.40 - 10.80 10*3/mm3 Final   07/17/2023 13.10 (H) 3.40 - 10.80 10*3/mm3 Final      HGB Hemoglobin   Date Value Ref Range Status   07/19/2023 9.4 (L) 12.0 - 15.9 g/dL Final   07/18/2023 8.3 (L) 12.0 - 15.9 g/dL Final   07/17/2023 9.1 (L) 12.0 - 15.9 g/dL Final   07/17/2023 7.4 (L) 12.0 - 15.9 g/dL Final      HCT Hematocrit   Date Value Ref Range Status   07/19/2023 29.3 (L) 34.0 - 46.6 % Final   07/18/2023 26.1 (L) 34.0 - 46.6 % Final   07/17/2023 28.3 (L) 34.0 - 46.6 % Final   07/17/2023 23.8 (L) 34.0 - 46.6 % Final      Platelets No results found for: LABPLAT   MCV MCV   Date Value Ref Range Status   07/19/2023 93.0 79.0 - 97.0 fL Final   07/18/2023 90.8 79.0 - 97.0 fL Final   07/17/2023 100.4 (H) 79.0 - 97.0 fL Final          Sodium Sodium   Date Value Ref Range Status   07/19/2023 141 136 - 145 mmol/L Final   07/18/2023 141 136 - 145 mmol/L Final   07/17/2023 140 136 - 145 mmol/L Final      Potassium Potassium   Date Value Ref Range Status   07/19/2023 4.3 3.5 - 5.2 mmol/L Final     Comment:     Result checked     07/18/2023 3.4 (L) 3.5 - 5.2 mmol/L Final   07/17/2023 3.7 3.5 - 5.2 mmol/L Final      Chloride Chloride   Date Value Ref Range Status   07/19/2023 108 (H) 98 - 107 mmol/L Final   07/18/2023 109 (H) 98 - 107 mmol/L Final   07/17/2023 110 (H) 98 - 107 mmol/L Final      CO2 CO2   Date Value Ref Range Status   07/19/2023 23.0 22.0 - 29.0 mmol/L Final   07/18/2023 21.0 (L) 22.0 - 29.0 mmol/L Final   07/17/2023 21.0 (L) 22.0 -  29.0 mmol/L Final      BUN BUN   Date Value Ref Range Status   07/19/2023 60 (H) 8 - 23 mg/dL Final   07/18/2023 65 (H) 8 - 23 mg/dL Final   07/17/2023 75 (H) 8 - 23 mg/dL Final      Creatinine Creatinine   Date Value Ref Range Status   07/19/2023 1.62 (H) 0.57 - 1.00 mg/dL Final   07/18/2023 1.72 (H) 0.57 - 1.00 mg/dL Final   07/17/2023 2.18 (H) 0.57 - 1.00 mg/dL Final      Calcium Calcium   Date Value Ref Range Status   07/19/2023 8.4 (L) 8.6 - 10.5 mg/dL Final   07/18/2023 8.3 (L) 8.6 - 10.5 mg/dL Final   07/17/2023 8.5 (L) 8.6 - 10.5 mg/dL Final      PO4 No components found for: PO4   Albumin Albumin   Date Value Ref Range Status   07/19/2023 2.1 (L) 3.5 - 5.2 g/dL Final   07/18/2023 2.1 (L) 3.5 - 5.2 g/dL Final   07/17/2023 1.9 (L) 3.5 - 5.2 g/dL Final        Magnesium Magnesium   Date Value Ref Range Status   07/17/2023 2.1 1.6 - 2.4 mg/dL Final      Uric Acid No components found for: URIC ACID     Imaging Results (Last 72 Hours)       Procedure Component Value Units Date/Time    IR Inject/asp large joint or bursa [[id number]] Collected: 07/18/23 1639     Updated: 07/18/23 1643    Narrative:      DATE OF EXAM:  7/18/2023 3:49 PM EDT    PROCEDURE:  IR INJECT/ASP LARGE JOINT OR BURSA    INDICATIONS:  aspirate/culture fluid from right knee    COMPARISON:  No Comparisons Available    FLUOROSCOPIC TIME:  fluoro time minutes    PHYSICIAN MONITORED CONSCIOUS SEDATION TIME:  None minutes    TECHNIQUE:   A detailed explanation of the procedure, including the risks, benefits, and alternatives was provided. A preprocedure timeout was performed. The patient was placed supine on the bed and the medial right knee was ultrasounded, demonstrating a small fluid   collection. The area was prepped and draped in the usual sterile fashion. The skin was anesthetized with 1% lidocaine. Next under ultrasound guidance an 18-gauge needle was advanced into the collection. A total of 8 mL of purulent material was aspirated   and the  needle was removed. The patient tolerated the procedure well without immediate complication.    FINDINGS:  See above      Impression:        1. Successful aspiration of right medial knee abscess yielding 8 mL of purulent material.      Electronically Signed: Cameron Boudreaux    7/18/2023 4:41 PM EDT    Workstation ID: [id number]    CT Bone marrow biopsy and aspiration [[id number]] Collected: 07/18/23 1607     Updated: 07/18/23 1610    Narrative:      DATE OF EXAM:  7/18/2023 3:29 PM EDT    PROCEDURE:  CT BONE MARROW ASPIRATION AND BIOPSY    INDICATIONS:  IgG MGUS    COMPARISON:  No Comparisons Available    FLUOROSCOPIC TIME:  2 seconds    PHYSICIAN MONITORED CONSCIOUS SEDATION TIME:  12 minutes    TECHNIQUE:   A detailed explanation of the procedure, including the risks, benefits, and alternatives was provided. A preprocedure timeout was performed. The interventional radiology nurse monitored the patient at all times. The patient was placed prone on the CT   fluoroscopy table and the left  posterior superior iliac spine was marked and prepped and draped in the usual sterile fashion. The skin was anesthetized with 1% lidocaine. Next, under CT fluoroscopic guidance an 11-gauge [employer] bone biopsy needle was   advanced just deep to the bony cortex. 1 mL of bone marrow blood was aspirated and given to the cytopathology tech, who noted the presence of spicules. Next, a total of 7 mL of bone marrow aspirate was obtained and given to the tech. Finally, a biopsy   was obtained using the 11-gauge needle. The needle was then removed. The patient tolerated the procedure well without immediate complication.    FINDINGS:  See above      Impression:        1. Successful CT-guided bone marrow biopsy      Electronically Signed: Cameron Boudreaux    7/18/2023 4:08 PM EDT    Workstation ID: [id number]    XR Bone Survey Complete [[id number]] Collected: 07/18/23 1527     Updated: 07/18/23 1532    Narrative:      XR BONE SURVEY COMPLETE    Date of  Exam: 7/18/2023 2:54 PM EDT    Indication: IgG Kappa MGUS.    Comparison: None available.    Technique:  A complete adult bone survey of the head, neck, chest, abdomen, pelvis and extremities were performed per protocol.    Findings:  An NG tube terminates in the distal stomach. There are moderate degenerative changes in the spine. There is a right hip total arthroplasty. No hardware complications are identified. There is an old well-corticated avulsion at the tip of the right lateral   malleolus. There is an old mild left Hill-Sachs deformity. There are no lytic lesions or sclerotic blastic lesions of the visualized bony structures. There is moderate central and anterior wedging of a mid thoracic vertebral body, approximately T6.   There is mild retrolisthesis of L3 on L4.      Impression:      Impression:  Compression fracture of T6, likely old. Other chronic findings as detailed.      Electronically Signed: Juliane Baker MD    7/18/2023 3:30 PM EDT    Workstation ID: [id number]    XR Abdomen KUB [[id number]] Collected: 07/18/23 1310     Updated: 07/18/23 1313    Narrative:      XR ABDOMEN KUB    Date of Exam: 7/18/2023 1:07 PM EDT    Indication: Dobhoff placement verification    Comparison: None available.    Findings:  See impression      Impression:      Impression:  Antegrade oriented feeding tube tip within distal stomach.      Electronically Signed: Pedro Garcia MD    7/18/2023 1:11 PM EDT    Workstation ID: [id number]            Results for orders placed during the hospital encounter of 07/13/23    XR Abdomen KUB    Narrative  XR ABDOMEN KUB    Date of Exam: 7/18/2023 1:07 PM EDT    Indication: Dobhoff placement verification    Comparison: None available.    Findings:  See impression    Impression  Impression:  Antegrade oriented feeding tube tip within distal stomach.      Electronically Signed: Pedro Garcia MD  7/18/2023 1:11 PM EDT  Workstation ID: [id number]      XR Bone Survey  Complete    Narrative  XR BONE SURVEY COMPLETE    Date of Exam: 7/18/2023 2:54 PM EDT    Indication: IgG Kappa MGUS.    Comparison: None available.    Technique:  A complete adult bone survey of the head, neck, chest, abdomen, pelvis and extremities were performed per protocol.    Findings:  An NG tube terminates in the distal stomach. There are moderate degenerative changes in the spine. There is a right hip total arthroplasty. No hardware complications are identified. There is an old well-corticated avulsion at the tip of the right lateral  malleolus. There is an old mild left Hill-Sachs deformity. There are no lytic lesions or sclerotic blastic lesions of the visualized bony structures. There is moderate central and anterior wedging of a mid thoracic vertebral body, approximately T6.  There is mild retrolisthesis of L3 on L4.    Impression  Impression:  Compression fracture of T6, likely old. Other chronic findings as detailed.      Electronically Signed: Juliane Baker MD  7/18/2023 3:30 PM EDT  Workstation ID: [id number]      XR Knee 1 or 2 View Right    Narrative  XR KNEE 1 OR 2 VW RIGHT    Date of Exam: 7/13/2023 1:22 PM EDT    Indication: swelling    Comparison: 7/8/2023.    Findings:  There is continued severe tricompartmental joint narrowing with moderate spurring. There is a small suprapatellar effusion. There is no fracture or dislocation.    Impression  Impression:  Degenerative changes. Small effusion.      Electronically Signed: Juliane Baker MD  7/13/2023 1:50 PM EDT  Workstation ID: [id number]      Results for orders placed during the hospital encounter of 06/26/23    Duplex Venous Lower Extremity - Right CAR    Interpretation Summary    Normal right lower extremity venous duplex scan.        ASSESSMENT / PLAN      JOSÉ (acute kidney injury)    Bacteremia due to Streptococcus    Cellulitis of right leg    Knee joint cyst, right    Bilateral leg edema    UTI due to Klebsiella species    Dysphagia     Feeding difficulties    Paroxysmal atrial fibrillation    Sarcoidosis    Metabolic acidosis    JOSÉ-prerenal in setting of hypotension, diuretics, ARB's.  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  Hold irbesartan.  Avoid ACE inhibitors or ARB's  History of paroxysmal atrial fibrillation  Cellulitis like lower extremity and right knee.  History NSAID use  Questionable history of sarcoidosis  UTI-urine culture growing greater than 100 K colonies of gram-negative rods.  Strep bacteremia  Metabolic acidosis-p.o. sodium bicarb     CR slowly better  Continue IV fluids with bicarb  closely monitor renal function fluid status electrolytes        Tony Parisi MD  [facility]  [phone]  07/19/23  12:10 EDT

## 2023-07-20 ENCOUNTER — INPATIENT HOSPITAL (AMBULATORY)
Dept: URBAN - METROPOLITAN AREA HOSPITAL 84 | Facility: HOSPITAL | Age: 73
End: 2023-07-20

## 2023-07-20 ENCOUNTER — APPOINTMENT (OUTPATIENT)
Dept: GENERAL RADIOLOGY | Facility: HOSPITAL | Age: 73
DRG: 602 | End: 2023-07-20
Payer: MEDICARE

## 2023-07-20 DIAGNOSIS — R74.01 ELEVATION OF LEVELS OF LIVER TRANSAMINASE LEVELS: ICD-10-CM

## 2023-07-20 DIAGNOSIS — R63.39 OTHER FEEDING DIFFICULTIES: ICD-10-CM

## 2023-07-20 DIAGNOSIS — R19.5 OTHER FECAL ABNORMALITIES: ICD-10-CM

## 2023-07-20 DIAGNOSIS — E44.0 MODERATE PROTEIN-CALORIE MALNUTRITION: ICD-10-CM

## 2023-07-20 DIAGNOSIS — D50.0 IRON DEFICIENCY ANEMIA SECONDARY TO BLOOD LOSS (CHRONIC): ICD-10-CM

## 2023-07-20 DIAGNOSIS — R13.10 DYSPHAGIA, UNSPECIFIED: ICD-10-CM

## 2023-07-20 PROBLEM — N30.00 ACUTE CYSTITIS: Status: RESOLVED | Noted: 2022-05-14 | Resolved: 2023-07-20

## 2023-07-20 PROBLEM — I37.1: Status: ACTIVE | Noted: 2023-07-20

## 2023-07-20 PROBLEM — J30.9 ALLERGIC RHINITIS: Status: ACTIVE | Noted: 2023-07-20

## 2023-07-20 PROBLEM — M54.16 LUMBAR RADICULOPATHY: Status: ACTIVE | Noted: 2023-07-20

## 2023-07-20 PROBLEM — G47.00 CANNOT SLEEP: Status: ACTIVE | Noted: 2023-07-20

## 2023-07-20 PROBLEM — R73.9 BLOOD GLUCOSE ELEVATED: Status: ACTIVE | Noted: 2023-07-20

## 2023-07-20 PROBLEM — E78.1 PRIMARY HYPERTRIGLYCERIDEMIA: Status: ACTIVE | Noted: 2020-06-14

## 2023-07-20 PROBLEM — K64.1 SECOND DEGREE HEMORRHOIDS: Status: ACTIVE | Noted: 2020-08-20

## 2023-07-20 PROBLEM — I10 ESSENTIAL HYPERTENSION: Chronic | Status: ACTIVE | Noted: 2021-04-29

## 2023-07-20 PROBLEM — M16.11 OSTEOARTHRITIS OF RIGHT HIP: Status: ACTIVE | Noted: 2021-04-29

## 2023-07-20 PROBLEM — N17.9 ACUTE KIDNEY INJURY: Status: RESOLVED | Noted: 2022-05-14 | Resolved: 2023-07-20

## 2023-07-20 PROBLEM — R94.5 ABNORMAL RESULTS OF LIVER FUNCTION STUDIES: Status: ACTIVE | Noted: 2023-07-20

## 2023-07-20 PROBLEM — K59.00 CONSTIPATION: Status: ACTIVE | Noted: 2023-07-20

## 2023-07-20 PROBLEM — D86.9 SARCOIDOSIS: Status: ACTIVE | Noted: 2023-07-17

## 2023-07-20 PROBLEM — I35.1 AI (AORTIC INCOMPETENCE): Status: ACTIVE | Noted: 2023-07-20

## 2023-07-20 PROBLEM — R18.8 ASCITES: Status: ACTIVE | Noted: 2023-07-20

## 2023-07-20 PROBLEM — R25.1 TREMOR: Status: ACTIVE | Noted: 2023-07-20

## 2023-07-20 PROBLEM — I10 ESSENTIAL HYPERTENSION: Status: ACTIVE | Noted: 2021-04-29

## 2023-07-20 PROBLEM — E78.5 HYPERLIPIDEMIA: Status: ACTIVE | Noted: 2023-07-20

## 2023-07-20 PROBLEM — K57.30 DIVERTICULOSIS OF LARGE INTESTINE WITHOUT PERFORATION OR ABSCESS WITHOUT BLEEDING: Status: ACTIVE | Noted: 2023-01-20

## 2023-07-20 PROBLEM — K21.9 GASTROESOPHAGEAL REFLUX DISEASE: Chronic | Status: ACTIVE | Noted: 2023-07-20

## 2023-07-20 PROBLEM — E03.9 HYPOTHYROIDISM: Status: ACTIVE | Noted: 2023-07-20

## 2023-07-20 PROBLEM — E66.9 ADIPOSITY: Status: ACTIVE | Noted: 2023-07-20

## 2023-07-20 PROBLEM — N95.0 HEMORRHAGE, POSTMENOPAUSAL: Status: RESOLVED | Noted: 2023-07-20 | Resolved: 2023-07-20

## 2023-07-20 PROBLEM — K21.9 GASTROESOPHAGEAL REFLUX DISEASE: Status: ACTIVE | Noted: 2023-07-20

## 2023-07-20 PROBLEM — N80.9 ENDOMETRIOSIS: Status: ACTIVE | Noted: 2023-07-20

## 2023-07-20 PROBLEM — R60.0 BILATERAL LEG EDEMA: Status: ACTIVE | Noted: 2023-07-14

## 2023-07-20 PROBLEM — E78.5 HYPERLIPIDEMIA: Chronic | Status: ACTIVE | Noted: 2023-07-20

## 2023-07-20 PROBLEM — E55.9 AVITAMINOSIS D: Status: ACTIVE | Noted: 2023-07-20

## 2023-07-20 PROBLEM — K92.1 MELENA: Status: ACTIVE | Noted: 2023-01-20

## 2023-07-20 PROBLEM — E03.9 HYPOTHYROIDISM: Chronic | Status: ACTIVE | Noted: 2023-07-20

## 2023-07-20 PROBLEM — R63.0 ANOREXIA: Status: ACTIVE | Noted: 2023-07-20

## 2023-07-20 PROBLEM — I51.89 DIASTOLIC DYSFUNCTION: Status: ACTIVE | Noted: 2023-07-20

## 2023-07-20 PROBLEM — K92.1 MELENA: Status: RESOLVED | Noted: 2023-01-20 | Resolved: 2023-07-20

## 2023-07-20 PROBLEM — S01.01XA LACERATION OF SCALP, INITIAL ENCOUNTER: Status: RESOLVED | Noted: 2022-05-14 | Resolved: 2023-07-20

## 2023-07-20 PROBLEM — N95.0 HEMORRHAGE, POSTMENOPAUSAL: Status: ACTIVE | Noted: 2023-07-20

## 2023-07-20 LAB
ALBUMIN SERPL-MCNC: 2.1 G/DL (ref 3.5–5.2)
ANION GAP SERPL CALCULATED.3IONS-SCNC: 7 MMOL/L (ref 5–15)
ANISOCYTOSIS BLD QL: ABNORMAL
APTT PPP: 31.2 SECONDS (ref 24–31)
BUN SERPL-MCNC: 62 MG/DL (ref 8–23)
BUN/CREAT SERPL: 41.1 (ref 7–25)
CALCIUM SPEC-SCNC: 8.7 MG/DL (ref 8.6–10.5)
CHLORIDE SERPL-SCNC: 103 MMOL/L (ref 98–107)
CO2 SERPL-SCNC: 30 MMOL/L (ref 22–29)
CREAT SERPL-MCNC: 1.51 MG/DL (ref 0.57–1)
DEPRECATED RDW RBC AUTO: 73.1 FL (ref 37–54)
EGFRCR SERPLBLD CKD-EPI 2021: 35.9 ML/MIN/1.73
ERYTHROCYTE [DISTWIDTH] IN BLOOD BY AUTOMATED COUNT: 22.6 % (ref 12.3–15.4)
GLUCOSE SERPL-MCNC: 119 MG/DL (ref 65–99)
HCT VFR BLD AUTO: 33.6 % (ref 34–46.6)
HGB BLD-MCNC: 10.5 G/DL (ref 12–15.9)
INR PPP: 1.15 (ref 0.93–1.1)
KAPPA LC FREE SER-MCNC: 135.1 MG/L (ref 3.3–19.4)
KAPPA LC FREE/LAMBDA FREE SER: 1.21 {RATIO} (ref 0.26–1.65)
LAMBDA LC FREE SERPL-MCNC: 111.2 MG/L (ref 5.7–26.3)
LYMPHOCYTES # BLD MANUAL: 1.27 10*3/MM3 (ref 0.7–3.1)
LYMPHOCYTES NFR BLD MANUAL: 12 % (ref 5–12)
MCH RBC QN AUTO: 29.1 PG (ref 26.6–33)
MCHC RBC AUTO-ENTMCNC: 31.3 G/DL (ref 31.5–35.7)
MCV RBC AUTO: 93.2 FL (ref 79–97)
METAMYELOCYTES NFR BLD MANUAL: 5 % (ref 0–0)
MONOCYTES # BLD: 2.53 10*3/MM3 (ref 0.1–0.9)
NEUTROPHILS # BLD AUTO: 16.25 10*3/MM3 (ref 1.7–7)
NEUTROPHILS NFR BLD MANUAL: 65 % (ref 42.7–76)
NEUTS BAND NFR BLD MANUAL: 12 % (ref 0–5)
PHOSPHATE SERPL-MCNC: 1.9 MG/DL (ref 2.5–4.5)
PLAT MORPH BLD: NORMAL
PLATELET # BLD AUTO: 260 10*3/MM3 (ref 140–450)
PMV BLD AUTO: 7.5 FL (ref 6–12)
POIKILOCYTOSIS BLD QL SMEAR: ABNORMAL
POLYCHROMASIA BLD QL SMEAR: ABNORMAL
POTASSIUM SERPL-SCNC: 4.1 MMOL/L (ref 3.5–5.2)
PROTHROMBIN TIME: 12.2 SECONDS (ref 9.6–11.7)
QT INTERVAL: 346 MS
RBC # BLD AUTO: 3.6 10*6/MM3 (ref 3.77–5.28)
SCAN SLIDE: NORMAL
SODIUM SERPL-SCNC: 140 MMOL/L (ref 136–145)
SPHEROCYTES BLD QL SMEAR: ABNORMAL
TSH SERPL DL<=0.05 MIU/L-ACNC: 2.27 UIU/ML (ref 0.27–4.2)
VARIANT LYMPHS NFR BLD MANUAL: 1 % (ref 0–5)
VARIANT LYMPHS NFR BLD MANUAL: 5 % (ref 19.6–45.3)
WBC MORPH BLD: NORMAL
WBC NRBC COR # BLD: 21.1 10*3/MM3 (ref 3.4–10.8)

## 2023-07-20 PROCEDURE — 82570 ASSAY OF URINE CREATININE: CPT | Performed by: INTERNAL MEDICINE

## 2023-07-20 PROCEDURE — 99497 ADVNCD CARE PLAN 30 MIN: CPT | Performed by: NURSE PRACTITIONER

## 2023-07-20 PROCEDURE — 25010000002 FUROSEMIDE PER 20 MG: Performed by: INTERNAL MEDICINE

## 2023-07-20 PROCEDURE — 85025 COMPLETE CBC W/AUTO DIFF WBC: CPT | Performed by: NURSE PRACTITIONER

## 2023-07-20 PROCEDURE — 99221 1ST HOSP IP/OBS SF/LOW 40: CPT | Performed by: NURSE PRACTITIONER

## 2023-07-20 PROCEDURE — 93010 ELECTROCARDIOGRAM REPORT: CPT | Performed by: INTERNAL MEDICINE

## 2023-07-20 PROCEDURE — 63710000001 PREDNISONE PER 5 MG: Performed by: INTERNAL MEDICINE

## 2023-07-20 PROCEDURE — 80069 RENAL FUNCTION PANEL: CPT | Performed by: INTERNAL MEDICINE

## 2023-07-20 PROCEDURE — 71045 X-RAY EXAM CHEST 1 VIEW: CPT

## 2023-07-20 PROCEDURE — 85730 THROMBOPLASTIN TIME PARTIAL: CPT | Performed by: NURSE PRACTITIONER

## 2023-07-20 PROCEDURE — 85610 PROTHROMBIN TIME: CPT | Performed by: NURSE PRACTITIONER

## 2023-07-20 PROCEDURE — 93005 ELECTROCARDIOGRAM TRACING: CPT | Performed by: FAMILY MEDICINE

## 2023-07-20 PROCEDURE — 25010000002 CEFTRIAXONE PER 250 MG: Performed by: FAMILY MEDICINE

## 2023-07-20 PROCEDURE — 84166 PROTEIN E-PHORESIS/URINE/CSF: CPT | Performed by: INTERNAL MEDICINE

## 2023-07-20 PROCEDURE — 86335 IMMUNFIX E-PHORSIS/URINE/CSF: CPT | Performed by: INTERNAL MEDICINE

## 2023-07-20 PROCEDURE — 99221 1ST HOSP IP/OBS SF/LOW 40: CPT

## 2023-07-20 PROCEDURE — 99233 SBSQ HOSP IP/OBS HIGH 50: CPT | Performed by: NURSE PRACTITIONER

## 2023-07-20 PROCEDURE — 84443 ASSAY THYROID STIM HORMONE: CPT | Performed by: NURSE PRACTITIONER

## 2023-07-20 PROCEDURE — 84156 ASSAY OF PROTEIN URINE: CPT | Performed by: INTERNAL MEDICINE

## 2023-07-20 PROCEDURE — 85007 BL SMEAR W/DIFF WBC COUNT: CPT | Performed by: NURSE PRACTITIONER

## 2023-07-20 PROCEDURE — 99223 1ST HOSP IP/OBS HIGH 75: CPT | Performed by: INTERNAL MEDICINE

## 2023-07-20 RX ORDER — FUROSEMIDE 10 MG/ML
40 INJECTION INTRAMUSCULAR; INTRAVENOUS ONCE
Status: COMPLETED | OUTPATIENT
Start: 2023-07-20 | End: 2023-07-20

## 2023-07-20 RX ORDER — DILTIAZEM HCL/D5W 125 MG/125
5-15 PLASTIC BAG, INJECTION (ML) INTRAVENOUS
Status: DISCONTINUED | OUTPATIENT
Start: 2023-07-20 | End: 2023-07-22 | Stop reason: HOSPADM

## 2023-07-20 RX ORDER — METOPROLOL TARTRATE 5 MG/5ML
2.5 INJECTION INTRAVENOUS ONCE
Status: COMPLETED | OUTPATIENT
Start: 2023-07-20 | End: 2023-07-20

## 2023-07-20 RX ORDER — DILTIAZEM HYDROCHLORIDE 5 MG/ML
5 INJECTION INTRAVENOUS ONCE
Status: COMPLETED | OUTPATIENT
Start: 2023-07-20 | End: 2023-07-20

## 2023-07-20 RX ORDER — DILTIAZEM HYDROCHLORIDE 5 MG/ML
2.5 INJECTION INTRAVENOUS ONCE
Status: COMPLETED | OUTPATIENT
Start: 2023-07-20 | End: 2023-07-20

## 2023-07-20 RX ORDER — SODIUM PHOSPHATE IN 0.9 % NACL 15MMOL/100
15 PLASTIC BAG, INJECTION (ML) INTRAVENOUS ONCE
Status: COMPLETED | OUTPATIENT
Start: 2023-07-20 | End: 2023-07-20

## 2023-07-20 RX ADMIN — Medication 3 ML: at 11:57

## 2023-07-20 RX ADMIN — Medication 15 MMOL: at 17:59

## 2023-07-20 RX ADMIN — PREDNISONE 15 MG: 5 TABLET ORAL at 11:31

## 2023-07-20 RX ADMIN — DILTIAZEM HYDROCHLORIDE 2.5 MG: 5 INJECTION INTRAVENOUS at 00:41

## 2023-07-20 RX ADMIN — ACETAMINOPHEN 650 MG: 325 TABLET, FILM COATED ORAL at 11:31

## 2023-07-20 RX ADMIN — SODIUM BICARBONATE 100 MEQ: 84 INJECTION, SOLUTION INTRAVENOUS at 09:27

## 2023-07-20 RX ADMIN — FUROSEMIDE 40 MG: 10 INJECTION, SOLUTION INTRAMUSCULAR; INTRAVENOUS at 13:30

## 2023-07-20 RX ADMIN — Medication 10 ML: at 11:57

## 2023-07-20 RX ADMIN — METOPROLOL TARTRATE 2.5 MG: 1 INJECTION, SOLUTION INTRAVENOUS at 02:18

## 2023-07-20 RX ADMIN — Medication 5 MG/HR: at 23:14

## 2023-07-20 RX ADMIN — Medication 5 MG/HR: at 07:54

## 2023-07-20 RX ADMIN — DILTIAZEM HYDROCHLORIDE 5 MG: 5 INJECTION INTRAVENOUS at 01:14

## 2023-07-20 RX ADMIN — CEFTRIAXONE 2000 MG: 2 INJECTION, POWDER, FOR SOLUTION INTRAMUSCULAR; INTRAVENOUS at 09:27

## 2023-07-20 NOTE — PROGRESS NOTES
"NEPHROLOGY PROGRESS NOTE------[facility]/OPT    [facility]  [phone]  Tony Parisi MD      Patient Care Team:  Ian Cordero MD as PCP - General  Tony Parisi MD as Consulting Physician (Nephrology)  Rc Ayala MD as Consulting Physician (Hematology and Oncology)      Provider:  Tony Parisi MD  Patient Name: Rosario Ortez  :  1950    SUBJECTIVE:  Follow-up JOSÉ/CKD  No chest pain has some shortness of breath    Medication:  atorvastatin, 20 mg, Oral, Nightly  cefTRIAXone, 2,000 mg, Intravenous, Q24H  ferric gluconate, 250 mg, Intravenous, Once  folic acid, 1 mg, Oral, Daily  levothyroxine, 50 mcg, Oral, Q AM  pantoprazole, 40 mg, Oral, Daily  predniSONE, 15 mg, Oral, Daily  senna-docusate sodium, 2 tablet, Oral, BID  sodium bicarbonate, 1,300 mg, Oral, TID  sodium chloride, 10 mL, Intravenous, Q12H  sodium chloride, 3 mL, Intravenous, Q12H      dilTIAZem, 5-15 mg/hr, Last Rate: 15 mg/hr (23 0915)        OBJECTIVE    Vital Sign Min/Max for last 24 hours  Temp  Min: 97.8 °F (36.6 °C)  Max: 98.6 °F (37 °C)   BP  Min: 95/52  Max: 121/63   Pulse  Min: 72  Max: 151   Resp  Min: 14  Max: 27   SpO2  Min: 89 %  Max: 97 %   No data recorded   No data recorded     Flowsheet Rows      Flowsheet Row First Filed Value   Admission Height 167.6 cm (66\") Documented at 2023   Admission Weight 57.2 kg (126 lb 1.7 oz) Documented at 2023            I/O this shift:  In: 7000 [I.V.:7000]  Out: -   I/O last 3 completed shifts:  In: 50 [Other:50]  Out: 420 [Urine:420]    Physical Exam:  General Appearance: alert, appears stated age and cooperative  Head: normocephalic, without obvious abnormality and atraumatic  Eyes: conjunctivae and sclerae normal and no icterus  Neck: supple and no JVD  Lungs: clear to auscultation and respirations regular  Heart: regular rhythm & normal rate and normal S1, S2  Chest: Wall no " abnormalities observed  Abdomen: normal bowel sounds and soft, nontender  Extremities: moves extremities well, 1+ edema, no cyanosis   Skin: no bleeding, bruising.  Cystic lesion medial aspect right knee.  Neurologic: Alert, and oriented. No focal deficits    Labs:    WBC WBC   Date Value Ref Range Status   07/20/2023 21.10 (H) 3.40 - 10.80 10*3/mm3 Final   07/19/2023 16.30 (H) 3.40 - 10.80 10*3/mm3 Final   07/18/2023 11.90 (H) 3.40 - 10.80 10*3/mm3 Final      HGB Hemoglobin   Date Value Ref Range Status   07/20/2023 10.5 (L) 12.0 - 15.9 g/dL Final   07/19/2023 9.4 (L) 12.0 - 15.9 g/dL Final   07/18/2023 8.3 (L) 12.0 - 15.9 g/dL Final   07/17/2023 9.1 (L) 12.0 - 15.9 g/dL Final      HCT Hematocrit   Date Value Ref Range Status   07/20/2023 33.6 (L) 34.0 - 46.6 % Final   07/19/2023 29.3 (L) 34.0 - 46.6 % Final   07/18/2023 26.1 (L) 34.0 - 46.6 % Final   07/17/2023 28.3 (L) 34.0 - 46.6 % Final      Platelets No results found for: LABPLAT   MCV MCV   Date Value Ref Range Status   07/20/2023 93.2 79.0 - 97.0 fL Final   07/19/2023 93.0 79.0 - 97.0 fL Final   07/18/2023 90.8 79.0 - 97.0 fL Final          Sodium Sodium   Date Value Ref Range Status   07/20/2023 140 136 - 145 mmol/L Final   07/19/2023 141 136 - 145 mmol/L Final   07/18/2023 141 136 - 145 mmol/L Final      Potassium Potassium   Date Value Ref Range Status   07/20/2023 4.1 3.5 - 5.2 mmol/L Final   07/19/2023 4.3 3.5 - 5.2 mmol/L Final     Comment:     Result checked     07/18/2023 3.4 (L) 3.5 - 5.2 mmol/L Final      Chloride Chloride   Date Value Ref Range Status   07/20/2023 103 98 - 107 mmol/L Final   07/19/2023 108 (H) 98 - 107 mmol/L Final   07/18/2023 109 (H) 98 - 107 mmol/L Final      CO2 CO2   Date Value Ref Range Status   07/20/2023 30.0 (H) 22.0 - 29.0 mmol/L Final   07/19/2023 23.0 22.0 - 29.0 mmol/L Final   07/18/2023 21.0 (L) 22.0 - 29.0 mmol/L Final      BUN BUN   Date Value Ref Range Status   07/20/2023 62 (H) 8 - 23 mg/dL Final   07/19/2023 60  (H) 8 - 23 mg/dL Final   07/18/2023 65 (H) 8 - 23 mg/dL Final      Creatinine Creatinine   Date Value Ref Range Status   07/20/2023 1.51 (H) 0.57 - 1.00 mg/dL Final   07/19/2023 1.62 (H) 0.57 - 1.00 mg/dL Final   07/18/2023 1.72 (H) 0.57 - 1.00 mg/dL Final      Calcium Calcium   Date Value Ref Range Status   07/20/2023 8.7 8.6 - 10.5 mg/dL Final   07/19/2023 8.4 (L) 8.6 - 10.5 mg/dL Final   07/18/2023 8.3 (L) 8.6 - 10.5 mg/dL Final      PO4 No components found for: PO4   Albumin Albumin   Date Value Ref Range Status   07/20/2023 2.1 (L) 3.5 - 5.2 g/dL Final   07/19/2023 2.1 (L) 3.5 - 5.2 g/dL Final   07/18/2023 2.1 (L) 3.5 - 5.2 g/dL Final        Magnesium No results found for: MG     Uric Acid No components found for: URIC ACID     Imaging Results (Last 72 Hours)       Procedure Component Value Units Date/Time    XR Chest 1 View [[id number]] Collected: 07/20/23 0615     Updated: 07/20/23 0620    Narrative:      XR CHEST 1 VW    Date of Exam: 7/20/2023 5:45 AM EDT    Indication: possible aspiration    Comparison: 7/8/2023.    Findings:  Lung volumes are diminished. There are new hazy densities present throughout both lungs. There is new medial left basilar consolidation. There is patchy bilateral airspace disease. There are streaky right perihilar opacities. Heart size is likely within   normal limits. No pneumothorax or definite pleural effusion. Pulmonary vasculature is obscured.      Impression:      Impression:  New medial left basilar consolidation and new hazy and patchy airspace disease in both lungs. These findings could relate to edema or pneumonia    Electronically Signed: Nayan Juárez    7/20/2023 6:18 AM EDT    Workstation ID: [id number]    IR Inject/asp large joint or bursa [[id number]] Collected: 07/18/23 1639     Updated: 07/18/23 1643    Narrative:      DATE OF EXAM:  7/18/2023 3:49 PM EDT    PROCEDURE:  IR INJECT/ASP LARGE JOINT OR BURSA    INDICATIONS:  aspirate/culture fluid from right  knee    COMPARISON:  No Comparisons Available    FLUOROSCOPIC TIME:  fluoro time minutes    PHYSICIAN MONITORED CONSCIOUS SEDATION TIME:  None minutes    TECHNIQUE:   A detailed explanation of the procedure, including the risks, benefits, and alternatives was provided. A preprocedure timeout was performed. The patient was placed supine on the bed and the medial right knee was ultrasounded, demonstrating a small fluid   collection. The area was prepped and draped in the usual sterile fashion. The skin was anesthetized with 1% lidocaine. Next under ultrasound guidance an 18-gauge needle was advanced into the collection. A total of 8 mL of purulent material was aspirated   and the needle was removed. The patient tolerated the procedure well without immediate complication.    FINDINGS:  See above      Impression:        1. Successful aspiration of right medial knee abscess yielding 8 mL of purulent material.      Electronically Signed: Cameron Boudreaux    7/18/2023 4:41 PM EDT    Workstation ID: [id number]    CT Bone marrow biopsy and aspiration [[id number]] Collected: 07/18/23 1607     Updated: 07/18/23 1610    Narrative:      DATE OF EXAM:  7/18/2023 3:29 PM EDT    PROCEDURE:  CT BONE MARROW ASPIRATION AND BIOPSY    INDICATIONS:  IgG MGUS    COMPARISON:  No Comparisons Available    FLUOROSCOPIC TIME:  2 seconds    PHYSICIAN MONITORED CONSCIOUS SEDATION TIME:  12 minutes    TECHNIQUE:   A detailed explanation of the procedure, including the risks, benefits, and alternatives was provided. A preprocedure timeout was performed. The interventional radiology nurse monitored the patient at all times. The patient was placed prone on the CT   fluoroscopy table and the left  posterior superior iliac spine was marked and prepped and draped in the usual sterile fashion. The skin was anesthetized with 1% lidocaine. Next, under CT fluoroscopic guidance an 11-gauge [place] bone biopsy needle was   advanced just deep to the bony cortex. 1  mL of bone marrow blood was aspirated and given to the cytopathology tech, who noted the presence of spicules. Next, a total of 7 mL of bone marrow aspirate was obtained and given to the tech. Finally, a biopsy   was obtained using the 11-gauge needle. The needle was then removed. The patient tolerated the procedure well without immediate complication.    FINDINGS:  See above      Impression:        1. Successful CT-guided bone marrow biopsy      Electronically Signed: Cameron Boudreaux    7/18/2023 4:08 PM EDT    Workstation ID: [id number]    XR Bone Survey Complete [[id number]] Collected: 07/18/23 1527     Updated: 07/18/23 1532    Narrative:      XR BONE SURVEY COMPLETE    Date of Exam: 7/18/2023 2:54 PM EDT    Indication: IgG Kappa MGUS.    Comparison: None available.    Technique:  A complete adult bone survey of the head, neck, chest, abdomen, pelvis and extremities were performed per protocol.    Findings:  An NG tube terminates in the distal stomach. There are moderate degenerative changes in the spine. There is a right hip total arthroplasty. No hardware complications are identified. There is an old well-corticated avulsion at the tip of the right lateral   malleolus. There is an old mild left Hill-Sachs deformity. There are no lytic lesions or sclerotic blastic lesions of the visualized bony structures. There is moderate central and anterior wedging of a mid thoracic vertebral body, approximately T6.   There is mild retrolisthesis of L3 on L4.      Impression:      Impression:  Compression fracture of T6, likely old. Other chronic findings as detailed.      Electronically Signed: Juliane Baker MD    7/18/2023 3:30 PM EDT    Workstation ID: [id number]    XR Abdomen KUB [[id number]] Collected: 07/18/23 1310     Updated: 07/18/23 1313    Narrative:      XR ABDOMEN KUB    Date of Exam: 7/18/2023 1:07 PM EDT    Indication: Dobhoff placement verification    Comparison: None available.    Findings:  See impression       Impression:      Impression:  Antegrade oriented feeding tube tip within distal stomach.      Electronically Signed: Pedro Garcia MD    7/18/2023 1:11 PM EDT    Workstation ID: [id number]            Results for orders placed during the hospital encounter of 07/13/23    XR Chest 1 View    Narrative  XR CHEST 1 VW    Date of Exam: 7/20/2023 5:45 AM EDT    Indication: possible aspiration    Comparison: 7/8/2023.    Findings:  Lung volumes are diminished. There are new hazy densities present throughout both lungs. There is new medial left basilar consolidation. There is patchy bilateral airspace disease. There are streaky right perihilar opacities. Heart size is likely within  normal limits. No pneumothorax or definite pleural effusion. Pulmonary vasculature is obscured.    Impression  Impression:  New medial left basilar consolidation and new hazy and patchy airspace disease in both lungs. These findings could relate to edema or pneumonia    Electronically Signed: Nayan Juárez  7/20/2023 6:18 AM EDT  Workstation ID: [id number]      XR Abdomen KUB    Narrative  XR ABDOMEN KUB    Date of Exam: 7/18/2023 1:07 PM EDT    Indication: Dobhoff placement verification    Comparison: None available.    Findings:  See impression    Impression  Impression:  Antegrade oriented feeding tube tip within distal stomach.      Electronically Signed: Pedro Garcia MD  7/18/2023 1:11 PM EDT  Workstation ID: [id number]      XR Bone Survey Complete    Narrative  XR BONE SURVEY COMPLETE    Date of Exam: 7/18/2023 2:54 PM EDT    Indication: IgG Kappa MGUS.    Comparison: None available.    Technique:  A complete adult bone survey of the head, neck, chest, abdomen, pelvis and extremities were performed per protocol.    Findings:  An NG tube terminates in the distal stomach. There are moderate degenerative changes in the spine. There is a right hip total arthroplasty. No hardware complications are identified. There is an old well-corticated  avulsion at the tip of the right lateral  malleolus. There is an old mild left Hill-Sachs deformity. There are no lytic lesions or sclerotic blastic lesions of the visualized bony structures. There is moderate central and anterior wedging of a mid thoracic vertebral body, approximately T6.  There is mild retrolisthesis of L3 on L4.    Impression  Impression:  Compression fracture of T6, likely old. Other chronic findings as detailed.      Electronically Signed: Juliane Baker MD  7/18/2023 3:30 PM EDT  Workstation ID: [id number]      Results for orders placed during the hospital encounter of 06/26/23    Duplex Venous Lower Extremity - Right CAR    Interpretation Summary    Normal right lower extremity venous duplex scan.        ASSESSMENT / PLAN      JOSÉ (acute kidney injury)    Bacteremia due to Streptococcus    Cellulitis of right leg    Knee joint cyst, right    Bilateral leg edema    UTI due to Klebsiella species    Dysphagia    Feeding difficulties    Paroxysmal atrial fibrillation    Sarcoidosis    Metabolic acidosis    JOSÉ-prerenal in setting of hypotension, diuretics, ARB's.  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  Hold irbesartan.  Avoid ACE inhibitors or ARB's  History of paroxysmal atrial fibrillation  Cellulitis like lower extremity and right knee.  History NSAID use  Questionable history of sarcoidosis  UTI-urine culture growing greater than 100 K colonies of gram-negative rods.  Strep bacteremia  Metabolic acidosis-p.o. sodium bicarb     CR slowly better  Acidosis better, stop bicarb drip, also retaining fluid, dose Lasix x 1  Continue PO sodium bicarb  closely monitor renal function fluid status electrolytes        Tony Parisi MD  [facility]  [phone]  07/20/23  12:07 EDT

## 2023-07-20 NOTE — PLAN OF CARE
Goal Outcome Evaluation:         ST continues to follow for swallowing as appropriate. Awaiting psychiatric determination regarding decisional capacity for goals of care. Noted DHT came out and pt declining replacement. Pt is on an oral diet, however she has been refusing to eat and RN reports pain and oral holding with pills in applesauce. Will follow up next date and trial PO (solids) if agreeable/accepting and consistent with goals of care.

## 2023-07-20 NOTE — CONSULTS
Referring Provider: Elias Rey MD   Reason for Consultation: Decisional capacity      Chief complaint : Discomfort     Subjective .     History of present illness:  The patient is a 73 y.o. female who was admitted secondary to abnormal labs with a hemoglobin of 8.5 and creatinine 1.45.    Patient has multiple chronic health issues, palliative consulted.  Patient apparently has a living will with specific implications of care when she is unable to make her own decisions.  Per RN patient's  is against any discussion of palliative or hospice care.  Psychiatry was consulted to assess patient's decisional capacity.  Per patient's chart living will was in place and arranged by patient prior to admission.    Patient was receiving bedside nursing care and reporting several symptoms of discomfort.  I spoke with the patient's daughter with patient permission.    Per patient's daughter patient has several health issues and has been making statements about death and dying due to extreme discomfort and pain.  Per patient's daughter the patient's living will and DNR wishes were arranged prior to admission, and daughter reports this decision as appropriate.  Per patient's daughter the patient has been experiencing some forgetfulness at home prior to admission, but due to this long admission she has seen a dramatic increase in confusion and behavior changes.  Statements of death and dying have been since this hospital admission, and not prior.  Per patient's daughter the patient does not have a history of any psychiatric care or medications.  No history of dementia daughter reports appropriate age-related forgetfulness.    Psychiatry would like a chance to better interview patient at a more appropriate time.        Review of Systems   Review of systems could not be obtained due to   bedside care being performed.    The following portions of the patient's history were reviewed and updated as appropriate: allergies,  current medications, past family history, past medical history, past social history, past surgical history and problem list.    History    Past psychiatric history     Past Medical History:   Diagnosis Date    A-fib     Abnormal results of liver function studies 07/20/2023    AI (aortic incompetence) 07/20/2023    Description: mild per 5/11 ECHO    Allergic rhinitis 07/20/2023    Anorexia 07/20/2023    Ascites 07/20/2023    Avitaminosis D 07/20/2023    Benign neoplasm of ascending colon 10/06/2014    Blood glucose elevated 07/20/2023    Constipation 07/20/2023    Coronary artery disease     Diastolic dysfunction 07/20/2023    Diverticulosis of large intestine without perforation or abscess without bleeding 01/20/2023    Endometriosis 07/20/2023    Essential hypertension 04/29/2021    Gastroesophageal reflux disease 07/20/2023    Hemorrhage, postmenopausal 07/20/2023    Hyperlipidemia 07/20/2023    Hypothyroidism 07/20/2023    Lumbar radiculopathy 07/20/2023    Melena 01/20/2023    Osteoarthritis of right hip 04/29/2021    Paroxysmal atrial fibrillation 07/17/2023    Peripheral neuropathy 04/25/2012    Personal history of colonic polyps 10/06/2014    CO (pulmonary regurgitation) 07/20/2023    Description: Trace CO per 5/11 ECHO    Primary hypertriglyceridemia 06/14/2020    Sarcoidosis 07/17/2023    Suspected    Second degree hemorrhoids 08/20/2020    Tremor 07/20/2023    Vitamin B12 deficiency 02/19/2014          Family History   Problem Relation Age of Onset    Breast cancer Sister         Social History     Tobacco Use    Smoking status: Never     Passive exposure: Never    Smokeless tobacco: Never   Vaping Use    Vaping Use: Never used   Substance Use Topics    Alcohol use: Never    Drug use: Never          Medications Prior to Admission   Medication Sig Dispense Refill Last Dose    apixaban (ELIQUIS) 2.5 MG tablet tablet Take 1 tablet by mouth 2 (Two) Times a Day.       atorvastatin (LIPITOR) 20 MG tablet Take 1  tablet by mouth Daily.       doxycycline (VIBRAMYCIN) 100 MG capsule Take 1 capsule by mouth 2 (Two) Times a Day.       ferrous sulfate 325 (65 FE) MG tablet Take 1 tablet by mouth Daily With Breakfast.       folic acid (FOLVITE) 1 MG tablet Take 1 tablet by mouth Daily.       furosemide (LASIX) 20 MG tablet Take 1 tablet by mouth Daily for 30 days. 30 tablet 0     hydroCHLOROthiazide (HYDRODIURIL) 25 MG tablet Take 1 tablet by mouth Daily.       irbesartan (AVAPRO) 75 MG tablet Take 1 tablet by mouth Every Night. 30 tablet 3     levothyroxine (SYNTHROID, LEVOTHROID) 50 MCG tablet 1 tablet Every Morning.       pantoprazole (PROTONIX) 40 MG EC tablet Take 1 tablet by mouth Daily.       predniSONE (DELTASONE) 5 MG tablet Take 3 tablets by mouth Daily.       traZODone (DESYREL) 50 MG tablet Take 2 tablets by mouth At Night As Needed for Sleep.           Scheduled Meds:  atorvastatin, 20 mg, Oral, Nightly  cefTRIAXone, 2,000 mg, Intravenous, Q24H  ferric gluconate, 250 mg, Intravenous, Once  folic acid, 1 mg, Oral, Daily  levothyroxine, 50 mcg, Oral, Q AM  pantoprazole, 40 mg, Oral, Daily  predniSONE, 15 mg, Oral, Daily  senna-docusate sodium, 2 tablet, Oral, BID  sodium bicarbonate, 1,300 mg, Oral, TID  sodium chloride, 10 mL, Intravenous, Q12H  sodium chloride, 3 mL, Intravenous, Q12H         Continuous Infusions:  dilTIAZem, 5-15 mg/hr, Last Rate: 15 mg/hr (07/20/23 0915)        PRN Meds:    acetaminophen    senna-docusate sodium **AND** polyethylene glycol **AND** bisacodyl **AND** bisacodyl    Calcium Replacement - Follow Nurse / BPA Driven Protocol    Magnesium Standard Dose Replacement - Follow Nurse / BPA Driven Protocol    ondansetron    Phosphorus Replacement - Follow Nurse / BPA Driven Protocol    Potassium Replacement - Follow Nurse / BPA Driven Protocol    [COMPLETED] Insert Peripheral IV **AND** sodium chloride    sodium chloride    sodium chloride    sodium chloride    sodium chloride    traZODone     "  Allergies:  Codeine, Topiramate, Tramadol, Cefdinir, Levofloxacin, and Lisinopril      Objective     Vital Signs   /58 (BP Location: Right arm, Patient Position: Lying)   Pulse 89   Temp 98.6 °F (37 °C) (Oral)   Resp 22   Ht 167.6 cm (66\")   Wt 83 kg (182 lb 15.7 oz)   SpO2 90%   BMI 29.53 kg/m²     Physical Exam:    Musculoskeletal:   Muscle strength and tone: Unable to assess  Abnormal Movements: None observed  Gait: Unable to assess     General Appearance:  In bed, receiving bedside care from nurses.                     Mental Status Exam:   Hygiene:   good  Cooperation:   Unable to participate at this time  Eye Contact:  Downcast  Psychomotor Behavior:  Appropriate  Affect:  Appropriate  Mood: anxious  Speech:  Minimal  Thought Process:  Unable to demonstrate  Thought Content:  Unable to demonstrate  Suicidal:   Patient did not express  Homicidal:   Patient did not express  Hallucinations:  Not demonstrated today  Delusion:  Unable to demonstrate  Memory:  Unable to evaluate  Orientation:  Person  Reliability:   Unable to assess  Insight:   Unable to assess  Judgement:   Unable to assess  Impulse Control:   Unable to assess        Medications and allergies reviewed     Result Review:  I have personally reviewed the results from the time of this admission to 7/20/2023 14:55 EDT and agree with these findings:  [x]  Laboratory  []  Microbiology  []  Radiology  [x]  EKG/Telemetry   []  Cardiology/Vascular   []  Pathology  []  Old records  []  Other:    BUN 62, creatinine 1.51, GFR 35.9  Phosphorus 1.9  CBC all abnormal, WBC 21.10    EKG 7/20/2023    QRSD 92    QTc 508      Assessment & Plan       JOSÉ (acute kidney injury)    Bacteremia due to Streptococcus    Cellulitis of right leg    Knee joint cyst, right    Bilateral leg edema    UTI due to Klebsiella species    Dysphagia    Feeding difficulties    Paroxysmal atrial fibrillation    Sarcoidosis    Metabolic acidosis    Gastroesophageal " reflux disease    Essential hypertension    Hyperlipidemia    Hypothyroidism         Assessment: Bacteremia, JOSÉ, UTI      Treatment Plan:   Unable to interview patient, will attempt to interview tomorrow.  Per family patient's confusion started with admission.  Patient diagnosed with bacteremia, UTI, which can be a cause for increased confusion and a greater risk of delirium since sudden onset of confusion and altered mental status.    Will follow and attempt to interview patient at a different time.    Treatment Plan discussed with: Patient and Family        I discussed the patients findings and my recommendations with patient, family, and nursing staff    I have reviewed and approved the behavioral health treatment plans and problem list. Yes  Thank you for the consult   Referring MD has access to consult report and progress notes in EMR     JOE Ortega  07/20/23  14:55 EDT

## 2023-07-20 NOTE — PROGRESS NOTES
Hematology/Oncology Inpatient Progress Note    PATIENT NAME: Rosario Ortez  : 1950  MRN: [id number]    CHIEF COMPLAINT: IgG kappa monoclonal gammopathy, iron deficiency anemia and anemia of chronic disease due to CKD    HISTORY OF PRESENT ILLNESS:  73 y.o. female presented to [facility] ER on 2023 upon the direction of her primary care provider for abnormal labs.  She had seen her PCP the day prior and was started on doxycycline for bronchitis and was found to have persistent anemia with a hemoglobin of 8.5 and creatinine 1.45.  SPEP was abnormal.  Prior to admission, in May 2023, she was seen in follow-up by Dr. Flores for recurrent ascites and possible sarcoidosis.  She was on methotrexate with folic acid and dexamethasone.  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  Her ACE level was elevated at 109 and it was felt that she may have sarcoidosis.  Ascitic fluid in 2023, was transudate with negative cytology.  Her Lasix was increased.  Her hepatitis panel and autoimmune work-up was negative in the past.  It was recommended that she undergo a liver biopsy when her ascites improved.  She was referred to the [facility] and had an appointment on 23.  Discharged from [facility] on 2023, following a 3-day admission for syncope and JOSÉ.  Cardiology was consulted. Stress test was negative with a EF of 74%.  Bilateral lower extremity venous Dopplers were negative.  Stool heme was positive,  (135-214) and  PTH 53.2 (15-65). CMP remarkable for creatinine of 1.37 and AST of 37 (1-33).  White count 4.6, hemoglobin 8.2, MCV 95, platelets 210,000.  Iron studies revealed an iron saturation of 28% (20-50), ferritin of 220 (), and TIBC 189 (298-536).  Iron was 53 ().  Vitamin B12 873 (211-946). Paracentesis was done with removal of 6.5 L of clear yellow fluid.  She stopped taking methotrexate shortly after she was  discharged.  In the ER, CMP remarkable for creatinine of 2.79, albumin 2.6 and AST 40.  White count was 12.6, hemoglobin 8.7, MCV 97.3 and platelets 254,000.  PT 13.5, PTT 33.1, fibrinogen 253 (210-450).  Repeat iron studies showed an iron of 19 (), iron saturation 11 (20-50), TIBC 170 (298-536), and ferritin of 216 (13-1 50).  IgA 243 (), IgG 905 (580-1602), and IgM 35 ().  CXR was unremarkable.  She reported pain in her right knee and MRI showed prominent diffuse edema in the soft tissue surrounding the knee suggesting a skin and soft tissue infection.  There was a 2.8 x 0.80 x 2.5 cm collection in the subcu tissues along the medial femoral condyle which was nonspecific but could represent an abscess.  There was no significant knee effusion or significant marrow edema to suggest joint infection.  There was advanced right compartmental osteoarthritis and suspicion for tears of the posterior horns of the menisci was seen.  Urine culture grew Klebsiella pneumonia and blood culture from 1 bottle grew alphahemolytic strep.  SIFE showed IgG kappa monoclonal gammopathy.  Haptoglobin 173 ().  Surgery, GI, nephrology, orthopedics and infectious disease were consulted.  On 7/15/2023, her stool heme was positive and ESR was 11 (0-30).  She was transfused 1 unit pRBCs on 7/17/2023 for hemoglobin drop to 7.4.  On the day of consultation, her white count was 11.9, hemoglobin 8.3, MCV 90.8 and platelets 215.  Chemistries were significant for potassium 3.4 and creatinine 1.72.  PT 13.2 (9.6-11.7), and PTT 47 (24-31).  It was planned that she undergo an EGD and have an aspirate performed on her presumed right knee abscess.     07/18/23  Hematology/Oncology was consulted for her IgG kappa monoclonal gammopathy.     Past Medical History: CKD, anemia.  Hypothyroidism, A-fib 2021.  Hyperlipidemia and hypertension for years. Bilateral lower extremity neuropathy.  Surgical History: Tonsillectomy and appendectomy.   Cholecystectomy 2006.  Possible hysterectomy.  EGD and colonoscopy 2023.  Social History: She lives in [place] with her .  She used to work as a .  No history of smoking or alcohol abuse.  Family History: Her sister had breast cancer at age 75.  Allergies: Codeine causes nausea.  Topiramate causes a rash.  Tramadol causes mental status changes.  Cefdinir, Levaquin and lisinopril.     PCP: Ian Cordero MD    INTERVAL HISTORY:  7/18/2023-Ferrlecit 250 mg IV x1.  Patient underwent right knee aspiration and bone marrow aspiration per IR.  EGD performed by Dr. Flores which was normal and Dobbhoff tube was placed.  7/19/2023-White count 16.3, hemoglobin 9.4, MCV 93 and platelets 243.  Creatinine 1.62.  AST 47 (1-32).  Bone survey showed an old T6 compression fracture only.  Right knee aspirate with many gram-positive cocci in pairs.  Spot urine WILL showed no monoclonal protein.  7/20/2023-white count 21.1, hemoglobin 10.5.  PT 12.2 (9.6-11.7), PTT 31.2 (24-31).  Urine culture grew Klebsiella aerogenes.  Right knee aspirate growing Staph aureus.  Palliative care consulted and patient stated she wanted to be a DNR although her  feels she is not capable of making her own decisions.  Psychiatry was consulted.  Pulmonary and cardiology were consulted as well.  Chest x-ray showed new medial left basilar consolidation and new hazy and patchy air    History of present illness reviewed since last visit and changes noted on 07/20/23.    Subjective   complains of shortness of air and a cough.    ROS:  Review of Systems   Constitutional:  Negative for activity change, chills, fatigue, fever and unexpected weight change.   HENT:  Negative for congestion, dental problem, hearing loss, mouth sores, nosebleeds, sore throat and trouble swallowing.    Eyes:  Negative for photophobia and visual disturbance.   Respiratory:  Positive for cough and shortness of breath. Negative for apnea and chest  tightness.    Cardiovascular:  Negative for chest pain, palpitations and leg swelling.   Gastrointestinal:  Negative for abdominal distention, abdominal pain, blood in stool, constipation, diarrhea, nausea and vomiting.   Endocrine: Negative for cold intolerance and heat intolerance.   Genitourinary:  Negative for decreased urine volume, difficulty urinating, frequency, hematuria and urgency.   Musculoskeletal:  Negative for arthralgias and gait problem.   Skin:  Negative for rash and wound.   Neurological:  Negative for dizziness, tremors, seizures, weakness, light-headedness, numbness and headaches.   Hematological:  Negative for adenopathy. Does not bruise/bleed easily.   Psychiatric/Behavioral:  Negative for confusion and hallucinations. The patient is not nervous/anxious.    All other systems reviewed and are negative.     MEDICATIONS:    Scheduled Meds:  atorvastatin, 20 mg, Oral, Nightly  cefTRIAXone, 2,000 mg, Intravenous, Q24H  ferric gluconate, 250 mg, Intravenous, Once  folic acid, 1 mg, Oral, Daily  levothyroxine, 50 mcg, Oral, Q AM  pantoprazole, 40 mg, Oral, Daily  predniSONE, 15 mg, Oral, Daily  senna-docusate sodium, 2 tablet, Oral, BID  sodium bicarbonate, 1,300 mg, Oral, TID  sodium chloride, 10 mL, Intravenous, Q12H  sodium chloride, 3 mL, Intravenous, Q12H  sodium phosphate, 15 mmol, Intravenous, Once       Continuous Infusions:  dilTIAZem, 5-15 mg/hr, Last Rate: 15 mg/hr (07/20/23 0915)       PRN Meds:    acetaminophen    senna-docusate sodium **AND** polyethylene glycol **AND** bisacodyl **AND** bisacodyl    Calcium Replacement - Follow Nurse / BPA Driven Protocol    Magnesium Standard Dose Replacement - Follow Nurse / BPA Driven Protocol    ondansetron    Phosphorus Replacement - Follow Nurse / BPA Driven Protocol    Potassium Replacement - Follow Nurse / BPA Driven Protocol    [COMPLETED] Insert Peripheral IV **AND** sodium chloride    sodium chloride    sodium chloride    sodium chloride     "sodium chloride    traZODone     ALLERGIES:    Allergies   Allergen Reactions    Codeine Nausea And Vomiting, Other (See Comments) and Nausea Only     nausea  nausea      Topiramate Rash    Tramadol Other (See Comments) and Mental Status Change    Cefdinir Unknown - High Severity    Levofloxacin Nausea Only    Lisinopril Cough       Objective    VITALS:   /58 (BP Location: Right arm, Patient Position: Lying)   Pulse 89   Temp 98.6 °F (37 °C) (Oral)   Resp 22   Ht 167.6 cm (66\")   Wt 83 kg (182 lb 15.7 oz)   SpO2 90%   BMI 29.53 kg/m²     PHYSICAL EXAM:  Physical Exam  Vitals and nursing note reviewed.   Constitutional:       General: She is not in acute distress.     Appearance: Normal appearance. She is well-developed and normal weight. She is ill-appearing. She is not diaphoretic.      Comments: Mild lethargy.   HENT:      Head: Normocephalic and atraumatic.      Nose: Nose normal.      Mouth/Throat:      Mouth: Mucous membranes are moist.      Pharynx: Oropharynx is clear. No oropharyngeal exudate or posterior oropharyngeal erythema.      Comments: Dental fillings.  Eyes:      General: No scleral icterus.     Extraocular Movements: Extraocular movements intact.      Conjunctiva/sclera: Conjunctivae normal.      Pupils: Pupils are equal, round, and reactive to light.   Cardiovascular:      Rate and Rhythm: Normal rate and regular rhythm.      Heart sounds: Normal heart sounds. No murmur heard.  Pulmonary:      Effort: Pulmonary effort is normal. No respiratory distress.      Breath sounds: Normal breath sounds. No stridor. No wheezing or rales.   Abdominal:      General: Bowel sounds are normal. There is no distension.      Palpations: Abdomen is soft. There is no mass.      Tenderness: There is no abdominal tenderness. There is no guarding.   Genitourinary:     Comments: Barroso catheter.  Musculoskeletal:         General: Deformity (Generative changes of joints.) present. No swelling or tenderness. " Normal range of motion.      Cervical back: Normal range of motion and neck supple.      Right lower leg: No edema.      Left lower leg: No edema.      Comments: Left upper extremity O2 monitor.    Lymphadenopathy:      Cervical: No cervical adenopathy.      Upper Body:      Right upper body: No supraclavicular adenopathy.      Left upper body: No supraclavicular adenopathy.   Skin:     General: Skin is warm and dry.      Coloration: Skin is not pale.      Findings: Bruising (Ecchymosis on arms.) present. No erythema or rash.      Comments: Right upper extremity IV.     Neurological:      General: No focal deficit present.      Mental Status: She is oriented to person, place, and time.      Coordination: Coordination normal.   Psychiatric:         Mood and Affect: Mood normal.         Behavior: Behavior normal.         Thought Content: Thought content normal.         Judgment: Judgment normal.         RECENT LABS:  Lab Results (last 24 hours)       Procedure Component Value Units Date/Time    Body Fluid Culture - Body Fluid, Knee, Right [[id number]]  (Abnormal) Collected: 07/18/23 1559    Specimen: Body Fluid from Knee, Right Updated: 07/20/23 0902     Body Fluid Culture Heavy growth (4+) Staphylococcus aureus     Gram Stain Many (4+) WBCs per low power field      Many (4+) Gram positive cocci in pairs    Manual Differential [[id number]]  (Abnormal) Collected: 07/20/23 0729    Specimen: Blood Updated: 07/20/23 0858     Neutrophil % 65.0 %      Lymphocyte % 5.0 %      Monocyte % 12.0 %      Bands %  12.0 %      Metamyelocyte % 5.0 %      Atypical Lymphocyte % 1.0 %      Neutrophils Absolute 16.25 10*3/mm3      Lymphocytes Absolute 1.27 10*3/mm3      Monocytes Absolute 2.53 10*3/mm3      Anisocytosis Slight/1+     Poikilocytes Slight/1+     Polychromasia Slight/1+     Spherocytes Slight/1+     WBC Morphology Normal     Platelet Morphology Normal    CBC & Differential [[id number]]  (Abnormal) Collected: 07/20/23 0729     Specimen: Blood Updated: 07/20/23 0858    Narrative:      The following orders were created for panel order CBC & Differential.  Procedure                               Abnormality         Status                     ---------                               -----------         ------                     CBC Auto Differential[[id number]]        Abnormal            Final result               Scan Slide[[id number]]                                       Final result                 Please view results for these tests on the individual orders.    Scan Slide [[id number]] Collected: 07/20/23 0729    Specimen: Blood Updated: 07/20/23 0858     Scan Slide --     Comment: See Manual Differential Results       CBC Auto Differential [[id number]]  (Abnormal) Collected: 07/20/23 0729    Specimen: Blood Updated: 07/20/23 0858     WBC 21.10 10*3/mm3      RBC 3.60 10*6/mm3      Hemoglobin 10.5 g/dL      Hematocrit 33.6 %      MCV 93.2 fL      MCH 29.1 pg      MCHC 31.3 g/dL      RDW 22.6 %      RDW-SD 73.1 fl      MPV 7.5 fL      Platelets 260 10*3/mm3     Narrative:      The previously reported component NRBC is no longer being reported. Previous result was 0.0 /100 WBC (Reference Range: 0.0-0.2 /100 WBC) on 7/20/2023 at 0815 EDT.    Renal Function Panel [[id number]]  (Abnormal) Collected: 07/20/23 0729    Specimen: Blood Updated: 07/20/23 0841     Glucose 119 mg/dL      BUN 62 mg/dL      Creatinine 1.51 mg/dL      Sodium 140 mmol/L      Potassium 4.1 mmol/L      Chloride 103 mmol/L      CO2 30.0 mmol/L      Calcium 8.7 mg/dL      Albumin 2.1 g/dL      Phosphorus 1.9 mg/dL      Anion Gap 7.0 mmol/L      BUN/Creatinine Ratio 41.1     eGFR 35.9 mL/min/1.73     Narrative:      GFR Normal >60  Chronic Kidney Disease <60  Kidney Failure <15    The GFR formula is only valid for adults with stable renal function between ages 18 and 70.    TSH [[id number]]  (Normal) Collected: 07/20/23 0729    Specimen: Blood Updated: 07/20/23 0839     TSH 2.270  uIU/mL     Protime-INR [[id number]]  (Abnormal) Collected: 07/20/23 0729    Specimen: Blood Updated: 07/20/23 0827     Protime 12.2 Seconds      INR 1.15    aPTT [[id number]]  (Abnormal) Collected: 07/20/23 0729    Specimen: Blood Updated: 07/20/23 0827     PTT 31.2 seconds             PENDING RESULTS: 24-hour UIFE, light chain ratio, bone marrow results, and copper.    IMAGING REVIEWED:  XR Bone Survey Complete    Result Date: 7/18/2023  Impression: Compression fracture of T6, likely old. Other chronic findings as detailed. Electronically Signed: Juliane Baker MD  7/18/2023 3:30 PM EDT  Workstation ID: [id number]    XR Chest 1 View    Result Date: 7/20/2023  Impression: New medial left basilar consolidation and new hazy and patchy airspace disease in both lungs. These findings could relate to edema or pneumonia Electronically Signed: Nayan Juárez  7/20/2023 6:18 AM EDT  Workstation ID: [id number]    CT Bone marrow biopsy and aspiration    Result Date: 7/18/2023  1. Successful CT-guided bone marrow biopsy Electronically Signed: Cameron Boudreaux  7/18/2023 4:08 PM EDT  Workstation ID: [id number]    IR Inject/asp large joint or bursa    Result Date: 7/18/2023  1. Successful aspiration of right medial knee abscess yielding 8 mL of purulent material. Electronically Signed: Cameron Boudreaux  7/18/2023 4:41 PM EDT  Workstation ID: [id number]     I have reviewed the patient's labs, imaging, reports, and other clinician documentation.    Assessment & Plan   ASSESSMENT:  IgG kappa monoclonal gammopathy-incidental finding on work-up for CKD and anemia.  Spot urine WILL with no gammopathy.  Creatinine improving.  Bone survey showed an old T6 fracture.  Immunoglobulins normal.  Bone marrow aspiration performed 7/18-awaiting results.  24-hour UIFE pending.  On prednisone as an outpatient, which was continued.  Anemia and OLAF-onset June 2023, associated with iron deficiency and heme positive stools, CKD, monoclonal gammopathy, and recent  methotrexate use.  On folic acid and Protonix.  S/p 3 doses of Ferrlecit 250 mg each and 1 unit pRBCs (7/17).  Prior work-up revealed normal B12 and haptoglobin.  Retic was suppressed.  EGD was unremarkable.  Copper level pending.  Improving.   Recurrent ascites, liver disease, chronic epigastric pain, noncaseating granuloma of the stomach, acute coagulopathy and elevated LFTs- in work-up for sarcoidosis per Dr. Flores.  She has been referred to the [facility].  Acute hepatitis panel, platelets and fibrinogen all normal.  Mildly elevated AST.  Likely due to factor deficiency from liver disease.  No esophageal varices on recent EGD. On steroids. Coags improved.   CKD with electrolyte imbalances, A-fib, hypertension, hyperlipidemia and hypothyroidism-On levothyroxine.  Per PMD and nephrology.  K. Aerogenes UTI, right knee s. Aureus cellulitis and leukocytosis-on Rocephin per ID.    Depression-psychiatry has been consulted.      PLAN  Obtain SPEP from primary care provider as outpatient.  Obtain records from patient's rheumatologist.  24-hour UIFE pending.  Bone marrow aspiration and biopsy path pending.   Light chain ratio pending.  Copper level pending.  DEXA scan as outpatient.  Colonoscopy as outpatient.    Patient seen and evaluated by Dr. Ayala.  Electronically signed by JOE Goddard, 07/20/23, 3:39 PM EDT.        I have personally performed a face-to-face diagnostic evaluation on this patient. I have performed a complete history and physical examination, reviewed laboratory studies, and radiographic examinations.  I have completed the majority and substantive portion of the medical decision making.  I have formulated the assessment on this patient and the plan of action as noted above. I have discussed the case with Raquel Guevara NP, have edited/reviewed the note, and agree with the care plan.  She is complaining of SOA and cough.  On examination, she has hair thinning and UE ecchymoses.  Labs  are revealing leukocytosis and anemia.  Work-up of gammopathy is in progress.  Await bone marrow pathology.      I discussed the patient's findings and my recommendations with patient and spouse.    Part of this note may be an electronic transcription/translation of spoken language to printed text using the Dragon Dictation System.    Electronically signed by Rc Ayala MD, 07/20/23, 5:41 PM EDT.

## 2023-07-20 NOTE — SIGNIFICANT NOTE
07/20/23 1310   OTHER   Discipline occupational therapist   Rehab Time/Intention   Session Not Performed patient/family declined treatment  (Patient and  report pt got no sleep last night, both refuse therapy despite encouragement. OT returned to attempt a second time resulting in a second refusal. Will attempt tomorrow.)

## 2023-07-20 NOTE — DISCHARGE PLACEMENT REQUEST
"Rosario Ortez (73 y.o. Female)       Date of Birth   1950    Social Security Number       Address   [address] [place] [address] [place] [facility]    Home Phone   [phone]    MRN   [id number]       Rastafari   Spiritism    Marital Status                               Admission Date   23    Admission Type   Emergency    Admitting Provider   Armando Adams MD    Attending Provider   Elias Rey MD    Department, Room/Bed   [facility], 246/       Discharge Date       Discharge Disposition       Discharge Destination                                 Attending Provider: Elias Rey MD    Allergies: Codeine, Topiramate, Tramadol, Cefdinir, Levofloxacin, Lisinopril    Isolation: None   Infection: None   Code Status: CPR    Ht: 167.6 cm (66\")   Wt: 83 kg (182 lb 15.7 oz)    Admission Cmt: None   Principal Problem: None                  Active Insurance as of 2023       Primary Coverage       Payor Plan Insurance Group Employer/Plan Group    HUMANA MEDICARE REPLACEMENT HUMANA MEDICARE REPLACEMENT [id number]       Payor Plan Address Payor Plan Phone Number Payor Plan Fax Number Effective Dates    [address] [address] [phone]  3/1/2023 - None Entered    [facility] [id number]         Subscriber Name Subscriber Birth Date Member ID       ROSARIO ORTEZ 1950 [id number]                     Emergency Contacts        (Rel.) Home Phone Work Phone Mobile Phone    WILMAR ORTEZ (Spouse) -- -- [phone]    Marily Ortez (Daughter) [phone] -- --                 History & Physical        Cameron Boudreaux MD at 23 [facility]6            [facility]   Interventional Radiology H&P    Patient Name: Rosario Ortez  : 1950  MRN: [id number]  Primary Care Physician:  Ian Cordero MD  Referring Physician: Ian Cordero MD  Date of admission: 2023    Subjective   Subjective     HPI:  Rosario Ortez is a 73 y.o. female here for bone " marrow biopsy.    Review of Systems:   Constitutional no fever,  no weight loss       Otolaryngeal no difficulty swallowing   Cardiovascular no chest pain   Pulmonary no cough, no sputum production   Gastrointestinal no constipation, no diarrhea                         Personal History       Past Medical/Surgical History:   Past Medical History:   Diagnosis Date    A-fib     Coronary artery disease     Paroxysmal atrial fibrillation 07/17/2023    Sarcoidosis 07/17/2023    Suspected     Past Surgical History:   Procedure Laterality Date    BREAST BIOPSY      HYSTERECTOMY      JOINT REPLACEMENT Right 2015       Social History:  reports that she has never smoked. She has never been exposed to tobacco smoke. She has never used smokeless tobacco. She reports that she does not drink alcohol and does not use drugs.    Medications:  Medications Prior to Admission   Medication Sig Dispense Refill Last Dose    apixaban (ELIQUIS) 2.5 MG tablet tablet Take 1 tablet by mouth 2 (Two) Times a Day.       atorvastatin (LIPITOR) 20 MG tablet Take 1 tablet by mouth Daily.       doxycycline (VIBRAMYCIN) 100 MG capsule Take 1 capsule by mouth 2 (Two) Times a Day.       ferrous sulfate 325 (65 FE) MG tablet Take 1 tablet by mouth Daily With Breakfast.       folic acid (FOLVITE) 1 MG tablet Take 1 tablet by mouth Daily.       furosemide (LASIX) 20 MG tablet Take 1 tablet by mouth Daily for 30 days. 30 tablet 0     hydroCHLOROthiazide (HYDRODIURIL) 25 MG tablet Take 1 tablet by mouth Daily.       irbesartan (AVAPRO) 75 MG tablet Take 1 tablet by mouth Every Night. 30 tablet 3     levothyroxine (SYNTHROID, LEVOTHROID) 50 MCG tablet 1 tablet Every Morning.       pantoprazole (PROTONIX) 40 MG EC tablet Take 1 tablet by mouth Daily.       predniSONE (DELTASONE) 5 MG tablet Take 3 tablets by mouth Daily.       traZODone (DESYREL) 50 MG tablet Take 2 tablets by mouth At Night As Needed for Sleep.        Current medications:  atorvastatin, 20 mg,  Oral, Nightly  cefTRIAXone, 2,000 mg, Intravenous, Q24H  ferric gluconate, 250 mg, Intravenous, Once  folic acid, 1 mg, Oral, Daily  levothyroxine, 50 mcg, Oral, Q AM  pantoprazole, 40 mg, Oral, Daily  potassium chloride, 40 mEq, Oral, Once  predniSONE, 15 mg, Oral, Daily  senna-docusate sodium, 2 tablet, Oral, BID  sodium bicarbonate, 1,300 mg, Oral, TID  sodium chloride, 10 mL, Intravenous, Q12H  sodium chloride, 3 mL, Intravenous, Q12H      Current IV drips:  sodium bicarbonate drip (greater than 75 mEq/bag), 100 mEq, Last Rate: 100 mEq (07/17/23 2041)        Allergies:  Allergies   Allergen Reactions    Codeine Nausea And Vomiting, Other (See Comments) and Nausea Only     nausea  nausea      Topiramate Rash    Tramadol Other (See Comments) and Mental Status Change    Cefdinir Unknown - High Severity    Levofloxacin Nausea Only    Lisinopril Cough       Objective    Objective     Vitals:   Temp:  [95.1 °F (35.1 °C)-98.3 °F (36.8 °C)] 95.1 °F (35.1 °C)  Heart Rate:  [63-92] 70  Resp:  [14-20] 15  BP: (116-154)/(43-56) 154/47  Flow (L/min):  [2-4] 3      Physical Exam:   Constitutional: Awake, alert, No acute distress    Respiratory: Clear to auscultation bilaterally, nonlabored respirations    Cardiovascular: RRR, no murmurs, rubs, or gallops, palpable pedal pulses bilaterally   Gastrointestinal: Positive bowel sounds, soft, nontender, nondistended        ASA SCALE ASSESSMENT:  2-Mild to moderate systemic disease, medically well controlled, with no functional limitation    MALLAMPATI CLASSIFICATION:  2-Able to visualize the soft palate, fauces, uvula. The anterior & posterior tonsilar pillars are hidden by the tongue.       Result Review        Result Review:     Sodium   Date Value Ref Range Status   07/18/2023 141 136 - 145 mmol/L Final   07/17/2023 140 136 - 145 mmol/L Final   07/16/2023 140 136 - 145 mmol/L Final       Potassium   Date Value Ref Range Status   07/18/2023 3.4 (L) 3.5 - 5.2 mmol/L Final    2023 3.7 3.5 - 5.2 mmol/L Final   2023 4.3 3.5 - 5.2 mmol/L Final     Comment:     Slight hemolysis detected by analyzer. Results may be affected.       Chloride   Date Value Ref Range Status   2023 109 (H) 98 - 107 mmol/L Final   2023 110 (H) 98 - 107 mmol/L Final   2023 111 (H) 98 - 107 mmol/L Final       No results found for: PLASMABICARB    BUN   Date Value Ref Range Status   2023 65 (H) 8 - 23 mg/dL Final   2023 75 (H) 8 - 23 mg/dL Final   2023 72 (H) 8 - 23 mg/dL Final       Creatinine   Date Value Ref Range Status   2023 1.72 (H) 0.57 - 1.00 mg/dL Final   2023 2.18 (H) 0.57 - 1.00 mg/dL Final   2023 2.30 (H) 0.57 - 1.00 mg/dL Final       Calcium   Date Value Ref Range Status   2023 8.3 (L) 8.6 - 10.5 mg/dL Final   2023 8.5 (L) 8.6 - 10.5 mg/dL Final   2023 8.4 (L) 8.6 - 10.5 mg/dL Final           No components found for: GLUCOSE.*  Results from last 7 days   Lab Units 23  0125   WBC 10*3/mm3 11.90*   HEMOGLOBIN g/dL 8.3*   HEMATOCRIT % 26.1*   PLATELETS 10*3/mm3 215      Results from last 7 days   Lab Units 23  1114   INR  1.25*           Assessment / Plan     Assesment:   Bone mrarow biopsy      Plan:   Bone marrow biopsy.    The risks and benefits of the procedure were discussed with the patient.    Electronically signed by Cameron Boudreaux MD, 23, 3:26 PM EDT.     Electronically signed by Cameron Boudreaux MD at 23 2358       Abhinav Boyd MD at 23 1255              [facility] Medicine Services  History & Physical    Patient Name: Rosario Ortez  : 1950  MRN: [id number]  Primary Care Physician:  Ian Cordero MD  Date of admission: 2023  Date and Time of Service: 2023 at 12:50 PM.    Subjective      Chief Complaint: Sent from the PCPs office secondary to abnormal lab results.    History of Present Illness: Rosario Ortez is a 73 y.o. female with past medical  history of paroxysmal atrial fibrillation, hypertension, hypothyroidism and fluid accumulation in the abdomen and lower extremities and recently suspected to have sarcoidosis for which she is going to specialized center next week for further diagnosis has been doing at her baseline and went for her blood work at her PCPs office yesterday. PCPs office called her today and told her to come to the hospital because her kidney function looks worse and she has a UTI.  Patient does have JOSÉ on CKD but does not have a UTI at this point.  Her baseline activity is pretty poor and she would walk some very small distances with walker but is mostly wheelchair-bound.  She was also complaining of some area of erythematous lesions with some swelling in her right leg and knee and probably had some bronchitis for which she was started on doxycycline yesterday by her PCP.  Patient's oral intake is very poor as per family.  She has been on water pills with Lasix and she was also started on hydrochlorothiazide yesterday.  She denies any nausea vomiting or abdominal pain but is quite tender in the abdomen.  She denies any fever chills chest pain or shortness of breath but has some productive cough.  She denies any other significant complaints though.      Review of Systems   Constitutional: Positive for decreased appetite. Negative for chills, fever and weight loss.   HENT:  Positive for congestion. Negative for sore throat.    Eyes:  Negative for blurred vision, double vision and visual disturbance.   Cardiovascular:  Positive for leg swelling. Negative for chest pain, cyanosis, palpitations and syncope.   Respiratory:  Positive for cough, shortness of breath and sputum production. Negative for hemoptysis and wheezing.    Endocrine: Negative for cold intolerance and heat intolerance.   Hematologic/Lymphatic: Negative for adenopathy.   Skin:  Negative for itching and rash.        Questionable cellulitis of the right knee and possibly  right lower extremity.   Musculoskeletal:  Negative for back pain, falls and stiffness.   Gastrointestinal:  Positive for anorexia and diarrhea. Negative for abdominal pain, constipation, hematemesis, hematochezia, jaundice, melena, nausea and vomiting.   Genitourinary:  Negative for dysuria, frequency, hematuria and urgency.   Neurological:  Positive for weakness. Negative for dizziness, focal weakness, tremors and vertigo.   Psychiatric/Behavioral:  Negative for altered mental status, depression, suicidal ideas and thoughts of violence.         Personal History     Past Medical History:   Diagnosis Date    A-fib     Coronary artery disease        Past Surgical History:   Procedure Laterality Date    BREAST BIOPSY      HYSTERECTOMY      JOINT REPLACEMENT Right 2015       Family History: family history includes Breast cancer in her sister. Otherwise pertinent FHx was reviewed and not pertinent to current issue.    Social History:  reports that she has never smoked. She has never been exposed to tobacco smoke. She has never used smokeless tobacco. She reports that she does not drink alcohol and does not use drugs.    Home Medications:  Prior to Admission Medications       Prescriptions Last Dose Informant Patient Reported? Taking?    apixaban (ELIQUIS) 2.5 MG tablet tablet  Spouse/Significant Other Yes Yes    Take 1 tablet by mouth 2 (Two) Times a Day.    atorvastatin (LIPITOR) 20 MG tablet   Yes Yes    Take 1 tablet by mouth Daily.    doxycycline (VIBRAMYCIN) 100 MG capsule   Yes Yes    Take 1 capsule by mouth 2 (Two) Times a Day.    ferrous sulfate 325 (65 FE) MG tablet   Yes Yes    Take 1 tablet by mouth Daily With Breakfast.    folic acid (FOLVITE) 1 MG tablet  Spouse/Significant Other Yes Yes    Take 1 tablet by mouth Daily.    furosemide (LASIX) 20 MG tablet   No Yes    Take 1 tablet by mouth Daily for 30 days.    hydroCHLOROthiazide (HYDRODIURIL) 25 MG tablet   Yes Yes    Take 1 tablet by mouth Daily.     irbesartan (AVAPRO) 75 MG tablet   No Yes    Take 1 tablet by mouth Every Night.    levothyroxine (SYNTHROID, LEVOTHROID) 50 MCG tablet   Yes Yes    1 tablet Every Morning.    pantoprazole (PROTONIX) 40 MG EC tablet  Spouse/Significant Other Yes Yes    Take 1 tablet by mouth Daily.    predniSONE (DELTASONE) 5 MG tablet   Yes Yes    Take 3 tablets by mouth Daily.    traZODone (DESYREL) 50 MG tablet   Yes Yes    Take 2 tablets by mouth At Night As Needed for Sleep.              Allergies:  Allergies   Allergen Reactions    Codeine Nausea And Vomiting, Other (See Comments) and Nausea Only     nausea  nausea      Topiramate Rash    Tramadol Other (See Comments) and Mental Status Change    Cefdinir Unknown - High Severity    Levofloxacin Nausea Only    Lisinopril Cough       Objective      Vitals:   Temp:  [97.7 °F (36.5 °C)] 97.7 °F (36.5 °C)  Heart Rate:  [] 69  Resp:  [18] 18  BP: ()/(41-65) 103/41    Physical Exam  Constitutional:       General: She is in acute distress.   HENT:      Head: Normocephalic and atraumatic.      Ears:      Comments: Patient has systolic murmur at the right upper sternal border with intensity of 2/6.     Mouth/Throat:      Mouth: Mucous membranes are moist.      Pharynx: Oropharynx is clear.   Eyes:      Extraocular Movements: Extraocular movements intact.   Cardiovascular:      Rate and Rhythm: Normal rate and regular rhythm.      Heart sounds: Murmur heard.   Pulmonary:      Effort: Pulmonary effort is normal.      Breath sounds: Normal breath sounds.   Abdominal:      General: There is no distension.      Palpations: Abdomen is soft.      Tenderness: There is abdominal tenderness. There is no rebound.   Musculoskeletal:      Cervical back: Neck supple.      Right lower leg: Edema present.      Left lower leg: Edema present.   Skin:     General: Skin is warm and dry.   Neurological:      General: No focal deficit present.      Mental Status: She is alert.   Psychiatric:          Mood and Affect: Mood normal.          Result Review    Result Review:  I have personally reviewed the results from the time of this admission to 7/13/2023 12:56 EDT and agree with these findings:  []  Laboratory  []  Microbiology  []  Radiology  []  EKG/Telemetry   []  Cardiology/Vascular   []  Pathology  []  Old records  []  Other:  Most notable findings include:   CBC shows WBC of 4.6, H&H of 8.7 and 27.4 and platelets of 254.  CMP shows sodium 135, potassium 4.2, chloride 103, carbon dioxide 18, BUN 82, creatinine 2.79.  Anion gap is 14.  Alkaline phosphatase was 174, total protein 5.3, albumin 2.6 and AST of 40 otherwise liver enzymes were negative.  UA shows trace leukocyte esterase 3-5 WBCs 3+ bacteria.  Chest x-ray was without any acute problems.      Assessment & Plan        Active Hospital Problems:  Active Hospital Problems    Diagnosis     **JOSÉ (acute kidney injury)      Plan:   #JOSÉ on CKD:  Likely secondary to dehydration due to poor p.o. intake in addition to her diuretics.  Hold all diuretics and ARB's and start gentle hydration.  Repeat BMP in the morning.    #Questionable UTI:  Patient does not have UTI on UA.    #Cellulitis right lower extremity and right knee:  Continue doxycycline which was started yesterday.  We will get an x-ray of the right knee.    #PAF:  Patient seems to be in normal rhythm at this point.  Continue Eliquis at home dose.    #Hypothyroidism:  Continue Synthroid at home dose.  We will check a TSH at this point.    #Hypertension:  Patient's blood pressure is running low at this point likely secondary to dehydration we will see if it improves after holding her blood pressure medications and giving gentle hydration.    Patient is full code and she wants her medical proxy decision-maker to be her .    I have utilized all available immediate resources to obtain, update, or review the patient's current medications.  I confirmed that the patient's Advance Care Plan is  present, code status is documented, or surrogate decision maker is listed in the patient's medical record.   [] I did NOT confirm today the presence of an Advance Care Plan or surrogate decision maker documented within the patient's medical record.         DVT prophylaxis:  Medical DVT prophylaxis orders are present.    CODE STATUS:    Level Of Support Discussed With: Patient; Health Care Surrogate  Code Status (Patient has no pulse and is not breathing): CPR (Attempt to Resuscitate)  Medical Interventions (Patient has pulse or is breathing): Full Support    Admission Status:  I believe this patient meets observation status.    I discussed the patient's findings and my recommendations with patient and family.        Signature: Electronically signed by Abhinav Boyd MD, 07/13/23, 12:56 EDT.  [facility] Hospitalist Team    Electronically signed by Abhinav Boyd MD at 07/13/23 1308       Current Facility-Administered Medications   Medication Dose Route Frequency Provider Last Rate Last Admin    acetaminophen (TYLENOL) tablet 650 mg  650 mg Oral Q6H PRN Abhinav Boyd MD   650 mg at 07/20/23 1131    atorvastatin (LIPITOR) tablet 20 mg  20 mg Oral Nightly Abhinav Boyd MD   20 mg at 07/19/23 2047    sennosides-docusate (PERICOLACE) 8.6-50 MG per tablet 2 tablet  2 tablet Oral BID Abhinav Boyd MD   2 tablet at 07/19/23 2047    And    polyethylene glycol (MIRALAX) packet 17 g  17 g Oral Daily PRN Abhinav Boyd MD        And    bisacodyl (DULCOLAX) EC tablet 5 mg  5 mg Oral Daily PRN Abhinav Boyd MD        And    bisacodyl (DULCOLAX) suppository 10 mg  10 mg Rectal Daily PRN Abhinav Boyd MD        cefTRIAXone (ROCEPHIN) 2,000 mg in sodium chloride 0.9 % 100 mL IVPB  2,000 mg Intravenous Q24H Armando Adams  mL/hr at 07/20/23 0927 2,000 mg at 07/20/23 0927    dilTIAZem (CARDIZEM) 125 mg in 125 mL D5W infusion  5-15 mg/hr Intravenous Titrated Belen Valerio APRN 15 mL/hr at 07/20/23 0915 15 mg/hr at  07/20/23 0915    ferric gluconate (FERRLECIT) 250 MG in sodium chloride 0.9% 250 mL IVPB  250 mg Intravenous Once Raquel Guevara APRN        folic acid (FOLVITE) tablet 1 mg  1 mg Oral Daily Abhinav Boyd MD   1 mg at 07/19/23 0947    levothyroxine (SYNTHROID, LEVOTHROID) tablet 50 mcg  50 mcg Oral Q AM Abhinav Boyd MD   50 mcg at 07/19/23 0513    ondansetron (ZOFRAN) injection 4 mg  4 mg Intravenous Q6H PRN Abhinav Boyd MD        pantoprazole (PROTONIX) EC tablet 40 mg  40 mg Oral Daily Abhinav Boyd MD   40 mg at 07/19/23 0947    Potassium Replacement - Follow Nurse / BPA Driven Protocol   Does not apply PRN Armando Adams MD        predniSONE (DELTASONE) tablet 15 mg  15 mg Oral Daily Abhinav Boyd MD   15 mg at 07/20/23 1131    sodium bicarbonate tablet 1,300 mg  1,300 mg Oral TID Tony Parisi MD   1,300 mg at 07/19/23 2245    sodium chloride 0.9 % flush 10 mL  10 mL Intravenous PRN Rafael De Luna MD        sodium chloride 0.9 % flush 10 mL  10 mL Intravenous Q12H Abhinav Boyd MD   10 mL at 07/20/23 1157    sodium chloride 0.9 % flush 10 mL  10 mL Intravenous PRN Abhinav Boyd MD        sodium chloride 0.9 % flush 3 mL  3 mL Intravenous Q12H Kacie White APRN   3 mL at 07/20/23 1157    sodium chloride 0.9 % flush 3-10 mL  3-10 mL Intravenous PRN Kacie White APRN        sodium chloride 0.9 % infusion 40 mL  40 mL Intravenous PRN Abhinav Boyd MD   100 mL at 07/18/23 1236    sodium chloride 0.9 % infusion 40 mL  40 mL Intravenous PRN Kacie White APRN        traZODone (DESYREL) tablet 100 mg  100 mg Oral Nightly PRN Abhinav Boyd MD   100 mg at 07/19/23 2047        Physician Progress Notes (last 24 hours)        Tony Parisi MD at 07/20/23 1207          NEPHROLOGY PROGRESS NOTE------[facility]    [facility]  [phone]  Tony Parisi MD      Patient Care Team:  Ian Cordero MD as PCP - General  Tony Parisi MD as  "Consulting Physician (Nephrology)  Rc Ayala MD as Consulting Physician (Hematology and Oncology)      Provider:  Tony Parisi MD  Patient Name: Rosario Ortez  :  1950    SUBJECTIVE:  Follow-up JOSÉ/CKD  No chest pain has some shortness of breath    Medication:  atorvastatin, 20 mg, Oral, Nightly  cefTRIAXone, 2,000 mg, Intravenous, Q24H  ferric gluconate, 250 mg, Intravenous, Once  folic acid, 1 mg, Oral, Daily  levothyroxine, 50 mcg, Oral, Q AM  pantoprazole, 40 mg, Oral, Daily  predniSONE, 15 mg, Oral, Daily  senna-docusate sodium, 2 tablet, Oral, BID  sodium bicarbonate, 1,300 mg, Oral, TID  sodium chloride, 10 mL, Intravenous, Q12H  sodium chloride, 3 mL, Intravenous, Q12H      dilTIAZem, 5-15 mg/hr, Last Rate: 15 mg/hr (23 0915)        OBJECTIVE    Vital Sign Min/Max for last 24 hours  Temp  Min: 97.8 °F (36.6 °C)  Max: 98.6 °F (37 °C)   BP  Min: 95/52  Max: 121/63   Pulse  Min: 72  Max: 151   Resp  Min: 14  Max: 27   SpO2  Min: 89 %  Max: 97 %   No data recorded   No data recorded     Flowsheet Rows      Flowsheet Row First Filed Value   Admission Height 167.6 cm (66\") Documented at 2023 0814   Admission Weight 57.2 kg (126 lb 1.7 oz) Documented at 2023 0814            I/O this shift:  In: 7000 [I.V.:7000]  Out: -   I/O last 3 completed shifts:  In: 50 [Other:50]  Out: 420 [Urine:420]    Physical Exam:  General Appearance: alert, appears stated age and cooperative  Head: normocephalic, without obvious abnormality and atraumatic  Eyes: conjunctivae and sclerae normal and no icterus  Neck: supple and no JVD  Lungs: clear to auscultation and respirations regular  Heart: regular rhythm & normal rate and normal S1, S2  Chest: Wall no abnormalities observed  Abdomen: normal bowel sounds and soft, nontender  Extremities: moves extremities well, 1+ edema, no cyanosis   Skin: no bleeding, bruising.  Cystic lesion medial aspect right knee.  Neurologic: Alert, and oriented. No " focal deficits    Labs:    WBC WBC   Date Value Ref Range Status   07/20/2023 21.10 (H) 3.40 - 10.80 10*3/mm3 Final   07/19/2023 16.30 (H) 3.40 - 10.80 10*3/mm3 Final   07/18/2023 11.90 (H) 3.40 - 10.80 10*3/mm3 Final      HGB Hemoglobin   Date Value Ref Range Status   07/20/2023 10.5 (L) 12.0 - 15.9 g/dL Final   07/19/2023 9.4 (L) 12.0 - 15.9 g/dL Final   07/18/2023 8.3 (L) 12.0 - 15.9 g/dL Final   07/17/2023 9.1 (L) 12.0 - 15.9 g/dL Final      HCT Hematocrit   Date Value Ref Range Status   07/20/2023 33.6 (L) 34.0 - 46.6 % Final   07/19/2023 29.3 (L) 34.0 - 46.6 % Final   07/18/2023 26.1 (L) 34.0 - 46.6 % Final   07/17/2023 28.3 (L) 34.0 - 46.6 % Final      Platelets No results found for: LABPLAT   MCV MCV   Date Value Ref Range Status   07/20/2023 93.2 79.0 - 97.0 fL Final   07/19/2023 93.0 79.0 - 97.0 fL Final   07/18/2023 90.8 79.0 - 97.0 fL Final          Sodium Sodium   Date Value Ref Range Status   07/20/2023 140 136 - 145 mmol/L Final   07/19/2023 141 136 - 145 mmol/L Final   07/18/2023 141 136 - 145 mmol/L Final      Potassium Potassium   Date Value Ref Range Status   07/20/2023 4.1 3.5 - 5.2 mmol/L Final   07/19/2023 4.3 3.5 - 5.2 mmol/L Final     Comment:     Result checked     07/18/2023 3.4 (L) 3.5 - 5.2 mmol/L Final      Chloride Chloride   Date Value Ref Range Status   07/20/2023 103 98 - 107 mmol/L Final   07/19/2023 108 (H) 98 - 107 mmol/L Final   07/18/2023 109 (H) 98 - 107 mmol/L Final      CO2 CO2   Date Value Ref Range Status   07/20/2023 30.0 (H) 22.0 - 29.0 mmol/L Final   07/19/2023 23.0 22.0 - 29.0 mmol/L Final   07/18/2023 21.0 (L) 22.0 - 29.0 mmol/L Final      BUN BUN   Date Value Ref Range Status   07/20/2023 62 (H) 8 - 23 mg/dL Final   07/19/2023 60 (H) 8 - 23 mg/dL Final   07/18/2023 65 (H) 8 - 23 mg/dL Final      Creatinine Creatinine   Date Value Ref Range Status   07/20/2023 1.51 (H) 0.57 - 1.00 mg/dL Final   07/19/2023 1.62 (H) 0.57 - 1.00 mg/dL Final   07/18/2023 1.72 (H) 0.57 -  1.00 mg/dL Final      Calcium Calcium   Date Value Ref Range Status   07/20/2023 8.7 8.6 - 10.5 mg/dL Final   07/19/2023 8.4 (L) 8.6 - 10.5 mg/dL Final   07/18/2023 8.3 (L) 8.6 - 10.5 mg/dL Final      PO4 No components found for: PO4   Albumin Albumin   Date Value Ref Range Status   07/20/2023 2.1 (L) 3.5 - 5.2 g/dL Final   07/19/2023 2.1 (L) 3.5 - 5.2 g/dL Final   07/18/2023 2.1 (L) 3.5 - 5.2 g/dL Final        Magnesium No results found for: MG     Uric Acid No components found for: URIC ACID     Imaging Results (Last 72 Hours)       Procedure Component Value Units Date/Time    XR Chest 1 View [[id number]] Collected: 07/20/23 0615     Updated: 07/20/23 0620    Narrative:      XR CHEST 1 VW    Date of Exam: 7/20/2023 5:45 AM EDT    Indication: possible aspiration    Comparison: 7/8/2023.    Findings:  Lung volumes are diminished. There are new hazy densities present throughout both lungs. There is new medial left basilar consolidation. There is patchy bilateral airspace disease. There are streaky right perihilar opacities. Heart size is likely within   normal limits. No pneumothorax or definite pleural effusion. Pulmonary vasculature is obscured.      Impression:      Impression:  New medial left basilar consolidation and new hazy and patchy airspace disease in both lungs. These findings could relate to edema or pneumonia    Electronically Signed: Nayan Juárez    7/20/2023 6:18 AM EDT    Workstation ID: [id number]    IR Inject/asp large joint or bursa [[id number]] Collected: 07/18/23 1639     Updated: 07/18/23 1643    Narrative:      DATE OF EXAM:  7/18/2023 3:49 PM EDT    PROCEDURE:  IR INJECT/ASP LARGE JOINT OR BURSA    INDICATIONS:  aspirate/culture fluid from right knee    COMPARISON:  No Comparisons Available    FLUOROSCOPIC TIME:  fluoro time minutes    PHYSICIAN MONITORED CONSCIOUS SEDATION TIME:  None minutes    TECHNIQUE:   A detailed explanation of the procedure, including the risks, benefits, and  alternatives was provided. A preprocedure timeout was performed. The patient was placed supine on the bed and the medial right knee was ultrasounded, demonstrating a small fluid   collection. The area was prepped and draped in the usual sterile fashion. The skin was anesthetized with 1% lidocaine. Next under ultrasound guidance an 18-gauge needle was advanced into the collection. A total of 8 mL of purulent material was aspirated   and the needle was removed. The patient tolerated the procedure well without immediate complication.    FINDINGS:  See above      Impression:        1. Successful aspiration of right medial knee abscess yielding 8 mL of purulent material.      Electronically Signed: Cameron Boudreaux    7/18/2023 4:41 PM EDT    Workstation ID: [id number]    CT Bone marrow biopsy and aspiration [[id number]] Collected: 07/18/23 1607     Updated: 07/18/23 1610    Narrative:      DATE OF EXAM:  7/18/2023 3:29 PM EDT    PROCEDURE:  CT BONE MARROW ASPIRATION AND BIOPSY    INDICATIONS:  IgG MGUS    COMPARISON:  No Comparisons Available    FLUOROSCOPIC TIME:  2 seconds    PHYSICIAN MONITORED CONSCIOUS SEDATION TIME:  12 minutes    TECHNIQUE:   A detailed explanation of the procedure, including the risks, benefits, and alternatives was provided. A preprocedure timeout was performed. The interventional radiology nurse monitored the patient at all times. The patient was placed prone on the CT   fluoroscopy table and the left  posterior superior iliac spine was marked and prepped and draped in the usual sterile fashion. The skin was anesthetized with 1% lidocaine. Next, under CT fluoroscopic guidance an 11-gauge OnControl bone biopsy needle was   advanced just deep to the bony cortex. 1 mL of bone marrow blood was aspirated and given to the cytopathology tech, who noted the presence of spicules. Next, a total of 7 mL of bone marrow aspirate was obtained and given to the tech. Finally, a biopsy   was obtained using the  11-gauge needle. The needle was then removed. The patient tolerated the procedure well without immediate complication.    FINDINGS:  See above      Impression:        1. Successful CT-guided bone marrow biopsy      Electronically Signed: Cameron Boudreaux    7/18/2023 4:08 PM EDT    Workstation ID: [id number]    XR Bone Survey Complete [[id number]] Collected: 07/18/23 1527     Updated: 07/18/23 1532    Narrative:      XR BONE SURVEY COMPLETE    Date of Exam: 7/18/2023 2:54 PM EDT    Indication: IgG Kappa MGUS.    Comparison: None available.    Technique:  A complete adult bone survey of the head, neck, chest, abdomen, pelvis and extremities were performed per protocol.    Findings:  An NG tube terminates in the distal stomach. There are moderate degenerative changes in the spine. There is a right hip total arthroplasty. No hardware complications are identified. There is an old well-corticated avulsion at the tip of the right lateral   malleolus. There is an old mild left Hill-Sachs deformity. There are no lytic lesions or sclerotic blastic lesions of the visualized bony structures. There is moderate central and anterior wedging of a mid thoracic vertebral body, approximately T6.   There is mild retrolisthesis of L3 on L4.      Impression:      Impression:  Compression fracture of T6, likely old. Other chronic findings as detailed.      Electronically Signed: Juliane Baker MD    7/18/2023 3:30 PM EDT    Workstation ID: [id number]    XR Abdomen KUB [[id number]] Collected: 07/18/23 1310     Updated: 07/18/23 1313    Narrative:      XR ABDOMEN KUB    Date of Exam: 7/18/2023 1:07 PM EDT    Indication: Dobhoff placement verification    Comparison: None available.    Findings:  See impression      Impression:      Impression:  Antegrade oriented feeding tube tip within distal stomach.      Electronically Signed: Pedro Garcia MD    7/18/2023 1:11 PM EDT    Workstation ID: [id number]            Results for orders placed during the  hospital encounter of 07/13/23    XR Chest 1 View    Narrative  XR CHEST 1 VW    Date of Exam: 7/20/2023 5:45 AM EDT    Indication: possible aspiration    Comparison: 7/8/2023.    Findings:  Lung volumes are diminished. There are new hazy densities present throughout both lungs. There is new medial left basilar consolidation. There is patchy bilateral airspace disease. There are streaky right perihilar opacities. Heart size is likely within  normal limits. No pneumothorax or definite pleural effusion. Pulmonary vasculature is obscured.    Impression  Impression:  New medial left basilar consolidation and new hazy and patchy airspace disease in both lungs. These findings could relate to edema or pneumonia    Electronically Signed: Nayan Juárez  7/20/2023 6:18 AM EDT  Workstation ID: [id number]      XR Abdomen KUB    Narrative  XR ABDOMEN KUB    Date of Exam: 7/18/2023 1:07 PM EDT    Indication: Dobhoff placement verification    Comparison: None available.    Findings:  See impression    Impression  Impression:  Antegrade oriented feeding tube tip within distal stomach.      Electronically Signed: Pedro Garcia MD  7/18/2023 1:11 PM EDT  Workstation ID: [id number]      XR Bone Survey Complete    Narrative  XR BONE SURVEY COMPLETE    Date of Exam: 7/18/2023 2:54 PM EDT    Indication: IgG Kappa MGUS.    Comparison: None available.    Technique:  A complete adult bone survey of the head, neck, chest, abdomen, pelvis and extremities were performed per protocol.    Findings:  An NG tube terminates in the distal stomach. There are moderate degenerative changes in the spine. There is a right hip total arthroplasty. No hardware complications are identified. There is an old well-corticated avulsion at the tip of the right lateral  malleolus. There is an old mild left Hill-Sachs deformity. There are no lytic lesions or sclerotic blastic lesions of the visualized bony structures. There is moderate central and anterior wedging of  "a mid thoracic vertebral body, approximately T6.  There is mild retrolisthesis of L3 on L4.    Impression  Impression:  Compression fracture of T6, likely old. Other chronic findings as detailed.      Electronically Signed: Juliane Baker MD  7/18/2023 3:30 PM EDT  Workstation ID: [id number]      Results for orders placed during the hospital encounter of 06/26/23    Duplex Venous Lower Extremity - Right CAR    Interpretation Summary    Normal right lower extremity venous duplex scan.        ASSESSMENT / PLAN      JOSÉ (acute kidney injury)    Bacteremia due to Streptococcus    Cellulitis of right leg    Knee joint cyst, right    Bilateral leg edema    UTI due to Klebsiella species    Dysphagia    Feeding difficulties    Paroxysmal atrial fibrillation    Sarcoidosis    Metabolic acidosis    JOSÉ-prerenal in setting of hypotension, diuretics, ARB's.  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  Hold irbesartan.  Avoid ACE inhibitors or ARB's  History of paroxysmal atrial fibrillation  Cellulitis like lower extremity and right knee.  History NSAID use  Questionable history of sarcoidosis  UTI-urine culture growing greater than 100 K colonies of gram-negative rods.  Strep bacteremia  Metabolic acidosis-p.o. sodium bicarb     CR slowly better  Acidosis better, stop bicarb drip, also retaining fluid, dose Lasix x 1  Continue PO sodium bicarb  closely monitor renal function fluid status electrolytes        Tony Parisi MD  [facility]  [phone]  07/20/23  12:07 EDT      Electronically signed by Tony Parisi MD at 07/20/23 1234       Kacie White, JOE at 07/20/23 1022           LOS: 6 days   Patient Care Team:  Ian Cordero MD as PCP - General  Tony Parisi MD as Consulting Physician (Nephrology)  Rc Ayala MD as Consulting Physician (Hematology and Oncology)      Subjective \" I want to " "die\"    Interval History:     Subjective: She does not want to eat.  Denies current nausea.    ROS:   No chest pain, shortness of breath, or cough.      Medication Review:     Current Facility-Administered Medications:     acetaminophen (TYLENOL) tablet 650 mg, 650 mg, Oral, Q6H PRN, Abhinav Boyd MD, 650 mg at 07/19/23 0202    atorvastatin (LIPITOR) tablet 20 mg, 20 mg, Oral, Nightly, Abhinav Boyd MD, 20 mg at 07/19/23 2047    sennosides-docusate (PERICOLACE) 8.6-50 MG per tablet 2 tablet, 2 tablet, Oral, BID, 2 tablet at 07/19/23 2047 **AND** polyethylene glycol (MIRALAX) packet 17 g, 17 g, Oral, Daily PRN **AND** bisacodyl (DULCOLAX) EC tablet 5 mg, 5 mg, Oral, Daily PRN **AND** bisacodyl (DULCOLAX) suppository 10 mg, 10 mg, Rectal, Daily PRN, Abhinav Boyd MD    cefTRIAXone (ROCEPHIN) 2,000 mg in sodium chloride 0.9 % 100 mL IVPB, 2,000 mg, Intravenous, Q24H, Armando Adams MD, Last Rate: 200 mL/hr at 07/20/23 0927, 2,000 mg at 07/20/23 0927    dilTIAZem (CARDIZEM) 125 mg in 125 mL D5W infusion, 5-15 mg/hr, Intravenous, Titrated, Belen Valerio APRN, Last Rate: 15 mL/hr at 07/20/23 0915, 15 mg/hr at 07/20/23 0915    ferric gluconate (FERRLECIT) 250 MG in sodium chloride 0.9% 250 mL IVPB, 250 mg, Intravenous, Once, Raquel Guevara APRN    folic acid (FOLVITE) tablet 1 mg, 1 mg, Oral, Daily, Abhinav Boyd MD, 1 mg at 07/19/23 0947    levothyroxine (SYNTHROID, LEVOTHROID) tablet 50 mcg, 50 mcg, Oral, Q AM, Abhinav Boyd MD, 50 mcg at 07/19/23 0513    ondansetron (ZOFRAN) injection 4 mg, 4 mg, Intravenous, Q6H PRN, Abhinav Boyd MD    pantoprazole (PROTONIX) EC tablet 40 mg, 40 mg, Oral, Daily, Abhinav Boyd MD, 40 mg at 07/19/23 0947    Potassium Replacement - Follow Nurse / BPA Driven Protocol, , Does not apply, PRN, Armando Adams MD    predniSONE (DELTASONE) tablet 15 mg, 15 mg, Oral, Daily, Abhinav Boyd MD, 15 mg at 07/19/23 0947    sodium bicarbonate 8.4 % 100 mEq in dextrose (D5W) 5 % 1,000 " mL infusion (greater than 75 mEq), 100 mEq, Intravenous, Continuous, Sang Sanford MD, Last Rate: 75 mL/hr at 07/20/23 0927, 100 mEq at 07/20/23 0927    sodium bicarbonate tablet 1,300 mg, 1,300 mg, Oral, TID, Tony Parisi MD, 1,300 mg at 07/19/23 2245    [COMPLETED] Insert Peripheral IV, , , Once **AND** sodium chloride 0.9 % flush 10 mL, 10 mL, Intravenous, PRN, Rafael De Luna MD    sodium chloride 0.9 % flush 10 mL, 10 mL, Intravenous, Q12H, Abhinav Boyd MD, 10 mL at 07/19/23 2047    sodium chloride 0.9 % flush 10 mL, 10 mL, Intravenous, PRN, Abhinav Boyd MD    sodium chloride 0.9 % flush 3 mL, 3 mL, Intravenous, Q12H, Kacie White APRN, 3 mL at 07/19/23 2054    sodium chloride 0.9 % flush 3-10 mL, 3-10 mL, Intravenous, PRN, Kacie White, APRN    sodium chloride 0.9 % infusion 40 mL, 40 mL, Intravenous, PRN, Abhinav Boyd MD, 100 mL at 07/18/23 1236    sodium chloride 0.9 % infusion 40 mL, 40 mL, Intravenous, PRN, Kacie White, APRN    traZODone (DESYREL) tablet 100 mg, 100 mg, Oral, Nightly PRN, Abhinav Boyd MD, 100 mg at 07/19/23 2047      Objective chronically ill-appearing but no acute distress,  in room    Vital Signs  Vitals:    07/20/23 0900 07/20/23 0915 07/20/23 0930 07/20/23 1000   BP: 121/63  120/54 111/58   BP Location:       Patient Position:       Pulse: (!) 151 (!) 135 88 89   Resp:       Temp:       TempSrc:       SpO2: 93%  92% 90%   Weight:       Height:           Physical Exam:     General Appearance:    Awake and alert, in no acute distress although chronically ill-appearing   Head:    Normocephalic, without obvious abnormality   Eyes:          Conjunctivae normal, anicteric sclera   Throat:   No oral lesions, no thrush, oral mucosa moist   Neck:    supple, no JVD   Lungs:     respirations regular, even and unlabored       Rectal:     Deferred   Extremities: Referral edema, no cyanosis   Skin: Scattered bruising no rash, no jaundice        Results Review:     CBC    Results from last 7 days   Lab Units 07/20/23  0729 07/19/23  0310 07/18/23  0125 07/17/23  1859 07/17/23  1114 07/16/23  0630 07/14/23 2234 07/14/23  0310   WBC 10*3/mm3 21.10* 16.30* 11.90*  --  13.10* 11.20* 9.80 9.80   HEMOGLOBIN g/dL 10.5* 9.4* 8.3* 9.1* 7.4* 7.7* 7.5* 7.2*   PLATELETS 10*3/mm3 260 243 215  --  238 219 206 179     CMP   Results from last 7 days   Lab Units 07/20/23  0729 07/19/23 0310 07/18/23  0125 07/17/23  1114 07/16/23  0152 07/14/23 2234 07/14/23  0309   SODIUM mmol/L 140 141 141 140 140 138 137   POTASSIUM mmol/L 4.1 4.3 3.4* 3.7 4.3 4.4 4.2   CHLORIDE mmol/L 103 108* 109* 110* 111* 109* 108*   CO2 mmol/L 30.0* 23.0 21.0* 21.0* 15.0* 17.0* 18.0*   BUN mg/dL 62* 60* 65* 75* 72* 75* 73*   CREATININE mg/dL 1.51* 1.62* 1.72* 2.18* 2.30* 2.69* 2.71*   GLUCOSE mg/dL 119* 151* 101* 101* 96 112* 92   ALBUMIN g/dL 2.1* 2.1* 2.1* 1.9*  --   --   --    BILIRUBIN mg/dL  --  0.5  --  0.3  --   --   --    ALK PHOS U/L  --  204*  --  137*  --   --   --    AST (SGOT) U/L  --  47*  --  26  --   --   --    ALT (SGPT) U/L  --  17  --  15  --   --   --    MAGNESIUM mg/dL  --   --   --  2.1  --   --   --    PHOSPHORUS mg/dL 1.9* 2.3* 3.1  --   --   --   --      Cr Clearance Estimated Creatinine Clearance: 35 mL/min (A) (by C-G formula based on SCr of 1.51 mg/dL (H)).  Coag   Results from last 7 days   Lab Units 07/20/23  0729 07/17/23  1114 07/14/23  2234   INR  1.15* 1.25* 1.28*   APTT seconds 31.2* 47.0* 33.1*     HbA1C   Lab Results   Component Value Date    HGBA1C 5.9 (H) 10/16/2019     Blood Glucose No results found for: POCGLU  Infection   Results from last 7 days   Lab Units 07/18/23  1559   BODYFLDCX  Heavy growth (4+) Staphylococcus aureus*     UA      Radiology(recent) XR Bone Survey Complete    Result Date: 7/18/2023  Impression: Compression fracture of T6, likely old. Other chronic findings as detailed. Electronically Signed: Juliane Baker MD  7/18/2023 3:30 PM EDT  Workstation ID:  [id number]    XR Chest 1 View    Result Date: 7/20/2023  Impression: New medial left basilar consolidation and new hazy and patchy airspace disease in both lungs. These findings could relate to edema or pneumonia Electronically Signed: Nayan Juárez  7/20/2023 6:18 AM EDT  Workstation ID: [id number]    XR Abdomen KUB    Result Date: 7/18/2023  Impression: Antegrade oriented feeding tube tip within distal stomach. Electronically Signed: Pedro Garcia MD  7/18/2023 1:11 PM EDT  Workstation ID: [id number]    CT Bone marrow biopsy and aspiration    Result Date: 7/18/2023  1. Successful CT-guided bone marrow biopsy Electronically Signed: Cameron Boudreaux  7/18/2023 4:08 PM EDT  Workstation ID: [id number]    IR Inject/asp large joint or bursa    Result Date: 7/18/2023  1. Successful aspiration of right medial knee abscess yielding 8 mL of purulent material. Electronically Signed: Cameron Boudreaux  7/18/2023 4:41 PM EDT  Workstation ID: [id number]        Assessment & Plan   Dysphagia with feeding difficulties  Hemoccult positive stool/iron deficiency anemia  Recurrent ascites  Elevated liver enzymes  JOSÉ/CKD  Possible sarcoidosis  A-fib on Eliquis  Bacteriuria  History of cholecystectomy  Hypothyroidism     Plan:  Patient is well-known to Dr. Flores as an outpatient with elevated LFTs, iron deficiency anemia and recurrent ascites with concern for possible sarcoidosis.  She is pending evaluation at [facility]. Now with JOSÉ on CKD with Afib requiring IV Cardizem overnight.  She pulled out dobhoff but was tolerating gastric feeds.  Speech cleared her for puréed diet but she is refusing oral nutrition.  Not a candidate for G-tube secondary to ascites.  EGD without evidence of esophageal stricture.  Patient does not want Dobbhoff and would like to \"die\" but  would like to continue to pursue aggressive therapy.  If patient Clines replacement of Dobbhoff, would recommend psychiatry consult to determine her decision-making " capabilities.  She remains on low-dose prednisone for suspected sarcoidosis.  Hematology is following with bone marrow biopsies results pending.  Discussed in detail with bedside RN this morning on rounds.  We will follow.    Electronically signed by JOE Teran, 07/20/23, 10:22 AM EDT.            Electronically signed by Kacie White APRN at 07/20/23 1025       Gayathri Briggs APRN at 07/19/23 1640       Attestation signed by Jordon Wilcox MD at 07/20/23 1133    I have reviewed this documentation and agree.                  Infectious Diseases Progress Note      LOS: 5 days   Patient Care Team:  Ian Cordero MD as PCP - General  Tony Parisi MD as Consulting Physician (Nephrology)  Rc Ayala MD as Consulting Physician (Hematology and Oncology)    Chief Complaint: Right leg pain, mass to the medial aspect of the right knee, fatigue    Subjective       The patient has been afebrile for the last 24 hours.  The patient is on 1 L of oxygen by nasal cannula, hemodynamically stable, and is tolerating antimicrobial therapy.  Patient is complaining of fatigue and pain to her buttocks region      Review of Systems:   Review of Systems   Constitutional:  Positive for fatigue.   HENT: Negative.     Eyes: Negative.    Respiratory:  Positive for cough and shortness of breath.    Cardiovascular: Negative.    Gastrointestinal: Negative.    Endocrine: Negative.    Genitourinary: Negative.    Musculoskeletal: Negative.    Skin:  Positive for color change.   Neurological:  Positive for weakness.   Psychiatric/Behavioral: Negative.     All other systems reviewed and are negative.     Objective     Vital Signs  Temp:  [97.7 °F (36.5 °C)-99 °F (37.2 °C)] 97.8 °F (36.6 °C)  Heart Rate:  [73-85] 80  Resp:  [11-19] 15  BP: (102-121)/(41-57) 121/57    Physical Exam:  Physical Exam  Vitals and nursing note reviewed.   Constitutional:       General: She is not in acute distress.     Appearance: She is  well-developed and normal weight. She is ill-appearing. She is not diaphoretic.   HENT:      Head: Normocephalic and atraumatic.   Eyes:      General: No scleral icterus.     Extraocular Movements: Extraocular movements intact.      Conjunctiva/sclera: Conjunctivae normal.      Pupils: Pupils are equal, round, and reactive to light.   Cardiovascular:      Rate and Rhythm: Normal rate and regular rhythm.      Heart sounds: Normal heart sounds, S1 normal and S2 normal. No murmur heard.  Pulmonary:      Effort: Pulmonary effort is normal. No respiratory distress.      Breath sounds: No stridor. No wheezing or rales.      Comments: Diminished throughout  Chest:      Chest wall: No tenderness.   Abdominal:      General: Bowel sounds are normal. There is distension.      Palpations: Abdomen is soft. There is no mass.      Tenderness: There is no abdominal tenderness. There is no guarding.   Musculoskeletal:         General: No swelling, tenderness or deformity.      Cervical back: Neck supple.   Skin:     General: Skin is warm and dry.      Coloration: Skin is not pale.      Findings: No bruising, erythema or rash.      Comments:  Patient has a fluctuant mass in the medial aspect of the knee-less fluctuant today    The small red patch to the lateral aspect of the knee has almost completely resolved.    No significant swelling or erythema to the rest of the right leg    Neurological:      Mental Status: She is alert and oriented to person, place, and time.      Cranial Nerves: No cranial nerve deficit.        Results Review:    I have reviewed all clinical data, test, lab, and imaging results.     Radiology  No Radiology Exams Resulted Within Past 24 Hours    Cardiology    Laboratory    Results from last 7 days   Lab Units 07/19/23  0310 07/18/23  0125 07/17/23  1859 07/17/23  1114 07/16/23  0630 07/14/23  2234 07/14/23  0310 07/13/23  0852   WBC 10*3/mm3 16.30* 11.90*  --  13.10* 11.20* 9.80 9.80 12.60*   HEMOGLOBIN g/dL  9.4* 8.3* 9.1* 7.4* 7.7* 7.5* 7.2* 8.7*   HEMATOCRIT % 29.3* 26.1* 28.3* 23.8* 24.1* 23.5* 22.0* 27.4*   PLATELETS 10*3/mm3 243 215  --  238 219 206 179 254       Results from last 7 days   Lab Units 07/19/23  0310 07/18/23  0125 07/17/23  1114 07/16/23  0152 07/14/23  2234 07/14/23  0309 07/13/23  0852   SODIUM mmol/L 141 141 140 140 138 137 135*   POTASSIUM mmol/L 4.3 3.4* 3.7 4.3 4.4 4.2 4.2   CHLORIDE mmol/L 108* 109* 110* 111* 109* 108* 103   CO2 mmol/L 23.0 21.0* 21.0* 15.0* 17.0* 18.0* 18.0*   BUN mg/dL 60* 65* 75* 72* 75* 73* 82*   CREATININE mg/dL 1.62* 1.72* 2.18* 2.30* 2.69* 2.71* 2.79*   GLUCOSE mg/dL 151* 101* 101* 96 112* 92 86   ALBUMIN g/dL 2.1* 2.1* 1.9*  --   --   --  2.6*   BILIRUBIN mg/dL 0.5  --  0.3  --   --   --  0.9   ALK PHOS U/L 204*  --  137*  --   --   --  174*   AST (SGOT) U/L 47*  --  26  --   --   --  40*   ALT (SGPT) U/L 17  --  15  --   --   --  23   CALCIUM mg/dL 8.4* 8.3* 8.5* 8.4* 8.5* 8.5* 9.4           Results from last 7 days   Lab Units 07/17/23  1114   SED RATE mm/hr 11           Microbiology   Microbiology Results (last 10 days)       Procedure Component Value - Date/Time    Body Fluid Culture - Body Fluid, Knee, Right [[id number]] Collected: 07/18/23 1559    Lab Status: Preliminary result Specimen: Body Fluid from Knee, Right Updated: 07/19/23 0840     Body Fluid Culture Growth present, too young to evaluate     Gram Stain Many (4+) WBCs per low power field      Many (4+) Gram positive cocci in pairs    Blood Culture - Blood, Arm, Left [[id number]]  (Abnormal) Collected: 07/13/23 1006    Lab Status: Final result Specimen: Blood from Arm, Left Updated: 07/15/23 0718     Blood Culture Streptococcus, Alpha Hemolytic     Isolated from Aerobic Bottle     Gram Stain Aerobic Bottle Gram positive cocci in chains    Narrative:      Probable contaminant requires clinical correlation, susceptibility not performed unless requested by physician.      Blood Culture ID, PCR - Blood, Arm,  Left [[id number]]  (Abnormal) Collected: 07/13/23 1006    Lab Status: Final result Specimen: Blood from Arm, Left Updated: 07/14/23 0634     BCID, PCR Streptococcus spp, not A, B, or pneumoniae. Identification by BCID2 PCR.     BOTTLE TYPE Aerobic Bottle    Urine Culture - Urine, Straight Cath [[id number]]  (Abnormal)  (Susceptibility) Collected: 07/13/23 0944    Lab Status: Final result Specimen: Urine from Straight Cath Updated: 07/15/23 0358     Urine Culture >100,000 CFU/mL Klebsiella aerogenes    Narrative:      Colonization of the urinary tract without infection is common. Treatment is discouraged unless the patient is symptomatic, pregnant, or undergoing an invasive urologic procedure.    Susceptibility        Klebsiella aerogenes      ISELA      Cefazolin Resistant      Cefepime Susceptible      Ceftazidime Susceptible      Ceftriaxone Susceptible      Gentamicin Susceptible      Levofloxacin Susceptible      Nitrofurantoin Resistant      Piperacillin + Tazobactam Susceptible      Trimethoprim + Sulfamethoxazole Susceptible                           Blood Culture - Blood, Arm, Left [[id number]]  (Normal) Collected: 07/13/23 0852    Lab Status: Final result Specimen: Blood from Arm, Left Updated: 07/18/23 0915     Blood Culture No growth at 5 days    Narrative:      Less than seven (7) mL's of blood was collected.  Insufficient quantity may yield false negative results.            Medication Review:       Schedule Meds  atorvastatin, 20 mg, Oral, Nightly  cefTRIAXone, 2,000 mg, Intravenous, Q24H  ferric gluconate, 250 mg, Intravenous, Once  folic acid, 1 mg, Oral, Daily  levothyroxine, 50 mcg, Oral, Q AM  pantoprazole, 40 mg, Oral, Daily  predniSONE, 15 mg, Oral, Daily  senna-docusate sodium, 2 tablet, Oral, BID  sodium bicarbonate, 1,300 mg, Oral, TID  sodium chloride, 10 mL, Intravenous, Q12H  sodium chloride, 3 mL, Intravenous, Q12H        Infusion Meds  sodium bicarbonate drip (greater than 75 mEq/bag), 100  mEq, Last Rate: 100 mEq (07/18/23 1712)        PRN Meds    acetaminophen    senna-docusate sodium **AND** polyethylene glycol **AND** bisacodyl **AND** bisacodyl    ondansetron    Potassium Replacement - Follow Nurse / BPA Driven Protocol    [COMPLETED] Insert Peripheral IV **AND** sodium chloride    sodium chloride    sodium chloride    sodium chloride    sodium chloride    traZODone        Assessment & Plan       Antimicrobial Therapy   1.  IV Rocephin        2.        3.        4.        5.          Assessment     Positive blood culture in 1 out of 2 sets on admission for Streptococcus species.  Patient had no endovascular device or prior cardiac valve replacement.  We are suspecting contamination.     Bacteriuria.  Urine cultures are growing Klebsiella aerogenes.  urinalysis was not significantly abnormal.     Probable right medial knee abscess.  MRI shows a fluid collection in the subcutaneous tissues along the medial femoral condyle but no findings to suggest a joint infection  -Aspiration of aspect by IR on 7/18/2023     Recent work-up for sarcoidosis by an outpatient rheumatologist.  Patient was taken off methotrexate on 7/3/2023.  Still on prednisone.  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 labs  Case discussed with patient and family member at bedside  Case discussed with CYNDY Briggs, JOE  07/19/23  16:40 EDT    Note is dictated utilizing voice recognition software/Dragon    Electronically signed by Jordon Wilcox MD at 07/20/23 3507

## 2023-07-20 NOTE — PROGRESS NOTES
[facility]  INPATIENT PROGRESS NOTE    Length of Stay: 6  Date of Admission: 7/13/2023  Primary Care Physician: Ian Cordero MD    Subjective   Chief Complaint: UTI  HPI:    Patient doing stable today    Review of Systems   Respiratory:  Negative for shortness of breath.    Cardiovascular:  Negative for chest pain.        All pertinent negatives and positives are as above. All other systems have been reviewed and are negative unless otherwise stated.     Objective    Temp:  [97.8 °F (36.6 °C)-98.6 °F (37 °C)] 98.6 °F (37 °C)  Heart Rate:  [] 89  Resp:  [15-27] 22  BP: ()/(52-65) 111/58  Physical Exam  Vitals and nursing note reviewed.   Constitutional:       General: She is not in acute distress.     Appearance: She is well-developed. She is not diaphoretic.   HENT:      Head: Normocephalic and atraumatic.   Cardiovascular:      Rate and Rhythm: Normal rate.   Pulmonary:      Effort: Pulmonary effort is normal. No respiratory distress.      Breath sounds: No wheezing.   Abdominal:      General: There is no distension.      Palpations: Abdomen is soft.   Musculoskeletal:         General: Normal range of motion.   Skin:     General: Skin is warm and dry.   Neurological:      Mental Status: She is alert.      Cranial Nerves: No cranial nerve deficit.   Psychiatric:         Behavior: Behavior normal.         Thought Content: Thought content normal.         Judgment: Judgment normal.             Results Review:  I have reviewed the labs, radiology results, and diagnostic studies.    Laboratory Data:   Lab Results (last 24 hours)       Procedure Component Value Units Date/Time    Body Fluid Culture - Body Fluid, Knee, Right [[id number]]  (Abnormal) Collected: 07/18/23 1559    Specimen: Body Fluid from Knee, Right Updated: 07/20/23 0902     Body Fluid Culture Heavy growth (4+) Staphylococcus aureus     Gram Stain Many (4+) WBCs per low power field      Many  Medical Necessity Information: It is in your best interest to select a reason for this procedure from the list below. All of these items fulfill various CMS LCD requirements except lesion extends to a margin. (4+) Gram positive cocci in pairs    Manual Differential [[id number]]  (Abnormal) Collected: 07/20/23 0729    Specimen: Blood Updated: 07/20/23 0858     Neutrophil % 65.0 %      Lymphocyte % 5.0 %      Monocyte % 12.0 %      Bands %  12.0 %      Metamyelocyte % 5.0 %      Atypical Lymphocyte % 1.0 %      Neutrophils Absolute 16.25 10*3/mm3      Lymphocytes Absolute 1.27 10*3/mm3      Monocytes Absolute 2.53 10*3/mm3      Anisocytosis Slight/1+     Poikilocytes Slight/1+     Polychromasia Slight/1+     Spherocytes Slight/1+     WBC Morphology Normal     Platelet Morphology Normal    CBC & Differential [[id number]]  (Abnormal) Collected: 07/20/23 0729    Specimen: Blood Updated: 07/20/23 0858    Narrative:      The following orders were created for panel order CBC & Differential.  Procedure                               Abnormality         Status                     ---------                               -----------         ------                     CBC Auto Differential[[id number]]        Abnormal            Final result               Scan Slide[[id number]]                                       Final result                 Please view results for these tests on the individual orders.    Scan Slide [[id number]] Collected: 07/20/23 0729    Specimen: Blood Updated: 07/20/23 0858     Scan Slide --     Comment: See Manual Differential Results       CBC Auto Differential [[id number]]  (Abnormal) Collected: 07/20/23 0729    Specimen: Blood Updated: 07/20/23 0858     WBC 21.10 10*3/mm3      RBC 3.60 10*6/mm3      Hemoglobin 10.5 g/dL      Hematocrit 33.6 %      MCV 93.2 fL      MCH 29.1 pg      MCHC 31.3 g/dL      RDW 22.6 %      RDW-SD 73.1 fl      MPV 7.5 fL      Platelets 260 10*3/mm3     Narrative:      The previously reported component NRBC is no longer being reported. Previous result was 0.0 /100 WBC (Reference Range: 0.0-0.2 /100 WBC) on 7/20/2023 at 0815 EDT.    Renal Function Panel [[id number]]  (Abnormal) Collected:  Include Z78.9 (Other Specified Conditions Influencing Health Status) As An Associated Diagnosis?: No 07/20/23 0729    Specimen: Blood Updated: 07/20/23 0841     Glucose 119 mg/dL      BUN 62 mg/dL      Creatinine 1.51 mg/dL      Sodium 140 mmol/L      Potassium 4.1 mmol/L      Chloride 103 mmol/L      CO2 30.0 mmol/L      Calcium 8.7 mg/dL      Albumin 2.1 g/dL      Phosphorus 1.9 mg/dL      Anion Gap 7.0 mmol/L      BUN/Creatinine Ratio 41.1     eGFR 35.9 mL/min/1.73     Narrative:      GFR Normal >60  Chronic Kidney Disease <60  Kidney Failure <15    The GFR formula is only valid for adults with stable renal function between ages 18 and 70.    TSH [[id number]]  (Normal) Collected: 07/20/23 0729    Specimen: Blood Updated: 07/20/23 0839     TSH 2.270 uIU/mL     Protime-INR [[id number]]  (Abnormal) Collected: 07/20/23 0729    Specimen: Blood Updated: 07/20/23 0827     Protime 12.2 Seconds      INR 1.15    aPTT [[id number]]  (Abnormal) Collected: 07/20/23 0729    Specimen: Blood Updated: 07/20/23 0827     PTT 31.2 seconds             Culture Data:   No results found for: BLOODCX  No results found for: URINECX  No results found for: RESPCX  No results found for: WOUNDCX  No results found for: STOOLCX  No components found for: BODYFLD    Radiology Data:   Imaging Results (Last 24 Hours)       Procedure Component Value Units Date/Time    XR Chest 1 View [[id number]] Collected: 07/20/23 0615     Updated: 07/20/23 0620    Narrative:      XR CHEST 1 VW    Date of Exam: 7/20/2023 5:45 AM EDT    Indication: possible aspiration    Comparison: 7/8/2023.    Findings:  Lung volumes are diminished. There are new hazy densities present throughout both lungs. There is new medial left basilar consolidation. There is patchy bilateral airspace disease. There are streaky right perihilar opacities. Heart size is likely within   normal limits. No pneumothorax or definite pleural effusion. Pulmonary vasculature is obscured.      Impression:      Impression:  New medial left basilar consolidation and new hazy and patchy airspace disease in  Add Nub Associated Diagnoses If Applicable When Selecting Medical Necessity: Yes Medical Necessity Clause: This procedure was medically necessary because the lesion that was treated was: both lungs. These findings could relate to edema or pneumonia    Electronically Signed: Nayan Juárez    7/20/2023 6:18 AM EDT    Workstation ID: [id number]            I have reviewed the patient's current medications.     Assessment/Plan     Active Hospital Problems    Diagnosis     Paroxysmal atrial fibrillation     Sarcoidosis     Metabolic acidosis     Bacteremia due to Streptococcus     Cellulitis of right leg     Knee joint cyst, right     Bilateral leg edema     UTI due to Klebsiella species     JOSÉ (acute kidney injury)     Dysphagia     Feeding difficulties      JOSÉ on CKD Stage 3 with metabolic acidosis-likely pre-renal in the setting of volume depletion from diuretics.  Bicarbonate fluids, Nephrology following.  Creatinine level slowly improving, we will continue to monitor.     2. Right leg cellulitis / ? Knee abscess, continue IV rocephin.  ID consulted to due to strep bacteremia.  Probable source is the right leg.  Consulted wound care also.  Keep leg elevated.  Aspirated, culture pending     3. Strep bacteremia-continue ceftriaxone.  ID consulted.  Continue with IV antibiotics.     4. B/L leg edema-chronic issue.  Had venous dopplers done June 2023 and negative for any DVT.  Keep leg elevated while in bed.  Has some lymphedema also.     5.  Klebsiella UTI.  Continue ceftriaxone.     6. PAF-continue Eliquis 5 mg BID, holding for now pending right knee intervention.  Monitor HR. Should be able to restart tomorrow.      7. Dysphagia/occult positive stool--EGD/dilatation done, no concerning findings. ST following, puréed diet along with NG tube feeds.       PT/OT     DVT prophylaxis:  Mechanical DVT prophylaxis orders are present.    Patient is full code      Elias Rey MD   07/20/23   14:12 EDT      Lab: 6 Lab Facility: 3 Surgeon (Optional): Debbie Dumont Size Of Lesion In Cm: 0.4 Size Of Margin In Cm: 0.5 Eye Clamp Note Details: An eye clamp was used during the procedure. Excision Method: Fusiform Saucerization Depth: dermis and superficial adipose tissue Repair Type: Intermediate Intermediate / Complex Repair - Final Wound Length In Cm: 3.8 Suturegard Retention Suture: 2-0 Nylon Retention Suture Bite Size: 3 mm Length To Time In Minutes Device Was In Place: 10 Number Of Hemigard Strips Per Side: 1 Undermining Type: Entire Wound Debridement Text: The wound edges were debrided prior to proceeding with the closure to facilitate wound healing. Helical Rim Text: The closure involved the helical rim. Vermilion Border Text: The closure involved the vermilion border. Nostril Rim Text: The closure involved the nostril rim. Retention Suture Text: Retention sutures were placed to support the closure and prevent dehiscence. Primary Defect Length (In Cm): 0 Suture Removal: 14 days Epidermal Closure Graft Donor Site (Optional): simple interrupted Graft Donor Site Bandage (Optional-Leave Blank If You Don't Want In Note): Steri-strips and a pressure bandage were applied to the donor site. Detail Level: Detailed Excision Depth: adipose tissue Scalpel Size: 15 blade Anesthesia Type: 1% lidocaine with epinephrine Hemostasis: Electrocautery Estimated Blood Loss (Cc): minimal Deep Sutures: 3-0 Vicryl Epidermal Sutures: 3-0 Ethilon Wound Care: Mupirocin Dressing: dry sterile dressing Suturegard Intro: Intraoperative tissue expansion was performed, utilizing the SUTUREGARD device, in order to reduce wound tension. Suturegard Body: The suture ends were repeatedly re-tightened and re-clamped to achieve the desired tissue expansion. Hemigard Intro: Due to skin fragility and wound tension, it was decided to use HEMIGARD adhesive retention suture devices to permit a linear closure. The skin was cleaned and dried for a 6cm distance away from the wound. Excessive hair, if present, was removed to allow for adhesion. Hemigard Postcare Instructions: The HEMIGARD strips are to remain completely dry for at least 5-7 days. Positioning (Leave Blank If You Do Not Want): The patient was placed in a comfortable position exposing the surgical site. Complex Repair Preamble Text (Leave Blank If You Do Not Want): Extensive wide undermining was performed. Intermediate Repair Preamble Text (Leave Blank If You Do Not Want): Undermining was performed with blunt dissection. Fusiform Excision Additional Text (Leave Blank If You Do Not Want): The margin was drawn around the clinically apparent lesion.  A fusiform shape was then drawn on the skin incorporating the lesion and margins.  Incisions were then made along these lines to the appropriate tissue plane and the lesion was extirpated. Eliptical Excision Additional Text (Leave Blank If You Do Not Want): The margin was drawn around the clinically apparent lesion.  An elliptical shape was then drawn on the skin incorporating the lesion and margins.  Incisions were then made along these lines to the appropriate tissue plane and the lesion was extirpated. Saucerization Excision Additional Text (Leave Blank If You Do Not Want): The margin was drawn around the clinically apparent lesion.  Incisions were then made along these lines, in a tangential fashion, to the appropriate tissue plane and the lesion was extirpated. Slit Excision Additional Text (Leave Blank If You Do Not Want): A linear line was drawn on the skin overlying the lesion. An incision was made slowly until the lesion was visualized.  Once visualized, the lesion was removed with blunt dissection. Excisional Biopsy Additional Text (Leave Blank If You Do Not Want): The margin was drawn around the clinically apparent lesion. An elliptical shape was then drawn on the skin incorporating the lesion and margins.  Incisions were then made along these lines to the appropriate tissue plane and the lesion was extirpated. Perilesional Excision Additional Text (Leave Blank If You Do Not Want): The margin was drawn around the clinically apparent lesion. Incisions were then made along these lines to the appropriate tissue plane and the lesion was extirpated. Repair Performed By Another Provider Text (Leave Blank If You Do Not Want): After the tissue was excised the defect was repaired by another provider. No Repair - Repaired With Adjacent Surgical Defect Text (Leave Blank If You Do Not Want): After the excision the defect was repaired concurrently with another surgical defect which was in close approximation. Adjacent Tissue Transfer Text: The defect edges were debeveled with a #15 scalpel blade. Given the location of the defect and the proximity to free margins an adjacent tissue transfer was deemed most appropriate. Using a sterile surgical marker, an appropriate flap was drawn incorporating the defect and placing the expected incisions within the relaxed skin tension lines where possible. The area thus outlined was incised deep to adipose tissue with a #15 scalpel blade. The skin margins were undermined to an appropriate distance in all directions utilizing iris scissors and carried over to close the primary defect. Advancement Flap (Single) Text: The defect edges were debeveled with a #15 scalpel blade.  Given the location of the defect and the proximity to free margins a single advancement flap was deemed most appropriate.  Using a sterile surgical marker, an appropriate advancement flap was drawn incorporating the defect and placing the expected incisions within the relaxed skin tension lines where possible.    The area thus outlined was incised deep to adipose tissue with a #15 scalpel blade.  The skin margins were undermined to an appropriate distance in all directions utilizing iris scissors. Advancement Flap (Double) Text: The defect edges were debeveled with a #15 scalpel blade.  Given the location of the defect and the proximity to free margins a double advancement flap was deemed most appropriate.  Using a sterile surgical marker, the appropriate advancement flaps were drawn incorporating the defect and placing the expected incisions within the relaxed skin tension lines where possible.    The area thus outlined was incised deep to adipose tissue with a #15 scalpel blade.  The skin margins were undermined to an appropriate distance in all directions utilizing iris scissors. Burow's Advancement Flap Text: The defect edges were debeveled with a #15 scalpel blade.  Given the location of the defect and the proximity to free margins a Burow's advancement flap was deemed most appropriate.  Using a sterile surgical marker, the appropriate advancement flap was drawn incorporating the defect and placing the expected incisions within the relaxed skin tension lines where possible.    The area thus outlined was incised deep to adipose tissue with a #15 scalpel blade.  The skin margins were undermined to an appropriate distance in all directions utilizing iris scissors. Chonodrocutaneous Helical Advancement Flap Text: The defect edges were debeveled with a #15 scalpel blade.  Given the location of the defect and the proximity to free margins a chondrocutaneous helical advancement flap was deemed most appropriate.  Using a sterile surgical marker, the appropriate advancement flap was drawn incorporating the defect and placing the expected incisions within the relaxed skin tension lines where possible.    The area thus outlined was incised deep to adipose tissue with a #15 scalpel blade.  The skin margins were undermined to an appropriate distance in all directions utilizing iris scissors. Crescentic Advancement Flap Text: The defect edges were debeveled with a #15 scalpel blade.  Given the location of the defect and the proximity to free margins a crescentic advancement flap was deemed most appropriate.  Using a sterile surgical marker, the appropriate advancement flap was drawn incorporating the defect and placing the expected incisions within the relaxed skin tension lines where possible.    The area thus outlined was incised deep to adipose tissue with a #15 scalpel blade.  The skin margins were undermined to an appropriate distance in all directions utilizing iris scissors. A-T Advancement Flap Text: The defect edges were debeveled with a #15 scalpel blade.  Given the location of the defect, shape of the defect and the proximity to free margins an A-T advancement flap was deemed most appropriate.  Using a sterile surgical marker, an appropriate advancement flap was drawn incorporating the defect and placing the expected incisions within the relaxed skin tension lines where possible.    The area thus outlined was incised deep to adipose tissue with a #15 scalpel blade.  The skin margins were undermined to an appropriate distance in all directions utilizing iris scissors. O-T Advancement Flap Text: The defect edges were debeveled with a #15 scalpel blade.  Given the location of the defect, shape of the defect and the proximity to free margins an O-T advancement flap was deemed most appropriate.  Using a sterile surgical marker, an appropriate advancement flap was drawn incorporating the defect and placing the expected incisions within the relaxed skin tension lines where possible.    The area thus outlined was incised deep to adipose tissue with a #15 scalpel blade.  The skin margins were undermined to an appropriate distance in all directions utilizing iris scissors. O-L Flap Text: The defect edges were debeveled with a #15 scalpel blade.  Given the location of the defect, shape of the defect and the proximity to free margins an O-L flap was deemed most appropriate.  Using a sterile surgical marker, an appropriate advancement flap was drawn incorporating the defect and placing the expected incisions within the relaxed skin tension lines where possible.    The area thus outlined was incised deep to adipose tissue with a #15 scalpel blade.  The skin margins were undermined to an appropriate distance in all directions utilizing iris scissors. O-Z Flap Text: The defect edges were debeveled with a #15 scalpel blade.  Given the location of the defect, shape of the defect and the proximity to free margins an O-Z flap was deemed most appropriate.  Using a sterile surgical marker, an appropriate transposition flap was drawn incorporating the defect and placing the expected incisions within the relaxed skin tension lines where possible. The area thus outlined was incised deep to adipose tissue with a #15 scalpel blade.  The skin margins were undermined to an appropriate distance in all directions utilizing iris scissors. Double O-Z Flap Text: The defect edges were debeveled with a #15 scalpel blade.  Given the location of the defect, shape of the defect and the proximity to free margins a Double O-Z flap was deemed most appropriate.  Using a sterile surgical marker, an appropriate transposition flap was drawn incorporating the defect and placing the expected incisions within the relaxed skin tension lines where possible. The area thus outlined was incised deep to adipose tissue with a #15 scalpel blade.  The skin margins were undermined to an appropriate distance in all directions utilizing iris scissors. V-Y Flap Text: The defect edges were debeveled with a #15 scalpel blade.  Given the location of the defect, shape of the defect and the proximity to free margins a V-Y flap was deemed most appropriate.  Using a sterile surgical marker, an appropriate advancement flap was drawn incorporating the defect and placing the expected incisions within the relaxed skin tension lines where possible.    The area thus outlined was incised deep to adipose tissue with a #15 scalpel blade.  The skin margins were undermined to an appropriate distance in all directions utilizing iris scissors. Advancement-Rotation Flap Text: The defect edges were debeveled with a #15 scalpel blade. Given the location of the defect, shape of the defect and the proximity to free margins an advancement-rotation flap was deemed most appropriate. Using a sterile surgical marker, an appropriate flap was drawn incorporating the defect and placing the expected incisions within the relaxed skin tension lines where possible. The area thus outlined was incised deep to adipose tissue with a #15 scalpel blade. The skin margins were undermined to an appropriate distance in all directions utilizing iris scissors. Following this, the designed flap was carried over into the primary defect and sutured into place. Mercedes Flap Text: The defect edges were debeveled with a #15 scalpel blade.  Given the location of the defect, shape of the defect and the proximity to free margins a Mercedes flap was deemed most appropriate.  Using a sterile surgical marker, an appropriate advancement flap was drawn incorporating the defect and placing the expected incisions within the relaxed skin tension lines where possible. The area thus outlined was incised deep to adipose tissue with a #15 scalpel blade.  The skin margins were undermined to an appropriate distance in all directions utilizing iris scissors. Modified Advancement Flap Text: The defect edges were debeveled with a #15 scalpel blade.  Given the location of the defect, shape of the defect and the proximity to free margins a modified advancement flap was deemed most appropriate.  Using a sterile surgical marker, an appropriate advancement flap was drawn incorporating the defect and placing the expected incisions within the relaxed skin tension lines where possible.    The area thus outlined was incised deep to adipose tissue with a #15 scalpel blade.  The skin margins were undermined to an appropriate distance in all directions utilizing iris scissors. Mucosal Advancement Flap Text: Given the location of the defect, shape of the defect and the proximity to free margins a mucosal advancement flap was deemed most appropriate. Incisions were made with a 15 blade scalpel in the appropriate fashion along the cutaneous vermillion border and the mucosal lip. The remaining actinically damaged mucosal tissue was excised.  The mucosal advancement flap was then elevated to the gingival sulcus with care taken to preserve the neurovascular structures and advanced into the primary defect. Care was taken to ensure that precise realignment of the vermilion border was achieved. Peng Advancement Flap Text: The defect edges were debeveled with a #15 scalpel blade. Given the location of the defect, shape of the defect and the proximity to free margins a Peng advancement flap was deemed most appropriate. Using a sterile surgical marker, an appropriate advancement flap was drawn incorporating the defect and placing the expected incisions within the relaxed skin tension lines where possible. The area thus outlined was incised deep to adipose tissue with a #15 scalpel blade. The skin margins were undermined to an appropriate distance in all directions utilizing iris scissors. Following this, the designed flap was advanced and carried over into the primary defect and sutured into place. Hatchet Flap Text: The defect edges were debeveled with a #15 scalpel blade.  Given the location of the defect, shape of the defect and the proximity to free margins a hatchet flap was deemed most appropriate.  Using a sterile surgical marker, an appropriate hatchet flap was drawn incorporating the defect and placing the expected incisions within the relaxed skin tension lines where possible.    The area thus outlined was incised deep to adipose tissue with a #15 scalpel blade.  The skin margins were undermined to an appropriate distance in all directions utilizing iris scissors. Rotation Flap Text: The defect edges were debeveled with a #15 scalpel blade.  Given the location of the defect, shape of the defect and the proximity to free margins a rotation flap was deemed most appropriate.  Using a sterile surgical marker, an appropriate rotation flap was drawn incorporating the defect and placing the expected incisions within the relaxed skin tension lines where possible.    The area thus outlined was incised deep to adipose tissue with a #15 scalpel blade.  The skin margins were undermined to an appropriate distance in all directions utilizing iris scissors. Spiral Flap Text: The defect edges were debeveled with a #15 scalpel blade.  Given the location of the defect, shape of the defect and the proximity to free margins a spiral flap was deemed most appropriate.  Using a sterile surgical marker, an appropriate rotation flap was drawn incorporating the defect and placing the expected incisions within the relaxed skin tension lines where possible. The area thus outlined was incised deep to adipose tissue with a #15 scalpel blade.  The skin margins were undermined to an appropriate distance in all directions utilizing iris scissors. Staged Advancement Flap Text: The defect edges were debeveled with a #15 scalpel blade. Given the location of the defect, shape of the defect and the proximity to free margins a staged advancement flap was deemed most appropriate. Using a sterile surgical marker, an appropriate advancement flap was drawn incorporating the defect and placing the expected incisions within the relaxed skin tension lines where possible. The area thus outlined was incised deep to adipose tissue with a #15 scalpel blade. The skin margins were undermined to an appropriate distance in all directions utilizing iris scissors. Following this, the designed flap was carried over into the primary defect and sutured into place. Star Wedge Flap Text: The defect edges were debeveled with a #15 scalpel blade.  Given the location of the defect, shape of the defect and the proximity to free margins a star wedge flap was deemed most appropriate.  Using a sterile surgical marker, an appropriate rotation flap was drawn incorporating the defect and placing the expected incisions within the relaxed skin tension lines where possible. The area thus outlined was incised deep to adipose tissue with a #15 scalpel blade.  The skin margins were undermined to an appropriate distance in all directions utilizing iris scissors. Transposition Flap Text: The defect edges were debeveled with a #15 scalpel blade.  Given the location of the defect and the proximity to free margins a transposition flap was deemed most appropriate.  Using a sterile surgical marker, an appropriate transposition flap was drawn incorporating the defect.    The area thus outlined was incised deep to adipose tissue with a #15 scalpel blade.  The skin margins were undermined to an appropriate distance in all directions utilizing iris scissors. Muscle Hinge Flap Text: The defect edges were debeveled with a #15 scalpel blade.  Given the size, depth and location of the defect and the proximity to free margins a muscle hinge flap was deemed most appropriate.  Using a sterile surgical marker, an appropriate hinge flap was drawn incorporating the defect. The area thus outlined was incised with a #15 scalpel blade.  The skin margins were undermined to an appropriate distance in all directions utilizing iris scissors. Mustarde Flap Text: The defect edges were debeveled with a #15 scalpel blade.  Given the size, depth and location of the defect and the proximity to free margins a Mustarde flap was deemed most appropriate. Using a sterile surgical marker, an appropriate flap was drawn incorporating the defect. The area thus outlined was incised with a #15 scalpel blade. The skin margins were undermined to an appropriate distance in all directions utilizing iris scissors. Following this, the designed flap was carried into the primary defect and sutured into place. Nasal Turnover Hinge Flap Text: The defect edges were debeveled with a #15 scalpel blade.  Given the size, depth, location of the defect and the defect being full thickness a nasal turnover hinge flap was deemed most appropriate. Using a sterile surgical marker, an appropriate hinge flap was drawn incorporating the defect. The area thus outlined was incised with a #15 scalpel blade. The flap was designed to recreate the nasal mucosal lining and the alar rim. The skin margins were undermined to an appropriate distance in all directions utilizing iris scissors. Following this, the designed flap was carried over into the primary defect and sutured into place Nasalis-Muscle-Based Myocutaneous Island Pedicle Flap Text: Using a #15 blade, an incision was made around the donor flap to the level of the nasalis muscle. Wide lateral undermining was then performed in both the subcutaneous plane above the nasalis muscle, and in a submuscular plane just above periosteum. This allowed the formation of a free nasalis muscle axial pedicle (based on the angular artery) which was still attached to the actual cutaneous flap, increasing its mobility and vascular viability. Hemostasis was obtained with pinpoint electrocoagulation. The flap was mobilized into position and the pivotal anchor points positioned and stabilized with buried interrupted sutures. Subcutaneous and dermal tissues were closed in a multilayered fashion with sutures. Tissue redundancies were excised, and the epidermal edges were apposed without significant tension and sutured with sutures. Orbicularis Oris Muscle Flap Text: The defect edges were debeveled with a #15 scalpel blade.  Given that the defect affected the competency of the oral sphincter an orbicularis oris muscle flap was deemed most appropriate to restore this competency and normal muscle function.  Using a sterile surgical marker, an appropriate flap was drawn incorporating the defect. The area thus outlined was incised with a #15 scalpel blade. Following this, the designed flap was carried over into the primary defect and sutured into place. Melolabial Transposition Flap Text: The defect edges were debeveled with a #15 scalpel blade.  Given the location of the defect and the proximity to free margins a melolabial flap was deemed most appropriate.  Using a sterile surgical marker, an appropriate melolabial transposition flap was drawn incorporating the defect.    The area thus outlined was incised deep to adipose tissue with a #15 scalpel blade.  The skin margins were undermined to an appropriate distance in all directions utilizing iris scissors. Rhombic Flap Text: The defect edges were debeveled with a #15 scalpel blade.  Given the location of the defect and the proximity to free margins a rhombic flap was deemed most appropriate.  Using a sterile surgical marker, an appropriate rhombic flap was drawn incorporating the defect.    The area thus outlined was incised deep to adipose tissue with a #15 scalpel blade.  The skin margins were undermined to an appropriate distance in all directions utilizing iris scissors. Rhomboid Transposition Flap Text: The defect edges were debeveled with a #15 scalpel blade.  Given the location of the defect and the proximity to free margins a rhomboid transposition flap was deemed most appropriate.  Using a sterile surgical marker, an appropriate rhomboid flap was drawn incorporating the defect.    The area thus outlined was incised deep to adipose tissue with a #15 scalpel blade.  The skin margins were undermined to an appropriate distance in all directions utilizing iris scissors. Bi-Rhombic Flap Text: The defect edges were debeveled with a #15 scalpel blade.  Given the location of the defect and the proximity to free margins a bi-rhombic flap was deemed most appropriate.  Using a sterile surgical marker, an appropriate rhombic flap was drawn incorporating the defect. The area thus outlined was incised deep to adipose tissue with a #15 scalpel blade.  The skin margins were undermined to an appropriate distance in all directions utilizing iris scissors. Helical Rim Advancement Flap Text: The defect edges were debeveled with a #15 blade scalpel.  Given the location of the defect and the proximity to free margins (helical rim) a double helical rim advancement flap was deemed most appropriate.  Using a sterile surgical marker, the appropriate advancement flaps were drawn incorporating the defect and placing the expected incisions between the helical rim and antihelix where possible.  The area thus outlined was incised through and through with a #15 scalpel blade.  With a skin hook and iris scissors, the flaps were gently and sharply undermined and freed up. Bilateral Helical Rim Advancement Flap Text: The defect edges were debeveled with a #15 blade scalpel.  Given the location of the defect and the proximity to free margins (helical rim) a bilateral helical rim advancement flap was deemed most appropriate.  Using a sterile surgical marker, the appropriate advancement flaps were drawn incorporating the defect and placing the expected incisions between the helical rim and antihelix where possible.  The area thus outlined was incised through and through with a #15 scalpel blade.  With a skin hook and iris scissors, the flaps were gently and sharply undermined and freed up. Ear Star Wedge Flap Text: The defect edges were debeveled with a #15 blade scalpel.  Given the location of the defect and the proximity to free margins (helical rim) an ear star wedge flap was deemed most appropriate.  Using a sterile surgical marker, the appropriate flap was drawn incorporating the defect and placing the expected incisions between the helical rim and antihelix where possible.  The area thus outlined was incised through and through with a #15 scalpel blade. Banner Transposition Flap Text: The defect edges were debeveled with a #15 scalpel blade.  Given the location of the defect and the proximity to free margins a Banner transposition flap was deemed most appropriate.  Using a sterile surgical marker, an appropriate flap drawn around the defect. The area thus outlined was incised deep to adipose tissue with a #15 scalpel blade.  The skin margins were undermined to an appropriate distance in all directions utilizing iris scissors. Bilobed Flap Text: The defect edges were debeveled with a #15 scalpel blade.  Given the location of the defect and the proximity to free margins a bilobe flap was deemed most appropriate.  Using a sterile surgical marker, an appropriate bilobe flap drawn around the defect.    The area thus outlined was incised deep to adipose tissue with a #15 scalpel blade.  The skin margins were undermined to an appropriate distance in all directions utilizing iris scissors. Bilobed Transposition Flap Text: The defect edges were debeveled with a #15 scalpel blade.  Given the location of the defect and the proximity to free margins a bilobed transposition flap was deemed most appropriate.  Using a sterile surgical marker, an appropriate bilobe flap drawn around the defect.    The area thus outlined was incised deep to adipose tissue with a #15 scalpel blade.  The skin margins were undermined to an appropriate distance in all directions utilizing iris scissors. Trilobed Flap Text: The defect edges were debeveled with a #15 scalpel blade.  Given the location of the defect and the proximity to free margins a trilobed flap was deemed most appropriate.  Using a sterile surgical marker, an appropriate trilobed flap drawn around the defect.    The area thus outlined was incised deep to adipose tissue with a #15 scalpel blade.  The skin margins were undermined to an appropriate distance in all directions utilizing iris scissors. Dorsal Nasal Flap Text: The defect edges were debeveled with a #15 scalpel blade.  Given the location of the defect and the proximity to free margins a dorsal nasal flap was deemed most appropriate.  Using a sterile surgical marker, an appropriate dorsal nasal flap was drawn around the defect.    The area thus outlined was incised deep to adipose tissue with a #15 scalpel blade.  The skin margins were undermined to an appropriate distance in all directions utilizing iris scissors. Island Pedicle Flap Text: The defect edges were debeveled with a #15 scalpel blade.  Given the location of the defect, shape of the defect and the proximity to free margins an island pedicle advancement flap was deemed most appropriate.  Using a sterile surgical marker, an appropriate advancement flap was drawn incorporating the defect, outlining the appropriate donor tissue and placing the expected incisions within the relaxed skin tension lines where possible.    The area thus outlined was incised deep to adipose tissue with a #15 scalpel blade.  The skin margins were undermined to an appropriate distance in all directions around the primary defect and laterally outward around the island pedicle utilizing iris scissors.  There was minimal undermining beneath the pedicle flap. Island Pedicle Flap With Canthal Suspension Text: The defect edges were debeveled with a #15 scalpel blade.  Given the location of the defect, shape of the defect and the proximity to free margins an island pedicle advancement flap was deemed most appropriate.  Using a sterile surgical marker, an appropriate advancement flap was drawn incorporating the defect, outlining the appropriate donor tissue and placing the expected incisions within the relaxed skin tension lines where possible. The area thus outlined was incised deep to adipose tissue with a #15 scalpel blade.  The skin margins were undermined to an appropriate distance in all directions around the primary defect and laterally outward around the island pedicle utilizing iris scissors.  There was minimal undermining beneath the pedicle flap. A suspension suture was placed in the canthal tendon to prevent tension and prevent ectropion. Alar Island Pedicle Flap Text: The defect edges were debeveled with a #15 scalpel blade.  Given the location of the defect, shape of the defect and the proximity to the alar rim an island pedicle advancement flap was deemed most appropriate.  Using a sterile surgical marker, an appropriate advancement flap was drawn incorporating the defect, outlining the appropriate donor tissue and placing the expected incisions within the nasal ala running parallel to the alar rim. The area thus outlined was incised with a #15 scalpel blade.  The skin margins were undermined minimally to an appropriate distance in all directions around the primary defect and laterally outward around the island pedicle utilizing iris scissors.  There was minimal undermining beneath the pedicle flap. Double Island Pedicle Flap Text: The defect edges were debeveled with a #15 scalpel blade.  Given the location of the defect, shape of the defect and the proximity to free margins a double island pedicle advancement flap was deemed most appropriate.  Using a sterile surgical marker, an appropriate advancement flap was drawn incorporating the defect, outlining the appropriate donor tissue and placing the expected incisions within the relaxed skin tension lines where possible.    The area thus outlined was incised deep to adipose tissue with a #15 scalpel blade.  The skin margins were undermined to an appropriate distance in all directions around the primary defect and laterally outward around the island pedicle utilizing iris scissors.  There was minimal undermining beneath the pedicle flap. Island Pedicle Flap-Requiring Vessel Identification Text: The defect edges were debeveled with a #15 scalpel blade.  Given the location of the defect, shape of the defect and the proximity to free margins an island pedicle advancement flap was deemed most appropriate.  Using a sterile surgical marker, an appropriate advancement flap was drawn, based on the axial vessel mentioned above, incorporating the defect, outlining the appropriate donor tissue and placing the expected incisions within the relaxed skin tension lines where possible.    The area thus outlined was incised deep to adipose tissue with a #15 scalpel blade.  The skin margins were undermined to an appropriate distance in all directions around the primary defect and laterally outward around the island pedicle utilizing iris scissors.  There was minimal undermining beneath the pedicle flap. Keystone Flap Text: The defect edges were debeveled with a #15 scalpel blade.  Given the location of the defect, shape of the defect a keystone flap was deemed most appropriate.  Using a sterile surgical marker, an appropriate keystone flap was drawn incorporating the defect, outlining the appropriate donor tissue and placing the expected incisions within the relaxed skin tension lines where possible. The area thus outlined was incised deep to adipose tissue with a #15 scalpel blade.  The skin margins were undermined to an appropriate distance in all directions around the primary defect and laterally outward around the flap utilizing iris scissors. O-T Plasty Text: The defect edges were debeveled with a #15 scalpel blade.  Given the location of the defect, shape of the defect and the proximity to free margins an O-T plasty was deemed most appropriate.  Using a sterile surgical marker, an appropriate O-T plasty was drawn incorporating the defect and placing the expected incisions within the relaxed skin tension lines where possible.    The area thus outlined was incised deep to adipose tissue with a #15 scalpel blade.  The skin margins were undermined to an appropriate distance in all directions utilizing iris scissors. O-Z Plasty Text: The defect edges were debeveled with a #15 scalpel blade.  Given the location of the defect, shape of the defect and the proximity to free margins an O-Z plasty (double transposition flap) was deemed most appropriate.  Using a sterile surgical marker, the appropriate transposition flaps were drawn incorporating the defect and placing the expected incisions within the relaxed skin tension lines where possible.    The area thus outlined was incised deep to adipose tissue with a #15 scalpel blade.  The skin margins were undermined to an appropriate distance in all directions utilizing iris scissors.  Hemostasis was achieved with electrocautery.  The flaps were then transposed into place, one clockwise and the other counterclockwise, and anchored with interrupted buried subcutaneous sutures. Double O-Z Plasty Text: The defect edges were debeveled with a #15 scalpel blade.  Given the location of the defect, shape of the defect and the proximity to free margins a Double O-Z plasty (double transposition flap) was deemed most appropriate.  Using a sterile surgical marker, the appropriate transposition flaps were drawn incorporating the defect and placing the expected incisions within the relaxed skin tension lines where possible. The area thus outlined was incised deep to adipose tissue with a #15 scalpel blade.  The skin margins were undermined to an appropriate distance in all directions utilizing iris scissors.  Hemostasis was achieved with electrocautery.  The flaps were then transposed into place, one clockwise and the other counterclockwise, and anchored with interrupted buried subcutaneous sutures. V-Y Plasty Text: The defect edges were debeveled with a #15 scalpel blade.  Given the location of the defect, shape of the defect and the proximity to free margins an V-Y advancement flap was deemed most appropriate.  Using a sterile surgical marker, an appropriate advancement flap was drawn incorporating the defect and placing the expected incisions within the relaxed skin tension lines where possible.    The area thus outlined was incised deep to adipose tissue with a #15 scalpel blade.  The skin margins were undermined to an appropriate distance in all directions utilizing iris scissors. H Plasty Text: Given the location of the defect, shape of the defect and the proximity to free margins a H-plasty was deemed most appropriate for repair.  Using a sterile surgical marker, the appropriate advancement arms of the H-plasty were drawn incorporating the defect and placing the expected incisions within the relaxed skin tension lines where possible. The area thus outlined was incised deep to adipose tissue with a #15 scalpel blade. The skin margins were undermined to an appropriate distance in all directions utilizing iris scissors.  The opposing advancement arms were then advanced into place in opposite direction and anchored with interrupted buried subcutaneous sutures. W Plasty Text: The lesion was extirpated to the level of the fat with a #15 scalpel blade.  Given the location of the defect, shape of the defect and the proximity to free margins a W-plasty was deemed most appropriate for repair.  Using a sterile surgical marker, the appropriate transposition arms of the W-plasty were drawn incorporating the defect and placing the expected incisions within the relaxed skin tension lines where possible.    The area thus outlined was incised deep to adipose tissue with a #15 scalpel blade.  The skin margins were undermined to an appropriate distance in all directions utilizing iris scissors.  The opposing transposition arms were then transposed into place in opposite direction and anchored with interrupted buried subcutaneous sutures. Z Plasty Text: The lesion was extirpated to the level of the fat with a #15 scalpel blade.  Given the location of the defect, shape of the defect and the proximity to free margins a Z-plasty was deemed most appropriate for repair.  Using a sterile surgical marker, the appropriate transposition arms of the Z-plasty were drawn incorporating the defect and placing the expected incisions within the relaxed skin tension lines where possible.    The area thus outlined was incised deep to adipose tissue with a #15 scalpel blade.  The skin margins were undermined to an appropriate distance in all directions utilizing iris scissors.  The opposing transposition arms were then transposed into place in opposite direction and anchored with interrupted buried subcutaneous sutures. Zygomaticofacial Flap Text: Given the location of the defect, shape of the defect and the proximity to free margins a zygomaticofacial flap was deemed most appropriate for repair.  Using a sterile surgical marker, the appropriate flap was drawn incorporating the defect and placing the expected incisions within the relaxed skin tension lines where possible. The area thus outlined was incised deep to adipose tissue with a #15 scalpel blade with preservation of a vascular pedicle.  The skin margins were undermined to an appropriate distance in all directions utilizing iris scissors.  The flap was then placed into the defect and anchored with interrupted buried subcutaneous sutures. Cheek Interpolation Flap Text: A decision was made to reconstruct the defect utilizing an interpolation axial flap and a staged reconstruction.  A telfa template was made of the defect.  This telfa template was then used to outline the Cheek Interpolation flap.  The donor area for the pedicle flap was then injected with anesthesia.  The flap was excised through the skin and subcutaneous tissue down to the layer of the underlying musculature.  The interpolation flap was carefully excised within this deep plane to maintain its blood supply.  The edges of the donor site were undermined.   The donor site was closed in a primary fashion.  The pedicle was then rotated into position and sutured.  Once the tube was sutured into place, adequate blood supply was confirmed with blanching and refill.  The pedicle was then wrapped with xeroform gauze and dressed appropriately with a telfa and gauze bandage to ensure continued blood supply and protect the attached pedicle. Cheek-To-Nose Interpolation Flap Text: A decision was made to reconstruct the defect utilizing an interpolation axial flap and a staged reconstruction.  A telfa template was made of the defect.  This telfa template was then used to outline the Cheek-To-Nose Interpolation flap.  The donor area for the pedicle flap was then injected with anesthesia.  The flap was excised through the skin and subcutaneous tissue down to the layer of the underlying musculature.  The interpolation flap was carefully excised within this deep plane to maintain its blood supply.  The edges of the donor site were undermined.   The donor site was closed in a primary fashion.  The pedicle was then rotated into position and sutured.  Once the tube was sutured into place, adequate blood supply was confirmed with blanching and refill.  The pedicle was then wrapped with xeroform gauze and dressed appropriately with a telfa and gauze bandage to ensure continued blood supply and protect the attached pedicle. Interpolation Flap Text: A decision was made to reconstruct the defect utilizing an interpolation axial flap and a staged reconstruction.  A telfa template was made of the defect.  This telfa template was then used to outline the interpolation flap.  The donor area for the pedicle flap was then injected with anesthesia.  The flap was excised through the skin and subcutaneous tissue down to the layer of the underlying musculature.  The interpolation flap was carefully excised within this deep plane to maintain its blood supply.  The edges of the donor site were undermined.   The donor site was closed in a primary fashion.  The pedicle was then rotated into position and sutured.  Once the tube was sutured into place, adequate blood supply was confirmed with blanching and refill.  The pedicle was then wrapped with xeroform gauze and dressed appropriately with a telfa and gauze bandage to ensure continued blood supply and protect the attached pedicle. Melolabial Interpolation Flap Text: A decision was made to reconstruct the defect utilizing an interpolation axial flap and a staged reconstruction.  A telfa template was made of the defect.  This telfa template was then used to outline the melolabial interpolation flap.  The donor area for the pedicle flap was then injected with anesthesia.  The flap was excised through the skin and subcutaneous tissue down to the layer of the underlying musculature.  The pedicle flap was carefully excised within this deep plane to maintain its blood supply.  The edges of the donor site were undermined.   The donor site was closed in a primary fashion.  The pedicle was then rotated into position and sutured.  Once the tube was sutured into place, adequate blood supply was confirmed with blanching and refill.  The pedicle was then wrapped with xeroform gauze and dressed appropriately with a telfa and gauze bandage to ensure continued blood supply and protect the attached pedicle. Mastoid Interpolation Flap Text: A decision was made to reconstruct the defect utilizing an interpolation axial flap and a staged reconstruction.  A telfa template was made of the defect.  This telfa template was then used to outline the mastoid interpolation flap.  The donor area for the pedicle flap was then injected with anesthesia.  The flap was excised through the skin and subcutaneous tissue down to the layer of the underlying musculature.  The pedicle flap was carefully excised within this deep plane to maintain its blood supply.  The edges of the donor site were undermined.   The donor site was closed in a primary fashion.  The pedicle was then rotated into position and sutured.  Once the tube was sutured into place, adequate blood supply was confirmed with blanching and refill.  The pedicle was then wrapped with xeroform gauze and dressed appropriately with a telfa and gauze bandage to ensure continued blood supply and protect the attached pedicle. Posterior Auricular Interpolation Flap Text: A decision was made to reconstruct the defect utilizing an interpolation axial flap and a staged reconstruction.  A telfa template was made of the defect.  This telfa template was then used to outline the posterior auricular interpolation flap.  The donor area for the pedicle flap was then injected with anesthesia.  The flap was excised through the skin and subcutaneous tissue down to the layer of the underlying musculature.  The pedicle flap was carefully excised within this deep plane to maintain its blood supply.  The edges of the donor site were undermined.   The donor site was closed in a primary fashion.  The pedicle was then rotated into position and sutured.  Once the tube was sutured into place, adequate blood supply was confirmed with blanching and refill.  The pedicle was then wrapped with xeroform gauze and dressed appropriately with a telfa and gauze bandage to ensure continued blood supply and protect the attached pedicle. Paramedian Forehead Flap Text: A decision was made to reconstruct the defect utilizing an interpolation axial flap and a staged reconstruction.  A telfa template was made of the defect.  This telfa template was then used to outline the paramedian forehead pedicle flap.  The donor area for the pedicle flap was then injected with anesthesia.  The flap was excised through the skin and subcutaneous tissue down to the layer of the underlying musculature.  The pedicle flap was carefully excised within this deep plane to maintain its blood supply.  The edges of the donor site were undermined.   The donor site was closed in a primary fashion.  The pedicle was then rotated into position and sutured.  Once the tube was sutured into place, adequate blood supply was confirmed with blanching and refill.  The pedicle was then wrapped with xeroform gauze and dressed appropriately with a telfa and gauze bandage to ensure continued blood supply and protect the attached pedicle. Abbe Flap (Upper To Lower Lip) Text: The defect of the lower lip was assessed and measured.  Given the location and size of the defect, an Abbe flap was deemed most appropriate. Using a sterile surgical marker, an appropriate Abbe flap was measured and drawn on the upper lip. Local anesthesia was then infiltrated.  A scalpel was then used to incise the upper lip through and through the skin, vermilion, muscle and mucosa, leaving the flap pedicled on the opposite side.  The flap was then rotated and transferred to the lower lip defect.  The flap was then sutured into place with a three layer technique, closing the orbicularis oris muscle layer with subcutaneous buried sutures, followed by a mucosal layer and an epidermal layer. Abbe Flap (Lower To Upper Lip) Text: The defect of the upper lip was assessed and measured.  Given the location and size of the defect, an Abbe flap was deemed most appropriate. Using a sterile surgical marker, an appropriate Abbe flap was measured and drawn on the lower lip. Local anesthesia was then infiltrated. A scalpel was then used to incise the upper lip through and through the skin, vermilion, muscle and mucosa, leaving the flap pedicled on the opposite side.  The flap was then rotated and transferred to the lower lip defect.  The flap was then sutured into place with a three layer technique, closing the orbicularis oris muscle layer with subcutaneous buried sutures, followed by a mucosal layer and an epidermal layer. Estlander Flap (Upper To Lower Lip) Text: The defect of the lower lip was assessed and measured.  Given the location and size of the defect, an Estlander flap was deemed most appropriate. Using a sterile surgical marker, an appropriate Estlander flap was measured and drawn on the upper lip. Local anesthesia was then infiltrated. A scalpel was then used to incise the lateral aspect of the flap, through skin, muscle and mucosa, leaving the flap pedicled medially.  The flap was then rotated and positioned to fill the lower lip defect.  The flap was then sutured into place with a three layer technique, closing the orbicularis oris muscle layer with subcutaneous buried sutures, followed by a mucosal layer and an epidermal layer. Lip Wedge Excision Repair Text: Given the location of the defect and the proximity to free margins a full thickness wedge repair was deemed most appropriate.  Using a sterile surgical marker, the appropriate repair was drawn incorporating the defect and placing the expected incisions perpendicular to the vermilion border.  The vermilion border was also meticulously outlined to ensure appropriate reapproximation during the repair.  The area thus outlined was incised through and through with a #15 scalpel blade.  The muscularis and dermis were reaproximated with deep sutures following hemostasis. Care was taken to realign the vermilion border before proceeding with the superficial closure.  Once the vermilion was realigned the superfical and mucosal closure was finished. Ftsg Text: The defect edges were debeveled with a #15 scalpel blade.  Given the location of the defect, shape of the defect and the proximity to free margins a full thickness skin graft was deemed most appropriate.  Using a sterile surgical marker, the primary defect shape was transferred to the donor site. The area thus outlined was incised deep to adipose tissue with a #15 scalpel blade.  The harvested graft was then trimmed of adipose tissue until only dermis and epidermis was left.  The skin margins of the secondary defect were undermined to an appropriate distance in all directions utilizing iris scissors.  The secondary defect was closed with interrupted buried subcutaneous sutures.  The skin edges were then re-apposed with running  sutures.  The skin graft was then placed in the primary defect and oriented appropriately. Split-Thickness Skin Graft Text: The defect edges were debeveled with a #15 scalpel blade.  Given the location of the defect, shape of the defect and the proximity to free margins a split thickness skin graft was deemed most appropriate.  Using a sterile surgical marker, the primary defect shape was transferred to the donor site. The split thickness graft was then harvested.  The skin graft was then placed in the primary defect and oriented appropriately. Burow's Graft Text: The defect edges were debeveled with a #15 scalpel blade.  Given the location of the defect, shape of the defect, the proximity to free margins and the presence of a standing cone deformity a Burow's skin graft was deemed most appropriate. The standing cone was removed and this tissue was then trimmed to the shape of the primary defect. The adipose tissue was also removed until only dermis and epidermis were left.  The skin margins of the secondary defect were undermined to an appropriate distance in all directions utilizing iris scissors.  The secondary defect was closed with interrupted buried subcutaneous sutures.  The skin edges were then re-apposed with running  sutures.  The skin graft was then placed in the primary defect and oriented appropriately. Cartilage Graft Text: The defect edges were debeveled with a #15 scalpel blade.  Given the location of the defect, shape of the defect, the fact the defect involved a full thickness cartilage defect a cartilage graft was deemed most appropriate.  An appropriate donor site was identified, cleansed, and anesthetized. The cartilage graft was then harvested and transferred to the recipient site, oriented appropriately and then sutured into place.  The secondary defect was then repaired using a primary closure. Composite Graft Text: The defect edges were debeveled with a #15 scalpel blade.  Given the location of the defect, shape of the defect, the proximity to free margins and the fact the defect was full thickness a composite graft was deemed most appropriate.  The defect was outline and then transferred to the donor site.  A full thickness graft was then excised from the donor site. The graft was then placed in the primary defect, oriented appropriately and then sutured into place.  The secondary defect was then repaired using a primary closure. Epidermal Autograft Text: The defect edges were debeveled with a #15 scalpel blade.  Given the location of the defect, shape of the defect and the proximity to free margins an epidermal autograft was deemed most appropriate.  Using a sterile surgical marker, the primary defect shape was transferred to the donor site. The epidermal graft was then harvested.  The skin graft was then placed in the primary defect and oriented appropriately. Dermal Autograft Text: The defect edges were debeveled with a #15 scalpel blade.  Given the location of the defect, shape of the defect and the proximity to free margins a dermal autograft was deemed most appropriate.  Using a sterile surgical marker, the primary defect shape was transferred to the donor site. The area thus outlined was incised deep to adipose tissue with a #15 scalpel blade.  The harvested graft was then trimmed of adipose and epidermal tissue until only dermis was left.  The skin graft was then placed in the primary defect and oriented appropriately. Skin Substitute Text: The defect edges were debeveled with a #15 scalpel blade.  Given the location of the defect, shape of the defect and the proximity to free margins a skin substitute graft was deemed most appropriate.  The graft material was trimmed to fit the size of the defect. The graft was then placed in the primary defect and oriented appropriately. Tissue Cultured Epidermal Autograft Text: The defect edges were debeveled with a #15 scalpel blade.  Given the location of the defect, shape of the defect and the proximity to free margins a tissue cultured epidermal autograft was deemed most appropriate.  The graft was then trimmed to fit the size of the defect.  The graft was then placed in the primary defect and oriented appropriately. Xenograft Text: The defect edges were debeveled with a #15 scalpel blade.  Given the location of the defect, shape of the defect and the proximity to free margins a xenograft was deemed most appropriate.  The graft was then trimmed to fit the size of the defect.  The graft was then placed in the primary defect and oriented appropriately. Purse String (Intermediate) Text: Given the location of the defect and the characteristics of the surrounding skin a purse string intermediate closure was deemed most appropriate.  Undermining was performed circumferentially around the surgical defect.  A purse string suture was then placed and tightened. Purse String (Simple) Text: Given the location of the defect and the characteristics of the surrounding skin a purse string simple closure was deemed most appropriate.  Undermining was performed circumferentially around the surgical defect.  A purse string suture was then placed and tightened. Complex Repair And Single Advancement Flap Text: The defect edges were debeveled with a #15 scalpel blade.  The primary defect was closed partially with a complex linear closure.  Given the location of the remaining defect, shape of the defect and the proximity to free margins a single advancement flap was deemed most appropriate for complete closure of the defect.  Using a sterile surgical marker, an appropriate advancement flap was drawn incorporating the defect and placing the expected incisions within the relaxed skin tension lines where possible.    The area thus outlined was incised deep to adipose tissue with a #15 scalpel blade.  The skin margins were undermined to an appropriate distance in all directions utilizing iris scissors. Complex Repair And Double Advancement Flap Text: The defect edges were debeveled with a #15 scalpel blade.  The primary defect was closed partially with a complex linear closure.  Given the location of the remaining defect, shape of the defect and the proximity to free margins a double advancement flap was deemed most appropriate for complete closure of the defect.  Using a sterile surgical marker, an appropriate advancement flap was drawn incorporating the defect and placing the expected incisions within the relaxed skin tension lines where possible.    The area thus outlined was incised deep to adipose tissue with a #15 scalpel blade.  The skin margins were undermined to an appropriate distance in all directions utilizing iris scissors. Complex Repair And Modified Advancement Flap Text: The defect edges were debeveled with a #15 scalpel blade.  The primary defect was closed partially with a complex linear closure.  Given the location of the remaining defect, shape of the defect and the proximity to free margins a modified advancement flap was deemed most appropriate for complete closure of the defect.  Using a sterile surgical marker, an appropriate advancement flap was drawn incorporating the defect and placing the expected incisions within the relaxed skin tension lines where possible.    The area thus outlined was incised deep to adipose tissue with a #15 scalpel blade.  The skin margins were undermined to an appropriate distance in all directions utilizing iris scissors. Complex Repair And A-T Advancement Flap Text: The defect edges were debeveled with a #15 scalpel blade.  The primary defect was closed partially with a complex linear closure.  Given the location of the remaining defect, shape of the defect and the proximity to free margins an A-T advancement flap was deemed most appropriate for complete closure of the defect.  Using a sterile surgical marker, an appropriate advancement flap was drawn incorporating the defect and placing the expected incisions within the relaxed skin tension lines where possible.    The area thus outlined was incised deep to adipose tissue with a #15 scalpel blade.  The skin margins were undermined to an appropriate distance in all directions utilizing iris scissors. Complex Repair And O-T Advancement Flap Text: The defect edges were debeveled with a #15 scalpel blade.  The primary defect was closed partially with a complex linear closure.  Given the location of the remaining defect, shape of the defect and the proximity to free margins an O-T advancement flap was deemed most appropriate for complete closure of the defect.  Using a sterile surgical marker, an appropriate advancement flap was drawn incorporating the defect and placing the expected incisions within the relaxed skin tension lines where possible.    The area thus outlined was incised deep to adipose tissue with a #15 scalpel blade.  The skin margins were undermined to an appropriate distance in all directions utilizing iris scissors. Complex Repair And O-L Flap Text: The defect edges were debeveled with a #15 scalpel blade.  The primary defect was closed partially with a complex linear closure.  Given the location of the remaining defect, shape of the defect and the proximity to free margins an O-L flap was deemed most appropriate for complete closure of the defect.  Using a sterile surgical marker, an appropriate flap was drawn incorporating the defect and placing the expected incisions within the relaxed skin tension lines where possible.    The area thus outlined was incised deep to adipose tissue with a #15 scalpel blade.  The skin margins were undermined to an appropriate distance in all directions utilizing iris scissors. Complex Repair And Bilobe Flap Text: The defect edges were debeveled with a #15 scalpel blade.  The primary defect was closed partially with a complex linear closure.  Given the location of the remaining defect, shape of the defect and the proximity to free margins a bilobe flap was deemed most appropriate for complete closure of the defect.  Using a sterile surgical marker, an appropriate advancement flap was drawn incorporating the defect and placing the expected incisions within the relaxed skin tension lines where possible.    The area thus outlined was incised deep to adipose tissue with a #15 scalpel blade.  The skin margins were undermined to an appropriate distance in all directions utilizing iris scissors. Complex Repair And Melolabial Flap Text: The defect edges were debeveled with a #15 scalpel blade.  The primary defect was closed partially with a complex linear closure.  Given the location of the remaining defect, shape of the defect and the proximity to free margins a melolabial flap was deemed most appropriate for complete closure of the defect.  Using a sterile surgical marker, an appropriate advancement flap was drawn incorporating the defect and placing the expected incisions within the relaxed skin tension lines where possible.    The area thus outlined was incised deep to adipose tissue with a #15 scalpel blade.  The skin margins were undermined to an appropriate distance in all directions utilizing iris scissors. Complex Repair And Rotation Flap Text: The defect edges were debeveled with a #15 scalpel blade.  The primary defect was closed partially with a complex linear closure.  Given the location of the remaining defect, shape of the defect and the proximity to free margins a rotation flap was deemed most appropriate for complete closure of the defect.  Using a sterile surgical marker, an appropriate advancement flap was drawn incorporating the defect and placing the expected incisions within the relaxed skin tension lines where possible.    The area thus outlined was incised deep to adipose tissue with a #15 scalpel blade.  The skin margins were undermined to an appropriate distance in all directions utilizing iris scissors. Complex Repair And Rhombic Flap Text: The defect edges were debeveled with a #15 scalpel blade.  The primary defect was closed partially with a complex linear closure.  Given the location of the remaining defect, shape of the defect and the proximity to free margins a rhombic flap was deemed most appropriate for complete closure of the defect.  Using a sterile surgical marker, an appropriate advancement flap was drawn incorporating the defect and placing the expected incisions within the relaxed skin tension lines where possible.    The area thus outlined was incised deep to adipose tissue with a #15 scalpel blade.  The skin margins were undermined to an appropriate distance in all directions utilizing iris scissors. Complex Repair And Transposition Flap Text: The defect edges were debeveled with a #15 scalpel blade.  The primary defect was closed partially with a complex linear closure.  Given the location of the remaining defect, shape of the defect and the proximity to free margins a transposition flap was deemed most appropriate for complete closure of the defect.  Using a sterile surgical marker, an appropriate advancement flap was drawn incorporating the defect and placing the expected incisions within the relaxed skin tension lines where possible.    The area thus outlined was incised deep to adipose tissue with a #15 scalpel blade.  The skin margins were undermined to an appropriate distance in all directions utilizing iris scissors. Complex Repair And V-Y Plasty Text: The defect edges were debeveled with a #15 scalpel blade.  The primary defect was closed partially with a complex linear closure.  Given the location of the remaining defect, shape of the defect and the proximity to free margins a V-Y plasty was deemed most appropriate for complete closure of the defect.  Using a sterile surgical marker, an appropriate advancement flap was drawn incorporating the defect and placing the expected incisions within the relaxed skin tension lines where possible.    The area thus outlined was incised deep to adipose tissue with a #15 scalpel blade.  The skin margins were undermined to an appropriate distance in all directions utilizing iris scissors. Complex Repair And M Plasty Text: The defect edges were debeveled with a #15 scalpel blade.  The primary defect was closed partially with a complex linear closure.  Given the location of the remaining defect, shape of the defect and the proximity to free margins an M plasty was deemed most appropriate for complete closure of the defect.  Using a sterile surgical marker, an appropriate advancement flap was drawn incorporating the defect and placing the expected incisions within the relaxed skin tension lines where possible.    The area thus outlined was incised deep to adipose tissue with a #15 scalpel blade.  The skin margins were undermined to an appropriate distance in all directions utilizing iris scissors. Complex Repair And Double M Plasty Text: The defect edges were debeveled with a #15 scalpel blade.  The primary defect was closed partially with a complex linear closure.  Given the location of the remaining defect, shape of the defect and the proximity to free margins a double M plasty was deemed most appropriate for complete closure of the defect.  Using a sterile surgical marker, an appropriate advancement flap was drawn incorporating the defect and placing the expected incisions within the relaxed skin tension lines where possible.    The area thus outlined was incised deep to adipose tissue with a #15 scalpel blade.  The skin margins were undermined to an appropriate distance in all directions utilizing iris scissors. Complex Repair And W Plasty Text: The defect edges were debeveled with a #15 scalpel blade.  The primary defect was closed partially with a complex linear closure.  Given the location of the remaining defect, shape of the defect and the proximity to free margins a W plasty was deemed most appropriate for complete closure of the defect.  Using a sterile surgical marker, an appropriate advancement flap was drawn incorporating the defect and placing the expected incisions within the relaxed skin tension lines where possible.    The area thus outlined was incised deep to adipose tissue with a #15 scalpel blade.  The skin margins were undermined to an appropriate distance in all directions utilizing iris scissors. Complex Repair And Z Plasty Text: The defect edges were debeveled with a #15 scalpel blade.  The primary defect was closed partially with a complex linear closure.  Given the location of the remaining defect, shape of the defect and the proximity to free margins a Z plasty was deemed most appropriate for complete closure of the defect.  Using a sterile surgical marker, an appropriate advancement flap was drawn incorporating the defect and placing the expected incisions within the relaxed skin tension lines where possible.    The area thus outlined was incised deep to adipose tissue with a #15 scalpel blade.  The skin margins were undermined to an appropriate distance in all directions utilizing iris scissors. Complex Repair And Dorsal Nasal Flap Text: The defect edges were debeveled with a #15 scalpel blade.  The primary defect was closed partially with a complex linear closure.  Given the location of the remaining defect, shape of the defect and the proximity to free margins a dorsal nasal flap was deemed most appropriate for complete closure of the defect.  Using a sterile surgical marker, an appropriate flap was drawn incorporating the defect and placing the expected incisions within the relaxed skin tension lines where possible.    The area thus outlined was incised deep to adipose tissue with a #15 scalpel blade.  The skin margins were undermined to an appropriate distance in all directions utilizing iris scissors. Complex Repair And Ftsg Text: The defect edges were debeveled with a #15 scalpel blade.  The primary defect was closed partially with a complex linear closure.  Given the location of the defect, shape of the defect and the proximity to free margins a full thickness skin graft was deemed most appropriate to repair the remaining defect.  The graft was trimmed to fit the size of the remaining defect.  The graft was then placed in the primary defect, oriented appropriately, and sutured into place. Complex Repair And Burow's Graft Text: The defect edges were debeveled with a #15 scalpel blade.  The primary defect was closed partially with a complex linear closure.  Given the location of the defect, shape of the defect, the proximity to free margins and the presence of a standing cone deformity a Burow's graft was deemed most appropriate to repair the remaining defect.  The graft was trimmed to fit the size of the remaining defect.  The graft was then placed in the primary defect, oriented appropriately, and sutured into place. Complex Repair And Split-Thickness Skin Graft Text: The defect edges were debeveled with a #15 scalpel blade.  The primary defect was closed partially with a complex linear closure.  Given the location of the defect, shape of the defect and the proximity to free margins a split thickness skin graft was deemed most appropriate to repair the remaining defect.  The graft was trimmed to fit the size of the remaining defect.  The graft was then placed in the primary defect, oriented appropriately, and sutured into place. Complex Repair And Epidermal Autograft Text: The defect edges were debeveled with a #15 scalpel blade.  The primary defect was closed partially with a complex linear closure.  Given the location of the defect, shape of the defect and the proximity to free margins an epidermal autograft was deemed most appropriate to repair the remaining defect.  The graft was trimmed to fit the size of the remaining defect.  The graft was then placed in the primary defect, oriented appropriately, and sutured into place. Complex Repair And Dermal Autograft Text: The defect edges were debeveled with a #15 scalpel blade.  The primary defect was closed partially with a complex linear closure.  Given the location of the defect, shape of the defect and the proximity to free margins an dermal autograft was deemed most appropriate to repair the remaining defect.  The graft was trimmed to fit the size of the remaining defect.  The graft was then placed in the primary defect, oriented appropriately, and sutured into place. Complex Repair And Tissue Cultured Epidermal Autograft Text: The defect edges were debeveled with a #15 scalpel blade.  The primary defect was closed partially with a complex linear closure.  Given the location of the defect, shape of the defect and the proximity to free margins an tissue cultured epidermal autograft was deemed most appropriate to repair the remaining defect.  The graft was trimmed to fit the size of the remaining defect.  The graft was then placed in the primary defect, oriented appropriately, and sutured into place. Complex Repair And Xenograft Text: The defect edges were debeveled with a #15 scalpel blade.  The primary defect was closed partially with a complex linear closure.  Given the location of the defect, shape of the defect and the proximity to free margins a xenograft was deemed most appropriate to repair the remaining defect.  The graft was trimmed to fit the size of the remaining defect.  The graft was then placed in the primary defect, oriented appropriately, and sutured into place. Complex Repair And Skin Substitute Graft Text: The defect edges were debeveled with a #15 scalpel blade.  The primary defect was closed partially with a complex linear closure.  Given the location of the remaining defect, shape of the defect and the proximity to free margins a skin substitute graft was deemed most appropriate to repair the remaining defect.  The graft was trimmed to fit the size of the remaining defect.  The graft was then placed in the primary defect, oriented appropriately, and sutured into place. Path Notes (To The Dermatopathologist): Please check margins notched at 12 o clock Consent was obtained from the patient. The risks and benefits to therapy were discussed in detail. Specifically, the risks of infection, scarring, bleeding, prolonged wound healing, incomplete removal, allergy to anesthesia, nerve injury and recurrence were addressed. Prior to the procedure, the treatment site was clearly identified and confirmed by the patient. All components of Universal Protocol/PAUSE Rule completed. Post-Care Instructions: I reviewed with the patient in detail post-care instructions. Patient is not to engage in any heavy lifting, exercise, or swimming for the next 14 days. Should the patient develop any fevers, chills, bleeding, severe pain patient will contact the office immediately. Home Suture Removal Text: Patient was provided a home suture removal kit and will remove their sutures at home.  If they have any questions or difficulties they will call the office. Where Do You Want The Question To Include Opioid Counseling Located?: Case Summary Tab Billing Type: Third-Party Bill Information: Selecting Yes will display possible errors in your note based on the variables you have selected. This validation is only offered as a suggestion for you. PLEASE NOTE THAT THE VALIDATION TEXT WILL BE REMOVED WHEN YOU FINALIZE YOUR NOTE. IF YOU WANT TO FAX A PRELIMINARY NOTE YOU WILL NEED TO TOGGLE THIS TO 'NO' IF YOU DO NOT WANT IT IN YOUR FAXED NOTE.

## 2023-07-20 NOTE — PROGRESS NOTES
Infectious Diseases Progress Note      LOS: 6 days   Patient Care Team:  Ian Cordero MD as PCP - General  Tony Parisi MD as Consulting Physician (Nephrology)  Rc Ayala MD as Consulting Physician (Hematology and Oncology)  Nicola Thurston MD as Consulting Physician (Cardiology)    Chief Complaint: Right leg pain, mass to the medial aspect of the right knee, fatigue    Subjective       The patient has been afebrile for the last 24 hours.  The patient is on 6 L of oxygen by nasal cannula, hemodynamically stable, and is tolerating antimicrobial therapy.  Patient just does not feel well overall.  She is now on a Cardizem drip      Review of Systems:   Review of Systems   Constitutional:  Positive for fatigue.   HENT: Negative.     Eyes: Negative.    Respiratory:  Positive for cough and shortness of breath.    Cardiovascular: Negative.    Gastrointestinal: Negative.    Endocrine: Negative.    Genitourinary: Negative.    Musculoskeletal: Negative.    Skin:  Positive for color change.   Neurological:  Positive for weakness.   Psychiatric/Behavioral: Negative.     All other systems reviewed and are negative.     Objective     Vital Signs  Temp:  [98 °F (36.7 °C)-98.6 °F (37 °C)] 98.3 °F (36.8 °C)  Heart Rate:  [] 66  Resp:  [17-27] 17  BP: ()/(47-65) 100/47    Physical Exam:  Physical Exam  Vitals and nursing note reviewed.   Constitutional:       General: She is not in acute distress.     Appearance: She is well-developed and normal weight. She is ill-appearing. She is not diaphoretic.   HENT:      Head: Normocephalic and atraumatic.   Eyes:      General: No scleral icterus.     Extraocular Movements: Extraocular movements intact.      Conjunctiva/sclera: Conjunctivae normal.      Pupils: Pupils are equal, round, and reactive to light.   Cardiovascular:      Rate and Rhythm: Normal rate and regular rhythm.      Heart sounds: Normal heart sounds, S1 normal and S2 normal. No murmur  heard.  Pulmonary:      Effort: Pulmonary effort is normal. No respiratory distress.      Breath sounds: No stridor. No wheezing or rales.      Comments: Diminished throughout  Chest:      Chest wall: No tenderness.   Abdominal:      General: Bowel sounds are normal. There is distension.      Palpations: Abdomen is soft. There is no mass.      Tenderness: There is no abdominal tenderness. There is no guarding.   Musculoskeletal:         General: No swelling, tenderness or deformity.      Cervical back: Neck supple.   Skin:     General: Skin is warm and dry.      Coloration: Skin is not pale.      Findings: No bruising, erythema or rash.      Comments:  Patient has a fluctuant mass in the medial aspect of the knee-less fluctuant today    The small red patch to the lateral aspect of the knee has almost completely resolved.    No significant swelling or erythema to the rest of the right leg    Neurological:      Mental Status: She is alert and oriented to person, place, and time.      Cranial Nerves: No cranial nerve deficit.        Results Review:    I have reviewed all clinical data, test, lab, and imaging results.     Radiology  XR Chest 1 View    Result Date: 7/20/2023  XR CHEST 1 VW Date of Exam: 7/20/2023 5:45 AM EDT Indication: possible aspiration Comparison: 7/8/2023. Findings: Lung volumes are diminished. There are new hazy densities present throughout both lungs. There is new medial left basilar consolidation. There is patchy bilateral airspace disease. There are streaky right perihilar opacities. Heart size is likely within normal limits. No pneumothorax or definite pleural effusion. Pulmonary vasculature is obscured.     Impression: New medial left basilar consolidation and new hazy and patchy airspace disease in both lungs. These findings could relate to edema or pneumonia Electronically Signed: Nayan Juárez  7/20/2023 6:18 AM EDT  Workstation ID: [id number]     Cardiology    Laboratory    Results from last  7 days   Lab Units 07/20/23  0729 07/19/23  0310 07/18/23  0125 07/17/23  1859 07/17/23  1114 07/16/23  0630 07/14/23 2234 07/14/23  0310   WBC 10*3/mm3 21.10* 16.30* 11.90*  --  13.10* 11.20* 9.80 9.80   HEMOGLOBIN g/dL 10.5* 9.4* 8.3* 9.1* 7.4* 7.7* 7.5* 7.2*   HEMATOCRIT % 33.6* 29.3* 26.1* 28.3* 23.8* 24.1* 23.5* 22.0*   PLATELETS 10*3/mm3 260 243 215  --  238 219 206 179       Results from last 7 days   Lab Units 07/20/23  0729 07/19/23  0310 07/18/23  0125 07/17/23  1114 07/16/23  0152 07/14/23 2234 07/14/23  0309   SODIUM mmol/L 140 141 141 140 140 138 137   POTASSIUM mmol/L 4.1 4.3 3.4* 3.7 4.3 4.4 4.2   CHLORIDE mmol/L 103 108* 109* 110* 111* 109* 108*   CO2 mmol/L 30.0* 23.0 21.0* 21.0* 15.0* 17.0* 18.0*   BUN mg/dL 62* 60* 65* 75* 72* 75* 73*   CREATININE mg/dL 1.51* 1.62* 1.72* 2.18* 2.30* 2.69* 2.71*   GLUCOSE mg/dL 119* 151* 101* 101* 96 112* 92   ALBUMIN g/dL 2.1* 2.1* 2.1* 1.9*  --   --   --    BILIRUBIN mg/dL  --  0.5  --  0.3  --   --   --    ALK PHOS U/L  --  204*  --  137*  --   --   --    AST (SGOT) U/L  --  47*  --  26  --   --   --    ALT (SGPT) U/L  --  17  --  15  --   --   --    CALCIUM mg/dL 8.7 8.4* 8.3* 8.5* 8.4* 8.5* 8.5*           Results from last 7 days   Lab Units 07/17/23  1114   SED RATE mm/hr 11           Microbiology   Microbiology Results (last 10 days)       Procedure Component Value - Date/Time    Body Fluid Culture - Body Fluid, Knee, Right [[id number]]  (Abnormal) Collected: 07/18/23 1559    Lab Status: Preliminary result Specimen: Body Fluid from Knee, Right Updated: 07/20/23 0902     Body Fluid Culture Heavy growth (4+) Staphylococcus aureus     Gram Stain Many (4+) WBCs per low power field      Many (4+) Gram positive cocci in pairs    Blood Culture - Blood, Arm, Left [[id number]]  (Abnormal) Collected: 07/13/23 1006    Lab Status: Final result Specimen: Blood from Arm, Left Updated: 07/15/23 0718     Blood Culture Streptococcus, Alpha Hemolytic     Isolated from Aerobic  Bottle     Gram Stain Aerobic Bottle Gram positive cocci in chains    Narrative:      Probable contaminant requires clinical correlation, susceptibility not performed unless requested by physician.      Blood Culture ID, PCR - Blood, Arm, Left [[id number]]  (Abnormal) Collected: 07/13/23 1006    Lab Status: Final result Specimen: Blood from Arm, Left Updated: 07/14/23 0634     BCID, PCR Streptococcus spp, not A, B, or pneumoniae. Identification by BCID2 PCR.     BOTTLE TYPE Aerobic Bottle    Urine Culture - Urine, Straight Cath [[id number]]  (Abnormal)  (Susceptibility) Collected: 07/13/23 0944    Lab Status: Final result Specimen: Urine from Straight Cath Updated: 07/15/23 0358     Urine Culture >100,000 CFU/mL Klebsiella aerogenes    Narrative:      Colonization of the urinary tract without infection is common. Treatment is discouraged unless the patient is symptomatic, pregnant, or undergoing an invasive urologic procedure.    Susceptibility        Klebsiella aerogenes      ISELA      Cefazolin Resistant      Cefepime Susceptible      Ceftazidime Susceptible      Ceftriaxone Susceptible      Gentamicin Susceptible      Levofloxacin Susceptible      Nitrofurantoin Resistant      Piperacillin + Tazobactam Susceptible      Trimethoprim + Sulfamethoxazole Susceptible                           Blood Culture - Blood, Arm, Left [[id number]]  (Normal) Collected: 07/13/23 0852    Lab Status: Final result Specimen: Blood from Arm, Left Updated: 07/18/23 0915     Blood Culture No growth at 5 days    Narrative:      Less than seven (7) mL's of blood was collected.  Insufficient quantity may yield false negative results.            Medication Review:       Schedule Meds  apixaban, 2.5 mg, Oral, Q12H  atorvastatin, 20 mg, Oral, Nightly  cefTRIAXone, 2,000 mg, Intravenous, Q24H  ferric gluconate, 250 mg, Intravenous, Once  folic acid, 1 mg, Oral, Daily  levothyroxine, 50 mcg, Oral, Q AM  pantoprazole, 40 mg, Oral, Daily  predniSONE,  15 mg, Oral, Daily  senna-docusate sodium, 2 tablet, Oral, BID  sodium bicarbonate, 1,300 mg, Oral, TID  sodium chloride, 10 mL, Intravenous, Q12H  sodium chloride, 3 mL, Intravenous, Q12H  sodium phosphate, 15 mmol, Intravenous, Once        Infusion Meds  dilTIAZem, 5-15 mg/hr, Last Rate: 15 mg/hr (07/20/23 0915)        PRN Meds    acetaminophen    senna-docusate sodium **AND** polyethylene glycol **AND** bisacodyl **AND** bisacodyl    Calcium Replacement - Follow Nurse / BPA Driven Protocol    Magnesium Standard Dose Replacement - Follow Nurse / BPA Driven Protocol    ondansetron    Phosphorus Replacement - Follow Nurse / BPA Driven Protocol    Potassium Replacement - Follow Nurse / BPA Driven Protocol    [COMPLETED] Insert Peripheral IV **AND** sodium chloride    sodium chloride    sodium chloride    sodium chloride    sodium chloride    traZODone        Assessment & Plan       Antimicrobial Therapy   1.  IV Rocephin        2.        3.        4.        5.          Assessment     Positive blood culture in 1 out of 2 sets on admission for Streptococcus species.  Patient had no endovascular device or prior cardiac valve replacement.  We are suspecting contamination.     Bacteriuria.  Urine cultures are growing Klebsiella aerogenes.  urinalysis was not significantly abnormal.     Probable right medial knee abscess.  MRI shows a fluid collection in the subcutaneous tissues along the medial femoral condyle but no findings to suggest a joint infection  -Aspiration of right knee by IR on 7/18/2023-cultures are growing Staphylococcus aureus     Recent work-up for sarcoidosis by an outpatient rheumatologist.  Patient was taken off methotrexate on 7/3/2023.  Still on prednisone.  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  Cardiology following     Plan     Continue IV Rocephin for now-patient is tolerating without issue although she has a reported allergy to cefdinir  Waiting on right knee fluid culture to finalized  Continue supportive care  A.m. labs  Case discussed with patient and family member at bedside      Gayathri Briggs, JOE  07/20/23  16:04 EDT    Note is dictated utilizing voice recognition software/Dragon

## 2023-07-20 NOTE — PLAN OF CARE
Problem: Adult Inpatient Plan of Care  Goal: Absence of Hospital-Acquired Illness or Injury  Intervention: Identify and Manage Fall Risk  Recent Flowsheet Documentation  Taken 7/20/2023 0225 by Germain Manriquez RN  Safety Promotion/Fall Prevention:   safety round/check completed   room organization consistent   nonskid shoes/slippers when out of bed   lighting adjusted   clutter free environment maintained   assistive device/personal items within reach  Taken 7/20/2023 0010 by Germain Manriquez RN  Safety Promotion/Fall Prevention:   safety round/check completed   room organization consistent   nonskid shoes/slippers when out of bed   mobility aid in reach   lighting adjusted   clutter free environment maintained   assistive device/personal items within reach  Taken 7/19/2023 2045 by Germain Manriquez RN  Safety Promotion/Fall Prevention:   safety round/check completed   room organization consistent   nonskid shoes/slippers when out of bed   lighting adjusted   clutter free environment maintained   assistive device/personal items within reach  Intervention: Prevent Skin Injury  Recent Flowsheet Documentation  Taken 7/19/2023 2045 by Germain Manriquez RN  Body Position:   supine   30 degrees  Intervention: Prevent and Manage VTE (Venous Thromboembolism) Risk  Recent Flowsheet Documentation  Taken 7/20/2023 0010 by Germain Manriquez RN  Activity Management: bedrest  Taken 7/19/2023 2045 by Germain Manriquez RN  Activity Management: activity encouraged  VTE Prevention/Management: sequential compression devices off  Intervention: Prevent Infection  Recent Flowsheet Documentation  Taken 7/20/2023 0225 by Germain Manriquez RN  Infection Prevention:   visitors restricted/screened   single patient room provided   rest/sleep promoted   environmental surveillance performed   hand hygiene promoted  Taken 7/20/2023 0010 by Germain Manriquez RN  Infection Prevention:   visitors restricted/screened   single patient room provided   rest/sleep promoted   environmental  surveillance performed  Taken 7/19/2023 2045 by Germain Manriquez RN  Infection Prevention:   visitors restricted/screened   single patient room provided   rest/sleep promoted   environmental surveillance performed  Goal: Optimal Comfort and Wellbeing  Intervention: Monitor Pain and Promote Comfort  Recent Flowsheet Documentation  Taken 7/20/2023 0010 by Germain Manriquez RN  Pain Management Interventions: position adjusted  Taken 7/19/2023 2045 by Germain Manriquez RN  Pain Management Interventions: see MAR  Intervention: Provide Person-Centered Care  Recent Flowsheet Documentation  Taken 7/19/2023 2045 by Germain Manriquez RN  Trust Relationship/Rapport:   care explained   choices provided   questions encouraged   questions answered     Problem: Skin Injury Risk Increased  Goal: Skin Health and Integrity  Intervention: Optimize Skin Protection  Recent Flowsheet Documentation  Taken 7/19/2023 2045 by Germain Manriquez RN  Head of Bed (HOB) Positioning: HOB at 30-45 degrees     Problem: Fall Injury Risk  Goal: Absence of Fall and Fall-Related Injury  Intervention: Identify and Manage Contributors  Recent Flowsheet Documentation  Taken 7/20/2023 0225 by Germain Manriquez RN  Medication Review/Management:   medications reviewed   dosing adjusted   provider consulted  Taken 7/20/2023 0010 by Germain Manriquez RN  Medication Review/Management: medications reviewed  Taken 7/19/2023 2045 by Germain Manriquez RN  Medication Review/Management:   medications reviewed   dosing adjusted  Intervention: Promote Injury-Free Environment  Recent Flowsheet Documentation  Taken 7/20/2023 0225 by Germain Manriquez RN  Safety Promotion/Fall Prevention:   safety round/check completed   room organization consistent   nonskid shoes/slippers when out of bed   lighting adjusted   clutter free environment maintained   assistive device/personal items within reach  Taken 7/20/2023 0010 by Germain Manriquez RN  Safety Promotion/Fall Prevention:   safety round/check completed   room organization  consistent   nonskid shoes/slippers when out of bed   mobility aid in reach   lighting adjusted   clutter free environment maintained   assistive device/personal items within reach  Taken 7/19/2023 2045 by Germain Manriquez RN  Safety Promotion/Fall Prevention:   safety round/check completed   room organization consistent   nonskid shoes/slippers when out of bed   lighting adjusted   clutter free environment maintained   assistive device/personal items within reach   Goal Outcome Evaluation:      Patient alert and oriented x4, O2 bumped to 3LMP via NC. With NGT secured with nasal septal septal tie. Tolerating tube feeds. Takes crushed medication mixed with pudding with some c/o pain and difficulty swallowing. For 24hours urine collection. With adam cath attached to urine bag. Continent of bowel. Able to verbalized feelings and wants. Having hard time sleeping. EKG done and noted Afib. Referred to on-call NP and was advised to give 2.5mg of Cardizem. No relief noted after 30 mins and was advised to give 2nd dose of Cardizem at 5mg with just slight improvement HR. NP now order to give lopressor of 2.5mg.   HR drops to 110's and /57. Still on continuous monitoring.

## 2023-07-20 NOTE — SIGNIFICANT NOTE
"   07/20/23 1425   OTHER   Discipline physical therapist   Rehab Time/Intention   Session Not Performed patient/family declined treatment  (Patient consistently reporting \"I want to die\" and does not want to participate in PT services despite encouragement and multiple attempts. Will follow up 7/21 and if patient is consistent that mobility is not in her goals of care, may complete orders.)   Recommendation   PT - Next Appointment 07/21/23       "

## 2023-07-20 NOTE — NURSING NOTE
Transported to  via stretcher, belonging and spouse brought to new room as well. Bedside report given. Patient was transported on 4 LPM via NC and tolerated well. N/G tube in place and tube feed running. Upon getting to new room and assessing patient with new nurse it was noted that patient had pulled out N/G tube. Attempted to reinsert N/G unsuccessfully. Patient positioned in more upright position, O2 therapy increased to 5 LPM via NC. Oral suction set up, patient oriented to call bell. Made sure receiving nurse had no questions. Returned to home unit.

## 2023-07-20 NOTE — CONSULTS
Palliative Care Consultation    Patient Name: Rosario Ortez  : 1950  MRN: [id number]  Allergies: Codeine, Topiramate, Tramadol, Cefdinir, Levofloxacin, and Lisinopril    Requesting clinician:  Candido  Reason for consult: Consultation for clarification of goals of care and code status.      Patient Code Status:   Code Status and Medical Interventions:   Ordered at: 23 1253     Level Of Support Discussed With:    Patient    Health Care Surrogate     Code Status (Patient has no pulse and is not breathing):    CPR (Attempt to Resuscitate)     Medical Interventions (Patient has pulse or is breathing):    Full Support           Chief Complaint:    Abnormal labs    History of Present Illness    Rosario Ortez is a 73 y.o. female who presented to [facility] ED on  with reports of abnormal labs. She states she presented to her PCP on  and had labwork drawn. She received a call and was told to go to the ED due to worsening kidney function. She denied any nausea, vomiting, fevers, chills, or shortness of breath. Family reported poor PO intake and wound to her right leg.     In ED: Glucose 86, Creatinine 2.79, K 4.2, Na 135, Albumin 2.6, ALT 23, AST 40, Alk phos 174, WBC 12.6, Hgb 8.7, Platelets 254    UA with bacteria and WBC    Patient started on IV antibiotics and IV fluids. Cultures obtained. First set of cultures positive for strep. ID consulted for antibiotic management.     Nephrology consulted for JOSÉ. Wound following for cellulitis. Surgery consulted due to concern for knee abscess. Plans for aspiration of fluid by IR.    GI consulted for anemia. Plans for EGD and dilation due to difficulty swallowing.    Hem onc consulted for labwork suggestive of possible multiple myeloma. Plans for biopsy.     Palliative consult for goals of care discussion in an acutely ill patient with possible new cancer.    VITAL SIGNS:   Temp:  [97.8 °F (36.6 °C)-98 °F (36.7 °C)] 98 °F (36.7 °C)  Heart Rate:  []  146  Resp:  [14-27] 27  BP: ()/(47-65) 120/65       PMH: Afib, CAD    Past Surgical History:   Procedure Laterality Date    BREAST BIOPSY      ENDOSCOPY N/A 7/18/2023    Procedure: ESOPHAGOGASTRODUODENOSCOPY with dobhoff placement and securement of feeding tube to duodenum using endoscopic clipping x 1;  Surgeon: Zeyad Flores MD;  Location: [facility] ENDOSCOPY;  Service: Gastroenterology;  Laterality: N/A;    HYSTERECTOMY      JOINT REPLACEMENT Right 2015       Family History   Problem Relation Age of Onset    Breast cancer Sister        Social History     Tobacco Use    Smoking status: Never     Passive exposure: Never    Smokeless tobacco: Never   Vaping Use    Vaping Use: Never used   Substance Use Topics    Alcohol use: Never    Drug use: Never           LABS:    Results from last 7 days   Lab Units 07/20/23  0729   WBC 10*3/mm3 21.10*   HEMOGLOBIN g/dL 10.5*   HEMATOCRIT % 33.6*   PLATELETS 10*3/mm3 260     Results from last 7 days   Lab Units 07/20/23  0729   SODIUM mmol/L 140   POTASSIUM mmol/L 4.1   CHLORIDE mmol/L 103   CO2 mmol/L 30.0*   BUN mg/dL 62*   CREATININE mg/dL 1.51*   GLUCOSE mg/dL 119*   CALCIUM mg/dL 8.7     Results from last 7 days   Lab Units 07/20/23  0729 07/19/23  0310   SODIUM mmol/L 140 141   POTASSIUM mmol/L 4.1 4.3   CHLORIDE mmol/L 103 108*   CO2 mmol/L 30.0* 23.0   BUN mg/dL 62* 60*   CREATININE mg/dL 1.51* 1.62*   CALCIUM mg/dL 8.7 8.4*   BILIRUBIN mg/dL  --  0.5   ALK PHOS U/L  --  204*   ALT (SGPT) U/L  --  17   AST (SGOT) U/L  --  47*   GLUCOSE mg/dL 119* 151*         IMAGING STUDIES:  XR Bone Survey Complete    Result Date: 7/18/2023  Impression: Compression fracture of T6, likely old. Other chronic findings as detailed. Electronically Signed: Juliane Bakre MD  7/18/2023 3:30 PM EDT  Workstation ID: [id number]    XR Chest 1 View    Result Date: 7/20/2023  Impression: New medial left basilar consolidation and new hazy and patchy airspace disease in both lungs. These  findings could relate to edema or pneumonia Electronically Signed: Nayan Juárez  7/20/2023 6:18 AM EDT  Workstation ID: [id number]    XR Abdomen KUB    Result Date: 7/18/2023  Impression: Antegrade oriented feeding tube tip within distal stomach. Electronically Signed: Pedro Garcia MD  7/18/2023 1:11 PM EDT  Workstation ID: [id number]    CT Bone marrow biopsy and aspiration    Result Date: 7/18/2023  1. Successful CT-guided bone marrow biopsy Electronically Signed: Cameron Boudreaux  7/18/2023 4:08 PM EDT  Workstation ID: [id number]    IR Inject/asp large joint or bursa    Result Date: 7/18/2023  1. Successful aspiration of right medial knee abscess yielding 8 mL of purulent material. Electronically Signed: Cameron Boudreaux  7/18/2023 4:41 PM EDT  Workstation ID: [id number]       I reviewed the patient's new clinical results including labs, imaging, and vitals.        Scheduled Meds:  atorvastatin, 20 mg, Oral, Nightly  cefTRIAXone, 2,000 mg, Intravenous, Q24H  ferric gluconate, 250 mg, Intravenous, Once  folic acid, 1 mg, Oral, Daily  levothyroxine, 50 mcg, Oral, Q AM  pantoprazole, 40 mg, Oral, Daily  predniSONE, 15 mg, Oral, Daily  senna-docusate sodium, 2 tablet, Oral, BID  sodium bicarbonate, 1,300 mg, Oral, TID  sodium chloride, 10 mL, Intravenous, Q12H  sodium chloride, 3 mL, Intravenous, Q12H      Continuous Infusions:  dilTIAZem, 5-15 mg/hr, Last Rate: 10 mg/hr (07/20/23 0842)  sodium bicarbonate drip (greater than 75 mEq/bag), 100 mEq, Last Rate: 100 mEq (07/18/23 1712)        I have reviewed patient's current medication list.     Review of Systems   Constitutional:  Positive for appetite change.   Respiratory:  Positive for shortness of breath.    Neurological:  Positive for weakness.   All other systems reviewed and are negative.      Physical Exam  Vitals and nursing note reviewed.   Constitutional:       Appearance: She is ill-appearing.   HENT:      Head: Normocephalic and atraumatic.      Nose: Nose normal.       Comments: Nasal canula, blood around nostril     Mouth/Throat:      Mouth: Mucous membranes are dry.      Pharynx: Oropharynx is clear.   Eyes:      Extraocular Movements: Extraocular movements intact.      Conjunctiva/sclera: Conjunctivae normal.      Pupils: Pupils are equal, round, and reactive to light.   Cardiovascular:      Rate and Rhythm: Normal rate.      Pulses: Normal pulses.   Pulmonary:      Effort: Pulmonary effort is normal.   Abdominal:      Palpations: Abdomen is soft.   Musculoskeletal:         General: Normal range of motion.      Cervical back: Normal range of motion.   Skin:     General: Skin is dry.      Coloration: Skin is pale.   Neurological:      General: No focal deficit present.      Mental Status: She is alert. Mental status is at baseline.      Comments: AO x 2           PROBLEM LIST:    JOSÉ (acute kidney injury)    Bacteremia due to Streptococcus    Cellulitis of right leg    Knee joint cyst, right    Bilateral leg edema    UTI due to Klebsiella species    Dysphagia    Feeding difficulties    Paroxysmal atrial fibrillation    Sarcoidosis    Metabolic acidosis          ASSESSMENT/PLAN:    JOSÉ: on CKD. Likely secondary to dehydration and poor intake. Started on gentle hydration. Nephrology consulted.    Bacteremia: 1 of 2 cultures positive for strep. ID consulted for antibiotic recommendations.     Anemia: with Hgb 7.7 on 7/16. GI consulted.No plans for intervention. Started on iron.     IgG Kappa Monoclonal Gammopathy: history per family. Hem onc consulted. Concern for myeloma.     UTI: per family by PCP. On IV antibiotics. UTI at admission on culture.    Cellulitis: to RLE. Concern for knee abscess. Ortho consulted. On antibiotics from PCP. Wound care following. General surgery consulted for aspiration of fluid around knee.     PAF/Hypthyroidism/HTN: chronic conditions per primary.     These illnesses and their management contribute to the need for a palliative consult and advanced  "care planning.      ADVANCED CARE PLANNIN/20 Met with patient and spouse at bedside this am. Patient is awake, and alert, able to tell me her name and where she is but stating that it is 1985. We discussed her current clinical status, concern for possible new cancer, and refusal of placement of DHT. Patient repeatedly stating during our visit \"please let me die. Just let me go. I do not want this anymore. This is hell.\"  became immediately defensive in conversation when I shared my role at the hospital and told me that \"All of her numbers on the monitor looked good, and that we dont even know if she has cancer.\" I clarified that this is correct, but at this point in time, her nutrition was also a concern. The patient is refusing to eat and is refusing having a DHT placed again. She tells me adamantly that she does not want a feeding tube. At this point of the conversation, both daughters walked into the room with the patients living will, that also validated the patient not wanting a feeding tube.  agitated in conversation telling me that she \"could get better, and go to rehab, and possibly get chemotherapy if she has cancer.\" I again clarified that we do not know if she has cancer, but if the patient is refusing to eat or have supplemental nutrition, recovery will be very difficult. Daughters shared that patient has been asking \"to die\" for months and that the patients spouse had been having difficulty honoring her decisions. We discussed code status and continuation of aggressive treatment vs hospice. When discussing CPR and intubation, patient continuously shaking her head and saying now. I asked her to tell me in her words what she would want and she states \"Let me die naturally. Do not put me on any machines.\"  states that he does not agree with this and that she is just \"giving up.\" I discussed hospice and daughters and patient agreeable to a referral for a discussion and EOS with " "hospice services. I stepped out of the room to allow family to discuss privately and to make a copy of the patients living will. Upon returning, spouse had left the room and daughter was present at bedside. Patient tells me that she \"Does not want her  to make decisions on her behalf, that he will not let me go.\" She states that she wants her daughters to be her medical decision makers. I again asked orientation questions and she told me her name and where she was appropriately but again told me that it was 1985. Because there is significant conflict in goals, I think it is appropriate to consult psychiatry to assess for patients decisional capacity before making changes to her plan of care. A hospice referral to [facility] was placed for an EOS, point of contact to be Marily at husbands request. This information was shared with nursing.       Advanced Directives: Patient has advance directive, copy requested  Health Care Directive on file: No  Health Care Surrogate:      Palliative Performance Scale Score:    Comments:           Decisional Capacity: questionable  Patient's understanding of illness: adequate  Patient goals of care:  Currently Full code, full intervention      Thank you for this consult and allowing us to participate in patient's plan of care. Palliative Care Team will continue to follow patient.       I spent 54 minutes reviewing providers documentation, medication records, assessing and examining patient, discussing with family, answering questions, providing guidance about a plan of care, and coordinating care with other healthcare members. More than 50% of time spent face to face discussing disease education, current clinical status, and medication management.     I spent an additional 29 minutes on advanced care planning, goals of care, and code status discussion.  I obtained consent for ACP discussion.     Kristal Pyle, APRN  7/20/2023  "

## 2023-07-20 NOTE — CONSULTS
Nutrition Services    Patient Name: Rosario Ortez  YOB: 1950  MRN: [id number]  Admission date: 7/13/2023        PPE Documentation        PPE Worn By Provider Gloves    PPE Worn By Patient  N/A     PROGRESS NOTE      Encounter Information: Progress note to monitor tube feeds. Per nursing documentation, patient previously tolerating tube feeds however patient pulled out feeding tube early this morning and unsuccessfully replaced by nursing. Of note, SLP upgraded patient's diet to pureed with thin liquids. No meals recorded at this time, will closely monitor. Boost Plus in place to promote PO intakes.        PO Diet: Diet: Regular/House Diet; Texture: Pureed (NDD 1); Fluid Consistency: Thin (IDDSI 0)   PO Supplements: Boost Plus TID (provides 1080 kcals, 42 g protein if consumed)   PO Intake:  No meals documented since PO diet upgraded.        Current nutrition support: Isosource 1.5 @ 55 mL/hr + 10 mL/hr water flush    Nutrition support review: No longer infusing as patient pulled out feeding tube. Per nursing documentation did reach goal rate of 55 mL/hr prior.        Labs (reviewed below): Elevated BUN/Cr->generally trending down   Hypophosphatemia-> not yet replaced, nephrology managing and replaced phos yesterday   BG appropriate        GI Function:  Last documented BM: 7/19         Nutrition Intervention Updates: Continue with oral diet as ordered, will monitor PO intakes for adequacy since diet upgraded to puree with thin liquids. Will continue with Boost TID but also add magic cups at lunch/dinner to provide additional oral calories/protein.      Results from last 7 days   Lab Units 07/20/23  0729 07/19/23  0310 07/18/23  0125 07/17/23  1114   SODIUM mmol/L 140 141 141 140   POTASSIUM mmol/L 4.1 4.3 3.4* 3.7   CHLORIDE mmol/L 103 108* 109* 110*   CO2 mmol/L 30.0* 23.0 21.0* 21.0*   BUN mg/dL 62* 60* 65* 75*   CREATININE mg/dL 1.51* 1.62* 1.72* 2.18*   CALCIUM mg/dL 8.7 8.4* 8.3* 8.5*    BILIRUBIN mg/dL  --  0.5  --  0.3   ALK PHOS U/L  --  204*  --  137*   ALT (SGPT) U/L  --  17  --  15   AST (SGOT) U/L  --  47*  --  26   GLUCOSE mg/dL 119* 151* 101* 101*       Results from last 7 days   Lab Units 07/20/23  0729 07/17/23  1859 07/17/23  1114   MAGNESIUM mg/dL  --   --  2.1   PHOSPHORUS mg/dL 1.9*   < >  --    HEMOGLOBIN g/dL 10.5*   < > 7.4*   HEMATOCRIT % 33.6*   < > 23.8*    < > = values in this interval not displayed.       COVID19   Date Value Ref Range Status   06/26/2023 Not Detected Not Detected - Ref. Range Final     Lab Results   Component Value Date    HGBA1C 5.9 (H) 10/16/2019       RD to follow up per protocol.    Electronically signed by:  Melany Bradford RD  07/20/23

## 2023-07-20 NOTE — PROGRESS NOTES
" LOS: 6 days   Patient Care Team:  Ian Cordero MD as PCP - General  Tony Parisi MD as Consulting Physician (Nephrology)  Rc Ayala MD as Consulting Physician (Hematology and Oncology)      Subjective \" I want to die\"    Interval History:     Subjective: She does not want to eat.  Denies current nausea.    ROS:   No chest pain, shortness of breath, or cough.      Medication Review:     Current Facility-Administered Medications:     acetaminophen (TYLENOL) tablet 650 mg, 650 mg, Oral, Q6H PRN, Abhinav Boyd MD, 650 mg at 07/19/23 0202    atorvastatin (LIPITOR) tablet 20 mg, 20 mg, Oral, Nightly, Abhinav Boyd MD, 20 mg at 07/19/23 2047    sennosides-docusate (PERICOLACE) 8.6-50 MG per tablet 2 tablet, 2 tablet, Oral, BID, 2 tablet at 07/19/23 2047 **AND** polyethylene glycol (MIRALAX) packet 17 g, 17 g, Oral, Daily PRN **AND** bisacodyl (DULCOLAX) EC tablet 5 mg, 5 mg, Oral, Daily PRN **AND** bisacodyl (DULCOLAX) suppository 10 mg, 10 mg, Rectal, Daily PRN, Abhinav Boyd MD    cefTRIAXone (ROCEPHIN) 2,000 mg in sodium chloride 0.9 % 100 mL IVPB, 2,000 mg, Intravenous, Q24H, Armando Adams MD, Last Rate: 200 mL/hr at 07/20/23 0927, 2,000 mg at 07/20/23 0927    dilTIAZem (CARDIZEM) 125 mg in 125 mL D5W infusion, 5-15 mg/hr, Intravenous, Titrated, Belen Valerio APRN, Last Rate: 15 mL/hr at 07/20/23 0915, 15 mg/hr at 07/20/23 0915    ferric gluconate (FERRLECIT) 250 MG in sodium chloride 0.9% 250 mL IVPB, 250 mg, Intravenous, Once, Raquel Guevara APRN    folic acid (FOLVITE) tablet 1 mg, 1 mg, Oral, Daily, Abhinav Boyd MD, 1 mg at 07/19/23 0947    levothyroxine (SYNTHROID, LEVOTHROID) tablet 50 mcg, 50 mcg, Oral, Q AM, Abhinav Boyd MD, 50 mcg at 07/19/23 0513    ondansetron (ZOFRAN) injection 4 mg, 4 mg, Intravenous, Q6H PRN, Abhinav Boyd MD    pantoprazole (PROTONIX) EC tablet 40 mg, 40 mg, Oral, Daily, Abhinav Boyd MD, 40 mg at 07/19/23 0947    Potassium Replacement - Follow Nurse " / BPA Driven Protocol, , Does not apply, PRN, Armando Adams MD    predniSONE (DELTASONE) tablet 15 mg, 15 mg, Oral, Daily, Abhinav Boyd MD, 15 mg at 07/19/23 0947    sodium bicarbonate 8.4 % 100 mEq in dextrose (D5W) 5 % 1,000 mL infusion (greater than 75 mEq), 100 mEq, Intravenous, Continuous, Sang Sanford MD, Last Rate: 75 mL/hr at 07/20/23 0927, 100 mEq at 07/20/23 0927    sodium bicarbonate tablet 1,300 mg, 1,300 mg, Oral, TID, Tony Parisi MD, 1,300 mg at 07/19/23 2245    [COMPLETED] Insert Peripheral IV, , , Once **AND** sodium chloride 0.9 % flush 10 mL, 10 mL, Intravenous, PRN, Rafael De Luna MD    sodium chloride 0.9 % flush 10 mL, 10 mL, Intravenous, Q12H, Abhinav Boyd MD, 10 mL at 07/19/23 2047    sodium chloride 0.9 % flush 10 mL, 10 mL, Intravenous, PRN, Abhinav Boyd MD    sodium chloride 0.9 % flush 3 mL, 3 mL, Intravenous, Q12H, Kacie White APRN, 3 mL at 07/19/23 2054    sodium chloride 0.9 % flush 3-10 mL, 3-10 mL, Intravenous, PRN, Kacie White APRN    sodium chloride 0.9 % infusion 40 mL, 40 mL, Intravenous, PRN, Abhinav Boyd MD, 100 mL at 07/18/23 1236    sodium chloride 0.9 % infusion 40 mL, 40 mL, Intravenous, PRN, Kacie White APRIVA    traZODone (DESYREL) tablet 100 mg, 100 mg, Oral, Nightly PRN, Abhinav Boyd MD, 100 mg at 07/19/23 2047      Objective chronically ill-appearing but no acute distress,  in room    Vital Signs  Vitals:    07/20/23 0900 07/20/23 0915 07/20/23 0930 07/20/23 1000   BP: 121/63  120/54 111/58   BP Location:       Patient Position:       Pulse: (!) 151 (!) 135 88 89   Resp:       Temp:       TempSrc:       SpO2: 93%  92% 90%   Weight:       Height:           Physical Exam:     General Appearance:    Awake and alert, in no acute distress although chronically ill-appearing   Head:    Normocephalic, without obvious abnormality   Eyes:          Conjunctivae normal, anicteric sclera   Throat:   No oral lesions, no thrush, oral mucosa  moist   Neck:    supple, no JVD   Lungs:     respirations regular, even and unlabored       Rectal:     Deferred   Extremities: Referral edema, no cyanosis   Skin: Scattered bruising no rash, no jaundice        Results Review:    CBC    Results from last 7 days   Lab Units 07/20/23  0729 07/19/23  0310 07/18/23  0125 07/17/23  1859 07/17/23  1114 07/16/23  0630 07/14/23  2234 07/14/23  0310   WBC 10*3/mm3 21.10* 16.30* 11.90*  --  13.10* 11.20* 9.80 9.80   HEMOGLOBIN g/dL 10.5* 9.4* 8.3* 9.1* 7.4* 7.7* 7.5* 7.2*   PLATELETS 10*3/mm3 260 243 215  --  238 219 206 179     CMP   Results from last 7 days   Lab Units 07/20/23  0729 07/19/23  0310 07/18/23  0125 07/17/23  1114 07/16/23  0152 07/14/23  2234 07/14/23  0309   SODIUM mmol/L 140 141 141 140 140 138 137   POTASSIUM mmol/L 4.1 4.3 3.4* 3.7 4.3 4.4 4.2   CHLORIDE mmol/L 103 108* 109* 110* 111* 109* 108*   CO2 mmol/L 30.0* 23.0 21.0* 21.0* 15.0* 17.0* 18.0*   BUN mg/dL 62* 60* 65* 75* 72* 75* 73*   CREATININE mg/dL 1.51* 1.62* 1.72* 2.18* 2.30* 2.69* 2.71*   GLUCOSE mg/dL 119* 151* 101* 101* 96 112* 92   ALBUMIN g/dL 2.1* 2.1* 2.1* 1.9*  --   --   --    BILIRUBIN mg/dL  --  0.5  --  0.3  --   --   --    ALK PHOS U/L  --  204*  --  137*  --   --   --    AST (SGOT) U/L  --  47*  --  26  --   --   --    ALT (SGPT) U/L  --  17  --  15  --   --   --    MAGNESIUM mg/dL  --   --   --  2.1  --   --   --    PHOSPHORUS mg/dL 1.9* 2.3* 3.1  --   --   --   --      Cr Clearance Estimated Creatinine Clearance: 35 mL/min (A) (by C-G formula based on SCr of 1.51 mg/dL (H)).  Coag   Results from last 7 days   Lab Units 07/20/23  0729 07/17/23  1114 07/14/23  2234   INR  1.15* 1.25* 1.28*   APTT seconds 31.2* 47.0* 33.1*     HbA1C   Lab Results   Component Value Date    HGBA1C 5.9 (H) 10/16/2019     Blood Glucose No results found for: POCGLU  Infection   Results from last 7 days   Lab Units 07/18/23  1559   BODYFLDCX  Heavy growth (4+) Staphylococcus aureus*     UA       Radiology(recent) XR Bone Survey Complete    Result Date: 7/18/2023  Impression: Compression fracture of T6, likely old. Other chronic findings as detailed. Electronically Signed: Juliane Baker MD  7/18/2023 3:30 PM EDT  Workstation ID: [id number]    XR Chest 1 View    Result Date: 7/20/2023  Impression: New medial left basilar consolidation and new hazy and patchy airspace disease in both lungs. These findings could relate to edema or pneumonia Electronically Signed: Nayan Juárez  7/20/2023 6:18 AM EDT  Workstation ID: [id number]    XR Abdomen KUB    Result Date: 7/18/2023  Impression: Antegrade oriented feeding tube tip within distal stomach. Electronically Signed: Pedro Garcia MD  7/18/2023 1:11 PM EDT  Workstation ID: [id number]    CT Bone marrow biopsy and aspiration    Result Date: 7/18/2023  1. Successful CT-guided bone marrow biopsy Electronically Signed: Cameron Boudreaux  7/18/2023 4:08 PM EDT  Workstation ID: [id number]    IR Inject/asp large joint or bursa    Result Date: 7/18/2023  1. Successful aspiration of right medial knee abscess yielding 8 mL of purulent material. Electronically Signed: Cameron Boudreaux  7/18/2023 4:41 PM EDT  Workstation ID: [id number]        Assessment & Plan   Dysphagia with feeding difficulties  Hemoccult positive stool/iron deficiency anemia  Recurrent ascites  Elevated liver enzymes  JOSÉ/CKD  Possible sarcoidosis  A-fib on Eliquis  Bacteriuria  History of cholecystectomy  Hypothyroidism     Plan:  Patient is well-known to Dr. Flores as an outpatient with elevated LFTs, iron deficiency anemia and recurrent ascites with concern for possible sarcoidosis.  She is pending evaluation at [facility]. Now with JOSÉ on CKD with Afib requiring IV Cardizem overnight.  She pulled out dobhoff but was tolerating gastric feeds.  Speech cleared her for puréed diet but she is refusing oral nutrition.  Not a candidate for G-tube secondary to ascites.  EGD without evidence of esophageal stricture.   "Patient does not want Dobbhoff and would like to \"die\" but  would like to continue to pursue aggressive therapy.  If patient Clines replacement of Dobbhoff, would recommend psychiatry consult to determine her decision-making capabilities.  She remains on low-dose prednisone for suspected sarcoidosis.  Hematology is following with bone marrow biopsies results pending.  Discussed in detail with bedside RN this morning on rounds.  We will follow.    Electronically signed by JOE Teran, 07/20/23, 10:22 AM EDT.          "

## 2023-07-20 NOTE — PLAN OF CARE
Goal Outcome Evaluation:        Problem: Adult Inpatient Plan of Care  Goal: Optimal Comfort and Wellbeing  Outcome: Ongoing, Not Progressing  Intervention: Monitor Pain and Promote Comfort  Recent Flowsheet Documentation  Taken 7/20/2023 0800 by Delma Cordero RN  Pain Management Interventions:   position adjusted   pillow support provided  Intervention: Provide Person-Centered Care  Recent Flowsheet Documentation  Taken 7/20/2023 1200 by Delma Cordero, RN  Trust Relationship/Rapport:   choices provided   care explained   emotional support provided   questions answered  Goal: Readiness for Transition of Care  Outcome: Ongoing, Not Progressing     Problem: Malnutrition  Goal: Improved Nutritional Intake  Outcome: Ongoing, Not Progressing

## 2023-07-20 NOTE — CONSULTS
Referring Provider: Elias Rey MD    Reason for Consultation: Atrial fibrillation with rapid ventricular response      Patient Care Team:  Ian Cordero MD as PCP - General  Tony Parisi MD as Consulting Physician (Nephrology)  Rc Ayala MD as Consulting Physician (Hematology and Oncology)  Nicola Thurston MD as Consulting Physician (Cardiology)      SUBJECTIVE     Chief Complaint: Acute kidney injury    History of present illness:  Rosario Ortez is a 73 y.o. female with paroxysmal atrial fibrillation, hypertension, hyperlipidemia, GERD, hypothyroidism and possible sarcoidosis who presented to the hospital due to acute kidney injury.  She has been diagnosed with possible UTI and started on antibiotics with IV fluids.  Nephrology and infectious disease teams have been on board.  Patient was noted to have positive blood cultures which was thought to be a contaminant.  She was noted to have a right leg cellulitis and probable right knee abscess.  General surgery and interventional radiology were consulted for possible drainage of fluid collection.    She has known atrial fibrillation and chronically on Eliquis which was stopped in anticipation of procedures.  On 7/16/2020 3 GI team was consulted for dysphagia and patient was diagnosed with gastritis with granulomatous inflammation.  She was referred to [facility] but has not made it to the appointment yet.  On 7/18/2023 heme-onc was consulted for incidental finding of IgG kappa monoclonal gammopathy.  On 7/20/2023 cardiology was consulted for atrial fibrillation with rapid ventricular response.  Patient was started on Cardizem drip after a bolus.  During my assessment the patient has converted into sinus rhythm and is currently refusing all oral medications.  She appears depressed and currently having a conversation with palliative care team with  and daughters at bedside.      Review of systems:  Poor appetite.  Denies chest  pain or shortness of breath.  Denies palpitations       Personal History:      Past Medical History:   Diagnosis Date    A-fib     Abnormal results of liver function studies 07/20/2023    AI (aortic incompetence) 07/20/2023    Description: mild per 5/11 ECHO    Allergic rhinitis 07/20/2023    Anorexia 07/20/2023    Ascites 07/20/2023    Avitaminosis D 07/20/2023    Benign neoplasm of ascending colon 10/06/2014    Blood glucose elevated 07/20/2023    Constipation 07/20/2023    Coronary artery disease     Diastolic dysfunction 07/20/2023    Diverticulosis of large intestine without perforation or abscess without bleeding 01/20/2023    Endometriosis 07/20/2023    Essential hypertension 04/29/2021    Gastroesophageal reflux disease 07/20/2023    Hemorrhage, postmenopausal 07/20/2023    Hyperlipidemia 07/20/2023    Hypothyroidism 07/20/2023    Lumbar radiculopathy 07/20/2023    Melena 01/20/2023    Osteoarthritis of right hip 04/29/2021    Paroxysmal atrial fibrillation 07/17/2023    Peripheral neuropathy 04/25/2012    Personal history of colonic polyps 10/06/2014    ME (pulmonary regurgitation) 07/20/2023    Description: Trace ME per 5/11 ECHO    Primary hypertriglyceridemia 06/14/2020    Sarcoidosis 07/17/2023    Suspected    Second degree hemorrhoids 08/20/2020    Tremor 07/20/2023    Vitamin B12 deficiency 02/19/2014       Past Surgical History:   Procedure Laterality Date    BREAST BIOPSY      ENDOSCOPY N/A 7/18/2023    Procedure: ESOPHAGOGASTRODUODENOSCOPY with dobhoff placement and securement of feeding tube to duodenum using endoscopic clipping x 1;  Surgeon: Zeyad Flores MD;  Location: [facility] ENDOSCOPY;  Service: Gastroenterology;  Laterality: N/A;    HYSTERECTOMY      JOINT REPLACEMENT Right 2015       Family History   Problem Relation Age of Onset    Breast cancer Sister        Social History     Tobacco Use    Smoking status: Never     Passive exposure: Never    Smokeless tobacco: Never   Vaping  Use    Vaping Use: Never used   Substance Use Topics    Alcohol use: Never    Drug use: Never        Medications Prior to Admission   Medication Sig Dispense Refill Last Dose    apixaban (ELIQUIS) 2.5 MG tablet tablet Take 1 tablet by mouth 2 (Two) Times a Day.       atorvastatin (LIPITOR) 20 MG tablet Take 1 tablet by mouth Daily.       doxycycline (VIBRAMYCIN) 100 MG capsule Take 1 capsule by mouth 2 (Two) Times a Day.       ferrous sulfate 325 (65 FE) MG tablet Take 1 tablet by mouth Daily With Breakfast.       folic acid (FOLVITE) 1 MG tablet Take 1 tablet by mouth Daily.       furosemide (LASIX) 20 MG tablet Take 1 tablet by mouth Daily for 30 days. 30 tablet 0     hydroCHLOROthiazide (HYDRODIURIL) 25 MG tablet Take 1 tablet by mouth Daily.       irbesartan (AVAPRO) 75 MG tablet Take 1 tablet by mouth Every Night. 30 tablet 3     levothyroxine (SYNTHROID, LEVOTHROID) 50 MCG tablet 1 tablet Every Morning.       pantoprazole (PROTONIX) 40 MG EC tablet Take 1 tablet by mouth Daily.       predniSONE (DELTASONE) 5 MG tablet Take 3 tablets by mouth Daily.       traZODone (DESYREL) 50 MG tablet Take 2 tablets by mouth At Night As Needed for Sleep.           Allergies:     Codeine, Topiramate, Tramadol, Cefdinir, Levofloxacin, and Lisinopril    Scheduled Meds:atorvastatin, 20 mg, Oral, Nightly  cefTRIAXone, 2,000 mg, Intravenous, Q24H  ferric gluconate, 250 mg, Intravenous, Once  folic acid, 1 mg, Oral, Daily  levothyroxine, 50 mcg, Oral, Q AM  pantoprazole, 40 mg, Oral, Daily  predniSONE, 15 mg, Oral, Daily  senna-docusate sodium, 2 tablet, Oral, BID  sodium bicarbonate, 1,300 mg, Oral, TID  sodium chloride, 10 mL, Intravenous, Q12H  sodium chloride, 3 mL, Intravenous, Q12H      Continuous Infusions:dilTIAZem, 5-15 mg/hr, Last Rate: 15 mg/hr (07/20/23 0915)      PRN Meds:  acetaminophen    senna-docusate sodium **AND** polyethylene glycol **AND** bisacodyl **AND** bisacodyl    Calcium Replacement - Follow Nurse / BPA  "Driven Protocol    Magnesium Standard Dose Replacement - Follow Nurse / BPA Driven Protocol    ondansetron    Phosphorus Replacement - Follow Nurse / BPA Driven Protocol    Potassium Replacement - Follow Nurse / BPA Driven Protocol    [COMPLETED] Insert Peripheral IV **AND** sodium chloride    sodium chloride    sodium chloride    sodium chloride    sodium chloride    traZODone      OBJECTIVE    Vital Signs  Vitals:    07/20/23 0900 07/20/23 0915 07/20/23 0930 07/20/23 1000   BP: 121/63  120/54 111/58   BP Location:    Right arm   Patient Position:    Lying   Pulse: (!) 151 (!) 135 88 89   Resp:    22   Temp:    98.6 °F (37 °C)   TempSrc:    Oral   SpO2: 93%  92% 90%   Weight:       Height:           Flowsheet Rows      Flowsheet Row First Filed Value   Admission Height 167.6 cm (66\") Documented at 07/13/2023 0814   Admission Weight 57.2 kg (126 lb 1.7 oz) Documented at 07/13/2023 0814              Intake/Output Summary (Last 24 hours) at 7/20/2023 1450  Last data filed at 7/20/2023 1000  Gross per 24 hour   Intake 7025 ml   Output 200 ml   Net 6825 ml        Telemetry: Sinus rhythm, previously atrial fibrillation    Physical Exam:  The patient is alert, oriented and in no distress.  Appears chronically ill, depressed and does not talk much.  Vital signs as noted above.  Head and neck revealed no carotid bruits or jugular venous distention.  No thyromegaly or lymph adenopathy is present  Lungs clear.  No wheezing.  Breath sounds are normal bilaterally.  Heart normal first and second heart sounds. No murmur.  No precordial rub is present.  No gallop is present.  Abdomen soft and nontender.  No organomegaly is present.  Extremities with good peripheral pulses without any pedal edema.  Skin warm and dry.  Musculoskeletal system is grossly normal.  CNS grossly normal.       Results Review:  I have personally reviewed the results from the time of this admission to 7/20/2023 14:50 EDT and agree with these findings:  [x]  " Laboratory  [x]  Microbiology  [x]  Radiology  [x]  EKG/Telemetry   [x]  Cardiology/Vascular   []  Pathology  [x]  Old records  []  Other:    Most notable findings include:     Lab Results (last 24 hours)       Procedure Component Value Units Date/Time    Body Fluid Culture - Body Fluid, Knee, Right [[id number]]  (Abnormal) Collected: 07/18/23 1559    Specimen: Body Fluid from Knee, Right Updated: 07/20/23 0902     Body Fluid Culture Heavy growth (4+) Staphylococcus aureus     Gram Stain Many (4+) WBCs per low power field      Many (4+) Gram positive cocci in pairs    Manual Differential [[id number]]  (Abnormal) Collected: 07/20/23 0729    Specimen: Blood Updated: 07/20/23 0858     Neutrophil % 65.0 %      Lymphocyte % 5.0 %      Monocyte % 12.0 %      Bands %  12.0 %      Metamyelocyte % 5.0 %      Atypical Lymphocyte % 1.0 %      Neutrophils Absolute 16.25 10*3/mm3      Lymphocytes Absolute 1.27 10*3/mm3      Monocytes Absolute 2.53 10*3/mm3      Anisocytosis Slight/1+     Poikilocytes Slight/1+     Polychromasia Slight/1+     Spherocytes Slight/1+     WBC Morphology Normal     Platelet Morphology Normal    CBC & Differential [[id number]]  (Abnormal) Collected: 07/20/23 0729    Specimen: Blood Updated: 07/20/23 0858    Narrative:      The following orders were created for panel order CBC & Differential.  Procedure                               Abnormality         Status                     ---------                               -----------         ------                     CBC Auto Differential[[id number]]        Abnormal            Final result               Scan Slide[[id number]]                                       Final result                 Please view results for these tests on the individual orders.    Scan Slide [[id number]] Collected: 07/20/23 0729    Specimen: Blood Updated: 07/20/23 0858     Scan Slide --     Comment: See Manual Differential Results       CBC Auto Differential [[id number]]  (Abnormal)  Collected: 07/20/23 0729    Specimen: Blood Updated: 07/20/23 0858     WBC 21.10 10*3/mm3      RBC 3.60 10*6/mm3      Hemoglobin 10.5 g/dL      Hematocrit 33.6 %      MCV 93.2 fL      MCH 29.1 pg      MCHC 31.3 g/dL      RDW 22.6 %      RDW-SD 73.1 fl      MPV 7.5 fL      Platelets 260 10*3/mm3     Narrative:      The previously reported component NRBC is no longer being reported. Previous result was 0.0 /100 WBC (Reference Range: 0.0-0.2 /100 WBC) on 7/20/2023 at 0815 EDT.    Renal Function Panel [[id number]]  (Abnormal) Collected: 07/20/23 0729    Specimen: Blood Updated: 07/20/23 0841     Glucose 119 mg/dL      BUN 62 mg/dL      Creatinine 1.51 mg/dL      Sodium 140 mmol/L      Potassium 4.1 mmol/L      Chloride 103 mmol/L      CO2 30.0 mmol/L      Calcium 8.7 mg/dL      Albumin 2.1 g/dL      Phosphorus 1.9 mg/dL      Anion Gap 7.0 mmol/L      BUN/Creatinine Ratio 41.1     eGFR 35.9 mL/min/1.73     Narrative:      GFR Normal >60  Chronic Kidney Disease <60  Kidney Failure <15    The GFR formula is only valid for adults with stable renal function between ages 18 and 70.    TSH [[id number]]  (Normal) Collected: 07/20/23 0729    Specimen: Blood Updated: 07/20/23 0839     TSH 2.270 uIU/mL     Protime-INR [[id number]]  (Abnormal) Collected: 07/20/23 0729    Specimen: Blood Updated: 07/20/23 0827     Protime 12.2 Seconds      INR 1.15    aPTT [[id number]]  (Abnormal) Collected: 07/20/23 0729    Specimen: Blood Updated: 07/20/23 0827     PTT 31.2 seconds             Imaging Results (Last 24 Hours)       Procedure Component Value Units Date/Time    XR Chest 1 View [[id number]] Collected: 07/20/23 0615     Updated: 07/20/23 0620    Narrative:      XR CHEST 1 VW    Date of Exam: 7/20/2023 5:45 AM EDT    Indication: possible aspiration    Comparison: 7/8/2023.    Findings:  Lung volumes are diminished. There are new hazy densities present throughout both lungs. There is new medial left basilar consolidation. There is patchy  bilateral airspace disease. There are streaky right perihilar opacities. Heart size is likely within   normal limits. No pneumothorax or definite pleural effusion. Pulmonary vasculature is obscured.      Impression:      Impression:  New medial left basilar consolidation and new hazy and patchy airspace disease in both lungs. These findings could relate to edema or pneumonia    Electronically Signed: Nayan Marquita    7/20/2023 6:18 AM EDT    Workstation ID: [id number]            LAB RESULTS (LAST 7 DAYS)    CBC  Results from last 7 days   Lab Units 07/20/23  0729 07/19/23  0310 07/18/23  0125 07/17/23  1859 07/17/23  1114 07/16/23  0630 07/14/23  2234 07/14/23  0310   WBC 10*3/mm3 21.10* 16.30* 11.90*  --  13.10* 11.20* 9.80 9.80   RBC 10*6/mm3 3.60* 3.15* 2.87*  --  2.37* 2.47* 2.40* 2.28*   HEMOGLOBIN g/dL 10.5* 9.4* 8.3* 9.1* 7.4* 7.7* 7.5* 7.2*   HEMATOCRIT % 33.6* 29.3* 26.1* 28.3* 23.8* 24.1* 23.5* 22.0*   MCV fL 93.2 93.0 90.8  --  100.4* 97.5* 97.8* 96.5   PLATELETS 10*3/mm3 260 243 215  --  238 219 206 179       BMP  Results from last 7 days   Lab Units 07/20/23  0729 07/19/23  0310 07/18/23  0125 07/17/23  1114 07/16/23  0152 07/14/23  2234 07/14/23  0309   SODIUM mmol/L 140 141 141 140 140 138 137   POTASSIUM mmol/L 4.1 4.3 3.4* 3.7 4.3 4.4 4.2   CHLORIDE mmol/L 103 108* 109* 110* 111* 109* 108*   CO2 mmol/L 30.0* 23.0 21.0* 21.0* 15.0* 17.0* 18.0*   BUN mg/dL 62* 60* 65* 75* 72* 75* 73*   CREATININE mg/dL 1.51* 1.62* 1.72* 2.18* 2.30* 2.69* 2.71*   GLUCOSE mg/dL 119* 151* 101* 101* 96 112* 92   MAGNESIUM mg/dL  --   --   --  2.1  --   --   --    PHOSPHORUS mg/dL 1.9* 2.3* 3.1  --   --   --   --        CMP   Results from last 7 days   Lab Units 07/20/23  0729 07/19/23  0310 07/18/23  0125 07/17/23  1114 07/16/23  0152 07/14/23  2234 07/14/23  0309   SODIUM mmol/L 140 141 141 140 140 138 137   POTASSIUM mmol/L 4.1 4.3 3.4* 3.7 4.3 4.4 4.2   CHLORIDE mmol/L 103 108* 109* 110* 111* 109* 108*   CO2 mmol/L 30.0*  23.0 21.0* 21.0* 15.0* 17.0* 18.0*   BUN mg/dL 62* 60* 65* 75* 72* 75* 73*   CREATININE mg/dL 1.51* 1.62* 1.72* 2.18* 2.30* 2.69* 2.71*   GLUCOSE mg/dL 119* 151* 101* 101* 96 112* 92   ALBUMIN g/dL 2.1* 2.1* 2.1* 1.9*  --   --   --    BILIRUBIN mg/dL  --  0.5  --  0.3  --   --   --    ALK PHOS U/L  --  204*  --  137*  --   --   --    AST (SGOT) U/L  --  47*  --  26  --   --   --    ALT (SGPT) U/L  --  17  --  15  --   --   --        BNP        TROPONIN        CoAg  Results from last 7 days   Lab Units 07/20/23  0729 07/17/23  1114 07/14/23  2234   INR  1.15* 1.25* 1.28*   APTT seconds 31.2* 47.0* 33.1*       Creatinine Clearance  Estimated Creatinine Clearance: 35 mL/min (A) (by C-G formula based on SCr of 1.51 mg/dL (H)).    ABG          Radiology  XR Bone Survey Complete    Result Date: 7/18/2023  Impression: Compression fracture of T6, likely old. Other chronic findings as detailed. Electronically Signed: Juliane Baker MD  7/18/2023 3:30 PM EDT  Workstation ID: [id number]    XR Chest 1 View    Result Date: 7/20/2023  Impression: New medial left basilar consolidation and new hazy and patchy airspace disease in both lungs. These findings could relate to edema or pneumonia Electronically Signed: Nayan Juárez  7/20/2023 6:18 AM EDT  Workstation ID: [id number]    CT Bone marrow biopsy and aspiration    Result Date: 7/18/2023  1. Successful CT-guided bone marrow biopsy Electronically Signed: Cameron Boudreaux  7/18/2023 4:08 PM EDT  Workstation ID: [id number]    IR Inject/asp large joint or bursa    Result Date: 7/18/2023  1. Successful aspiration of right medial knee abscess yielding 8 mL of purulent material. Electronically Signed: Cameron Boudreaux  7/18/2023 4:41 PM EDT  Workstation ID: [id number]       EKG  I personally viewed and interpreted the patient's EKG/Telemetry data:  ECG 12 Lead Tachycardia   Preliminary Result   HEART RATE= 130  bpm   RR Interval= 463  ms   DC Interval=   ms   P Horizontal Axis=   deg   P Front Axis=    deg   QRSD Interval= 92  ms   QT Interval= 346  ms   QRS Axis= 123  deg   T Wave Axis= -28  deg   - ABNORMAL ECG -   Atrial fibrillation   Right axis deviation   Low voltage, precordial leads   Nonspecific repol abnormality, diffuse leads   Prolonged QT interval   When compared with ECG of 13-Jul-2023 23:05:47,   Significant axis, voltage or hypertrophy change   Electronically Signed By:    Date and Time of Study: 2023-07-20 00:08:40      ECG 12 Lead Rhythm Change   Final Result   HEART RATE= 111  bpm   RR Interval= 505  ms   CA Interval= Failed  ms   P Horizontal Axis= Invalid  deg   P Front Axis= Invalid  deg   QRSD Interval= 97  ms   QT Interval= 342  ms   QRS Axis= -24  deg   T Wave Axis= 174  deg   - ABNORMAL ECG -   Atrial flutter with predominant 2:1 AV block   Low voltage, precordial leads   Electronically Signed By: Rafael De Luna ([facility]) 14-Jul-2023 05:59:38   Date and Time of Study: 2023-07-13 23:05:47      ECG 12 Lead Tachycardia   Final Result   HEART RATE= 102  bpm   RR Interval= 586  ms   CA Interval=   ms   P Horizontal Axis=   deg   P Front Axis=   deg   QRSD Interval= 96  ms   QT Interval= 322  ms   QRS Axis= -21  deg   T Wave Axis= 92  deg   - ABNORMAL ECG -   Atrial fibrillation   Low voltage, precordial leads   When compared with ECG of 08-Jul-2023 13:28:01,   Significant repolarization change   Significant axis, voltage or hypertrophy change   Electronically Signed By: Perico Perez ([facility]) 13-Jul-2023 21:42:20   Date and Time of Study: 2023-07-13 09:05:43      SCANNED - TELEMETRY     Final Result      SCANNED - TELEMETRY     Final Result      SCANNED - TELEMETRY     Final Result      SCANNED - TELEMETRY     Final Result      SCANNED - TELEMETRY     Final Result      SCANNED - TELEMETRY     Final Result      SCANNED - TELEMETRY     Final Result      SCANNED - TELEMETRY     Final Result      SCANNED - TELEMETRY     Final Result      SCANNED - TELEMETRY     Final Result      SCANNED - TELEMETRY      Final Result      SCANNED - TELEMETRY     Final Result      SCANNED - TELEMETRY     Final Result      SCANNED - TELEMETRY     Final Result      SCANNED - TELEMETRY     Final Result      SCANNED - TELEMETRY     Final Result      SCANNED - TELEMETRY     Final Result      SCANNED - TELEMETRY     Final Result      SCANNED - TELEMETRY     Final Result      SCANNED - TELEMETRY     Final Result      SCANNED - TELEMETRY     Final Result      SCANNED - TELEMETRY     Final Result      SCANNED - TELEMETRY     Final Result      SCANNED - TELEMETRY     Final Result      SCANNED - TELEMETRY     Final Result            Echocardiogram:          Stress Test:  Results for orders placed during the hospital encounter of 06/26/23    Stress Test With Myocardial Perfusion One Day    Interpretation Summary  Indications  Syncope    This study was performed under the direct supervision of Kacie NAYLOR.    Resting ECG  Sinus rhythm    The patient was injected with Lexiscan intravenously while constantly monitoring electrocardiogram and vital signs.  Patient did not have any chest discomfort ST abnormalities or ectopy with injection of Lexiscan.    Cardiolite was used as an imaging agent.    Cardiolite images showed uniform distribution of radionuclide without any evidence for myocardial ischemia.    Gated SPECT images revealed normal left ventricle size and contractility with ejection fraction of 74%.    Impression  ========  Lexiscan Cardiolite test is negative for myocardial ischemia.    Gated SPECT images revealed normal left ventricular size and contractility with ejection fraction of 74%.        Cardiac Catheterization:  No results found for this or any previous visit.        Other:      ASSESSMENT & PLAN:    Active Problems:    JOSÉ (acute kidney injury)    Bacteremia due to Streptococcus    Cellulitis of right leg    Knee joint cyst, right    Bilateral leg edema    UTI due to Klebsiella species    Dysphagia    Feeding difficulties     Paroxysmal atrial fibrillation    Sarcoidosis    Metabolic acidosis    Gastroesophageal reflux disease    Essential hypertension    Hyperlipidemia    Hypothyroidism    JOSÉ (acute kidney injury)  Metabolic acidosis  Renal function has improved after IV fluids.  Nephrology team is following  Closely monitor BMPs    Bacteremia due to Streptococcus  Most likely contamination  Repeat blood cultures  ID is following    Cellulitis of right leg  Knee joint cyst, right  Fluid culture growing Staph aureus  Started on ceftriaxone  Continue to follow recommendations of ID    Bilateral leg edema  Give 1 dose of IV Lasix.  Monitor I's and O's and replace electrolytes as needed.  Recent echocardiogram showed preserved LV function, normal diastolic function, mild to moderate aortic stenosis, mild mitral regurgitation.  She also has moderate bilateral pleural effusions and small pericardial effusion along with a small amount of ascites    UTI due to Klebsiella species  Currently on ceftriaxone  ID is following    Dysphagia  Feeding difficulties  Followed by GI    Paroxysmal atrial fibrillation with RVR  [id number] score is 6.  Continue Cardizem drip.  Start p.o. medications once patient is able to tolerate.  Resume Eliquis when patient is able to tolerate oral medication.  Anticoagulation currently on hold    Possible Sarcoidosis  Remains on steroid, methotrexate has been discontinued  Plan to follow-up [facility]    Gastroesophageal reflux disease  Continue PPI    Essential hypertension, chronic  Blood pressure has been borderline low and oral antihypertensives are currently on hold  Closely monitor blood pressure while on Cardizem drip    Hyperlipidemia  Started high intensity statin  Check lipid panel    Hypothyroidism  TSH is 2.2  Continue Synthroid

## 2023-07-21 ENCOUNTER — INPATIENT HOSPITAL (AMBULATORY)
Dept: URBAN - METROPOLITAN AREA HOSPITAL 84 | Facility: HOSPITAL | Age: 73
End: 2023-07-21

## 2023-07-21 DIAGNOSIS — R19.5 OTHER FECAL ABNORMALITIES: ICD-10-CM

## 2023-07-21 DIAGNOSIS — R18.8 OTHER ASCITES: ICD-10-CM

## 2023-07-21 DIAGNOSIS — D50.0 IRON DEFICIENCY ANEMIA SECONDARY TO BLOOD LOSS (CHRONIC): ICD-10-CM

## 2023-07-21 DIAGNOSIS — R63.39 OTHER FEEDING DIFFICULTIES: ICD-10-CM

## 2023-07-21 DIAGNOSIS — R74.01 ELEVATION OF LEVELS OF LIVER TRANSAMINASE LEVELS: ICD-10-CM

## 2023-07-21 DIAGNOSIS — E44.0 MODERATE PROTEIN-CALORIE MALNUTRITION: ICD-10-CM

## 2023-07-21 DIAGNOSIS — R13.10 DYSPHAGIA, UNSPECIFIED: ICD-10-CM

## 2023-07-21 LAB
ALBUMIN SERPL-MCNC: 1.8 G/DL (ref 3.5–5.2)
ALBUMIN/GLOB SERPL: 0.5 G/DL
ALP SERPL-CCNC: 190 U/L (ref 39–117)
ALT SERPL W P-5'-P-CCNC: 13 U/L (ref 1–33)
ANION GAP SERPL CALCULATED.3IONS-SCNC: 8 MMOL/L (ref 5–15)
AST SERPL-CCNC: 43 U/L (ref 1–32)
BACTERIA FLD CULT: ABNORMAL
BILIRUB SERPL-MCNC: 0.5 MG/DL (ref 0–1.2)
BUN SERPL-MCNC: 65 MG/DL (ref 8–23)
BUN/CREAT SERPL: 39.2 (ref 7–25)
CALCIUM SPEC-SCNC: 8.7 MG/DL (ref 8.6–10.5)
CHLORIDE SERPL-SCNC: 103 MMOL/L (ref 98–107)
CHOLEST SERPL-MCNC: 91 MG/DL (ref 0–200)
CO2 SERPL-SCNC: 28 MMOL/L (ref 22–29)
COPPER SERPL-MCNC: 88 UG/DL (ref 80–158)
CREAT SERPL-MCNC: 1.66 MG/DL (ref 0.57–1)
DEPRECATED RDW RBC AUTO: 74.4 FL (ref 37–54)
EGFRCR SERPLBLD CKD-EPI 2021: 32 ML/MIN/1.73
ERYTHROCYTE [DISTWIDTH] IN BLOOD BY AUTOMATED COUNT: 22.3 % (ref 12.3–15.4)
GLOBULIN UR ELPH-MCNC: 3.3 GM/DL
GLUCOSE SERPL-MCNC: 95 MG/DL (ref 65–99)
GRAM STN SPEC: ABNORMAL
GRAM STN SPEC: ABNORMAL
HBA1C MFR BLD: 4.7 % (ref 4.8–5.6)
HCT VFR BLD AUTO: 28.6 % (ref 34–46.6)
HDLC SERPL-MCNC: 36 MG/DL (ref 40–60)
HGB BLD-MCNC: 8.9 G/DL (ref 12–15.9)
LARGE PLATELETS: ABNORMAL
LDLC SERPL CALC-MCNC: 41 MG/DL (ref 0–100)
LDLC/HDLC SERPL: 1.19 {RATIO}
LYMPHOCYTES # BLD MANUAL: 0.65 10*3/MM3 (ref 0.7–3.1)
LYMPHOCYTES NFR BLD MANUAL: 6 % (ref 5–12)
MAGNESIUM SERPL-MCNC: 2.2 MG/DL (ref 1.6–2.4)
MCH RBC QN AUTO: 28.7 PG (ref 26.6–33)
MCHC RBC AUTO-ENTMCNC: 31.2 G/DL (ref 31.5–35.7)
MCV RBC AUTO: 92 FL (ref 79–97)
METAMYELOCYTES NFR BLD MANUAL: 2 % (ref 0–0)
MONOCYTES # BLD: 1.31 10*3/MM3 (ref 0.1–0.9)
NEUTROPHILS # BLD AUTO: 19.4 10*3/MM3 (ref 1.7–7)
NEUTROPHILS NFR BLD MANUAL: 86 % (ref 42.7–76)
NEUTS BAND NFR BLD MANUAL: 3 % (ref 0–5)
PHOSPHATE SERPL-MCNC: 4.2 MG/DL (ref 2.5–4.5)
PLATELET # BLD AUTO: 216 10*3/MM3 (ref 140–450)
PMV BLD AUTO: 7.4 FL (ref 6–12)
POTASSIUM SERPL-SCNC: 4.4 MMOL/L (ref 3.5–5.2)
PROT SERPL-MCNC: 5.1 G/DL (ref 6–8.5)
RBC # BLD AUTO: 3.11 10*6/MM3 (ref 3.77–5.28)
RBC MORPH BLD: NORMAL
REF LAB TEST METHOD: NORMAL
SCAN SLIDE: NORMAL
SMALL PLATELETS BLD QL SMEAR: ADEQUATE
SODIUM SERPL-SCNC: 139 MMOL/L (ref 136–145)
TRIGL SERPL-MCNC: 61 MG/DL (ref 0–150)
VARIANT LYMPHS NFR BLD MANUAL: 3 % (ref 19.6–45.3)
VLDLC SERPL-MCNC: 14 MG/DL (ref 5–40)
WBC MORPH BLD: NORMAL
WBC NRBC COR # BLD: 21.8 10*3/MM3 (ref 3.4–10.8)

## 2023-07-21 PROCEDURE — 25010000002 FUROSEMIDE PER 20 MG: Performed by: INTERNAL MEDICINE

## 2023-07-21 PROCEDURE — 97530 THERAPEUTIC ACTIVITIES: CPT

## 2023-07-21 PROCEDURE — 99231 SBSQ HOSP IP/OBS SF/LOW 25: CPT

## 2023-07-21 PROCEDURE — 25010000002 CEFTRIAXONE PER 250 MG: Performed by: INTERNAL MEDICINE

## 2023-07-21 PROCEDURE — 83036 HEMOGLOBIN GLYCOSYLATED A1C: CPT | Performed by: NURSE PRACTITIONER

## 2023-07-21 PROCEDURE — 80061 LIPID PANEL: CPT | Performed by: NURSE PRACTITIONER

## 2023-07-21 PROCEDURE — 85025 COMPLETE CBC W/AUTO DIFF WBC: CPT | Performed by: FAMILY MEDICINE

## 2023-07-21 PROCEDURE — 97110 THERAPEUTIC EXERCISES: CPT

## 2023-07-21 PROCEDURE — 80053 COMPREHEN METABOLIC PANEL: CPT | Performed by: FAMILY MEDICINE

## 2023-07-21 PROCEDURE — 80053 COMPREHEN METABOLIC PANEL: CPT | Performed by: NURSE PRACTITIONER

## 2023-07-21 PROCEDURE — 85007 BL SMEAR W/DIFF WBC COUNT: CPT | Performed by: FAMILY MEDICINE

## 2023-07-21 PROCEDURE — 92526 ORAL FUNCTION THERAPY: CPT

## 2023-07-21 PROCEDURE — 99233 SBSQ HOSP IP/OBS HIGH 50: CPT | Performed by: INTERNAL MEDICINE

## 2023-07-21 PROCEDURE — 97535 SELF CARE MNGMENT TRAINING: CPT

## 2023-07-21 PROCEDURE — 84100 ASSAY OF PHOSPHORUS: CPT | Performed by: FAMILY MEDICINE

## 2023-07-21 PROCEDURE — 63710000001 PREDNISONE PER 5 MG: Performed by: INTERNAL MEDICINE

## 2023-07-21 PROCEDURE — 99232 SBSQ HOSP IP/OBS MODERATE 35: CPT | Performed by: NURSE PRACTITIONER

## 2023-07-21 PROCEDURE — 83735 ASSAY OF MAGNESIUM: CPT | Performed by: NURSE PRACTITIONER

## 2023-07-21 PROCEDURE — 84100 ASSAY OF PHOSPHORUS: CPT | Performed by: INTERNAL MEDICINE

## 2023-07-21 RX ORDER — MIDODRINE HYDROCHLORIDE 2.5 MG/1
2.5 TABLET ORAL
Status: DISCONTINUED | OUTPATIENT
Start: 2023-07-21 | End: 2023-07-22 | Stop reason: HOSPADM

## 2023-07-21 RX ORDER — MIDODRINE HYDROCHLORIDE 2.5 MG/1
2.5 TABLET ORAL
Qty: 90 TABLET | Refills: 0 | Status: SHIPPED | OUTPATIENT
Start: 2023-07-21

## 2023-07-21 RX ORDER — FUROSEMIDE 10 MG/ML
40 INJECTION INTRAMUSCULAR; INTRAVENOUS ONCE
Status: COMPLETED | OUTPATIENT
Start: 2023-07-21 | End: 2023-07-21

## 2023-07-21 RX ORDER — AMOXICILLIN 250 MG
2 CAPSULE ORAL 2 TIMES DAILY
Qty: 30 TABLET | Refills: 0 | Status: SHIPPED | OUTPATIENT
Start: 2023-07-21

## 2023-07-21 RX ORDER — SODIUM BICARBONATE 650 MG/1
1300 TABLET ORAL 3 TIMES DAILY
Qty: 90 TABLET | Refills: 0 | Status: SHIPPED | OUTPATIENT
Start: 2023-07-21

## 2023-07-21 RX ORDER — LIDOCAINE HYDROCHLORIDE 20 MG/ML
5 SOLUTION OROPHARYNGEAL
Status: DISCONTINUED | OUTPATIENT
Start: 2023-07-21 | End: 2023-07-22 | Stop reason: HOSPADM

## 2023-07-21 RX ADMIN — PANTOPRAZOLE SODIUM 40 MG: 40 TABLET, DELAYED RELEASE ORAL at 08:11

## 2023-07-21 RX ADMIN — MIDODRINE HYDROCHLORIDE 2.5 MG: 2.5 TABLET ORAL at 17:59

## 2023-07-21 RX ADMIN — SODIUM BICARBONATE 1300 MG: 650 TABLET ORAL at 13:07

## 2023-07-21 RX ADMIN — FOLIC ACID 1 MG: 1 TABLET ORAL at 08:11

## 2023-07-21 RX ADMIN — SODIUM BICARBONATE 1300 MG: 650 TABLET ORAL at 08:11

## 2023-07-21 RX ADMIN — PREDNISONE 15 MG: 5 TABLET ORAL at 08:11

## 2023-07-21 RX ADMIN — APIXABAN 2.5 MG: 2.5 TABLET, FILM COATED ORAL at 08:11

## 2023-07-21 RX ADMIN — MIDODRINE HYDROCHLORIDE 2.5 MG: 2.5 TABLET ORAL at 13:04

## 2023-07-21 RX ADMIN — FUROSEMIDE 40 MG: 10 INJECTION, SOLUTION INTRAMUSCULAR; INTRAVENOUS at 13:04

## 2023-07-21 RX ADMIN — Medication 10 ML: at 08:14

## 2023-07-21 RX ADMIN — CEFTRIAXONE 2000 MG: 2 INJECTION, POWDER, FOR SOLUTION INTRAMUSCULAR; INTRAVENOUS at 07:56

## 2023-07-21 NOTE — PROGRESS NOTES
"NEPHROLOGY PROGRESS NOTE------[facility]    [facility]  [phone]  Tony aPrisi MD      Patient Care Team:  Ian Cordero MD as PCP - General  Tony Parisi MD as Consulting Physician (Nephrology)  Rc Ayala MD as Consulting Physician (Hematology and Oncology)  Nicola Thurston MD as Consulting Physician (Cardiology)      Provider:  Tony Parisi MD  Patient Name: Rosario Ortez  :  1950    SUBJECTIVE:  Follow-up JOSÉ/CKD  No chest pain has some shortness of breath   received Lasix yesterday    Medication:  apixaban, 2.5 mg, Oral, Q12H  atorvastatin, 20 mg, Oral, Nightly  cefTRIAXone, 2,000 mg, Intravenous, Q24H  ferric gluconate, 250 mg, Intravenous, Once  folic acid, 1 mg, Oral, Daily  levothyroxine, 50 mcg, Oral, Q AM  midodrine, 2.5 mg, Oral, TID AC  pantoprazole, 40 mg, Oral, Daily  predniSONE, 15 mg, Oral, Daily  senna-docusate sodium, 2 tablet, Oral, BID  sodium bicarbonate, 1,300 mg, Oral, TID  sodium chloride, 10 mL, Intravenous, Q12H      dilTIAZem, 5-15 mg/hr, Last Rate: Stopped (23 0100)        OBJECTIVE    Vital Sign Min/Max for last 24 hours  Temp  Min: 97 °F (36.1 °C)  Max: 98.3 °F (36.8 °C)   BP  Min: 100/47  Max: 111/48   Pulse  Min: 52  Max: 69   Resp  Min: 14  Max: 19   SpO2  Min: 93 %  Max: 99 %   No data recorded   Weight  Min: 81 kg (178 lb 9.2 oz)  Max: 81 kg (178 lb 9.2 oz)     Flowsheet Rows      Flowsheet Row First Filed Value   Admission Height 167.6 cm (66\") Documented at 2023 0814   Admission Weight 57.2 kg (126 lb 1.7 oz) Documented at 2023 0814            I/O this shift:  In: 240 [P.O.:240]  Out: -   I/O last 3 completed shifts:  In: 7025 [I.V.:7000; Other:25]  Out: 200 [Urine:200]    Physical Exam:  General Appearance: alert, appears stated age and cooperative  Head: normocephalic, without obvious abnormality and atraumatic  Eyes: conjunctivae and sclerae normal and no " icterus  Neck: supple and no JVD  Lungs: clear to auscultation and respirations regular  Heart: regular rhythm & normal rate and normal S1, S2  Chest: Wall no abnormalities observed  Abdomen: normal bowel sounds and soft, nontender  Extremities: moves extremities well, 1+ edema, no cyanosis   Skin: no bleeding, bruising.  Cystic lesion medial aspect right knee.  Neurologic: Alert, and oriented. No focal deficits    Labs:    WBC WBC   Date Value Ref Range Status   07/21/2023 21.80 (H) 3.40 - 10.80 10*3/mm3 Final   07/20/2023 21.10 (H) 3.40 - 10.80 10*3/mm3 Final   07/19/2023 16.30 (H) 3.40 - 10.80 10*3/mm3 Final      HGB Hemoglobin   Date Value Ref Range Status   07/21/2023 8.9 (L) 12.0 - 15.9 g/dL Final   07/20/2023 10.5 (L) 12.0 - 15.9 g/dL Final   07/19/2023 9.4 (L) 12.0 - 15.9 g/dL Final      HCT Hematocrit   Date Value Ref Range Status   07/21/2023 28.6 (L) 34.0 - 46.6 % Final   07/20/2023 33.6 (L) 34.0 - 46.6 % Final   07/19/2023 29.3 (L) 34.0 - 46.6 % Final      Platelets No results found for: LABPLAT   MCV MCV   Date Value Ref Range Status   07/21/2023 92.0 79.0 - 97.0 fL Final   07/20/2023 93.2 79.0 - 97.0 fL Final   07/19/2023 93.0 79.0 - 97.0 fL Final          Sodium Sodium   Date Value Ref Range Status   07/21/2023 139 136 - 145 mmol/L Final   07/20/2023 140 136 - 145 mmol/L Final   07/19/2023 141 136 - 145 mmol/L Final      Potassium Potassium   Date Value Ref Range Status   07/21/2023 4.4 3.5 - 5.2 mmol/L Final   07/20/2023 4.1 3.5 - 5.2 mmol/L Final   07/19/2023 4.3 3.5 - 5.2 mmol/L Final     Comment:     Result checked        Chloride Chloride   Date Value Ref Range Status   07/21/2023 103 98 - 107 mmol/L Final   07/20/2023 103 98 - 107 mmol/L Final   07/19/2023 108 (H) 98 - 107 mmol/L Final      CO2 CO2   Date Value Ref Range Status   07/21/2023 28.0 22.0 - 29.0 mmol/L Final   07/20/2023 30.0 (H) 22.0 - 29.0 mmol/L Final   07/19/2023 23.0 22.0 - 29.0 mmol/L Final      BUN BUN   Date Value Ref Range  Status   07/21/2023 65 (H) 8 - 23 mg/dL Final   07/20/2023 62 (H) 8 - 23 mg/dL Final   07/19/2023 60 (H) 8 - 23 mg/dL Final      Creatinine Creatinine   Date Value Ref Range Status   07/21/2023 1.66 (H) 0.57 - 1.00 mg/dL Final   07/20/2023 1.51 (H) 0.57 - 1.00 mg/dL Final   07/19/2023 1.62 (H) 0.57 - 1.00 mg/dL Final      Calcium Calcium   Date Value Ref Range Status   07/21/2023 8.7 8.6 - 10.5 mg/dL Final   07/20/2023 8.7 8.6 - 10.5 mg/dL Final   07/19/2023 8.4 (L) 8.6 - 10.5 mg/dL Final      PO4 No components found for: PO4   Albumin Albumin   Date Value Ref Range Status   07/21/2023 1.8 (L) 3.5 - 5.2 g/dL Final   07/20/2023 2.1 (L) 3.5 - 5.2 g/dL Final   07/19/2023 2.1 (L) 3.5 - 5.2 g/dL Final        Magnesium Magnesium   Date Value Ref Range Status   07/21/2023 2.2 1.6 - 2.4 mg/dL Final        Uric Acid No components found for: URIC ACID     Imaging Results (Last 72 Hours)       Procedure Component Value Units Date/Time    XR Chest 1 View [[id number]] Collected: 07/20/23 0615     Updated: 07/20/23 0620    Narrative:      XR CHEST 1 VW    Date of Exam: 7/20/2023 5:45 AM EDT    Indication: possible aspiration    Comparison: 7/8/2023.    Findings:  Lung volumes are diminished. There are new hazy densities present throughout both lungs. There is new medial left basilar consolidation. There is patchy bilateral airspace disease. There are streaky right perihilar opacities. Heart size is likely within   normal limits. No pneumothorax or definite pleural effusion. Pulmonary vasculature is obscured.      Impression:      Impression:  New medial left basilar consolidation and new hazy and patchy airspace disease in both lungs. These findings could relate to edema or pneumonia    Electronically Signed: Nayan Juárez    7/20/2023 6:18 AM EDT    Workstation ID: [id number]    IR Inject/asp large joint or bursa [[id number]] Collected: 07/18/23 1639     Updated: 07/18/23 1643    Narrative:      DATE OF EXAM:  7/18/2023 3:49 PM  EDT    PROCEDURE:  IR INJECT/ASP LARGE JOINT OR BURSA    INDICATIONS:  aspirate/culture fluid from right knee    COMPARISON:  No Comparisons Available    FLUOROSCOPIC TIME:  fluoro time minutes    PHYSICIAN MONITORED CONSCIOUS SEDATION TIME:  None minutes    TECHNIQUE:   A detailed explanation of the procedure, including the risks, benefits, and alternatives was provided. A preprocedure timeout was performed. The patient was placed supine on the bed and the medial right knee was ultrasounded, demonstrating a small fluid   collection. The area was prepped and draped in the usual sterile fashion. The skin was anesthetized with 1% lidocaine. Next under ultrasound guidance an 18-gauge needle was advanced into the collection. A total of 8 mL of purulent material was aspirated   and the needle was removed. The patient tolerated the procedure well without immediate complication.    FINDINGS:  See above      Impression:        1. Successful aspiration of right medial knee abscess yielding 8 mL of purulent material.      Electronically Signed: Cameron Boudreaux    7/18/2023 4:41 PM EDT    Workstation ID: [id number]    CT Bone marrow biopsy and aspiration [[id number]] Collected: 07/18/23 1607     Updated: 07/18/23 1610    Narrative:      DATE OF EXAM:  7/18/2023 3:29 PM EDT    PROCEDURE:  CT BONE MARROW ASPIRATION AND BIOPSY    INDICATIONS:  IgG MGUS    COMPARISON:  No Comparisons Available    FLUOROSCOPIC TIME:  2 seconds    PHYSICIAN MONITORED CONSCIOUS SEDATION TIME:  12 minutes    TECHNIQUE:   A detailed explanation of the procedure, including the risks, benefits, and alternatives was provided. A preprocedure timeout was performed. The interventional radiology nurse monitored the patient at all times. The patient was placed prone on the CT   fluoroscopy table and the left  posterior superior iliac spine was marked and prepped and draped in the usual sterile fashion. The skin was anesthetized with 1% lidocaine. Next, under CT  fluoroscopic guidance an 11-gauge OnControl bone biopsy needle was   advanced just deep to the bony cortex. 1 mL of bone marrow blood was aspirated and given to the cytopathology tech, who noted the presence of spicules. Next, a total of 7 mL of bone marrow aspirate was obtained and given to the tech. Finally, a biopsy   was obtained using the 11-gauge needle. The needle was then removed. The patient tolerated the procedure well without immediate complication.    FINDINGS:  See above      Impression:        1. Successful CT-guided bone marrow biopsy      Electronically Signed: Cameron Boudreaux    7/18/2023 4:08 PM EDT    Workstation ID: [id number]    XR Bone Survey Complete [[id number]] Collected: 07/18/23 1527     Updated: 07/18/23 1532    Narrative:      XR BONE SURVEY COMPLETE    Date of Exam: 7/18/2023 2:54 PM EDT    Indication: IgG Kappa MGUS.    Comparison: None available.    Technique:  A complete adult bone survey of the head, neck, chest, abdomen, pelvis and extremities were performed per protocol.    Findings:  An NG tube terminates in the distal stomach. There are moderate degenerative changes in the spine. There is a right hip total arthroplasty. No hardware complications are identified. There is an old well-corticated avulsion at the tip of the right lateral   malleolus. There is an old mild left Hill-Sachs deformity. There are no lytic lesions or sclerotic blastic lesions of the visualized bony structures. There is moderate central and anterior wedging of a mid thoracic vertebral body, approximately T6.   There is mild retrolisthesis of L3 on L4.      Impression:      Impression:  Compression fracture of T6, likely old. Other chronic findings as detailed.      Electronically Signed: Juliane Baker MD    7/18/2023 3:30 PM EDT    Workstation ID: [id number]    XR Abdomen KUB [[id number]] Collected: 07/18/23 1310     Updated: 07/18/23 1313    Narrative:      XR ABDOMEN KUB    Date of Exam: 7/18/2023 1:07 PM  EDT    Indication: Dobhoff placement verification    Comparison: None available.    Findings:  See impression      Impression:      Impression:  Antegrade oriented feeding tube tip within distal stomach.      Electronically Signed: Pedro Garcia MD    7/18/2023 1:11 PM EDT    Workstation ID: [id number]            Results for orders placed during the hospital encounter of 07/13/23    XR Chest 1 View    Narrative  XR CHEST 1 VW    Date of Exam: 7/20/2023 5:45 AM EDT    Indication: possible aspiration    Comparison: 7/8/2023.    Findings:  Lung volumes are diminished. There are new hazy densities present throughout both lungs. There is new medial left basilar consolidation. There is patchy bilateral airspace disease. There are streaky right perihilar opacities. Heart size is likely within  normal limits. No pneumothorax or definite pleural effusion. Pulmonary vasculature is obscured.    Impression  Impression:  New medial left basilar consolidation and new hazy and patchy airspace disease in both lungs. These findings could relate to edema or pneumonia    Electronically Signed: Nayan Juárez  7/20/2023 6:18 AM EDT  Workstation ID: [id number]      XR Abdomen KUB    Narrative  XR ABDOMEN KUB    Date of Exam: 7/18/2023 1:07 PM EDT    Indication: Dobhoff placement verification    Comparison: None available.    Findings:  See impression    Impression  Impression:  Antegrade oriented feeding tube tip within distal stomach.      Electronically Signed: Pedro Garcia MD  7/18/2023 1:11 PM EDT  Workstation ID: [id number]      XR Bone Survey Complete    Narrative  XR BONE SURVEY COMPLETE    Date of Exam: 7/18/2023 2:54 PM EDT    Indication: IgG Kappa MGUS.    Comparison: None available.    Technique:  A complete adult bone survey of the head, neck, chest, abdomen, pelvis and extremities were performed per protocol.    Findings:  An NG tube terminates in the distal stomach. There are moderate degenerative changes in the spine. There  is a right hip total arthroplasty. No hardware complications are identified. There is an old well-corticated avulsion at the tip of the right lateral  malleolus. There is an old mild left Hill-Sachs deformity. There are no lytic lesions or sclerotic blastic lesions of the visualized bony structures. There is moderate central and anterior wedging of a mid thoracic vertebral body, approximately T6.  There is mild retrolisthesis of L3 on L4.    Impression  Impression:  Compression fracture of T6, likely old. Other chronic findings as detailed.      Electronically Signed: Juliane Baker MD  7/18/2023 3:30 PM EDT  Workstation ID: [id number]      Results for orders placed during the hospital encounter of 06/26/23    Duplex Venous Lower Extremity - Right CAR    Interpretation Summary    Normal right lower extremity venous duplex scan.        ASSESSMENT / PLAN      JOSÉ (acute kidney injury)    Bacteremia due to Streptococcus    Cellulitis of right leg    Knee joint cyst, right    Bilateral leg edema    UTI due to Klebsiella species    Dysphagia    Feeding difficulties    Paroxysmal atrial fibrillation    Sarcoidosis    Metabolic acidosis    Gastroesophageal reflux disease    Essential hypertension    Hyperlipidemia    Hypothyroidism    Moderate malnutrition    JOSÉ-prerenal in setting of hypotension, diuretics, ARB's.  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  Hold irbesartan.  Avoid ACE inhibitors or ARB's  History of paroxysmal atrial fibrillation  Cellulitis like lower extremity and right knee.  History NSAID use  Questionable history of sarcoidosis  UTI-urine culture growing greater than 100 K colonies of gram-negative rods.  Strep bacteremia  Metabolic acidosis-p.o. sodium bicarb  Ascites  Possible sarcoidosis     Creatinine still up but mostly stable  Repeat Lasix today    closely monitor renal function fluid status  electrolytes        Tony Parisi MD  [facility]  [phone]  07/21/23  11:48 EDT

## 2023-07-21 NOTE — PROGRESS NOTES
Infectious Diseases Progress Note      LOS: 7 days   Patient Care Team:  Ian Cordero MD as PCP - General  Tony Parisi MD as Consulting Physician (Nephrology)  Rc Ayala MD as Consulting Physician (Hematology and Oncology)  Nicola Thurston MD as Consulting Physician (Cardiology)    Chief Complaint: Right leg pain, mass to the medial aspect of the right knee, fatigue    Subjective       The patient has been afebrile for the last 24 hours.  The patient is on 6 L of oxygen by nasal cannula, hemodynamically stable, and is tolerating antimicrobial therapy.  Patient appears to be much better spirits today and has started to eat on her own      Review of Systems:   Review of Systems   Constitutional:  Positive for fatigue.   HENT: Negative.     Eyes: Negative.    Respiratory:  Positive for cough and shortness of breath.    Cardiovascular: Negative.    Gastrointestinal: Negative.    Endocrine: Negative.    Genitourinary: Negative.    Musculoskeletal: Negative.    Skin:  Positive for color change.   Neurological:  Positive for weakness.   Psychiatric/Behavioral: Negative.     All other systems reviewed and are negative.     Objective     Vital Signs  Temp:  [97 °F (36.1 °C)-98.3 °F (36.8 °C)] 97.9 °F (36.6 °C)  Heart Rate:  [52-69] 60  Resp:  [14-19] 19  BP: (103-114)/(45-55) 114/54    Physical Exam:  Physical Exam  Vitals and nursing note reviewed.   Constitutional:       General: She is not in acute distress.     Appearance: She is well-developed and normal weight. She is ill-appearing. She is not diaphoretic.   HENT:      Head: Normocephalic and atraumatic.   Eyes:      General: No scleral icterus.     Extraocular Movements: Extraocular movements intact.      Conjunctiva/sclera: Conjunctivae normal.      Pupils: Pupils are equal, round, and reactive to light.   Cardiovascular:      Rate and Rhythm: Normal rate and regular rhythm.      Heart sounds: Normal heart sounds, S1 normal and S2 normal. No murmur  heard.  Pulmonary:      Effort: Pulmonary effort is normal. No respiratory distress.      Breath sounds: No stridor. No wheezing or rales.      Comments: Diminished throughout  Chest:      Chest wall: No tenderness.   Abdominal:      General: Bowel sounds are normal. There is distension.      Palpations: Abdomen is soft. There is no mass.      Tenderness: There is no abdominal tenderness. There is no guarding.   Musculoskeletal:         General: No swelling, tenderness or deformity.      Cervical back: Neck supple.   Skin:     General: Skin is warm and dry.      Coloration: Skin is not pale.      Findings: No bruising, erythema or rash.      Comments:  Patient has a fluctuant mass in the medial aspect of the knee-less fluctuant today    The small red patch to the lateral aspect of the knee has almost completely resolved.    No significant swelling or erythema to the rest of the right leg    Neurological:      Mental Status: She is alert and oriented to person, place, and time.      Cranial Nerves: No cranial nerve deficit.        Results Review:    I have reviewed all clinical data, test, lab, and imaging results.     Radiology  No Radiology Exams Resulted Within Past 24 Hours    Cardiology    Laboratory    Results from last 7 days   Lab Units 07/21/23  0045 07/20/23  0729 07/19/23  0310 07/18/23  0125 07/17/23  1859 07/17/23  1114 07/16/23  0630 07/14/23  2234   WBC 10*3/mm3 21.80* 21.10* 16.30* 11.90*  --  13.10* 11.20* 9.80   HEMOGLOBIN g/dL 8.9* 10.5* 9.4* 8.3* 9.1* 7.4* 7.7* 7.5*   HEMATOCRIT % 28.6* 33.6* 29.3* 26.1* 28.3* 23.8* 24.1* 23.5*   PLATELETS 10*3/mm3 216 260 243 215  --  238 219 206       Results from last 7 days   Lab Units 07/21/23  0045 07/20/23  0729 07/19/23  0310 07/18/23  0125 07/17/23  1114 07/16/23  0152 07/14/23  2234   SODIUM mmol/L 139 140 141 141 140 140 138   POTASSIUM mmol/L 4.4 4.1 4.3 3.4* 3.7 4.3 4.4   CHLORIDE mmol/L 103 103 108* 109* 110* 111* 109*   CO2 mmol/L 28.0 30.0* 23.0  21.0* 21.0* 15.0* 17.0*   BUN mg/dL 65* 62* 60* 65* 75* 72* 75*   CREATININE mg/dL 1.66* 1.51* 1.62* 1.72* 2.18* 2.30* 2.69*   GLUCOSE mg/dL 95 119* 151* 101* 101* 96 112*   ALBUMIN g/dL 1.8* 2.1* 2.1* 2.1* 1.9*  --   --    BILIRUBIN mg/dL 0.5  --  0.5  --  0.3  --   --    ALK PHOS U/L 190*  --  204*  --  137*  --   --    AST (SGOT) U/L 43*  --  47*  --  26  --   --    ALT (SGPT) U/L 13  --  17  --  15  --   --    CALCIUM mg/dL 8.7 8.7 8.4* 8.3* 8.5* 8.4* 8.5*           Results from last 7 days   Lab Units 07/17/23  1114   SED RATE mm/hr 11           Microbiology   Microbiology Results (last 10 days)       Procedure Component Value - Date/Time    Body Fluid Culture - Body Fluid, Knee, Right [[id number]]  (Abnormal)  (Susceptibility) Collected: 07/18/23 1559    Lab Status: Final result Specimen: Body Fluid from Knee, Right Updated: 07/21/23 0815     Body Fluid Culture Heavy growth (4+) Staphylococcus aureus     Gram Stain Many (4+) WBCs per low power field      Many (4+) Gram positive cocci in pairs    Susceptibility        Staphylococcus aureus      ISELA      Gentamicin Susceptible      Oxacillin Susceptible      Rifampin Susceptible      Vancomycin Susceptible                       Susceptibility Comments       Staphylococcus aureus    This isolate does not demonstrate inducible clindamycin resistance in vitro.                 Blood Culture - Blood, Arm, Left [[id number]]  (Abnormal) Collected: 07/13/23 1006    Lab Status: Final result Specimen: Blood from Arm, Left Updated: 07/15/23 0718     Blood Culture Streptococcus, Alpha Hemolytic     Isolated from Aerobic Bottle     Gram Stain Aerobic Bottle Gram positive cocci in chains    Narrative:      Probable contaminant requires clinical correlation, susceptibility not performed unless requested by physician.      Blood Culture ID, PCR - Blood, Arm, Left [[id number]]  (Abnormal) Collected: 07/13/23 1006    Lab Status: Final result Specimen: Blood from Arm, Left Updated:  07/14/23 0634     BCID, PCR Streptococcus spp, not A, B, or pneumoniae. Identification by BCID2 PCR.     BOTTLE TYPE Aerobic Bottle    Urine Culture - Urine, Straight Cath [[id number]]  (Abnormal)  (Susceptibility) Collected: 07/13/23 0944    Lab Status: Final result Specimen: Urine from Straight Cath Updated: 07/15/23 0358     Urine Culture >100,000 CFU/mL Klebsiella aerogenes    Narrative:      Colonization of the urinary tract without infection is common. Treatment is discouraged unless the patient is symptomatic, pregnant, or undergoing an invasive urologic procedure.    Susceptibility        Klebsiella aerogenes      ISELA      Cefazolin Resistant      Cefepime Susceptible      Ceftazidime Susceptible      Ceftriaxone Susceptible      Gentamicin Susceptible      Levofloxacin Susceptible      Nitrofurantoin Resistant      Piperacillin + Tazobactam Susceptible      Trimethoprim + Sulfamethoxazole Susceptible                           Blood Culture - Blood, Arm, Left [[id number]]  (Normal) Collected: 07/13/23 0852    Lab Status: Final result Specimen: Blood from Arm, Left Updated: 07/18/23 0915     Blood Culture No growth at 5 days    Narrative:      Less than seven (7) mL's of blood was collected.  Insufficient quantity may yield false negative results.            Medication Review:       Schedule Meds  apixaban, 2.5 mg, Oral, Q12H  atorvastatin, 20 mg, Oral, Nightly  cefTRIAXone, 2,000 mg, Intravenous, Q24H  ferric gluconate, 250 mg, Intravenous, Once  folic acid, 1 mg, Oral, Daily  levothyroxine, 50 mcg, Oral, Q AM  midodrine, 2.5 mg, Oral, TID AC  pantoprazole, 40 mg, Oral, Daily  predniSONE, 15 mg, Oral, Daily  senna-docusate sodium, 2 tablet, Oral, BID  sodium bicarbonate, 1,300 mg, Oral, TID  sodium chloride, 10 mL, Intravenous, Q12H        Infusion Meds  dilTIAZem, 5-15 mg/hr, Last Rate: Stopped (07/21/23 0100)        PRN Meds    acetaminophen    senna-docusate sodium **AND** polyethylene glycol **AND**  bisacodyl **AND** bisacodyl    Calcium Replacement - Follow Nurse / BPA Driven Protocol    Lidocaine Viscous HCl    Magnesium Standard Dose Replacement - Follow Nurse / BPA Driven Protocol    metoprolol tartrate    ondansetron    Phosphorus Replacement - Follow Nurse / BPA Driven Protocol    Potassium Replacement - Follow Nurse / BPA Driven Protocol    [COMPLETED] Insert Peripheral IV **AND** sodium chloride    sodium chloride    sodium chloride    sodium chloride    traZODone        Assessment & Plan       Antimicrobial Therapy   1.  IV Rocephin        2.        3.        4.        5.          Assessment     Positive blood culture in 1 out of 2 sets on admission for Streptococcus species.  Patient had no endovascular device or prior cardiac valve replacement.  We are suspecting contamination.     Bacteriuria.  Urine cultures are growing Klebsiella aerogenes.  urinalysis was not significantly abnormal.     Probable right medial knee abscess.  MRI shows a fluid collection in the subcutaneous tissues along the medial femoral condyle but no findings to suggest a joint infection  -Aspiration of right knee by IR on 7/18/2023-cultures are growing methicillin susceptible Staphylococcus aureus     Recent work-up for sarcoidosis by an outpatient rheumatologist.  Patient was taken off methotrexate on 7/3/2023.  Still on prednisone.  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  Cardiology following     Plan     Continue IV Rocephin 2 g every 24 hours-we will plan on 2 weeks total of antimicrobial therapy-last day on 7/27/2023.  Can be switched to p.o. Keflex 500 mg 3 times daily at discharge  Continue supportive  care  A.mJim labs  Case discussed with patient and family member at bedside      JOE Orlando  07/21/23  15:44 EDT    Note is dictated utilizing voice recognition software/Dragon

## 2023-07-21 NOTE — PLAN OF CARE
Goal Outcome Evaluation:         Rosario Ortez presents with functional mobility impairments which indicate the need for skilled intervention. Pt required max A x2 to perform supine <> sitting eob. Pt able to sit eob with min A to maintain sitting balance with bouts of CGA. Pt expressed feeling fatigued following ther ex and reaching activities requesting to return supine. Pt continues to display functional weakness and decreased activity tolerance. Tolerating session today without incident. Will continue to follow and progress as tolerated.

## 2023-07-21 NOTE — THERAPY TREATMENT NOTE
"Subjective: Pt agreeable to therapeutic plan of care. Pt expressed sitting up with nursing staff about 15 minutes this morning.    Objective:     Bed mobility - Max-A and Assist x 2  Transfers - N/A or Not attempted.  Ambulation -  N/A or Not attempted.    Therapeutic Exercise - 5 Reps-10 reps B LE AROM unsupported sitting / EOB    Vitals: WNL    Pain: Pt did not rate pain  Intervention for pain: N/A    Education: Provided education on the importance of mobility in the acute care setting, Verbal/Tactile Cues, Transfer Training, and Energy conservation strategies    Assessment: Rosario Ortez presents with functional mobility impairments which indicate the need for skilled intervention. Pt required max A x2 to perform supine <> sitting eob. Pt able to sit eob with min A to maintain sitting balance with bouts of CGA. Pt expressed feeling fatigued following ther ex and reaching activities requesting to return supine. Pt continues to display functional weakness and decreased activity tolerance. Tolerating session today without incident. Will continue to follow and progress as tolerated.     Plan/Recommendations:   Moderate Intensity Therapy recommended post-acute care. This is recommended as therapy feels the patient would require 3-4 days per week and wouldn't tolerate \"3 hour daily\" rehab intensity. SNF would be the preferred choice. If the patient does not agree to SNF, arrange HH or OP depending on home bound status. If patient is medically complex, consider LTACH.. Pt requires no DME at discharge.     Pt desires Skilled Rehab placement at discharge. Pt cooperative; agreeable to therapeutic recommendations and plan of care.         Basic Mobility 6-click:  Rollin = Total, A lot = 2, A little = 3; 4 = None  Supine>Sit:   1 = Total, A lot = 2, A little = 3; 4 = None   Sit>Stand with arms:  1 = Total, A lot = 2, A little = 3; 4 = None  Bed>Chair:   1 = Total, A lot = 2, A little = 3; 4 = None  Ambulate in " room:  1 = Total, A lot = 2, A little = 3; 4 = None  3-5 Steps with railin = Total, A lot = 2, A little = 3; 4 = None  Score: 9    Modified [place]: N/A = No pre-op stroke/TIA    Post-Tx Position: Supine with HOB Elevated, Alarms activated, and Call light and personal items within reach  PPE: gloves

## 2023-07-21 NOTE — PLAN OF CARE
Goal Outcome Evaluation:      Patient is alert to self, place, and situation. Patient states she wants to go home and not continue aggressive measures. Hospice came in today and spoke with the family to answer their questions on what all can be done while on hospice. Family was agreeable to hospice care and felt this was best for the patient. Patient is expected to discharge in the morning.

## 2023-07-21 NOTE — PROGRESS NOTES
Chief complaint Fatigue    Subjective .     History of present illness:   The patient is a 73 y.o. female who was admitted secondary to abnormal labs with a hemoglobin of 8.5 and creatinine 1.45.    Patient has multiple chronic health issues, palliative consulted.  Patient apparently has a living will with specific implications of care when she is unable to make her own decisions.  Per RN patient's  is against any discussion of palliative or hospice care.  Psychiatry was consulted to assess patient's decisional capacity.  Per patient's chart living will was in place and arranged by patient prior to admission.  I spoke with the patient's daughter initially with patient permission.  Per patient's daughter patient has several health issues and has been making statements about death and dying due to extreme discomfort and pain.  Per patient's daughter the patient's living will and DNR wishes were arranged prior to admission, and daughter reports this decision as appropriate.  Per patient's daughter the patient has been experiencing some forgetfulness at home prior to admission, but due to this long admission she has seen a dramatic increase in confusion and behavior changes.  Statements of death and dying have been since this hospital admission, and not prior.  Per patient's daughter the patient does not have a history of any psychiatric care or medications.  No history of dementia daughter reports appropriate age-related forgetfulness.     Today, pt is upright, alert resting in bed.  Pt is oriented to self, year, and place.   Pt not oriented to month, or situation, though she did understand she is in a hospital for health issues just not events leading to admission.   Pt discussed her family and recalled information from her past as appropriate items of discussion.  Pt denies having any memory issues and when mentioning her memory she became anxious.   Pt denied other history of depression, anxiety or any  other psychiatric diagnosis.   Pt denied previous psychiatric hospitalizations.   Pt denies SI, or thoughts of death.  Pt denied HI/AVH.  Pt reports that' she wishes to go home.  Pt reported symptoms of fatigue.        Review of Systems   All systems were reviewed and negative except for:  Neurological: positive for  memory deficit  Behavioral/Psych: positive for  anxiety    The following portions of the patient's history were reviewed and updated as appropriate: allergies, current medications, past family history, past medical history, past social history, past surgical history and problem list.    History    Past psychiatric history     Past Medical History:   Diagnosis Date    A-fib     Abnormal results of liver function studies 07/20/2023    AI (aortic incompetence) 07/20/2023    Description: mild per 5/11 ECHO    Allergic rhinitis 07/20/2023    Anorexia 07/20/2023    Ascites 07/20/2023    Avitaminosis D 07/20/2023    Benign neoplasm of ascending colon 10/06/2014    Blood glucose elevated 07/20/2023    Constipation 07/20/2023    Coronary artery disease     Diastolic dysfunction 07/20/2023    Diverticulosis of large intestine without perforation or abscess without bleeding 01/20/2023    Endometriosis 07/20/2023    Essential hypertension 04/29/2021    Gastroesophageal reflux disease 07/20/2023    Hemorrhage, postmenopausal 07/20/2023    Hyperlipidemia 07/20/2023    Hypothyroidism 07/20/2023    Lumbar radiculopathy 07/20/2023    Melena 01/20/2023    Osteoarthritis of right hip 04/29/2021    Paroxysmal atrial fibrillation 07/17/2023    Peripheral neuropathy 04/25/2012    Personal history of colonic polyps 10/06/2014    CT (pulmonary regurgitation) 07/20/2023    Description: Trace CT per 5/11 ECHO    Primary hypertriglyceridemia 06/14/2020    Sarcoidosis 07/17/2023    Suspected    Second degree hemorrhoids 08/20/2020    Tremor 07/20/2023    Vitamin B12 deficiency 02/19/2014          Family History   Problem Relation  Age of Onset    Breast cancer Sister         Social History     Tobacco Use    Smoking status: Never     Passive exposure: Never    Smokeless tobacco: Never   Vaping Use    Vaping Use: Never used   Substance Use Topics    Alcohol use: Never    Drug use: Never          Medications Prior to Admission   Medication Sig Dispense Refill Last Dose    apixaban (ELIQUIS) 2.5 MG tablet tablet Take 1 tablet by mouth 2 (Two) Times a Day.       atorvastatin (LIPITOR) 20 MG tablet Take 1 tablet by mouth Daily.       doxycycline (VIBRAMYCIN) 100 MG capsule Take 1 capsule by mouth 2 (Two) Times a Day.       ferrous sulfate 325 (65 FE) MG tablet Take 1 tablet by mouth Daily With Breakfast.       folic acid (FOLVITE) 1 MG tablet Take 1 tablet by mouth Daily.       furosemide (LASIX) 20 MG tablet Take 1 tablet by mouth Daily for 30 days. 30 tablet 0     hydroCHLOROthiazide (HYDRODIURIL) 25 MG tablet Take 1 tablet by mouth Daily.       irbesartan (AVAPRO) 75 MG tablet Take 1 tablet by mouth Every Night. 30 tablet 3     levothyroxine (SYNTHROID, LEVOTHROID) 50 MCG tablet 1 tablet Every Morning.       pantoprazole (PROTONIX) 40 MG EC tablet Take 1 tablet by mouth Daily.       predniSONE (DELTASONE) 5 MG tablet Take 3 tablets by mouth Daily.       traZODone (DESYREL) 50 MG tablet Take 2 tablets by mouth At Night As Needed for Sleep.           Scheduled Meds:  apixaban, 2.5 mg, Oral, Q12H  atorvastatin, 20 mg, Oral, Nightly  cefTRIAXone, 2,000 mg, Intravenous, Q24H  ferric gluconate, 250 mg, Intravenous, Once  folic acid, 1 mg, Oral, Daily  levothyroxine, 50 mcg, Oral, Q AM  midodrine, 2.5 mg, Oral, TID AC  pantoprazole, 40 mg, Oral, Daily  predniSONE, 15 mg, Oral, Daily  senna-docusate sodium, 2 tablet, Oral, BID  sodium bicarbonate, 1,300 mg, Oral, TID  sodium chloride, 10 mL, Intravenous, Q12H         Continuous Infusions:  dilTIAZem, 5-15 mg/hr, Last Rate: Stopped (07/21/23 0100)        PRN Meds:    acetaminophen    senna-docusate  "sodium **AND** polyethylene glycol **AND** bisacodyl **AND** bisacodyl    Calcium Replacement - Follow Nurse / BPA Driven Protocol    Lidocaine Viscous HCl    Magnesium Standard Dose Replacement - Follow Nurse / BPA Driven Protocol    metoprolol tartrate    ondansetron    Phosphorus Replacement - Follow Nurse / BPA Driven Protocol    Potassium Replacement - Follow Nurse / BPA Driven Protocol    [COMPLETED] Insert Peripheral IV **AND** sodium chloride    sodium chloride    sodium chloride    sodium chloride    traZODone      Allergies:  Codeine, Topiramate, Tramadol, Cefdinir, Levofloxacin, and Lisinopril      Objective     Vital Signs   /54   Pulse 60   Temp 97.9 °F (36.6 °C) (Oral)   Resp 19   Ht 167.6 cm (65.98\")   Wt 81 kg (178 lb 9.2 oz)   SpO2 97%   BMI 28.84 kg/m²     Physical Exam:    Muscle strength and tone: moderate, ue's, equal bilaterally  Abnormal Movements: None observed   Gait: HUBERT     General Appearance:    Upright, alert in bed       Mental Status Exam:   Hygiene:   good  Cooperation:  Cooperative  Eye Contact:  Good  Psychomotor Behavior:  Appropriate  Affect:  Appropriate  Mood: anxious  Hopelessness: Denies  Speech:  Normal and Minimal  Thought Process:  Goal directed  Thought Content:  Mood congruent  Suicidal:  None  Homicidal:  None  Hallucinations:  None  Delusion:  None  Memory:  Deficits  Orientation:  Person, Place, and Time  Reliability:  fair  Insight:  Fair  Judgement:  Fair  Impulse Control:  Good        Medications and allergies reviewed     Result Review:  I have personally reviewed the results from the time of this admission to 7/21/2023 16:06 EDT and agree with these findings:  [x]  Laboratory  []  Microbiology  []  Radiology  [x]  EKG/Telemetry   []  Cardiology/Vascular   []  Pathology  []  Old records  []  Other:      Assessment & Plan       JOSÉ (acute kidney injury)    Bacteremia due to Streptococcus    Cellulitis of right leg    Knee joint cyst, right    Bilateral " leg edema    UTI due to Klebsiella species    Dysphagia    Feeding difficulties    Paroxysmal atrial fibrillation    Sarcoidosis    Metabolic acidosis    Gastroesophageal reflux disease    Essential hypertension    Hyperlipidemia    Hypothyroidism    Moderate malnutrition         Assessment: Delirium due to medical condition Bacteremia, JOSÉ, UTI   Treatment Plan:     Per family patient's confusion started with admission.  Patient diagnosed with bacteremia, UTI, which can be a cause for increased confusion and a greater risk of delirium since sudden onset of confusion and altered mental status.    Pt has memory deficits and some confusion. Pt's decisional capacity appears to be impaired at this time, but this is reported to be an acute onset of confusion. Previous decision making capability prior to admission does not seem to be an issue.    Will follow and provide support prn.      Treatment Plan discussed with: Patient        I discussed the patients findings and my recommendations with patient and nursing staff    I have reviewed and approved the behavioral health treatment plans and problem list. Yes  Thank you for the consult   Referring MD has access to consult report and progress notes in EMR     JOE Ortega  07/21/23  16:06 EDT

## 2023-07-21 NOTE — PLAN OF CARE
"Goal Outcome Evaluation:           Progress: declining          Patient arrived from transferring unit with  at bedside and transferring nurse. Patient is alert and able to make needs known, disoriented only to the year. Patient on 6 liters nasal cannula without humidification, this nurse placed patient on a high flow nasal cannula with humidification for comfort, per patient's chart patient was on 2 liters on , now requiring 6 liters. Per bedside report nurse states patient arrived to her previous shift with a  aramis tube that had been partially pulled out by the patient despite it being bridled in place. Tube removed the rest of the way according to reporting nurse and patient then refused to have a new tube replaced, while at bedside during bedside report patient again refuses tube replacement.  at bedside at this time inquiring about patient's blood test results and urinalysis results and patient states \"I dont care about any of this, I just want to be left alone.\" As to not upset the patient any further this nurse did not discuss any lab results. Barroso catheter in place with clear yellow urine, patient is incontinent of stool per reporting nurse, also reports patient is refusing to take anything by mouth at this time. Per reporting nurse daughters at bedside and report they have patient's living will which states she does not want any tubes or any life support.  continues to insist that everything be done and this conversation again upsets the patient as she states \"just stop, I want to be left alone.\" Psychiatric team consulted to determine if patient is able to make her own decisions before any further conversations about palliative and hospice are had as  continues to refuse any conversation related at this time. Patient and  oriented to unit and room including call light. Patient encouraged to turn and reposition off of bottom and patient continues to refuse, patient " "continues to state, \"I just want to be flat on my back\" when asked if she wanted staff to prop her up toward the window with pillows and wedge. Patient continues to refuse turning throughout the night. Educated on risk of not turning and patient continues to refuse. Cardizem gtt off throughout the night secondary to bradycardia and hypotension. Patient has no current complaints resting quietly.   "

## 2023-07-21 NOTE — THERAPY TREATMENT NOTE
Acute Care - Speech Language Pathology   Swallow Treatment Note YAQUELIN Taveras     Patient Name: Rosario Ortez  : 1950  MRN: [id number]  Today's Date: 2023               Admit Date: 2023    Visit Dx:     ICD-10-CM ICD-9-CM   1. Weakness  R53.1 780.79   2. Acute kidney injury  N17.9 584.9   3. Dysphagia, unspecified type  R13.10 787.20   4. Feeding difficulties  R63.30 783.3     Patient Active Problem List   Diagnosis    JOSÉ (acute kidney injury)    Bacteremia due to Streptococcus    Cellulitis of right leg    Knee joint cyst, right    Bilateral leg edema    UTI due to Klebsiella species    Dysphagia    Feeding difficulties    Paroxysmal atrial fibrillation    Sarcoidosis    Metabolic acidosis    Abnormal results of liver function studies    Gastroesophageal reflux disease    AI (aortic incompetence)    LA (pulmonary regurgitation)    Allergic rhinitis    Ascites    Anorexia    Avitaminosis D    Benign neoplasm of ascending colon    Blood glucose elevated    Diastolic dysfunction    Diverticulosis of large intestine without perforation or abscess without bleeding    Essential hypertension    Hyperlipidemia    Hypothyroidism    Lumbar radiculopathy    Constipation    Peripheral neuropathy    Primary hypertriglyceridemia    Personal history of colonic polyps    Second degree hemorrhoids    Vitamin B12 deficiency    Tremor    Osteoarthritis of right hip    Endometriosis    Moderate malnutrition     Past Medical History:   Diagnosis Date    A-fib     Abnormal results of liver function studies 2023    AI (aortic incompetence) 2023    Description: mild per  ECHO    Allergic rhinitis 2023    Anorexia 2023    Ascites 2023    Avitaminosis D 2023    Benign neoplasm of ascending colon 10/06/2014    Blood glucose elevated 2023    Constipation 2023    Coronary artery disease     Diastolic dysfunction 2023    Diverticulosis of large intestine without  perforation or abscess without bleeding 01/20/2023    Endometriosis 07/20/2023    Essential hypertension 04/29/2021    Gastroesophageal reflux disease 07/20/2023    Hemorrhage, postmenopausal 07/20/2023    Hyperlipidemia 07/20/2023    Hypothyroidism 07/20/2023    Lumbar radiculopathy 07/20/2023    Melena 01/20/2023    Osteoarthritis of right hip 04/29/2021    Paroxysmal atrial fibrillation 07/17/2023    Peripheral neuropathy 04/25/2012    Personal history of colonic polyps 10/06/2014    OR (pulmonary regurgitation) 07/20/2023    Description: Trace OR per 5/11 ECHO    Primary hypertriglyceridemia 06/14/2020    Sarcoidosis 07/17/2023    Suspected    Second degree hemorrhoids 08/20/2020    Tremor 07/20/2023    Vitamin B12 deficiency 02/19/2014     Past Surgical History:   Procedure Laterality Date    BREAST BIOPSY      ENDOSCOPY N/A 7/18/2023    Procedure: ESOPHAGOGASTRODUODENOSCOPY with dobhoff placement and securement of feeding tube to duodenum using endoscopic clipping x 1;  Surgeon: Zeyad Flores MD;  Location: [facility] ENDOSCOPY;  Service: Gastroenterology;  Laterality: N/A;    HYSTERECTOMY      JOINT REPLACEMENT Right 2015       SLP Recommendation and Plan          SWALLOW EVALUATION (last 72 hours)            EDUCATION  The patient has been educated in the following areas:   Dysphagia (Swallowing Impairment) Oral Care/Hydration.        SLP GOALS       Row Name 07/21/23 1300       (LTG) Swallow    (LTG) Swallow Patient will tolerate safest and least restrictive diet without complications from aspiration.  -CB    Time Frame (Swallow Long Term Goal) by discharge  -CB    Progress/Outcomes (Swallow Long Term Goal) goal ongoing  -CB    Comment (Swallow Long Term Goal) Patient was seen for DT/meal at lunch. Patient is currently receiving puree diet. Patient's  reports that she ate much better at breakfast. He reports that her attitude seems better as well. Patient consumed some jello and drank some  water during meal. Patient continues to have poor appetite as she is likely only  eating 25%. Patient tolerating puree without any overt s/s of aspiration. No evidence of pain with swallowing. No wet vocal quality was detected. Suggested to patient to try variety of different food choices such as eating mashed potato and gravy and other items to stimulate increase oral intake.  Since, lately she has been eating some of the same foods like pudding, jello, boost  and ice cream. Patient appeared amendable to suggestion. ST ezra continue to follow to assure safety and tolerance with least restrictive diet.  -CB       (STG) Swallow 1    (STG) Swallow 1 Patient will participate in ongoing assessment of swallow including reevaluation clinically and/or including instrumental assessment of swallow if indicated, to further assess swallow function in anticipation of establishing safe PO diet.  -CB    Time Frame (Swallow Short Term Goal 1) 1 week  -CB    Progress/Outcomes (Swallow Short Term Goal 1) --              User Key  (r) = Recorded By, (t) = Taken By, (c) = Cosigned By      Initials Name Provider Type    Francisca Thomason Speech and Language Pathologist    Addie Galloway, SLP Speech and Language Pathologist                       Time Calculation:                Addie Amador, SLP  7/21/2023

## 2023-07-21 NOTE — PROGRESS NOTES
Referring Provider: Elias Rey MD    Reason for follow-up: Atrial fibrillation with rapid ventricular response     Patient Care Team:  Ian Cordero MD as PCP - General  Tony Parisi MD as Consulting Physician (Nephrology)  Rc Ayala MD as Consulting Physician (Hematology and Oncology)  Nicola Thurston MD as Consulting Physician (Cardiology)      SUBJECTIVE    Patient has converted to sinus rhythm   ROS  Review of all systems negative except as indicated.    Since I have last seen, the patient has been without any chest discomfort, shortness of breath, palpitations, dizziness or syncope.  Denies having any headache, abdominal pain, nausea, vomiting, diarrhea, constipation, loss of weight or loss of appetite.  Denies having any excessive bruising, hematuria or blood in the stool.  ROS      Personal History:    Past Medical History:   Diagnosis Date    A-fib     Abnormal results of liver function studies 07/20/2023    AI (aortic incompetence) 07/20/2023    Description: mild per 5/11 ECHO    Allergic rhinitis 07/20/2023    Anorexia 07/20/2023    Ascites 07/20/2023    Avitaminosis D 07/20/2023    Benign neoplasm of ascending colon 10/06/2014    Blood glucose elevated 07/20/2023    Constipation 07/20/2023    Coronary artery disease     Diastolic dysfunction 07/20/2023    Diverticulosis of large intestine without perforation or abscess without bleeding 01/20/2023    Endometriosis 07/20/2023    Essential hypertension 04/29/2021    Gastroesophageal reflux disease 07/20/2023    Hemorrhage, postmenopausal 07/20/2023    Hyperlipidemia 07/20/2023    Hypothyroidism 07/20/2023    Lumbar radiculopathy 07/20/2023    Melena 01/20/2023    Osteoarthritis of right hip 04/29/2021    Paroxysmal atrial fibrillation 07/17/2023    Peripheral neuropathy 04/25/2012    Personal history of colonic polyps 10/06/2014    IL (pulmonary regurgitation) 07/20/2023    Description: Trace IL per 5/11 ECHO    Primary  hypertriglyceridemia 06/14/2020    Sarcoidosis 07/17/2023    Suspected    Second degree hemorrhoids 08/20/2020    Tremor 07/20/2023    Vitamin B12 deficiency 02/19/2014       Past Surgical History:   Procedure Laterality Date    BREAST BIOPSY      ENDOSCOPY N/A 7/18/2023    Procedure: ESOPHAGOGASTRODUODENOSCOPY with dobhoff placement and securement of feeding tube to duodenum using endoscopic clipping x 1;  Surgeon: Zeyad Flores MD;  Location: [facility] ENDOSCOPY;  Service: Gastroenterology;  Laterality: N/A;    HYSTERECTOMY      JOINT REPLACEMENT Right 2015       Family History   Problem Relation Age of Onset    Breast cancer Sister        Social History     Tobacco Use    Smoking status: Never     Passive exposure: Never    Smokeless tobacco: Never   Vaping Use    Vaping Use: Never used   Substance Use Topics    Alcohol use: Never    Drug use: Never        Medications Prior to Admission   Medication Sig Dispense Refill Last Dose    apixaban (ELIQUIS) 2.5 MG tablet tablet Take 1 tablet by mouth 2 (Two) Times a Day.       atorvastatin (LIPITOR) 20 MG tablet Take 1 tablet by mouth Daily.       doxycycline (VIBRAMYCIN) 100 MG capsule Take 1 capsule by mouth 2 (Two) Times a Day.       ferrous sulfate 325 (65 FE) MG tablet Take 1 tablet by mouth Daily With Breakfast.       folic acid (FOLVITE) 1 MG tablet Take 1 tablet by mouth Daily.       furosemide (LASIX) 20 MG tablet Take 1 tablet by mouth Daily for 30 days. 30 tablet 0     hydroCHLOROthiazide (HYDRODIURIL) 25 MG tablet Take 1 tablet by mouth Daily.       irbesartan (AVAPRO) 75 MG tablet Take 1 tablet by mouth Every Night. 30 tablet 3     levothyroxine (SYNTHROID, LEVOTHROID) 50 MCG tablet 1 tablet Every Morning.       pantoprazole (PROTONIX) 40 MG EC tablet Take 1 tablet by mouth Daily.       predniSONE (DELTASONE) 5 MG tablet Take 3 tablets by mouth Daily.       traZODone (DESYREL) 50 MG tablet Take 2 tablets by mouth At Night As Needed for Sleep.     "      Allergies:  Codeine, Topiramate, Tramadol, Cefdinir, Levofloxacin, and Lisinopril    Scheduled Meds:apixaban, 2.5 mg, Oral, Q12H  atorvastatin, 20 mg, Oral, Nightly  cefTRIAXone, 2,000 mg, Intravenous, Q24H  ferric gluconate, 250 mg, Intravenous, Once  folic acid, 1 mg, Oral, Daily  levothyroxine, 50 mcg, Oral, Q AM  pantoprazole, 40 mg, Oral, Daily  predniSONE, 15 mg, Oral, Daily  senna-docusate sodium, 2 tablet, Oral, BID  sodium bicarbonate, 1,300 mg, Oral, TID  sodium chloride, 10 mL, Intravenous, Q12H      Continuous Infusions:dilTIAZem, 5-15 mg/hr, Last Rate: Stopped (07/21/23 0100)      PRN Meds:.  acetaminophen    senna-docusate sodium **AND** polyethylene glycol **AND** bisacodyl **AND** bisacodyl    Calcium Replacement - Follow Nurse / BPA Driven Protocol    Magnesium Standard Dose Replacement - Follow Nurse / BPA Driven Protocol    ondansetron    Phosphorus Replacement - Follow Nurse / BPA Driven Protocol    Potassium Replacement - Follow Nurse / BPA Driven Protocol    [COMPLETED] Insert Peripheral IV **AND** sodium chloride    sodium chloride    sodium chloride    sodium chloride    traZODone      OBJECTIVE    Vital Signs  Vitals:    07/20/23 2000 07/20/23 2100 07/21/23 0100 07/21/23 0500   BP:  107/46 103/54 106/55   BP Location:  Right arm Right arm Right arm   Patient Position:  Lying Lying Lying   Pulse: 61 63 52 54   Resp:  17 14 17   Temp:  97.9 °F (36.6 °C) 98 °F (36.7 °C) 98.3 °F (36.8 °C)   TempSrc:  Oral Oral Oral   SpO2:  95% 98% 98%   Weight:  81 kg (178 lb 9.2 oz)  81 kg (178 lb 9.2 oz)   Height:  167.6 cm (65.98\")         Flowsheet Rows      Flowsheet Row First Filed Value   Admission Height 167.6 cm (66\") Documented at 07/13/2023 0814   Admission Weight 57.2 kg (126 lb 1.7 oz) Documented at 07/13/2023 0814              Intake/Output Summary (Last 24 hours) at 7/21/2023 0810  Last data filed at 7/20/2023 1000  Gross per 24 hour   Intake 7000 ml   Output --   Net 7000 ml        "   Telemetry: Normal sinus rhythm, sinus bradycardia    Physical Exam:  The patient is alert, oriented and in no distress.  Vital signs as noted above.  Head and neck revealed no carotid bruits or jugular venous distention.  No thyromegaly or lymphadenopathy is present  Lungs clear.  No wheezing.  Breath sounds are normal bilaterally.  Heart normal first and second heart sounds.  No murmur. No precordial rub is present.  No gallop is present.  Abdomen soft and nontender.  No organomegaly is present.  Extremities with good peripheral pulses without any pedal edema.  Skin warm and dry.  Musculoskeletal system is grossly normal.  CNS grossly normal.       Results Review:  I have personally reviewed the results from the time of this admission to 7/21/2023 08:10 EDT and agree with these findings:  []  Laboratory  []  Microbiology  []  Radiology  []  EKG/Telemetry   []  Cardiology/Vascular   []  Pathology  []  Old records  []  Other:    Most notable findings include:    Lab Results (last 24 hours)       Procedure Component Value Units Date/Time    Flow Cytometry [[id number]] Collected: 07/18/23 1543    Specimen: Tissue Updated: 07/21/23 0752     Reference Lab Report --    Narrative:      See attached flow cytometry report from [facility].     Manual Differential [[id number]]  (Abnormal) Collected: 07/21/23 0045    Specimen: Blood Updated: 07/21/23 0223     Neutrophil % 86.0 %      Lymphocyte % 3.0 %      Monocyte % 6.0 %      Bands %  3.0 %      Metamyelocyte % 2.0 %      Neutrophils Absolute 19.40 10*3/mm3      Lymphocytes Absolute 0.65 10*3/mm3      Monocytes Absolute 1.31 10*3/mm3      RBC Morphology Normal     WBC Morphology Normal     Platelet Estimate Adequate     Large Platelets Slight/1+    CBC & Differential [[id number]]  (Abnormal) Collected: 07/21/23 0045    Specimen: Blood Updated: 07/21/23 0223    Narrative:      The following orders were created for panel order CBC & Differential.  Procedure                                Abnormality         Status                     ---------                               -----------         ------                     CBC Auto Differential[[id number]]        Abnormal            Final result               Scan Slide[[id number]]                                       Final result                 Please view results for these tests on the individual orders.    Scan Slide [[id number]] Collected: 07/21/23 0045    Specimen: Blood Updated: 07/21/23 0223     Scan Slide --     Comment: See Manual Differential Results       CBC Auto Differential [[id number]]  (Abnormal) Collected: 07/21/23 0045    Specimen: Blood Updated: 07/21/23 0223     WBC 21.80 10*3/mm3      RBC 3.11 10*6/mm3      Hemoglobin 8.9 g/dL      Hematocrit 28.6 %      MCV 92.0 fL      MCH 28.7 pg      MCHC 31.2 g/dL      RDW 22.3 %      RDW-SD 74.4 fl      MPV 7.4 fL      Platelets 216 10*3/mm3     Narrative:      The previously reported component NRBC is no longer being reported. Previous result was 0.1 /100 WBC (Reference Range: 0.0-0.2 /100 WBC) on 7/21/2023 at [facility] [place] EDT.    Hemoglobin A1c [[id number]]  (Abnormal) Collected: 07/21/23 0045    Specimen: Blood Updated: 07/21/23 0149     Hemoglobin A1C 4.70 %     Phosphorus [[id number]]  (Normal) Collected: 07/21/23 0045    Specimen: Blood Updated: 07/21/23 0128     Phosphorus 4.2 mg/dL     Magnesium [[id number]]  (Normal) Collected: 07/21/23 0045    Specimen: Blood Updated: 07/21/23 0125     Magnesium 2.2 mg/dL     Comprehensive Metabolic Panel [[id number]]  (Abnormal) Collected: 07/21/23 0045    Specimen: Blood Updated: 07/21/23 0125     Glucose 95 mg/dL      BUN 65 mg/dL      Creatinine 1.66 mg/dL      Sodium 139 mmol/L      Potassium 4.4 mmol/L      Chloride 103 mmol/L      CO2 28.0 mmol/L      Calcium 8.7 mg/dL      Total Protein 5.1 g/dL      Albumin 1.8 g/dL      ALT (SGPT) 13 U/L      AST (SGOT) 43 U/L      Alkaline Phosphatase 190 U/L      Total Bilirubin 0.5 mg/dL      Globulin  3.3 gm/dL      A/G Ratio 0.5 g/dL      BUN/Creatinine Ratio 39.2     Anion Gap 8.0 mmol/L      eGFR 32.0 mL/min/1.73     Narrative:      GFR Normal >60  Chronic Kidney Disease <60  Kidney Failure <15    The GFR formula is only valid for adults with stable renal function between ages 18 and 70.    Lipid Panel [[id number]]  (Abnormal) Collected: 07/21/23 0045    Specimen: Blood Updated: 07/21/23 0125     Total Cholesterol 91 mg/dL      Triglycerides 61 mg/dL      HDL Cholesterol 36 mg/dL      LDL Cholesterol  41 mg/dL      VLDL Cholesterol 14 mg/dL      LDL/HDL Ratio 1.19    Narrative:      Cholesterol Reference Ranges  (U.S. Department of Health and Human Services ATP III Classifications)    Desirable          <200 mg/dL  Borderline High    200-239 mg/dL  High Risk          >240 mg/dL      Triglyceride Reference Ranges  (U.S. Department of Health and Human Services ATP III Classifications)    Normal           <150 mg/dL  Borderline High  150-199 mg/dL  High             200-499 mg/dL  Very High        >500 mg/dL    HDL Reference Ranges  (U.S. Department of Health and Human Services ATP III Classifications)    Low     <40 mg/dl (major risk factor for CHD)  High    >60 mg/dl ('negative' risk factor for CHD)        LDL Reference Ranges  (U.S. Department of Health and Human Services ATP III Classifications)    Optimal          <100 mg/dL  Near Optimal     100-129 mg/dL  Borderline High  130-159 mg/dL  High             160-189 mg/dL  Very High        >189 mg/dL    Creatinine, Total Protein , Electrophoresis & Immunofixation 24 Hr Urine - Urine, Clean Catch [[id number]] Collected: 07/20/23 2059    Specimen: Urine, Clean Catch Updated: 07/20/23 2106    Immunoglobulin Free LT Chains Blood [[id number]]  (Abnormal) Collected: 07/18/23 1853    Specimen: Blood Updated: 07/20/23 1709     Free Light Chain, Kappa 135.1 mg/L      Free Lambda Light Chains 111.2 mg/L      Kappa/Lambda Ratio 1.21    Narrative:      Performed at:  [address]  [id number]  : Edward Ulloa PhD, Phone:  [phone]    Body Fluid Culture - Body Fluid, Knee, Right [[id number]]  (Abnormal) Collected: 07/18/23 1559    Specimen: Body Fluid from Knee, Right Updated: 07/20/23 0902     Body Fluid Culture Heavy growth (4+) Staphylococcus aureus     Gram Stain Many (4+) WBCs per low power field      Many (4+) Gram positive cocci in pairs    Manual Differential [[id number]]  (Abnormal) Collected: 07/20/23 0729    Specimen: Blood Updated: 07/20/23 0858     Neutrophil % 65.0 %      Lymphocyte % 5.0 %      Monocyte % 12.0 %      Bands %  12.0 %      Metamyelocyte % 5.0 %      Atypical Lymphocyte % 1.0 %      Neutrophils Absolute 16.25 10*3/mm3      Lymphocytes Absolute 1.27 10*3/mm3      Monocytes Absolute 2.53 10*3/mm3      Anisocytosis Slight/1+     Poikilocytes Slight/1+     Polychromasia Slight/1+     Spherocytes Slight/1+     WBC Morphology Normal     Platelet Morphology Normal    CBC & Differential [[id number]]  (Abnormal) Collected: 07/20/23 0729    Specimen: Blood Updated: 07/20/23 0858    Narrative:      The following orders were created for panel order CBC & Differential.  Procedure                               Abnormality         Status                     ---------                               -----------         ------                     CBC Auto Differential[[id number]]        Abnormal            Final result               Scan Slide[[id number]]                                       Final result                 Please view results for these tests on the individual orders.    Scan Slide [[id number]] Collected: 07/20/23 0729    Specimen: Blood Updated: 07/20/23 0858     Scan Slide --     Comment: See Manual Differential Results       CBC Auto Differential [[id number]]  (Abnormal) Collected: 07/20/23 0729    Specimen: Blood Updated: 07/20/23 0858     WBC 21.10 10*3/mm3      RBC 3.60 10*6/mm3      Hemoglobin 10.5 g/dL       Hematocrit 33.6 %      MCV 93.2 fL      MCH 29.1 pg      MCHC 31.3 g/dL      RDW 22.6 %      RDW-SD 73.1 fl      MPV 7.5 fL      Platelets 260 10*3/mm3     Narrative:      The previously reported component NRBC is no longer being reported. Previous result was 0.0 /100 WBC (Reference Range: 0.0-0.2 /100 WBC) on 7/20/2023 at 0815 EDT.    Renal Function Panel [[id number]]  (Abnormal) Collected: 07/20/23 0729    Specimen: Blood Updated: 07/20/23 0841     Glucose 119 mg/dL      BUN 62 mg/dL      Creatinine 1.51 mg/dL      Sodium 140 mmol/L      Potassium 4.1 mmol/L      Chloride 103 mmol/L      CO2 30.0 mmol/L      Calcium 8.7 mg/dL      Albumin 2.1 g/dL      Phosphorus 1.9 mg/dL      Anion Gap 7.0 mmol/L      BUN/Creatinine Ratio 41.1     eGFR 35.9 mL/min/1.73     Narrative:      GFR Normal >60  Chronic Kidney Disease <60  Kidney Failure <15    The GFR formula is only valid for adults with stable renal function between ages 18 and 70.    TSH [[id number]]  (Normal) Collected: 07/20/23 0729    Specimen: Blood Updated: 07/20/23 0839     TSH 2.270 uIU/mL     Protime-INR [[id number]]  (Abnormal) Collected: 07/20/23 0729    Specimen: Blood Updated: 07/20/23 0827     Protime 12.2 Seconds      INR 1.15    aPTT [[id number]]  (Abnormal) Collected: 07/20/23 0729    Specimen: Blood Updated: 07/20/23 0827     PTT 31.2 seconds             Imaging Results (Last 24 Hours)       ** No results found for the last 24 hours. **            LAB RESULTS (LAST 7 DAYS)    CBC  Results from last 7 days   Lab Units 07/21/23  0045 07/20/23  0729 07/19/23  0310 07/18/23  0125 07/17/23  1859 07/17/23  1114 07/16/23  0630 07/14/23  2234   WBC 10*3/mm3 21.80* 21.10* 16.30* 11.90*  --  13.10* 11.20* 9.80   RBC 10*6/mm3 3.11* 3.60* 3.15* 2.87*  --  2.37* 2.47* 2.40*   HEMOGLOBIN g/dL 8.9* 10.5* 9.4* 8.3* 9.1* 7.4* 7.7* 7.5*   HEMATOCRIT % 28.6* 33.6* 29.3* 26.1* 28.3* 23.8* 24.1* 23.5*   MCV fL 92.0 93.2 93.0 90.8  --  100.4* 97.5* 97.8*   PLATELETS 10*3/mm3  216 260 243 215  --  238 219 206       BMP  Results from last 7 days   Lab Units 07/21/23  0045 07/20/23  0729 07/19/23  0310 07/18/23  0125 07/17/23  1114 07/16/23  0152 07/14/23  2234   SODIUM mmol/L 139 140 141 141 140 140 138   POTASSIUM mmol/L 4.4 4.1 4.3 3.4* 3.7 4.3 4.4   CHLORIDE mmol/L 103 103 108* 109* 110* 111* 109*   CO2 mmol/L 28.0 30.0* 23.0 21.0* 21.0* 15.0* 17.0*   BUN mg/dL 65* 62* 60* 65* 75* 72* 75*   CREATININE mg/dL 1.66* 1.51* 1.62* 1.72* 2.18* 2.30* 2.69*   GLUCOSE mg/dL 95 119* 151* 101* 101* 96 112*   MAGNESIUM mg/dL 2.2  --   --   --  2.1  --   --    PHOSPHORUS mg/dL 4.2 1.9* 2.3* 3.1  --   --   --        CMP   Results from last 7 days   Lab Units 07/21/23  0045 07/20/23  0729 07/19/23  0310 07/18/23  0125 07/17/23  1114 07/16/23  0152 07/14/23  2234   SODIUM mmol/L 139 140 141 141 140 140 138   POTASSIUM mmol/L 4.4 4.1 4.3 3.4* 3.7 4.3 4.4   CHLORIDE mmol/L 103 103 108* 109* 110* 111* 109*   CO2 mmol/L 28.0 30.0* 23.0 21.0* 21.0* 15.0* 17.0*   BUN mg/dL 65* 62* 60* 65* 75* 72* 75*   CREATININE mg/dL 1.66* 1.51* 1.62* 1.72* 2.18* 2.30* 2.69*   GLUCOSE mg/dL 95 119* 151* 101* 101* 96 112*   ALBUMIN g/dL 1.8* 2.1* 2.1* 2.1* 1.9*  --   --    BILIRUBIN mg/dL 0.5  --  0.5  --  0.3  --   --    ALK PHOS U/L 190*  --  204*  --  137*  --   --    AST (SGOT) U/L 43*  --  47*  --  26  --   --    ALT (SGPT) U/L 13  --  17  --  15  --   --        BNP        TROPONIN        CoAg  Results from last 7 days   Lab Units 07/20/23  0729 07/17/23  1114 07/14/23  2234   INR  1.15* 1.25* 1.28*   APTT seconds 31.2* 47.0* 33.1*       Creatinine Clearance  Estimated Creatinine Clearance: 31.4 mL/min (A) (by C-G formula based on SCr of 1.66 mg/dL (H)).    ABG        Radiology  XR Chest 1 View    Result Date: 7/20/2023  Impression: New medial left basilar consolidation and new hazy and patchy airspace disease in both lungs. These findings could relate to edema or pneumonia Electronically Signed: Nayan Juárez   7/20/2023 6:18 AM EDT  Workstation ID: [id number]       EKG  I personally viewed and interpreted the patient's EKG/Telemetry data:  ECG 12 Lead Tachycardia   Final Result   HEART RATE= 130  bpm   RR Interval= 463  ms   AZ Interval=   ms   P Horizontal Axis=   deg   P Front Axis=   deg   QRSD Interval= 92  ms   QT Interval= 346  ms   QRS Axis= 123  deg   T Wave Axis= -28  deg   - ABNORMAL ECG -   Atrial fibrillation   Right axis deviation   Low voltage, precordial leads   Nonspecific repol abnormality, diffuse leads   Prolonged QT interval   When compared with ECG of 13-Jul-2023 23:05:47,   Significant axis, voltage or hypertrophy change   Electronically Signed By: Perico Perez (DEE DEE) 20-Jul-2023 18:53:10   Date and Time of Study: 2023-07-20 00:08:40      ECG 12 Lead Rhythm Change   Final Result   HEART RATE= 111  bpm   RR Interval= 505  ms   AZ Interval= Failed  ms   P Horizontal Axis= Invalid  deg   P Front Axis= Invalid  deg   QRSD Interval= 97  ms   QT Interval= 342  ms   QRS Axis= -24  deg   T Wave Axis= 174  deg   - ABNORMAL ECG -   Atrial flutter with predominant 2:1 AV block   Low voltage, precordial leads   Electronically Signed By: Rafael De Luna (DEE DEE) 14-Jul-2023 05:59:38   Date and Time of Study: 2023-07-13 23:05:47      ECG 12 Lead Tachycardia   Final Result   HEART RATE= 102  bpm   RR Interval= 586  ms   AZ Interval=   ms   P Horizontal Axis=   deg   P Front Axis=   deg   QRSD Interval= 96  ms   QT Interval= 322  ms   QRS Axis= -21  deg   T Wave Axis= 92  deg   - ABNORMAL ECG -   Atrial fibrillation   Low voltage, precordial leads   When compared with ECG of 08-Jul-2023 13:28:01,   Significant repolarization change   Significant axis, voltage or hypertrophy change   Electronically Signed By: Perico Perez (DEE DEE) 13-Jul-2023 21:42:20   Date and Time of Study: 2023-07-13 09:05:43      SCANNED - TELEMETRY     Final Result      SCANNED - TELEMETRY     Final Result      SCANNED - TELEMETRY     Final Result       SCANNED - TELEMETRY     Final Result      SCANNED - TELEMETRY     Final Result      SCANNED - TELEMETRY     Final Result      SCANNED - TELEMETRY     Final Result      SCANNED - TELEMETRY     Final Result      SCANNED - TELEMETRY     Final Result      SCANNED - TELEMETRY     Final Result      SCANNED - TELEMETRY     Final Result      SCANNED - TELEMETRY     Final Result      SCANNED - TELEMETRY     Final Result      SCANNED - TELEMETRY     Final Result      SCANNED - TELEMETRY     Final Result      SCANNED - TELEMETRY     Final Result      SCANNED - TELEMETRY     Final Result      SCANNED - TELEMETRY     Final Result      SCANNED - TELEMETRY     Final Result      SCANNED - TELEMETRY     Final Result      SCANNED - TELEMETRY     Final Result      SCANNED - TELEMETRY     Final Result      SCANNED - TELEMETRY     Final Result      SCANNED - TELEMETRY     Final Result      SCANNED - TELEMETRY     Final Result      SCANNED - TELEMETRY     Final Result      SCANNED - TELEMETRY     Final Result      SCANNED - TELEMETRY     Final Result      SCANNED - TELEMETRY     Final Result      SCANNED - TELEMETRY     Final Result            Echocardiogram:          Stress Test:  Results for orders placed during the hospital encounter of 06/26/23    Stress Test With Myocardial Perfusion One Day    Interpretation Summary  Indications  Syncope    This study was performed under the direct supervision of Kacie NAYLOR.    Resting ECG  Sinus rhythm    The patient was injected with Lexiscan intravenously while constantly monitoring electrocardiogram and vital signs.  Patient did not have any chest discomfort ST abnormalities or ectopy with injection of Lexiscan.    Cardiolite was used as an imaging agent.    Cardiolite images showed uniform distribution of radionuclide without any evidence for myocardial ischemia.    Gated SPECT images revealed normal left ventricle size and contractility with ejection fraction of  74%.    Impression  ========  Lexiscan Cardiolite test is negative for myocardial ischemia.    Gated SPECT images revealed normal left ventricular size and contractility with ejection fraction of 74%.         Cardiac Catheterization:  No results found for this or any previous visit.         Other:         ASSESSMENT & PLAN:    Active Problems:    JOSÉ (acute kidney injury)    Bacteremia due to Streptococcus    Cellulitis of right leg    Knee joint cyst, right    Bilateral leg edema    UTI due to Klebsiella species    Dysphagia    Feeding difficulties    Paroxysmal atrial fibrillation    Sarcoidosis    Metabolic acidosis    Gastroesophageal reflux disease    Essential hypertension    Hyperlipidemia    Hypothyroidism    Moderate malnutrition    JOSÉ (acute kidney injury)  Metabolic acidosis  Fluids/creatinine of 65/1.66  Diuretics per nephrology  Nephrology team is following  Closely monitor BMPs     Bacteremia due to Streptococcus  Currently on ceftriaxone with plans to switch to Keflex at the time of discharge     Cellulitis of right leg  Knee joint cyst, right  Fluid culture growing Staph aureus  Continue ceftriaxone  Plan is to switch to Keflex at the time of discharge     Bilateral leg edema  Give 1 dose of IV Lasix.  Monitor I's and O's and replace electrolytes as needed.  Recent echocardiogram showed preserved LV function, normal diastolic function, mild to moderate aortic stenosis, mild mitral regurgitation.  She also has moderate bilateral pleural effusions and small pericardial effusion along with a small amount of ascites     UTI due to Klebsiella species  Currently on ceftriaxone  ID is following     Dysphagia  Feeding difficulties  Followed by GI  Patient is refusing replacement of NG tube     Paroxysmal atrial fibrillation with RVR  [id number] score is 6.  Continue Cardizem drip.  Start p.o. medications once patient is able to tolerate.  Resume Eliquis when patient is able to tolerate oral  medication.  Anticoagulation currently on hold.  Currently in sinus rhythm.  Sinus bradycardia  Metoprolol tartrate 25 mg as needed heart rate more than 120 bpm  Avoid standing doses of AV danyelle blocking agents due to bradycardia     Possible Sarcoidosis  Remains on steroid, methotrexate has been discontinued  Plan to follow-up [facility]     Gastroesophageal reflux disease  Continue PPI     Essential hypertension, chronic  Blood pressure has been borderline low and oral antihypertensives are currently on hold  Cardizem drip has been discontinued due to bradycardia     Hyperlipidemia  Started high intensity statin  LDL 41, HDL 36, triglycerides 61, total cholesterol 91     Hypothyroidism  TSH is 2.2  Continue Synthroid    Confusion/altered mental state  Patient has consistently expressed wishes to not take medications and wanting to die.  Psychiatry is on board.  Currently decision making capacity is being assessed.  Altered mental state/confusion may be secondary to underlying infectious etiology.    Perico Perez MD  07/21/23  08:10 EDT

## 2023-07-21 NOTE — PROGRESS NOTES
Hematology/Oncology Inpatient Progress Note    PATIENT NAME: Rosario Ortez  : 1950  MRN: [id number]    CHIEF COMPLAINT: IgG kappa monoclonal gammopathy, iron deficiency anemia and anemia of chronic disease due to CKD    HISTORY OF PRESENT ILLNESS:  73 y.o. female presented to [facility] ER on 2023 upon the direction of her primary care provider for abnormal labs. She had seen her PCP the day prior and was started on doxycycline for bronchitis and was found to have persistent anemia with a hemoglobin of 8.5 and creatinine 1.45.  SPEP was abnormal.  Prior to admission, in May 2023, she was seen in follow-up by Dr. Flores for recurrent ascites and possible sarcoidosis.  She was on methotrexate with folic acid and dexamethasone.  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  Her ACE level was elevated at 109 and it was felt that she may have sarcoidosis.  Ascitic fluid in 2023, was transudate with negative cytology.  Her Lasix was increased.  Her hepatitis panel and autoimmune work-up was negative in the past.  It was recommended that she undergo a liver biopsy when her ascites improved.  She was referred to the [facility] and had an appointment on 23.  Discharged from [facility] on 2023, following a 3-day admission for syncope and JOSÉ.  Cardiology was consulted. Stress test was negative with a EF of 74%.  Bilateral lower extremity venous Dopplers were negative.  Stool heme was positive,  (135-214) and  PTH 53.2 (15-65). CMP remarkable for creatinine of 1.37 and AST of 37 (1-33).  White count 4.6, hemoglobin 8.2, MCV 95, platelets 210,000.  Iron studies revealed an iron saturation of 28% (20-50), ferritin of 220 (), and TIBC 189 (298-536).  Iron was 53 ().  Vitamin B12 873 (211-946). Paracentesis was done with removal of 6.5 L of clear yellow fluid.  She stopped taking methotrexate shortly after she was  discharged.  In the ER, CMP remarkable for creatinine of 2.79, albumin 2.6 and AST 40.  White count was 12.6, hemoglobin 8.7, MCV 97.3 and platelets 254,000.  PT 13.5, PTT 33.1, fibrinogen 253 (210-450).  Repeat iron studies showed an iron of 19 (), iron saturation 11 (20-50), TIBC 170 (298-536), and ferritin of 216 (13-1 50).  IgA 243 (), IgG 905 (580-1602), and IgM 35 ().  CXR was unremarkable.  She reported pain in her right knee and MRI showed prominent diffuse edema in the soft tissue surrounding the knee suggesting a skin and soft tissue infection.  There was a 2.8 x 0.80 x 2.5 cm collection in the subcu tissues along the medial femoral condyle which was nonspecific but could represent an abscess.  There was no significant knee effusion or significant marrow edema to suggest joint infection.  There was advanced right compartmental osteoarthritis and suspicion for tears of the posterior horns of the menisci was seen.  Urine culture grew Klebsiella pneumonia and blood culture from 1 bottle grew alphahemolytic strep.  SIFE showed IgG kappa monoclonal gammopathy.  Haptoglobin 173 ().  Surgery, GI, nephrology, orthopedics and infectious disease were consulted.  On 7/15/2023, her stool heme was positive and ESR was 11 (0-30).  She was transfused 1 unit pRBCs on 7/17/2023 for hemoglobin drop to 7.4.  On the day of consultation, her white count was 11.9, hemoglobin 8.3, MCV 90.8 and platelets 215.  Chemistries were significant for potassium 3.4 and creatinine 1.72.  PT 13.2 (9.6-11.7), and PTT 47 (24-31).  It was planned that she undergo an EGD and have an aspirate performed on her presumed right knee abscess.     07/18/23  Hematology/Oncology was consulted for her IgG kappa monoclonal gammopathy.     Past Medical History: CKD, anemia.  Hypothyroidism, A-fib 2021.  Hyperlipidemia and hypertension for years. Bilateral lower extremity neuropathy.  Surgical History: Tonsillectomy and appendectomy.   Cholecystectomy 2006.  Possible hysterectomy.  EGD and colonoscopy 2023.  Social History: She lives in [place] with her .  She used to work as a .  No history of smoking or alcohol abuse.  Family History: Her sister had breast cancer at age 75.  Allergies: Codeine causes nausea.  Topiramate causes a rash.  Tramadol causes mental status changes.  Cefdinir, Levaquin and lisinopril.     PCP: Ian Cordero MD    INTERVAL HISTORY:  7/18/2023-Ferrlecit 250 mg IV x1.  Patient underwent right knee aspiration and bone marrow aspiration per IR.  EGD performed by Dr. Flores which was normal and Dobbhoff tube was placed.  7/19/2023-White count 16.3, hemoglobin 9.4, MCV 93 and platelets 243.  Creatinine 1.62.  AST 47 (1-32).  Bone survey showed an old T6 compression fracture only.  Right knee aspirate with many gram-positive cocci in pairs.  Spot urine WILL showed no monoclonal protein.  7/20/2023-white count 21.1, hemoglobin 10.5.  PT 12.2 (9.6-11.7), PTT 31.2 (24-31).  Urine culture grew Klebsiella aerogenes.  Right knee aspirate growing Staph aureus.  Palliative care consulted and patient stated she wanted to be a DNR although her  feels she is not capable of making her own decisions.  Psychiatry was consulted.  Pulmonary and cardiology were consulted as well.  Chest x-ray showed new medial left basilar consolidation and new hazy and patchy air.  Transferred to PCU.  7/21/2023-hemoglobin 8.9.  Kappa lambda light chain ratio 1.21 (0.26-1.65).  Bone marrow flow cytometry showed no plasma cells.  There was partial CD56 expression on granulocytic and monocytic cells which is a nonspecific finding that can be seen in both reactive/regenerative processes as well as neoplastic processes.  TSH 2.27 (0.27-4.2).  Reviewed office note from  Rheumatology.  Pt. was last seen on 6/22/23.  In 5/2023, ACE was 101 (<82), CBC/SPEP and ESR were unremarkable(values not mentioned). Pt. Was reporting tremors  and difficulty walking.  She was referred to the [facility] for possible sarcoidosis and started on MTX 10 mg and Prednisone.  When she was seen on 6/22/23, she reported no major changes in her symptoms.  She was tolerating MTX and prednisone.  She had not scheduled the [facility] as she had been on vacation, but was going to do so.      History of present illness reviewed since last visit and changes noted on 07/21/23.    Subjective   no complaints at this time.  Per nursing she is not taking p.o. and refusing to turn.    ROS:  Review of Systems   Constitutional:  Negative for activity change, chills, fatigue, fever and unexpected weight change.   HENT:  Negative for congestion, dental problem, hearing loss, mouth sores, nosebleeds, sore throat and trouble swallowing.    Eyes:  Negative for photophobia and visual disturbance.   Respiratory:  Positive for cough and shortness of breath. Negative for apnea, chest tightness and stridor.    Cardiovascular:  Negative for chest pain, palpitations and leg swelling.   Gastrointestinal:  Negative for abdominal distention, abdominal pain, blood in stool, constipation, diarrhea, nausea and vomiting.   Endocrine: Negative for cold intolerance and heat intolerance.   Genitourinary:  Negative for decreased urine volume, difficulty urinating, frequency, hematuria and urgency.   Musculoskeletal:  Negative for arthralgias and gait problem.   Skin:  Negative for rash and wound.   Neurological:  Negative for dizziness, tremors, seizures, weakness, light-headedness, numbness and headaches.   Hematological:  Negative for adenopathy. Does not bruise/bleed easily.   Psychiatric/Behavioral:  Negative for confusion and hallucinations. The patient is not nervous/anxious.    All other systems reviewed and are negative.     MEDICATIONS:    Scheduled Meds:  apixaban, 2.5 mg, Oral, Q12H  atorvastatin, 20 mg, Oral, Nightly  cefTRIAXone, 2,000 mg, Intravenous, Q24H  ferric gluconate,  "250 mg, Intravenous, Once  folic acid, 1 mg, Oral, Daily  levothyroxine, 50 mcg, Oral, Q AM  pantoprazole, 40 mg, Oral, Daily  predniSONE, 15 mg, Oral, Daily  senna-docusate sodium, 2 tablet, Oral, BID  sodium bicarbonate, 1,300 mg, Oral, TID  sodium chloride, 10 mL, Intravenous, Q12H       Continuous Infusions:  dilTIAZem, 5-15 mg/hr, Last Rate: Stopped (07/21/23 0100)       PRN Meds:    acetaminophen    senna-docusate sodium **AND** polyethylene glycol **AND** bisacodyl **AND** bisacodyl    Calcium Replacement - Follow Nurse / BPA Driven Protocol    Lidocaine Viscous HCl    Magnesium Standard Dose Replacement - Follow Nurse / BPA Driven Protocol    ondansetron    Phosphorus Replacement - Follow Nurse / BPA Driven Protocol    Potassium Replacement - Follow Nurse / BPA Driven Protocol    [COMPLETED] Insert Peripheral IV **AND** sodium chloride    sodium chloride    sodium chloride    sodium chloride    traZODone     ALLERGIES:    Allergies   Allergen Reactions    Codeine Nausea And Vomiting, Other (See Comments) and Nausea Only     nausea  nausea      Topiramate Rash    Tramadol Other (See Comments) and Mental Status Change    Cefdinir Unknown - High Severity    Levofloxacin Nausea Only    Lisinopril Cough       Objective    VITALS:   /45   Pulse 58   Temp 97.6 °F (36.4 °C) (Oral)   Resp 18   Ht 167.6 cm (65.98\")   Wt 81 kg (178 lb 9.2 oz)   SpO2 99%   BMI 28.84 kg/m²     PHYSICAL EXAM:  Physical Exam  Vitals and nursing note reviewed.   Constitutional:       General: She is not in acute distress.     Appearance: Normal appearance. She is well-developed and normal weight. She is ill-appearing. She is not diaphoretic.      Comments: Mild lethargy.   HENT:      Head: Normocephalic and atraumatic.      Nose: Nose normal.      Comments: Oxygen per nasal cannula.     Mouth/Throat:      Mouth: Mucous membranes are moist.      Pharynx: Oropharynx is clear. No oropharyngeal exudate or posterior oropharyngeal " erythema.      Comments: Dental fillings.  Eyes:      General: No scleral icterus.     Extraocular Movements: Extraocular movements intact.      Conjunctiva/sclera: Conjunctivae normal.      Pupils: Pupils are equal, round, and reactive to light.   Cardiovascular:      Rate and Rhythm: Regular rhythm. Bradycardia present.      Heart sounds: Normal heart sounds. No murmur heard.     Comments: Cardiac monitor leads.  Pulmonary:      Effort: Pulmonary effort is normal. No respiratory distress.      Breath sounds: Normal breath sounds. No stridor. No wheezing or rales.   Abdominal:      General: Bowel sounds are normal. There is no distension.      Palpations: Abdomen is soft. There is no mass.      Tenderness: There is no abdominal tenderness. There is no guarding.   Genitourinary:     Comments: Barroso catheter.  Musculoskeletal:         General: Swelling (1+ bilateral lower extremity edema.) and deformity (Generative changes of joints.) present. No tenderness. Normal range of motion.      Cervical back: Normal range of motion and neck supple.      Right lower leg: No edema.      Left lower leg: No edema.      Comments: Left upper extremity O2 monitor.    Lymphadenopathy:      Cervical: No cervical adenopathy.      Upper Body:      Right upper body: No supraclavicular adenopathy.      Left upper body: No supraclavicular adenopathy.   Skin:     General: Skin is warm and dry.      Coloration: Skin is not pale.      Findings: No bruising, erythema or rash.   Neurological:      General: No focal deficit present.      Mental Status: She is oriented to person, place, and time.      Coordination: Coordination normal.   Psychiatric:         Mood and Affect: Mood normal.         Behavior: Behavior normal.         Thought Content: Thought content normal.         Judgment: Judgment normal.         RECENT LABS:  Lab Results (last 24 hours)       Procedure Component Value Units Date/Time    Body Fluid Culture - Body Fluid, Knee,  Right [[id number]]  (Abnormal)  (Susceptibility) Collected: 07/18/23 1559    Specimen: Body Fluid from Knee, Right Updated: 07/21/23 0815     Body Fluid Culture Heavy growth (4+) Staphylococcus aureus     Gram Stain Many (4+) WBCs per low power field      Many (4+) Gram positive cocci in pairs    Susceptibility        Staphylococcus aureus      ISELA      Gentamicin Susceptible      Oxacillin Susceptible      Rifampin Susceptible      Vancomycin Susceptible                       Susceptibility Comments       Staphylococcus aureus    This isolate does not demonstrate inducible clindamycin resistance in vitro.                 Flow Cytometry [[id number]] Collected: 07/18/23 1543    Specimen: Tissue Updated: 07/21/23 0752     Reference Lab Report --    Narrative:      [facility] flow [facility].     Manual Differential [[id number]]  (Abnormal) Collected: 07/21/23 0045    Specimen: Blood Updated: 07/21/23 0223     Neutrophil % 86.0 %      Lymphocyte % 3.0 %      Monocyte % 6.0 %      Bands %  3.0 %      Metamyelocyte % 2.0 %      Neutrophils Absolute 19.40 10*3/mm3      Lymphocytes Absolute 0.65 10*3/mm3      Monocytes Absolute 1.31 10*3/mm3      RBC Morphology Normal     WBC Morphology Normal     Platelet Estimate Adequate     Large Platelets Slight/1+    CBC & Differential [[id number]]  (Abnormal) Collected: 07/21/23 0045    Specimen: Blood Updated: 07/21/23 0223    Narrative:      The following orders were created for panel order CBC & Differential.  Procedure                               Abnormality         Status                     ---------                               -----------         ------                     CBC Auto Differential[[id number]]        Abnormal            Final result               Scan Slide[[id number]]                                       Final result                 Please view results for these tests on the individual orders.    Scan Slide [[id number]] Collected:  07/21/23 0045    Specimen: Blood Updated: 07/21/23 0223     Scan Slide --     Comment: See Manual Differential Results       CBC Auto Differential [[id number]]  (Abnormal) Collected: 07/21/23 0045    Specimen: Blood Updated: 07/21/23 0223     WBC 21.80 10*3/mm3      RBC 3.11 10*6/mm3      Hemoglobin 8.9 g/dL      Hematocrit 28.6 %      MCV 92.0 fL      MCH 28.7 pg      MCHC 31.2 g/dL      RDW 22.3 %      RDW-SD 74.4 fl      MPV 7.4 fL      Platelets 216 10*3/mm3     Narrative:      The previously reported component NRBC is no longer being reported. Previous result was 0.1 /100 WBC (Reference Range: 0.0-0.2 /100 WBC) on 7/21/2023 at [facility] EDT.    Hemoglobin A1c [[id number]]  (Abnormal) Collected: 07/21/23 0045    Specimen: Blood Updated: 07/21/23 0149     Hemoglobin A1C 4.70 %     Phosphorus [[id number]]  (Normal) Collected: 07/21/23 0045    Specimen: Blood Updated: 07/21/23 0128     Phosphorus 4.2 mg/dL     Magnesium [[id number]]  (Normal) Collected: 07/21/23 0045    Specimen: Blood Updated: 07/21/23 0125     Magnesium 2.2 mg/dL     Comprehensive Metabolic Panel [[id number]]  (Abnormal) Collected: 07/21/23 0045    Specimen: Blood Updated: 07/21/23 0125     Glucose 95 mg/dL      BUN 65 mg/dL      Creatinine 1.66 mg/dL      Sodium 139 mmol/L      Potassium 4.4 mmol/L      Chloride 103 mmol/L      CO2 28.0 mmol/L      Calcium 8.7 mg/dL      Total Protein 5.1 g/dL      Albumin 1.8 g/dL      ALT (SGPT) 13 U/L      AST (SGOT) 43 U/L      Alkaline Phosphatase 190 U/L      Total Bilirubin 0.5 mg/dL      Globulin 3.3 gm/dL      A/G Ratio 0.5 g/dL      BUN/Creatinine Ratio 39.2     Anion Gap 8.0 mmol/L      eGFR 32.0 mL/min/1.73     Narrative:      GFR Normal >60  Chronic Kidney Disease <60  Kidney Failure <15    The GFR formula is only valid for adults with stable renal function between ages 18 and 70.    Lipid Panel [[id number]]  (Abnormal) Collected: 07/21/23 0045    Specimen: Blood Updated: 07/21/23 0125     Total Cholesterol 91  mg/dL      Triglycerides 61 mg/dL      HDL Cholesterol 36 mg/dL      LDL Cholesterol  41 mg/dL      VLDL Cholesterol 14 mg/dL      LDL/HDL Ratio 1.19    Narrative:      Cholesterol Reference Ranges  (U.S. Department of Health and Human Services ATP III Classifications)    Desirable          <200 mg/dL  Borderline High    200-239 mg/dL  High Risk          >240 mg/dL      Triglyceride Reference Ranges  (U.S. Department of Health and Human Services ATP III Classifications)    Normal           <150 mg/dL  Borderline High  150-199 mg/dL  High             200-499 mg/dL  Very High        >500 mg/dL    HDL Reference Ranges  (U.S. Department of Health and Human Services ATP III Classifications)    Low     <40 mg/dl (major risk factor for CHD)  High    >60 mg/dl ('negative' risk factor for CHD)        LDL Reference Ranges  (U.S. Department of Health and Human Services ATP III Classifications)    Optimal          <100 mg/dL  Near Optimal     100-129 mg/dL  Borderline High  130-159 mg/dL  High             160-189 mg/dL  Very High        >189 mg/dL    Creatinine, Total Protein , Electrophoresis & Immunofixation 24 Hr Urine - Urine, Clean Catch [[id number]] Collected: 07/20/23 2059    Specimen: Urine, Clean Catch Updated: 07/20/23 2106    Immunoglobulin Free LT Chains Blood [[id number]]  (Abnormal) Collected: 07/18/23 1853    Specimen: Blood Updated: 07/20/23 1709     Free Light Chain, Kappa 135.1 mg/L      Free Lambda Light Chains 111.2 mg/L      Kappa/Lambda Ratio 1.21    Narrative:      Performed at:  [address] [place] [address]  [id number]  : Edward Ulloa PhD, Phone:  [phone]            PENDING RESULTS: 24-hour UIFE, bone marrow pathology and cytogenetics, and copper.    IMAGING REVIEWED:  XR Chest 1 View    Result Date: 7/20/2023  Impression: New medial left basilar consolidation and new hazy and patchy airspace disease in both lungs. These findings could relate to edema or pneumonia  Electronically Signed: Nayan Juárez  7/20/2023 6:18 AM EDT  Workstation ID: [id number]     I have reviewed the patient's labs, imaging, reports, and other clinician documentation.    Assessment & Plan   ASSESSMENT:  IgG kappa monoclonal gammopathy-incidental finding on work-up for CKD and anemia.  Spot urine WILL with no gammopathy.  Creatinine stable. Bone survey showed an old T6 fracture.  Immunoglobulins and light chain ratio both normal.  Bone marrow aspiration performed 7/18-awaiting results.  24-hour UIFE pending.  On prednisone as an outpatient for treatment for possible sarcoidosis, which was continued.  Anemia and OLAF-onset June 2023, associated with iron deficiency and heme positive stools, CKD, monoclonal gammopathy, and recent methotrexate use.  On folic acid and Protonix.  S/p 3 doses of Ferrlecit 250 mg each and 1 unit pRBCs (7/17).  Prior work-up revealed normal B12 and haptoglobin.  Retic was suppressed.  EGD was unremarkable.  Copper level pending. Stable.    Recurrent ascites, liver disease, chronic epigastric pain, noncaseating granuloma of the stomach, acute coagulopathy and elevated LFTs- in work-up for sarcoidosis per Dr. Flores.  She has been referred to the [facility] by  Rheumatology (records reviewed).  Acute hepatitis panel, platelets and fibrinogen all normal.  Mildly elevated AST.   No esophageal varices on recent EGD. On steroids. Coags improved.   CKD with electrolyte imbalances, A-fib, hypertension, hyperlipidemia and hypothyroidism-On levothyroxine and TSH normal.  Per PMD and nephrology.  K. Aerogenes UTI, right knee S. Aureus cellulitis and leukocytosis-on Rocephin per ID.    Depression-psychiatry has been consulted.  Current infections clouding evaluation.    PLAN  Obtain SPEP from primary care provider as outpatient.  24-hour UIFE pending.  Bone marrow aspiration and biopsy path/cytogenetics pending.   Copper level pending.  DEXA scan as outpatient.  Colonoscopy as  outpatient.    Patient seen and evaluated by Dr. Ayala.  Electronically signed by JOE Goddard, 07/21/23, 10:23 AM EDT.        I have personally performed a face-to-face diagnostic evaluation on this patient. I have performed a complete history and physical examination, reviewed laboratory studies, and radiographic examinations.  I have completed the majority and substantive portion of the medical decision making.  I have formulated the assessment on this patient and the plan of action as noted above. I have discussed the case with Raquel Guevara NP, have edited/reviewed the note, and agree with the care plan.  She claims to be feeling all right today.  On examination, deformities of joints are present.  WBCs 21.8 and hemoglobin 8.9.  She was transferred to the PCU with A-fib.  IV iron has been replaced.  Await bone marrow results.      I discussed the patient's findings and my recommendations with patient, spouse and nursing.    Part of this note may be an electronic transcription/translation of spoken language to printed text using the Dragon Dictation System.    Electronically signed by Rc Ayala MD, 07/21/23, 2:05 PM EDT.

## 2023-07-21 NOTE — THERAPY TREATMENT NOTE
Subjective: Pt agreeable to therapeutic plan of care. Pt with spouse at bedside. Pt c/o fatigue. NSG notified OT that pt sat edge of bed x10 minutes this morning.     Cognition: arousal/alertness: Alert and Attentive    Objective:     Bed Mobility: Max-A and Assist x 2   Functional Transfers: N/A or Not attempted.     Balance: sitting EOB, supported, and dynamic CGA and Min-A  Functional Ambulation: N/A or Not attempted.    Lower Body Dressing: Max-A  ADL Position: supine  ADL Comments: don/doff socks     Therapeutic Exercise - 10 Reps B UE AROM unsupported sitting / EOB - min A/CGA for dynamic seated balance     Vitals: WNL  5L HF   Pain: pt did not give numeric value Location:  states pt reporting of discomfort in buttocks   Interventions for pain: Repositioned, Increased Activity, and Therapeutic Presence, repositioned on L side   Education: Provided education on the importance of mobility in the acute care setting and Verbal/Tactile Cues      Assessment: Rosario Ortez presents with ADL impairments affecting function including balance, endurance / activity tolerance, postural / trunk control, range of motion (ROM), and strength. Demonstrated functioning below baseline abilities indicate the need for continued skilled intervention while inpatient. Pt noted to have BLE weeping, NSG notified. Pt requires max A x2 for supine to sit and sit to supine. Pt tolerates sitting edge of bed x5 minutes Amado-CGA for seated dynamic balance, reaching out of base of support. Pt continues to demo deficits with activity tolerance, global weakness and decrease in self care tasks. Pt is at high risk for falls and is not safe to d/c home at this time. Tolerating session today without incident. Will continue to follow and progress as tolerated.     Plan/Recommendations:   Moderate Intensity Therapy recommended post-acute care. This is recommended as therapy feels the patient would require 3-4 days per week and wouldn't  "tolerate \"3 hour daily\" rehab intensity. SNF would be the preferred choice. If the patient does not agree to SNF, arrange HH or OP depending on home bound status. If patient is medically complex, consider LTACH.. Pt requires no DME at discharge.     Pt desires Skilled Rehab placement at discharge. Pt cooperative; agreeable to therapeutic recommendations and plan of care.     Modified Hood: N/A = No pre-op stroke/TIA    Post-Tx Position: Supine with HOB Elevated, Alarms activated, and Call light and personal items within reach  PPE: gloves    "

## 2023-07-21 NOTE — PLAN OF CARE
"Assessment: Rosario Ortez presents with ADL impairments affecting function including balance, endurance / activity tolerance, postural / trunk control, range of motion (ROM), and strength. Demonstrated functioning below baseline abilities indicate the need for continued skilled intervention while inpatient. Pt noted to have BLE weepingCLAUDIA notified. Pt requires max A x2 for supine to sit and sit to supine. Pt tolerates sitting edge of bed x5 minutes Amado-CGA for seated dynamic balance, reaching out of base of support. Pt continues to demo deficits with activity tolerance, global weakness and decrease in self care tasks. Pt is at high risk for falls and is not safe to d/c home at this time. Tolerating session today without incident. Will continue to follow and progress as tolerated.     Plan/Recommendations:   Moderate Intensity Therapy recommended post-acute care. This is recommended as therapy feels the patient would require 3-4 days per week and wouldn't tolerate \"3 hour daily\" rehab intensity. SNF would be the preferred choice. If the patient does not agree to SNF, arrange HH or OP depending on home bound status. If patient is medically complex, consider LTACH.. Pt requires no DME at discharge.     Pt desires Skilled Rehab placement at discharge. Pt cooperative; agreeable to therapeutic recommendations and plan of care.   "

## 2023-07-21 NOTE — CONSULTS
Group: [facility]         CONSULT NOTE    Patient Identification:  Rosario Ortez  73 y.o.  female  1950  [id number]            Requesting physician: Attending physician    Reason for Consultation: Hypoxia      History of Present Illness:  73-year-old female with history of A-fib, HTN, hypothyroidism and CAD who presented 7/13/2023 for worsening kidney function, JOSÉ/CKD and UTI.  She had increased swelling in lower extremities and was recently suspected to have sarcoidosis.    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, recent work-up for sarcoidosis by rheumatologist and recently taken off methotrexate 7/3/2023  JOSÉ/CKD  Malnutrition status post EGD and NG tube placement for tube feeding  IgG kappa monoclonal gammopathy status post bone marrow biopsy 7/19/2023  A-fib with RVR  Hypothyroidism  Anemia        Recommendations:    Oxygen supplement and titration to maintain saturation 90 to 95%: Currently requiring 6 L per nasal cannula  Antibiotics as per ID  Prednisone: Currently on 15 mg daily  Thyroid hormone replacement  HR control as per cardiology  Anticoagulation: Eliquis  Atorvastatin      Review of Sytems:  Constitutional: Negative for chills, fever and positive for malaise/fatigue.   HENT: Negative.    Eyes: Negative.    Cardiovascular: Negative.    Respiratory: Positive for cough and shortness of breath.    Skin: Negative.    Musculoskeletal: Chronic lower extremity edema, right knee pain  Gastrointestinal: Negative.    Genitourinary: Negative.    Neurological: Generalized weakness    Past Medical History:  Past Medical History:   Diagnosis Date    A-fib     Abnormal results of liver function studies  07/20/2023    AI (aortic incompetence) 07/20/2023    Description: mild per 5/11 ECHO    Allergic rhinitis 07/20/2023    Anorexia 07/20/2023    Ascites 07/20/2023    Avitaminosis D 07/20/2023    Benign neoplasm of ascending colon 10/06/2014    Blood glucose elevated 07/20/2023    Constipation 07/20/2023    Coronary artery disease     Diastolic dysfunction 07/20/2023    Diverticulosis of large intestine without perforation or abscess without bleeding 01/20/2023    Endometriosis 07/20/2023    Essential hypertension 04/29/2021    Gastroesophageal reflux disease 07/20/2023    Hemorrhage, postmenopausal 07/20/2023    Hyperlipidemia 07/20/2023    Hypothyroidism 07/20/2023    Lumbar radiculopathy 07/20/2023    Melena 01/20/2023    Osteoarthritis of right hip 04/29/2021    Paroxysmal atrial fibrillation 07/17/2023    Peripheral neuropathy 04/25/2012    Personal history of colonic polyps 10/06/2014    WA (pulmonary regurgitation) 07/20/2023    Description: Trace WA per 5/11 ECHO    Primary hypertriglyceridemia 06/14/2020    Sarcoidosis 07/17/2023    Suspected    Second degree hemorrhoids 08/20/2020    Tremor 07/20/2023    Vitamin B12 deficiency 02/19/2014       Past Surgical History:  Past Surgical History:   Procedure Laterality Date    BREAST BIOPSY      ENDOSCOPY N/A 7/18/2023    Procedure: ESOPHAGOGASTRODUODENOSCOPY with dobhoff placement and securement of feeding tube to duodenum using endoscopic clipping x 1;  Surgeon: Zeyad Flores MD;  Location: [facility] ENDOSCOPY;  Service: Gastroenterology;  Laterality: N/A;    HYSTERECTOMY      JOINT REPLACEMENT Right 2015        Home Meds:  Medications Prior to Admission   Medication Sig Dispense Refill Last Dose    apixaban (ELIQUIS) 2.5 MG tablet tablet Take 1 tablet by mouth 2 (Two) Times a Day.       atorvastatin (LIPITOR) 20 MG tablet Take 1 tablet by mouth Daily.       doxycycline (VIBRAMYCIN) 100 MG capsule Take 1 capsule by mouth 2 (Two) Times a Day.       ferrous  "sulfate 325 (65 FE) MG tablet Take 1 tablet by mouth Daily With Breakfast.       folic acid (FOLVITE) 1 MG tablet Take 1 tablet by mouth Daily.       furosemide (LASIX) 20 MG tablet Take 1 tablet by mouth Daily for 30 days. 30 tablet 0     hydroCHLOROthiazide (HYDRODIURIL) 25 MG tablet Take 1 tablet by mouth Daily.       irbesartan (AVAPRO) 75 MG tablet Take 1 tablet by mouth Every Night. 30 tablet 3     levothyroxine (SYNTHROID, LEVOTHROID) 50 MCG tablet 1 tablet Every Morning.       pantoprazole (PROTONIX) 40 MG EC tablet Take 1 tablet by mouth Daily.       predniSONE (DELTASONE) 5 MG tablet Take 3 tablets by mouth Daily.       traZODone (DESYREL) 50 MG tablet Take 2 tablets by mouth At Night As Needed for Sleep.          Allergies:  Allergies   Allergen Reactions    Codeine Nausea And Vomiting, Other (See Comments) and Nausea Only     nausea  nausea      Topiramate Rash    Tramadol Other (See Comments) and Mental Status Change    Cefdinir Unknown - High Severity    Levofloxacin Nausea Only    Lisinopril Cough       Social History:   Social History     Socioeconomic History    Marital status:    Tobacco Use    Smoking status: Never     Passive exposure: Never    Smokeless tobacco: Never   Vaping Use    Vaping Use: Never used   Substance and Sexual Activity    Alcohol use: Never    Drug use: Never    Sexual activity: Defer       Family History:  Family History   Problem Relation Age of Onset    Breast cancer Sister        Physical Exam:  /45   Pulse 58   Temp 97.6 °F (36.4 °C) (Oral)   Resp 18   Ht 167.6 cm (65.98\")   Wt 81 kg (178 lb 9.2 oz)   SpO2 99%   BMI 28.84 kg/m²  Body mass index is 28.84 kg/m². 99% 81 kg (178 lb 9.2 oz)  General Appearance:  Alert   HEENT:  Normocephalic, without obvious abnormality, Conjunctiva/corneas clear,.   Nares normal, no drainage     Neck:  Supple, symmetrical, trachea midline.   Lungs /Chest wall:   Bilateral basal rhonchi, respirations unlabored, symmetrical " wall movement.     Heart: Rate controlled, S1 S2 normal  Abdomen: Soft, non-tender, no masses, no organomegaly.    Extremities: + Edema, no clubbing or cyanosis    LABS:  Lab Results   Component Value Date    CALCIUM 8.7 07/21/2023    PHOS 4.2 07/21/2023     Results from last 7 days   Lab Units 07/21/23  0045 07/20/23  0729 07/19/23  0310 07/17/23  1859 07/17/23  1114   MAGNESIUM mg/dL 2.2  --   --   --  2.1   SODIUM mmol/L 139 140 141   < > 140   POTASSIUM mmol/L 4.4 4.1 4.3   < > 3.7   CHLORIDE mmol/L 103 103 108*   < > 110*   CO2 mmol/L 28.0 30.0* 23.0   < > 21.0*   BUN mg/dL 65* 62* 60*   < > 75*   CREATININE mg/dL 1.66* 1.51* 1.62*   < > 2.18*   GLUCOSE mg/dL 95 119* 151*   < > 101*   CALCIUM mg/dL 8.7 8.7 8.4*   < > 8.5*   WBC 10*3/mm3 21.80* 21.10* 16.30*   < > 13.10*   HEMOGLOBIN g/dL 8.9* 10.5* 9.4*   < > 7.4*   PLATELETS 10*3/mm3 216 260 243   < > 238   ALT (SGPT) U/L 13  --  17  --  15   AST (SGOT) U/L 43*  --  47*  --  26    < > = values in this interval not displayed.     Lab Results   Component Value Date    CKTOTAL 19 (L) 06/27/2023    TROPONINT 22 (H) 06/26/2023         Results from last 7 days   Lab Units 07/18/23  1559   BODYFLDCX  Heavy growth (4+) Staphylococcus aureus*                 Results from last 7 days   Lab Units 07/20/23  0729 07/17/23  1114 07/14/23  2234   INR  1.15* 1.25* 1.28*     Results from last 7 days   Lab Units 07/18/23  1559   BODYFLDCX  Heavy growth (4+) Staphylococcus aureus*     Lab Results   Component Value Date    TSH 2.270 07/20/2023     Estimated Creatinine Clearance: 31.4 mL/min (A) (by C-G formula based on SCr of 1.66 mg/dL (H)).         Imaging:  Imaging Results (Last 24 Hours)       ** No results found for the last 24 hours. **              Current Meds:   SCHEDULE  apixaban, 2.5 mg, Oral, Q12H  atorvastatin, 20 mg, Oral, Nightly  cefTRIAXone, 2,000 mg, Intravenous, Q24H  ferric gluconate, 250 mg, Intravenous, Once  folic acid, 1 mg, Oral, Daily  levothyroxine, 50  mcg, Oral, Q AM  pantoprazole, 40 mg, Oral, Daily  predniSONE, 15 mg, Oral, Daily  senna-docusate sodium, 2 tablet, Oral, BID  sodium bicarbonate, 1,300 mg, Oral, TID  sodium chloride, 10 mL, Intravenous, Q12H      Infusions  dilTIAZem, 5-15 mg/hr, Last Rate: Stopped (07/21/23 0100)      PRNs    acetaminophen    senna-docusate sodium **AND** polyethylene glycol **AND** bisacodyl **AND** bisacodyl    Calcium Replacement - Follow Nurse / BPA Driven Protocol    Lidocaine Viscous HCl    Magnesium Standard Dose Replacement - Follow Nurse / BPA Driven Protocol    ondansetron    Phosphorus Replacement - Follow Nurse / BPA Driven Protocol    Potassium Replacement - Follow Nurse / BPA Driven Protocol    [COMPLETED] Insert Peripheral IV **AND** sodium chloride    sodium chloride    sodium chloride    sodium chloride    traZODone        Ran Pichardo MD  7/21/2023  10:08 EDT      Much of this encounter note is an electronic transcription/translation of spoken language to printed text using Dragon Software.

## 2023-07-21 NOTE — CONSULTS
Nutrition Services    Patient Name: Rosario Ortez  YOB: 1950  MRN: [id number]  Admission date: 7/13/2023    Continue current diet and ONS. RD leaving TF order in place at this time in case re-initiation of TF desired. RD monitoring for POC/GOC.     PPE Documentation        PPE Worn By Provider Did not enter room on this encounter    PPE Worn By Patient  None     CLINICAL NUTRITION ASSESSMENT      Reason for Assessment Follow up   7/15: MST score 2+   H&P      Past Medical History:   Diagnosis Date    A-fib     Abnormal results of liver function studies 07/20/2023    AI (aortic incompetence) 07/20/2023    Description: mild per 5/11 ECHO    Allergic rhinitis 07/20/2023    Anorexia 07/20/2023    Ascites 07/20/2023    Avitaminosis D 07/20/2023    Benign neoplasm of ascending colon 10/06/2014    Blood glucose elevated 07/20/2023    Constipation 07/20/2023    Coronary artery disease     Diastolic dysfunction 07/20/2023    Diverticulosis of large intestine without perforation or abscess without bleeding 01/20/2023    Endometriosis 07/20/2023    Essential hypertension 04/29/2021    Gastroesophageal reflux disease 07/20/2023    Hemorrhage, postmenopausal 07/20/2023    Hyperlipidemia 07/20/2023    Hypothyroidism 07/20/2023    Lumbar radiculopathy 07/20/2023    Melena 01/20/2023    Osteoarthritis of right hip 04/29/2021    Paroxysmal atrial fibrillation 07/17/2023    Peripheral neuropathy 04/25/2012    Personal history of colonic polyps 10/06/2014    AL (pulmonary regurgitation) 07/20/2023    Description: Trace AL per 5/11 ECHO    Primary hypertriglyceridemia 06/14/2020    Sarcoidosis 07/17/2023    Suspected    Second degree hemorrhoids 08/20/2020    Tremor 07/20/2023    Vitamin B12 deficiency 02/19/2014       Past Surgical History:   Procedure Laterality Date    BREAST BIOPSY      ENDOSCOPY N/A 7/18/2023    Procedure: ESOPHAGOGASTRODUODENOSCOPY with dobhoff placement and securement of feeding tube to  "duodenum using endoscopic clipping x 1;  Surgeon: Zeyad Flores MD;  Location: [facility] ENDOSCOPY;  Service: Gastroenterology;  Laterality: N/A;    HYSTERECTOMY      JOINT REPLACEMENT Right 2015        Current Problems   JOSÉ on CKD   - nephrology following    R leg cellulitis   - ID consulted   - wound care following    Possible hospital acquired sacral pressure injury  -WOCN last saw pt 7/17, noted stage 1 non-blanchable erythema to lower sacrum  -pt has been encouraged to reposition in bed but pt stated she prefers to lay on her back     Strep bacteremia   - on antibiotics     R knee cyst   - ortho saw pt 7/14    Bilateral leg edema   - chronic, Hx lymphedema    UTI   - on antibiotics     Swallowing difficulty   - ST following  -NG with enteral nutrition 7/17-7/20 when NG pulled. Pt refuses NG replacement.       Encounter Information        Trending Narrative     7/21: Pt being reassessed for follow up/ SLP continues to follow pt. Pt on pureed textures at this time. Pt refusing to eat. Pt pulled NG on 7/20 and refuses replacement. Palliative care following. Psych to see if pt able to make decisions for herself. Pt's living will confirmed that pt does not want a feeding tube.     7/15: Pt assessed due to concern for MST for reported weight loss. Pt working with another discipline at time of attempted visit, but does appear very thin per visual observation. In review of documentation, family has reported very poor PO intake prior to admission, and weight Hx reflects 13.1% weight loss in 1 month -- severe (and 35.5% loss in 1 year). This is C/W malnutrition -- need to follow up for NFPE portion of exam for staging. ST to see pt as well for swallowing evaluation; will watch for outcome of this assessment, as pt could become a candidate for enteral nutrition support.       Anthropometrics        Current Height, Weight Height: 167.6 cm (65.98\")  Weight: 81 kg (178 lb 9.2 oz) (07/21/23 0500)       Ideal Body " Weight (IBW) 130 lb   Usual Body Weight (UBW) Historically 195-200 lb       Trending Weight Hx     This admission: 7/21: 81 kg (bed scale); 29% wt gain since last wt review, nearly 8L fluid gain since admission  7/15: Scale weight 57.2 kg              PTA: 7/15: 13.1% weight loss in 1 month -- severe (and 35.5% loss in 1 year)    Wt Readings from Last 30 Encounters:   07/21/23 0500 81 kg (178 lb 9.2 oz)   07/20/23 2100 81 kg (178 lb 9.2 oz)   07/19/23 0346 83 kg (182 lb 15.7 oz)   07/18/23 0426 81 kg (178 lb 9.2 oz)   07/16/23 0047 63.3 kg (139 lb 8.8 oz)   07/13/23 0814 57.2 kg (126 lb 1.7 oz)   07/08/23 1316 65.8 kg (145 lb 1 oz)   06/26/23 2129 65.4 kg (144 lb 2.9 oz)   06/26/23 2042 65.4 kg (144 lb 2.9 oz)   06/26/23 1348 65.8 kg (145 lb)   03/20/23 1440 72.1 kg (159 lb)   10/29/22 1419 74.4 kg (164 lb)   06/07/22 1657 88.6 kg (195 lb 5.2 oz)   05/15/22 0259 89 kg (196 lb 4.8 oz)   05/14/22 0542 90.7 kg (200 lb)   03/20/22 1214 91.9 kg (202 lb 9.6 oz)   12/16/21 1158 93.7 kg (206 lb 9.6 oz)   06/25/14 0808 88.9 kg (196 lb 0.2 oz)   06/04/14 0749 92.5 kg (203 lb 15.9 oz)   12/16/13 1525 93.4 kg (206 lb)   08/19/13 1102 89.8 kg (197 lb 15.9 oz)   06/20/13 0810 92.1 kg (203 lb)   05/20/13 0845 92.5 kg (203 lb 15.9 oz)   01/17/13 0945 88 kg (194 lb 0.1 oz)      BMI kg/m2 Body mass index is 28.84 kg/m².       Labs        Pertinent Labs    Results from last 7 days   Lab Units 07/21/23  0045 07/20/23  0729 07/19/23  0310 07/18/23  0125 07/17/23  1114   SODIUM mmol/L 139 140 141   < > 140   POTASSIUM mmol/L 4.4 4.1 4.3   < > 3.7   CHLORIDE mmol/L 103 103 108*   < > 110*   CO2 mmol/L 28.0 30.0* 23.0   < > 21.0*   BUN mg/dL 65* 62* 60*   < > 75*   CREATININE mg/dL 1.66* 1.51* 1.62*   < > 2.18*   CALCIUM mg/dL 8.7 8.7 8.4*   < > 8.5*   BILIRUBIN mg/dL 0.5  --  0.5  --  0.3   ALK PHOS U/L 190*  --  204*  --  137*   ALT (SGPT) U/L 13  --  17  --  15   AST (SGOT) U/L 43*  --  47*  --  26   GLUCOSE mg/dL 95 119* 151*   < >  101*    < > = values in this interval not displayed.       Results from last 7 days   Lab Units 07/21/23  0045 07/17/23  1859 07/17/23  1114   MAGNESIUM mg/dL 2.2  --  2.1   PHOSPHORUS mg/dL 4.2   < >  --    HEMOGLOBIN g/dL 8.9*   < > 7.4*   HEMATOCRIT % 28.6*   < > 23.8*   TRIGLYCERIDES mg/dL 61  --   --     < > = values in this interval not displayed.       COVID19   Date Value Ref Range Status   06/26/2023 Not Detected Not Detected - Ref. Range Final     Lab Results   Component Value Date    HGBA1C 4.70 (L) 07/21/2023        Medications    Scheduled Medications apixaban, 2.5 mg, Oral, Q12H  atorvastatin, 20 mg, Oral, Nightly  cefTRIAXone, 2,000 mg, Intravenous, Q24H  ferric gluconate, 250 mg, Intravenous, Once  folic acid, 1 mg, Oral, Daily  levothyroxine, 50 mcg, Oral, Q AM  pantoprazole, 40 mg, Oral, Daily  predniSONE, 15 mg, Oral, Daily  senna-docusate sodium, 2 tablet, Oral, BID  sodium bicarbonate, 1,300 mg, Oral, TID  sodium chloride, 10 mL, Intravenous, Q12H        Infusions dilTIAZem, 5-15 mg/hr, Last Rate: Stopped (07/21/23 0100)        PRN Medications   acetaminophen    senna-docusate sodium **AND** polyethylene glycol **AND** bisacodyl **AND** bisacodyl    Calcium Replacement - Follow Nurse / BPA Driven Protocol    Lidocaine Viscous HCl    Magnesium Standard Dose Replacement - Follow Nurse / BPA Driven Protocol    ondansetron    Phosphorus Replacement - Follow Nurse / BPA Driven Protocol    Potassium Replacement - Follow Nurse / BPA Driven Protocol    [COMPLETED] Insert Peripheral IV **AND** sodium chloride    sodium chloride    sodium chloride    sodium chloride    traZODone     Physical Findings        Trending Physical   Appearance, NFPE 7/21: HUBERT    7/16: NFPE completed, consistent with nutrition diagnosis of malnutrition using AND/ASPEN criteria. See MSA below.     7/15: Likely is malnourished; will follow up for NFPE   --  Edema  3+ R arm, L wrist, L hand, legs, ankles  4+ feet     Bowel Function  "BM 7/20     Tubes NG removed 7/20     Chewing/Swallowing Pureed, SLP following      Skin Lesion to R lower leg and stage 1 non-blanchable erythema to lower sacrum per wound care RN note       Estimated/Assessed Needs     Assessed 7/17/23, remains appropriate    Energy Requirements     Height for Calculation  Height: 167.6 cm (66\")   Weight for Calculation 63.3 kg    Method for Estimation  30-35 kcal/kg   EST Needs (kcal/day) 7152-3635 kcal/day         Protein Requirements     Weight for Calculation 63.3 kg   EST Protein Needs (g/kg) 1.3 g/kg   EST Daily Needs (g/day) 82 g/day         Fluid Requirements      Estimated Needs (mL/day) 1mL/kcal - monitor hydration status          Fluid Deficit     Current Na Level (mEq/L)     Desired Na Level (mEq/L)     Estimated Fluid Deficit (L)       Current Nutrition Orders & Evaluation of Intake       Oral Nutrition     Food Allergies NKFA   Current PO Diet Diet: Regular/House Diet; Texture: Pureed (NDD 1); Fluid Consistency: Thin (IDDSI 0)   Supplement Boost Plus TID (provides 1080 kcals, 42 g protein if consumed)   Magic Cups at lunch/dinner (Provides 580 kcals, 18 g protein if consumed)      PO Evaluation     Trending % PO Intake 7/21: mainly 0%, sometimes 50%  7/15: Variable intake; averaging 50% at recent meals    --  Nutritional Risk Screening        NRS-2002 Score          Nutrition Diagnosis         Nutrition Dx Problem 1 Unintentional weight loss R/t suspected chronically poor intake, as evidenced by greater than 20% weight loss in 1 year and greater than 5% weight loss in one month.       Nutrition Dx Problem 2 Moderate chronic Dz related malnutrition R/t ongoing swallowing difficulty inhibiting adequate intake; as evidenced by diet recall reflective of intakes less than 75% estimated needs for at least 1 month, and moderate muscle and fat loss per NFPE.        Intervention Goal         Intervention Goal(s) Intake improving to at least 50%   Weight stabilizing  "     Nutrition Intervention        RD Action Continue current diet and ONS, monitor for POC/GOC     Nutrition Prescription          Diet Prescription pureed   Supplement Prescription Boost Plus TID (provides 1080 kcals, 42 g protein if consumed)   Magic Cups at lunch/dinner (Provides 580 kcals, 18 g protein if consumed)          Enteral Prescription End Goal:  Isosource 1.5 at 55 mL/hr; will adjust water flush per clinical picture      Calories  1815 kcals (100%)    Protein  82 g (100%)    Free water  920 mL    Flushes  Will adjust per clinical picture       The above end goal rate is for 22 hrs/day to assume interruptions for ADLs. Water flushes adjusted based on clinical picture + Rx flushes/IV fluids        TPN Prescription        Monitor/Evaluation        Monitor PO intake, Supplement intake, Pertinent labs, Weight, Skin status, GI status, POC/GOC, Swallow function     Malnutrition Severity Assessment      Patient meets criteria for : Moderate (non-severe) Malnutrition             Electronically signed by:  Vanessa Chow RD  07/21/23 10:08 EDT

## 2023-07-21 NOTE — CASE MANAGEMENT/SOCIAL WORK
Continued Stay Note  [facility]     Patient Name: Rosario Ortez  MRN: [id number]  Today's Date: 7/21/2023    Admit Date: 7/13/2023    Plan: Anticipate home with family and [facility].   Discharge Plan       Row Name 07/21/23 1502       Plan    Plan [facility]ticip[facility].    Plan Comments CM notified by [facility] liaison that plan is for patient to go home tomorrow with hospice services. Hospice is arranging DME. Will need EMS transport tomorrow morning.                      Expected Discharge Date and Time       Expected Discharge Date Expected Discharge Time    Jul 22, 2023           Phone communication or documentation only - no physical contact with patient or family.     ELVA BallesterosN, [place] [address]    Office: [phone]  Fax: [phone]

## 2023-07-21 NOTE — PROGRESS NOTES
" LOS: 7 days   Patient Care Team:  Ian Cordero MD as PCP - General  Tony Parisi MD as Consulting Physician (Nephrology)  Rc Ayala MD as Consulting Physician (Hematology and Oncology)  Nicola Thurston MD as Consulting Physician (Cardiology)      Subjective \" I want to die\"    Interval History: Transfer to PCU for A-fib with RVR and increased O2 needs.    Subjective: Patient reports buttock soreness as well as a mild sore throat.  She has been refusing most oral nutrition.  Refused replacement of Dobbhoff.    ROS:   No chest pain, shortness of breath, or cough.      Medication Review:     Current Facility-Administered Medications:     acetaminophen (TYLENOL) tablet 650 mg, 650 mg, Oral, Q6H PRN, Zeyad Flores MD, 650 mg at 07/20/23 1131    apixaban (ELIQUIS) tablet 2.5 mg, 2.5 mg, Oral, Q12H, Elias Rey MD, 2.5 mg at 07/21/23 0811    atorvastatin (LIPITOR) tablet 20 mg, 20 mg, Oral, Nightly, Zeyad Flores MD, 20 mg at 07/19/23 2047    sennosides-docusate (PERICOLACE) 8.6-50 MG per tablet 2 tablet, 2 tablet, Oral, BID, 2 tablet at 07/19/23 2047 **AND** polyethylene glycol (MIRALAX) packet 17 g, 17 g, Oral, Daily PRN **AND** bisacodyl (DULCOLAX) EC tablet 5 mg, 5 mg, Oral, Daily PRN **AND** bisacodyl (DULCOLAX) suppository 10 mg, 10 mg, Rectal, Daily PRN, Zeyad Flores MD    Calcium Replacement - Follow Nurse / BPA Driven Protocol, , Does not apply, PRN, Elias Rey MD    cefTRIAXone (ROCEPHIN) 2,000 mg in sodium chloride 0.9 % 100 mL IVPB, 2,000 mg, Intravenous, Q24H, Zeyad Flores MD, Last Rate: 200 mL/hr at 07/21/23 0756, 2,000 mg at 07/21/23 0756    dilTIAZem (CARDIZEM) 125 mg in 125 mL D5W infusion, 5-15 mg/hr, Intravenous, Titrated, Belen Valerio APRN, Held at 07/21/23 0100    ferric gluconate (FERRLECIT) 250 MG in sodium chloride 0.9% 250 mL IVPB, 250 mg, Intravenous, Once, Zeyad Flores MD    folic acid (FOLVITE) tablet " 1 mg, 1 mg, Oral, Daily, Zeyad Flores MD, 1 mg at 07/21/23 0811    levothyroxine (SYNTHROID, LEVOTHROID) tablet 50 mcg, 50 mcg, Oral, Q AM, Zeyad Flores MD, 50 mcg at 07/19/23 0513    Lidocaine Viscous HCl (XYLOCAINE) 2 % solution 5 mL, 5 mL, Mouth/Throat, Q3H PRN, Kacie White APRN    Magnesium Standard Dose Replacement - Follow Nurse / BPA Driven Protocol, , Does not apply, PRN, Elias Rey MD    ondansetron (ZOFRAN) injection 4 mg, 4 mg, Intravenous, Q6H PRN, Zeyad Flores MD    pantoprazole (PROTONIX) EC tablet 40 mg, 40 mg, Oral, Daily, Zeyad Flores MD, 40 mg at 07/21/23 0811    Phosphorus Replacement - Follow Nurse / BPA Driven Protocol, , Does not apply, PRN, Elias Rey MD    Potassium Replacement - Follow Nurse / BPA Driven Protocol, , Does not apply, PRN, Elias Rey MD    predniSONE (DELTASONE) tablet 15 mg, 15 mg, Oral, Daily, Zeyad Flores MD, 15 mg at 07/21/23 0811    sodium bicarbonate tablet 1,300 mg, 1,300 mg, Oral, TID, Zeyad Flores MD, 1,300 mg at 07/21/23 0811    [COMPLETED] Insert Peripheral IV, , , Once **AND** sodium chloride 0.9 % flush 10 mL, 10 mL, Intravenous, PRN, Zeyad Flores MD    sodium chloride 0.9 % flush 10 mL, 10 mL, Intravenous, Q12H, Zeyad Flores MD, 10 mL at 07/21/23 0814    sodium chloride 0.9 % flush 10 mL, 10 mL, Intravenous, PRN, Zeyad Flores MD    sodium chloride 0.9 % infusion 40 mL, 40 mL, Intravenous, PRN, Zeyad Flores MD, 100 mL at 07/18/23 1236    sodium chloride 0.9 % infusion 40 mL, 40 mL, Intravenous, PRN, Zeyad Flores MD    traZODone (DESYREL) tablet 100 mg, 100 mg, Oral, Nightly PRN, Zeyad Floers MD, 100 mg at 07/19/23 2047      Objective chronically ill-appearing,  at bedside    Vital Signs  Vitals:    07/20/23 2100 07/21/23 0100 07/21/23 0500 07/21/23 0941   BP: 107/46 103/54 106/55 109/45   BP  "Location: Right arm Right arm Right arm    Patient Position: Lying Lying Lying    Pulse: 63 52 54 58   Resp: 17 14 17 18   Temp: 97.9 °F (36.6 °C) 98 °F (36.7 °C) 98.3 °F (36.8 °C) 97.6 °F (36.4 °C)   TempSrc: Oral Oral Oral Oral   SpO2: 95% 98% 98% 99%   Weight: 81 kg (178 lb 9.2 oz)  81 kg (178 lb 9.2 oz)    Height: 167.6 cm (65.98\")          Physical Exam:     General Appearance:    Awake and alert, in no acute distress although ill-appearing   Head:    Normocephalic, without obvious abnormality   Eyes:          Conjunctivae normal, anicteric sclera   Throat:   No oral lesions, no thrush, oral mucosa moist   Neck:   supple, no JVD   Lungs:     respirations regular, even and unlabored       Rectal:     Deferred   Extremities:   + Peripheral edema, no cyanosis   Skin: Scattered bruising, no jaundice        Results Review:    CBC    Results from last 7 days   Lab Units 07/21/23  0045 07/20/23  0729 07/19/23  0310 07/18/23  0125 07/17/23  1859 07/17/23  1114 07/16/23  0630 07/14/23  2234   WBC 10*3/mm3 21.80* 21.10* 16.30* 11.90*  --  13.10* 11.20* 9.80   HEMOGLOBIN g/dL 8.9* 10.5* 9.4* 8.3* 9.1* 7.4* 7.7* 7.5*   PLATELETS 10*3/mm3 216 260 243 215  --  238 219 206     CMP   Results from last 7 days   Lab Units 07/21/23  0045 07/20/23  0729 07/19/23  0310 07/18/23  0125 07/17/23  1114 07/16/23  0152 07/14/23  2234   SODIUM mmol/L 139 140 141 141 140 140 138   POTASSIUM mmol/L 4.4 4.1 4.3 3.4* 3.7 4.3 4.4   CHLORIDE mmol/L 103 103 108* 109* 110* 111* 109*   CO2 mmol/L 28.0 30.0* 23.0 21.0* 21.0* 15.0* 17.0*   BUN mg/dL 65* 62* 60* 65* 75* 72* 75*   CREATININE mg/dL 1.66* 1.51* 1.62* 1.72* 2.18* 2.30* 2.69*   GLUCOSE mg/dL 95 119* 151* 101* 101* 96 112*   ALBUMIN g/dL 1.8* 2.1* 2.1* 2.1* 1.9*  --   --    BILIRUBIN mg/dL 0.5  --  0.5  --  0.3  --   --    ALK PHOS U/L 190*  --  204*  --  137*  --   --    AST (SGOT) U/L 43*  --  47*  --  26  --   --    ALT (SGPT) U/L 13  --  17  --  15  --   --    MAGNESIUM mg/dL 2.2  --   " "--   --  2.1  --   --    PHOSPHORUS mg/dL 4.2 1.9* 2.3* 3.1  --   --   --      Cr Clearance Estimated Creatinine Clearance: 31.4 mL/min (A) (by C-G formula based on SCr of 1.66 mg/dL (H)).  Coag   Results from last 7 days   Lab Units 07/20/23  0729 07/17/23  1114 07/14/23  2234   INR  1.15* 1.25* 1.28*   APTT seconds 31.2* 47.0* 33.1*     HbA1C   Lab Results   Component Value Date    HGBA1C 4.70 (L) 07/21/2023    HGBA1C 5.9 (H) 10/16/2019     Blood Glucose No results found for: POCGLU  Infection   Results from last 7 days   Lab Units 07/18/23  1559   BODYFLDCX  Heavy growth (4+) Staphylococcus aureus*     UA      Radiology(recent) XR Chest 1 View    Result Date: 7/20/2023  Impression: New medial left basilar consolidation and new hazy and patchy airspace disease in both lungs. These findings could relate to edema or pneumonia Electronically Signed: Nayan Juárez  7/20/2023 6:18 AM EDT  Workstation ID: [id number]        Assessment & Plan   Dysphagia with feeding difficulties -suspect refusal  Hemoccult positive stool/iron deficiency anemia  Recurrent ascites  Elevated liver enzymes  JOSÉ/CKD  Possible sarcoidosis  A-fib on Eliquis  Bacteriuria  History of cholecystectomy  Hypothyroidism     Plan:  Patient is well-known to Dr. Flores as an outpatient with elevated LFTs, iron deficiency anemia and recurrent ascites with concern for possible sarcoidosis.  She is pending evaluation at [facility]. Now with JOSÉ on CKD with Afib and increased respiratory issues.  She pulled out dobhoff but was tolerating gastric feeds.  Speech cleared her for puréed diet but she is refusing oral nutrition.  Not a candidate for G-tube secondary to ascites.  EGD without evidence of esophageal stricture.  Patient does not want Dobbhoff and would like to \"die\" but  would like to continue to pursue aggressive therapy.  Awaiting psychiatric evaluation to determine her ability to make her own decisions.  Palliative care has evaluated as " well.  Discussed in detail with bedside RN.  Hematology following with bone marrow biopsy results pending.  Continue supportive care.    Electronically signed by JOE Teran, 07/21/23, 10:18 AM EDT.

## 2023-07-21 NOTE — PROGRESS NOTES
[facility] Medicine Services  INPATIENT PROGRESS NOTE    Length of Stay: 7  Date of Admission: 7/13/2023  Primary Care Physician: Ian Cordero MD    Subjective   Chief Complaint: Sent from the PCPs office secondary to abnormal lab results.     History of Present Illness: Rosario Ortez is a 73 y.o. female with past medical history of paroxysmal atrial fibrillation, hypertension, hypothyroidism and fluid accumulation in the abdomen and lower extremities and recently suspected to have sarcoidosis for which she is going to specialized center next week for further diagnosis has been doing at her baseline and went for her blood work at her PCPs office yesterday. PCPs office called her today and told her to come to the hospital because her kidney function looks worse and she has a UTI.  Patient does have JOSÉ on CKD but does not have a UTI at this point.  Her baseline activity is pretty poor and she would walk some very small distances with walker but is mostly wheelchair-bound.  She was also complaining of some area of erythematous lesions with some swelling in her right leg and knee and probably had some bronchitis for which she was started on doxycycline yesterday by her PCP.  Patient's oral intake is very poor as per family.  She has been on water pills with Lasix and she was also started on hydrochlorothiazide yesterday.  She denies any nausea vomiting or abdominal pain but is quite tender in the abdomen.  She denies any fever chills chest pain or shortness of breath but has some productive cough.  She denies any other significant complaints though.     7/20/2023 patient seen and examined in bed no acute distress,  at bedside, discussed with RN.  Family will meet with [facility] to discussed possible care.  Vital signs stable.      Review of Systems   Constitutional: Positive for decreased appetite. Negative for chills, fever and weight loss.   HENT:  Positive for  congestion. Negative for sore throat.    Eyes:  Negative for blurred vision, double vision and visual disturbance.   Cardiovascular:  Positive for leg swelling. Negative for chest pain, cyanosis, palpitations and syncope.   Respiratory:  Positive for cough, shortness of breath and sputum production. Negative for hemoptysis and wheezing.    Endocrine: Negative for cold intolerance and heat intolerance.   Hematologic/Lymphatic: Negative for adenopathy.   Skin:  Negative for itching and rash.        Questionable cellulitis of the right knee and possibly right lower extremity.   Musculoskeletal:  Negative for back pain, falls and stiffness.   Gastrointestinal:  Positive for anorexia and diarrhea. Negative for abdominal pain, constipation, hematemesis, hematochezia, jaundice, melena, nausea and vomiting.   Genitourinary:  Negative for dysuria, frequency, hematuria and urgency.   Neurological:  Positive for weakness. Negative for dizziness, focal weakness, tremors and vertigo.   Psychiatric/Behavioral:  Negative for altered mental status, depression, suicidal ideas and thoughts of violence    All pertinent negatives and positives are as above. All other systems have been reviewed and are negative unless otherwise stated.     Objective    Temp:  [97 °F (36.1 °C)-98.6 °F (37 °C)] 98.3 °F (36.8 °C)  Heart Rate:  [52-89] 54  Resp:  [14-22] 17  BP: (100-120)/(46-58) 106/55  Physical Exam  Vitals and nursing note reviewed.   Constitutional:       General: She is not in acute distress.     Appearance: She is well-developed. She is not diaphoretic.   HENT:      Head: Normocephalic and atraumatic.   Cardiovascular:      Rate and Rhythm: Normal rate.   Pulmonary:      Effort: Pulmonary effort is normal. No respiratory distress.      Breath sounds: No wheezing.   Abdominal:      General: There is no distension.      Palpations: Abdomen is soft.   Musculoskeletal:         General: Normal range of motion.   Skin:     General: Skin is  warm and dry.   Neurological:      Mental Status: She is alert.      Cranial Nerves: No cranial nerve deficit.   Psychiatric:         Behavior: Behavior normal.         Thought Content: Thought content normal.         Judgment: Judgment normal.             Results Review:  I have reviewed the labs, radiology results, and diagnostic studies.    Laboratory Data: CMP:        Lab 07/21/23  0045 07/20/23  0729 07/19/23  0310 07/18/23  0125 07/17/23  1114   SODIUM 139 140 141 141 140   POTASSIUM 4.4 4.1 4.3 3.4* 3.7   CHLORIDE 103 103 108* 109* 110*   CO2 28.0 30.0* 23.0 21.0* 21.0*   ANION GAP 8.0 7.0 10.0 11.0 9.0   BUN 65* 62* 60* 65* 75*   CREATININE 1.66* 1.51* 1.62* 1.72* 2.18*   EGFR 32.0* 35.9* 33.0* 30.7* 23.1*   GLUCOSE 95 119* 151* 101* 101*   CALCIUM 8.7 8.7 8.4* 8.3* 8.5*   MAGNESIUM 2.2  --   --   --  2.1   PHOSPHORUS 4.2 1.9* 2.3* 3.1  --    TOTAL PROTEIN 5.1*  --  5.3*  --  4.6*   ALBUMIN 1.8* 2.1* 2.1* 2.1* 1.9*   GLOBULIN 3.3  --  3.2  --  2.7   ALT (SGPT) 13  --  17  --  15   AST (SGOT) 43*  --  47*  --  26   BILIRUBIN 0.5  --  0.5  --  0.3   ALK PHOS 190*  --  204*  --  137*    CBC:      Lab 07/21/23  0045 07/20/23  0729 07/19/23  0310 07/18/23  0125 07/17/23  1859 07/17/23  1114 07/16/23  0630 07/14/23  2234 07/14/23  2234   WBC 21.80* 21.10* 16.30* 11.90*  --  13.10* 11.20*  --  9.80   HEMOGLOBIN 8.9* 10.5* 9.4* 8.3*   < > 7.4* 7.7*  --  7.5*   HEMATOCRIT 28.6* 33.6* 29.3* 26.1*   < > 23.8* 24.1*  --  23.5*   PLATELETS 216 260 243 215  --  238 219  --  206   NEUTROS ABS 19.40* 16.25* 13.86* 9.76*  --  9.96* 9.86*   < > 8.30*   LYMPHS ABS  --   --   --   --   --   --   --   --  0.70   MONOS ABS  --   --   --   --   --   --   --   --  0.80   EOS ABS  --   --  0.82*  --   --   --  0.11  --  0.00   MCV 92.0 93.2 93.0 90.8  --  100.4* 97.5*  --  97.8*    < > = values in this interval not displayed.        .meds    Culture Data:   No results found for: BLOODCX  No results found for: URINECX  No results  found for: RESPCX  No results found for: WOUNDCX  No results found for: STOOLCX  No components found for: BODYFLD    Radiology Data:   Imaging Results (Last 24 Hours)       ** No results found for the last 24 hours. **          .med  I have reviewed the patient's current medications.  Scheduled Meds:  apixaban, 2.5 mg, Oral, Q12H  atorvastatin, 20 mg, Oral, Nightly  cefTRIAXone, 2,000 mg, Intravenous, Q24H  ferric gluconate, 250 mg, Intravenous, Once  folic acid, 1 mg, Oral, Daily  levothyroxine, 50 mcg, Oral, Q AM  pantoprazole, 40 mg, Oral, Daily  predniSONE, 15 mg, Oral, Daily  senna-docusate sodium, 2 tablet, Oral, BID  sodium bicarbonate, 1,300 mg, Oral, TID  sodium chloride, 10 mL, Intravenous, Q12H      Continuous Infusions:dilTIAZem, 5-15 mg/hr, Last Rate: Stopped (07/21/23 0100)      PRN Meds:.  acetaminophen    senna-docusate sodium **AND** polyethylene glycol **AND** bisacodyl **AND** bisacodyl    Calcium Replacement - Follow Nurse / BPA Driven Protocol    Lidocaine Viscous HCl    Magnesium Standard Dose Replacement - Follow Nurse / BPA Driven Protocol    ondansetron    Phosphorus Replacement - Follow Nurse / BPA Driven Protocol    Potassium Replacement - Follow Nurse / BPA Driven Protocol    [COMPLETED] Insert Peripheral IV **AND** sodium chloride    sodium chloride    sodium chloride    sodium chloride    traZODone     Assessment/Plan     Active Hospital Problems    Diagnosis     Hyperlipidemia     Hypothyroidism     Gastroesophageal reflux disease     Moderate malnutrition     Paroxysmal atrial fibrillation     Sarcoidosis     Metabolic acidosis     Bacteremia due to Streptococcus     Cellulitis of right leg     Knee joint cyst, right     Bilateral leg edema     UTI due to Klebsiella species     JOSÉ (acute kidney injury)     Dysphagia     Feeding difficulties     Essential hypertension      1. JOSÉ on CKD Stage 3 with metabolic acidosis-likely pre-renal in the setting of volume depletion from diuretics.   Bicarbonate fluids,   Nephrology following.  Creatinine level slowly improving,  will continue to monitor.     2. Right leg cellulitis: ? Knee abscess,   -continue IV rocephin.    -ID consulted to due to strep bacteremia.  Probable source is the right leg.    -Consulted wound care also.    Keep leg elevated.    Aspirated, culture pending     3. Strep bacteremia-continue ceftriaxone.  ID consulted.  Continue with IV antibiotics.     4. B/L leg edema-chronic issue.  Had venous dopplers done June 2023 and negative for any DVT.  Keep leg elevated while in bed.  Has some lymphedema also.     5.  Klebsiella UTI.    -Continue ceftriaxone.     6. PAF  -continue Eliquis 5 mg BID,     -Monitor HR. .      7. Dysphagia/occult positive stool-  -EGD/dilatation done, no concerning findings.   -ST following,  - puréed diet along with NG tube feeds.       PT/OT     DVT prophylaxis:  Mechanical DVT prophylaxis orders are present.    Patient is full code      Jordon Samson MD   07/21/23   09:26 EDT

## 2023-07-22 VITALS
WEIGHT: 179.45 LBS | SYSTOLIC BLOOD PRESSURE: 111 MMHG | BODY MASS INDEX: 28.84 KG/M2 | HEIGHT: 66 IN | RESPIRATION RATE: 16 BRPM | HEART RATE: 68 BPM | DIASTOLIC BLOOD PRESSURE: 50 MMHG | OXYGEN SATURATION: 96 % | TEMPERATURE: 97.8 F

## 2023-07-22 PROBLEM — Z51.5 END OF LIFE CARE: Status: ACTIVE | Noted: 2023-07-22

## 2023-07-22 LAB
ALBUMIN SERPL-MCNC: 1.9 G/DL (ref 3.5–5.2)
ALBUMIN/GLOB SERPL: 0.6 G/DL
ALP SERPL-CCNC: 179 U/L (ref 39–117)
ALT SERPL W P-5'-P-CCNC: 16 U/L (ref 1–33)
ANION GAP SERPL CALCULATED.3IONS-SCNC: 10 MMOL/L (ref 5–15)
ANISOCYTOSIS BLD QL: ABNORMAL
AST SERPL-CCNC: 46 U/L (ref 1–32)
BILIRUB SERPL-MCNC: 0.4 MG/DL (ref 0–1.2)
BUN SERPL-MCNC: 70 MG/DL (ref 8–23)
BUN/CREAT SERPL: 41.2 (ref 7–25)
CALCIUM SPEC-SCNC: 8.7 MG/DL (ref 8.6–10.5)
CHLORIDE SERPL-SCNC: 105 MMOL/L (ref 98–107)
CO2 SERPL-SCNC: 27 MMOL/L (ref 22–29)
CREAT SERPL-MCNC: 1.7 MG/DL (ref 0.57–1)
DEPRECATED RDW RBC AUTO: 73.9 FL (ref 37–54)
EGFRCR SERPLBLD CKD-EPI 2021: 31.1 ML/MIN/1.73
ERYTHROCYTE [DISTWIDTH] IN BLOOD BY AUTOMATED COUNT: 22 % (ref 12.3–15.4)
GLOBULIN UR ELPH-MCNC: 3.2 GM/DL
GLUCOSE SERPL-MCNC: 89 MG/DL (ref 65–99)
HCT VFR BLD AUTO: 28.2 % (ref 34–46.6)
HGB BLD-MCNC: 9.3 G/DL (ref 12–15.9)
LARGE PLATELETS: ABNORMAL
LYMPHOCYTES # BLD MANUAL: 0.84 10*3/MM3 (ref 0.7–3.1)
LYMPHOCYTES NFR BLD MANUAL: 7 % (ref 5–12)
MCH RBC QN AUTO: 30.3 PG (ref 26.6–33)
MCHC RBC AUTO-ENTMCNC: 33 G/DL (ref 31.5–35.7)
MCV RBC AUTO: 91.9 FL (ref 79–97)
MONOCYTES # BLD: 1.48 10*3/MM3 (ref 0.1–0.9)
MYELOCYTES NFR BLD MANUAL: 1 % (ref 0–0)
NEUTROPHILS # BLD AUTO: 18.57 10*3/MM3 (ref 1.7–7)
NEUTROPHILS NFR BLD MANUAL: 85 % (ref 42.7–76)
NEUTS BAND NFR BLD MANUAL: 3 % (ref 0–5)
PHOSPHATE SERPL-MCNC: 4.2 MG/DL (ref 2.5–4.5)
PLATELET # BLD AUTO: 198 10*3/MM3 (ref 140–450)
PMV BLD AUTO: 7.3 FL (ref 6–12)
POIKILOCYTOSIS BLD QL SMEAR: ABNORMAL
POTASSIUM SERPL-SCNC: 4.9 MMOL/L (ref 3.5–5.2)
PROT SERPL-MCNC: 5.1 G/DL (ref 6–8.5)
RBC # BLD AUTO: 3.06 10*6/MM3 (ref 3.77–5.28)
SCAN SLIDE: NORMAL
SODIUM SERPL-SCNC: 142 MMOL/L (ref 136–145)
VARIANT LYMPHS NFR BLD MANUAL: 4 % (ref 19.6–45.3)
WBC MORPH BLD: NORMAL
WBC NRBC COR # BLD: 21.1 10*3/MM3 (ref 3.4–10.8)

## 2023-07-22 PROCEDURE — 85007 BL SMEAR W/DIFF WBC COUNT: CPT | Performed by: NURSE PRACTITIONER

## 2023-07-22 PROCEDURE — 25010000002 CEFTRIAXONE PER 250 MG: Performed by: INTERNAL MEDICINE

## 2023-07-22 PROCEDURE — 85025 COMPLETE CBC W/AUTO DIFF WBC: CPT | Performed by: NURSE PRACTITIONER

## 2023-07-22 RX ADMIN — CEFTRIAXONE 2000 MG: 2 INJECTION, POWDER, FOR SOLUTION INTRAMUSCULAR; INTRAVENOUS at 07:49

## 2023-07-22 RX ADMIN — Medication 10 ML: at 07:51

## 2023-07-22 NOTE — PLAN OF CARE
"Goal Outcome Evaluation:           Progress: no change          Patient alert and able to make needs known. Patient had no complaints over night. Patient over all in much better spirits this shift, reporting to this writer she is pleased to get to go home tomorrow. Patient remains on 6 liters high flow nasal cannnula this shift with no signs or symptoms of shortness of air. Patient does continue to refuse to allow staff to reposition her in the bed stating \"I just want to be flat.\" Patient expressed frustration and fear that her  would not \"let her go\" when her body was ready to die when he took some of their belongings to their car last evening. Patient cried stating \"I am just tired and I dont have it in me to keep fighting all these things, he will never let me go.\" Patient presented with non verbal signs and symptoms of pain however  expressed he did not want her receiving any pain medication at this time. Call light has remained in reach and vitals have remained wdl.  "

## 2023-07-22 NOTE — CASE MANAGEMENT/SOCIAL WORK
Continued Stay Note   Nitin     Patient Name: Rosario Ortez  MRN: [id number]  Today's Date: 7/22/2023    Admit Date: 7/13/2023    Plan: Home with family and [facility]. [facility] to transport.   Discharge Plan       Row Name 07/22/23 0950       Plan    Plan Home with family and [facility]. [facility] to transport.    Plan Comments CM filled out medical necessity form and arranged transport for 1030. RN notified.

## 2023-07-22 NOTE — PLAN OF CARE
Goal Outcome Evaluation:            Patient d/c to home with spouse and home care/hospice. VSS at discharge. Patient belongings sent with spouse. Transported by [facility]. Reviewed d/c instructions with spouse and patient, verbalized understanding. [facility] aware of discharge. Meds delivered to patient room,  given to spouse prior to discharge. Unable to set up follow up appointments today as offices are closed, but contact information provided to patient's spouse.

## 2023-07-22 NOTE — CASE MANAGEMENT/SOCIAL WORK
Case Management Discharge Note      Final Note: Home with family and [facility]         Home Medical Care Coordination complete.      Service Provider Selected Services Address Phone Fax Patient Preferred    [facility] [address] [place] [address] [phone] -- --                     Transportation Services  Ambulance: [facility]    Final Discharge Disposition Code: 50 - home with hospice

## 2023-07-22 NOTE — PROGRESS NOTES
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 7/19/2023  A-fib with RVR  Hypothyroidism  Anemia           Recommendations:     Oxygen supplement and titration to maintain saturation 90 to 95%: Currently requiring 6-8 L per nasal cannula  Antibiotics as per ID  Prednisone: Currently on 15 mg daily  Thyroid hormone replacement  HR control as per cardiology  Anticoagulation: Eliquis  Atorvastatin                  LOS: 8 days     Subjective     Patient reports shortness of breath and generalized fatigue    Objective     Vital signs for last 24 hours:  Vitals:    07/21/23 2100 07/22/23 0100 07/22/23 0500 07/22/23 0801   BP: 112/49 123/54 113/54 111/50   BP Location: Right arm Right arm Right arm Right arm   Patient Position: Lying Lying Lying Lying   Pulse: 61 59 61 68   Resp: 15 14 13 16   Temp: 97.9 °F (36.6 °C) 98 °F (36.7 °C) 97.8 °F (36.6 °C)    TempSrc: Oral Oral Oral    SpO2: 99% 100% 99% 96%   Weight:   81.4 kg (179 lb 7.3 oz)    Height:           Intake/Output last 3 shifts:  I/O last 3 completed shifts:  In: 240 [P.O.:240]  Out: 600 [Urine:600]  Intake/Output this shift:  No intake/output data recorded.      Radiology  Imaging Results (Last 24 Hours)       ** No results found for the last 24 hours. **            Labs:  Results from last 7 days   Lab Units 07/22/23  0255   WBC 10*3/mm3 21.10*   HEMOGLOBIN g/dL 9.3*   HEMATOCRIT % 28.2*   PLATELETS 10*3/mm3 198     Results  from last 7 days   Lab Units 07/21/23  2304   SODIUM mmol/L 142   POTASSIUM mmol/L 4.9   CHLORIDE mmol/L 105   CO2 mmol/L 27.0   BUN mg/dL 70*   CREATININE mg/dL 1.70*   CALCIUM mg/dL 8.7   BILIRUBIN mg/dL 0.4   ALK PHOS U/L 179*   ALT (SGPT) U/L 16   AST (SGOT) U/L 46*   GLUCOSE mg/dL 89         Results from last 7 days   Lab Units 07/21/23  2304 07/21/23  0045 07/20/23  0729   ALBUMIN g/dL 1.9* 1.8* 2.1*             Results from last 7 days   Lab Units 07/21/23  0045   MAGNESIUM mg/dL 2.2     Results from last 7 days   Lab Units 07/20/23  0729 07/17/23  1114   INR  1.15* 1.25*   APTT seconds 31.2* 47.0*     Results from last 7 days   Lab Units 07/20/23  0729   TSH uIU/mL 2.270           Meds:   SCHEDULE  apixaban, 2.5 mg, Oral, Q12H  atorvastatin, 20 mg, Oral, Nightly  cefTRIAXone, 2,000 mg, Intravenous, Q24H  ferric gluconate, 250 mg, Intravenous, Once  folic acid, 1 mg, Oral, Daily  levothyroxine, 50 mcg, Oral, Q AM  midodrine, 2.5 mg, Oral, TID AC  pantoprazole, 40 mg, Oral, Daily  predniSONE, 15 mg, Oral, Daily  senna-docusate sodium, 2 tablet, Oral, BID  sodium bicarbonate, 1,300 mg, Oral, TID  sodium chloride, 10 mL, Intravenous, Q12H      Infusions  dilTIAZem, 5-15 mg/hr, Last Rate: Stopped (07/21/23 0100)      PRNs    acetaminophen    senna-docusate sodium **AND** polyethylene glycol **AND** bisacodyl **AND** bisacodyl    Calcium Replacement - Follow Nurse / BPA Driven Protocol    Lidocaine Viscous HCl    Magnesium Standard Dose Replacement - Follow Nurse / BPA Driven Protocol    metoprolol tartrate    ondansetron    Phosphorus Replacement - Follow Nurse / BPA Driven Protocol    Potassium Replacement - Follow Nurse / BPA Driven Protocol    [COMPLETED] Insert Peripheral IV **AND** sodium chloride    sodium chloride    sodium chloride    sodium chloride    traZODone    Physical Exam:  General Appearance:  Alert   HEENT:  Normocephalic, without obvious abnormality, Conjunctiva/corneas clear,.   Nares normal,  no drainage     Neck:  Supple, symmetrical, trachea midline.   Lungs /Chest wall:   Bilateral basal rhonchi, respirations unlabored, symmetrical wall movement.     Heart:  Regular rate and rhythm, S1 S2 normal  Abdomen: Soft, non-tender, no masses, no organomegaly.    Extremities: + Bilateral lower extremity edema, no clubbing or cyanosis     ROS  Constitutional: Negative for chills, fever and positive for malaise/fatigue.   HENT: Negative.    Eyes: Negative.    Cardiovascular: Negative.    Respiratory: Positive for cough and shortness of breath.    Skin: Negative.    Musculoskeletal: Negative.    Gastrointestinal: Negative.    Genitourinary: Negative.    Neurological: Generalized weakness      I reviewed the recent clinical results    Part of this note may be an electronic transcription/translation of spoken language to printed text using the Dragon Dictation System.

## 2023-07-22 NOTE — PROGRESS NOTES
Hematology/Oncology Inpatient Progress Note    PATIENT NAME: Rosario Ortez  : 1950  MRN: [id number]    CHIEF COMPLAINT: IgG kappa monoclonal gammopathy, iron deficiency anemia and anemia of chronic disease due to CKD    HISTORY OF PRESENT ILLNESS:  73 y.o. female presented to [facility] on 2023 upon the direction of her primary care provider for abnormal labs. She had seen her PCP the day prior and was started on doxycycline for bronchitis and was found to have persistent anemia with a hemoglobin of 8.5 and creatinine 1.45.  SPEP was abnormal.  Prior to admission, in May 2023, she was seen in follow-up by Dr. Flores for recurrent ascites and possible sarcoidosis.  She was on methotrexate with folic acid and dexamethasone.  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  Her ACE level was elevated at 109 and it was felt that she may have sarcoidosis.  Ascitic fluid in 2023, was transudate with negative cytology.  Her Lasix was increased.  Her hepatitis panel and autoimmune work-up was negative in the past.  It was recommended that she undergo a liver biopsy when her ascites improved.  She was referred to the [facility] and had an appointment on 23.  Discharged from [facility] on 2023, following a 3-day admission for syncope and JOSÉ.  Cardiology was consulted. Stress test was negative with a EF of 74%.  Bilateral lower extremity venous Dopplers were negative.  Stool heme was positive,  (135-214) and  PTH 53.2 (15-65). CMP remarkable for creatinine of 1.37 and AST of 37 (1-33).  White count 4.6, hemoglobin 8.2, MCV 95, platelets 210,000.  Iron studies revealed an iron saturation of 28% (20-50), ferritin of 220 (), and TIBC 189 (298-536).  Iron was 53 ().  Vitamin B12 873 (211-946). Paracentesis was done with removal of 6.5 L of clear yellow fluid.  She stopped taking methotrexate shortly after she was  discharged.  In the ER, CMP remarkable for creatinine of 2.79, albumin 2.6 and AST 40.  White count was 12.6, hemoglobin 8.7, MCV 97.3 and platelets 254,000.  PT 13.5, PTT 33.1, fibrinogen 253 (210-450).  Repeat iron studies showed an iron of 19 (), iron saturation 11 (20-50), TIBC 170 (298-536), and ferritin of 216 (13-1 50).  IgA 243 (), IgG 905 (580-1602), and IgM 35 ().  CXR was unremarkable.  She reported pain in her right knee and MRI showed prominent diffuse edema in the soft tissue surrounding the knee suggesting a skin and soft tissue infection.  There was a 2.8 x 0.80 x 2.5 cm collection in the subcu tissues along the medial femoral condyle which was nonspecific but could represent an abscess.  There was no significant knee effusion or significant marrow edema to suggest joint infection.  There was advanced right compartmental osteoarthritis and suspicion for tears of the posterior horns of the menisci was seen.  Urine culture grew Klebsiella pneumonia and blood culture from 1 bottle grew alphahemolytic strep.  SIFE showed IgG kappa monoclonal gammopathy.  Haptoglobin 173 ().  Surgery, GI, nephrology, orthopedics and infectious disease were consulted.  On 7/15/2023, her stool heme was positive and ESR was 11 (0-30).  She was transfused 1 unit pRBCs on 7/17/2023 for hemoglobin drop to 7.4.  On the day of consultation, her white count was 11.9, hemoglobin 8.3, MCV 90.8 and platelets 215.  Chemistries were significant for potassium 3.4 and creatinine 1.72.  PT 13.2 (9.6-11.7), and PTT 47 (24-31).  It was planned that she undergo an EGD and have an aspirate performed on her presumed right knee abscess.     07/18/23  Hematology/Oncology was consulted for her IgG kappa monoclonal gammopathy.     Past Medical History: CKD, anemia.  Hypothyroidism, A-fib 2021.  Hyperlipidemia and hypertension for years. Bilateral lower extremity neuropathy.  Surgical History: Tonsillectomy and appendectomy.   Cholecystectomy 2006.  Possible hysterectomy.  EGD and colonoscopy 2023.  Social History: She lives in [place] with her .  She used to work as a .  No history of smoking or alcohol abuse.  Family History: Her sister had breast cancer at age 75.  Allergies: Codeine causes nausea.  Topiramate causes a rash.  Tramadol causes mental status changes.  Cefdinir, Levaquin and lisinopril.     PCP: Ian Cordero MD    INTERVAL HISTORY:  7/18/2023-Ferrlecit 250 mg IV x1.  Patient underwent right knee aspiration and bone marrow aspiration per IR.  EGD performed by Dr. Flores which was normal and Dobbhoff tube was placed.  7/19/2023-White count 16.3, hemoglobin 9.4, MCV 93 and platelets 243.  Creatinine 1.62.  AST 47 (1-32).  Bone survey showed an old T6 compression fracture only.  Right knee aspirate with many gram-positive cocci in pairs.  Spot urine WILL showed no monoclonal protein.  7/20/2023-white count 21.1, hemoglobin 10.5.  PT 12.2 (9.6-11.7), PTT 31.2 (24-31).  Urine culture grew Klebsiella aerogenes.  Right knee aspirate growing Staph aureus.  Palliative care consulted and patient stated she wanted to be a DNR although her  feels she is not capable of making her own decisions.  Psychiatry was consulted.  Pulmonary and cardiology were consulted as well.  Chest x-ray showed new medial left basilar consolidation and new hazy and patchy air.  Transferred to PCU.  7/21/2023-hemoglobin 8.9.  Kappa lambda light chain ratio 1.21 (0.26-1.65).  Bone marrow flow cytometry showed no plasma cells.  There was partial CD56 expression on granulocytic and monocytic cells which is a nonspecific finding that can be seen in both reactive/regenerative processes as well as neoplastic processes.  TSH 2.27 (0.27-4.2).  Reviewed office note from  Rheumatology.  Pt. was last seen on 6/22/23.  In 5/2023, ACE was 101 (<82), CBC/SPEP and ESR were unremarkable(values not mentioned). Pt. Was reporting tremors  and difficulty walking.  She was referred to the [facility] for possible sarcoidosis and started on MTX 10 mg and Prednisone.  When she was seen on 6/22/23, she reported no major changes in her symptoms.  She was tolerating MTX and prednisone.  She had not scheduled the [facility] as she had been on vacation, but was going to do so.    7/22/2023-white count 21.1, hemoglobin 9.3, MCV 91.9 and platelets 198.  Copper 88 ().    History of present illness reviewed since last visit and changes noted on 07/22/23.    Subjective   complains of her hands being cold.  Has not been eating much.      ROS:  Review of Systems   Constitutional:  Positive for appetite change. Negative for activity change, chills, fatigue, fever and unexpected weight change.   HENT:  Negative for congestion, dental problem, hearing loss, mouth sores, nosebleeds, sore throat and trouble swallowing.    Eyes:  Negative for photophobia, discharge and visual disturbance.   Respiratory:  Negative for apnea, cough, chest tightness, shortness of breath and stridor.    Cardiovascular:  Negative for chest pain, palpitations and leg swelling.   Gastrointestinal:  Negative for abdominal distention, abdominal pain, blood in stool, constipation, diarrhea, nausea and vomiting.   Endocrine: Positive for cold intolerance. Negative for heat intolerance.   Genitourinary:  Negative for decreased urine volume, difficulty urinating, frequency, hematuria and urgency.   Musculoskeletal:  Negative for arthralgias and gait problem.   Skin:  Negative for rash and wound.   Neurological:  Negative for dizziness, tremors, seizures, weakness, light-headedness, numbness and headaches.   Hematological:  Negative for adenopathy. Does not bruise/bleed easily.   Psychiatric/Behavioral:  Negative for confusion and hallucinations. The patient is not nervous/anxious.    All other systems reviewed and are negative.     MEDICATIONS:    Scheduled Meds:  apixaban, 2.5 mg,  "Oral, Q12H  atorvastatin, 20 mg, Oral, Nightly  cefTRIAXone, 2,000 mg, Intravenous, Q24H  ferric gluconate, 250 mg, Intravenous, Once  folic acid, 1 mg, Oral, Daily  levothyroxine, 50 mcg, Oral, Q AM  midodrine, 2.5 mg, Oral, TID AC  pantoprazole, 40 mg, Oral, Daily  predniSONE, 15 mg, Oral, Daily  senna-docusate sodium, 2 tablet, Oral, BID  sodium bicarbonate, 1,300 mg, Oral, TID  sodium chloride, 10 mL, Intravenous, Q12H       Continuous Infusions:  dilTIAZem, 5-15 mg/hr, Last Rate: Stopped (07/21/23 0100)       PRN Meds:    acetaminophen    senna-docusate sodium **AND** polyethylene glycol **AND** bisacodyl **AND** bisacodyl    Calcium Replacement - Follow Nurse / BPA Driven Protocol    Lidocaine Viscous HCl    Magnesium Standard Dose Replacement - Follow Nurse / BPA Driven Protocol    metoprolol tartrate    ondansetron    Phosphorus Replacement - Follow Nurse / BPA Driven Protocol    Potassium Replacement - Follow Nurse / BPA Driven Protocol    [COMPLETED] Insert Peripheral IV **AND** sodium chloride    sodium chloride    sodium chloride    sodium chloride    traZODone     ALLERGIES:    Allergies   Allergen Reactions    Codeine Nausea And Vomiting, Other (See Comments) and Nausea Only     nausea  nausea      Topiramate Rash    Tramadol Other (See Comments) and Mental Status Change    Cefdinir Unknown - High Severity     Tolerated ceftriaxone July 2023    Levofloxacin Nausea Only    Lisinopril Cough       Objective    VITALS:   /50 (BP Location: Right arm, Patient Position: Lying)   Pulse 68   Temp 97.8 °F (36.6 °C) (Oral)   Resp 16   Ht 167.6 cm (65.98\")   Wt 81.4 kg (179 lb 7.3 oz)   SpO2 96%   BMI 28.98 kg/m²     PHYSICAL EXAM:  Physical Exam  Vitals and nursing note reviewed.   Constitutional:       General: She is not in acute distress.     Appearance: She is well-developed and normal weight. She is ill-appearing. She is not diaphoretic.   HENT:      Head: Normocephalic and atraumatic.      " Nose: Nose normal.      Comments: Oxygen per nasal cannula.     Mouth/Throat:      Mouth: Mucous membranes are moist.      Pharynx: Oropharynx is clear. No oropharyngeal exudate or posterior oropharyngeal erythema.      Comments: Dental fillings.  Eyes:      General: No scleral icterus.     Extraocular Movements: Extraocular movements intact.      Conjunctiva/sclera: Conjunctivae normal.      Pupils: Pupils are equal, round, and reactive to light.   Cardiovascular:      Rate and Rhythm: Normal rate and regular rhythm.      Heart sounds: Normal heart sounds. No murmur heard.     Comments: Cardiac monitor leads.  Pulmonary:      Effort: Pulmonary effort is normal. No respiratory distress.      Breath sounds: No stridor. Rales (bilateral) present. No wheezing.   Abdominal:      General: Bowel sounds are normal. There is no distension.      Palpations: Abdomen is soft. There is no mass.      Tenderness: There is no abdominal tenderness. There is no guarding.   Genitourinary:     Comments: Barroso catheter with dark urine and sediment..  Musculoskeletal:         General: Swelling (2-3+ bilateral lower extremity edema.) and deformity (Generative changes of joints.) present. No tenderness. Normal range of motion.      Cervical back: Normal range of motion and neck supple. No rigidity.      Right lower leg: No edema.      Left lower leg: No edema.      Comments: Left upper extremity O2 monitor.    Lymphadenopathy:      Cervical: No cervical adenopathy.      Upper Body:      Right upper body: No supraclavicular adenopathy.      Left upper body: No supraclavicular adenopathy.   Skin:     General: Skin is warm and dry.      Coloration: Skin is not pale.      Findings: Bruising (Few ecchymosis on arms.) present. No erythema or rash.   Neurological:      General: No focal deficit present.      Mental Status: She is oriented to person, place, and time.      Motor: Weakness present.      Coordination: Coordination normal.    Psychiatric:         Mood and Affect: Mood normal.         Behavior: Behavior normal.         Thought Content: Thought content normal.         Judgment: Judgment normal.         RECENT LABS:  Lab Results (last 24 hours)       Procedure Component Value Units Date/Time    Manual Differential [[id number]]  (Abnormal) Collected: 07/22/23 0255    Specimen: Blood Updated: 07/22/23 0344     Neutrophil % 85.0 %      Lymphocyte % 4.0 %      Monocyte % 7.0 %      Bands %  3.0 %      Myelocyte % 1.0 %      Neutrophils Absolute 18.57 10*3/mm3      Lymphocytes Absolute 0.84 10*3/mm3      Monocytes Absolute 1.48 10*3/mm3      Anisocytosis Slight/1+     Poikilocytes Slight/1+     WBC Morphology Normal     Large Platelets Slight/1+    CBC & Differential [[id number]]  (Abnormal) Collected: 07/22/23 0255    Specimen: Blood Updated: 07/22/23 0344    Narrative:      The following orders were created for panel order CBC & Differential.  Procedure                               Abnormality         Status                     ---------                               -----------         ------                     CBC Auto Differential[[id number]]        Abnormal            Final result               Scan Slide[[id number]]                                       Final result                 Please view results for these tests on the individual orders.    Scan Slide [[id number]] Collected: 07/22/23 0255    Specimen: Blood Updated: 07/22/23 0344     Scan Slide --     Comment: See Manual Differential Results       CBC Auto Differential [[id number]]  (Abnormal) Collected: 07/22/23 0255    Specimen: Blood Updated: 07/22/23 0344     WBC 21.10 10*3/mm3      RBC 3.06 10*6/mm3      Hemoglobin 9.3 g/dL      Hematocrit 28.2 %      MCV 91.9 fL      MCH 30.3 pg      MCHC 33.0 g/dL      RDW 22.0 %      RDW-SD 73.9 fl      MPV 7.3 fL      Platelets 198 10*3/mm3     Narrative:      The previously reported component NRBC is no longer being reported. Previous result  was 0.0 /100 WBC (Reference Range: 0.0-0.2 /100 WBC) on 7/22/2023 at 0312 EDT.    Comprehensive Metabolic Panel [[id number]]  (Abnormal) Collected: 07/21/23 2304    Specimen: Blood Updated: 07/22/23 0020     Glucose 89 mg/dL      BUN 70 mg/dL      Creatinine 1.70 mg/dL      Sodium 142 mmol/L      Potassium 4.9 mmol/L      Chloride 105 mmol/L      CO2 27.0 mmol/L      Calcium 8.7 mg/dL      Total Protein 5.1 g/dL      Albumin 1.9 g/dL      ALT (SGPT) 16 U/L      AST (SGOT) 46 U/L      Alkaline Phosphatase 179 U/L      Total Bilirubin 0.4 mg/dL      Globulin 3.2 gm/dL      A/G Ratio 0.6 g/dL      BUN/Creatinine Ratio 41.2     Anion Gap 10.0 mmol/L      eGFR 31.1 mL/min/1.73     Narrative:      GFR Normal >60  Chronic Kidney Disease <60  Kidney Failure <15    The GFR formula is only valid for adults with stable renal function between ages 18 and 70.    Phosphorus [[id number]]  (Normal) Collected: 07/21/23 2304    Specimen: Blood Updated: 07/22/23 0020     Phosphorus 4.2 mg/dL     Copper, Serum [[id number]] Collected: 07/18/23 1853    Specimen: Blood Updated: 07/21/23 1709     Copper 88 ug/dL      Comment:                                 Detection Limit = 5       Narrative:      Test(s) 001586-Copper, Serum or Plasma  was developed and its performance characteristics determined  by [facility]. It has not been cleared or approved by the Food  and Drug Administration.  Performed at:  [address]  [id number]  : Jesse Ritter MD, Phone:  [phone]            PENDING RESULTS: 24-hour UIFE, bone marrow pathology and cytogenetics, UA with reflex.     IMAGING REVIEWED:  No radiology results for the last day    I have reviewed the patient's labs, imaging, reports, and other clinician documentation.    Assessment & Plan   ASSESSMENT:  IgG kappa monoclonal gammopathy-incidental finding on work-up for CKD and anemia.  Spot urine WILL with no gammopathy.  Creatinine stable. Bone  survey showed an old T6 fracture.  Immunoglobulins and light chain ratio both normal.  Bone marrow aspiration performed 7/18-awaiting results.  24-hour UIFE pending.  On prednisone as an outpatient for treatment for possible sarcoidosis, which was continued.  Anemia and OLAF-onset June 2023, associated with iron deficiency and heme positive stools, CKD, monoclonal gammopathy, and recent methotrexate use.  On folic acid and Protonix.  S/p 3 doses of Ferrlecit 250 mg each and 1 unit pRBCs (7/17).  Prior work-up revealed normal B12 and haptoglobin.  Retic was suppressed.  EGD was unremarkable.  Copper level normal. Stable.    Recurrent ascites, liver disease, chronic epigastric pain, noncaseating granuloma of the stomach, acute coagulopathy and elevated LFTs- in work-up for sarcoidosis per Dr. Flores.  She was referred to the [facility] by  Rheumatology (records reviewed).  Acute hepatitis panel, platelets and fibrinogen all normal.  Mildly elevated AST.   No esophageal varices on recent EGD. On steroids. Coags improved.  Patient has not been eating but refuses NG or GT placement.  CKD with electrolyte imbalances, A-fib, hypertension, hyperlipidemia and hypothyroidism-On levothyroxine and TSH normal.  Per PMD and nephrology.  K. Aerogenes UTI, right knee S. Aureus cellulitis and leukocytosis-on Rocephin per ID.  We will recheck urinalysis due to leukocytosis.   Depression-psychiatry has been consulted.  Current infections clouding evaluation.    PLAN  Obtain SPEP from primary care provider as outpatient.  24-hour UIFE pending.  Bone marrow aspiration and biopsy path/cytogenetics pending.   DEXA scan as outpatient.  Colonoscopy as outpatient.  UA with reflex.    Electronically signed by JOE Goddard, 07/22/23, 11:31 AM EDT.        I have personally performed a face-to-face diagnostic evaluation on this patient. I have performed a complete history and physical examination, reviewed laboratory studies,  and radiographic examinations.  I have completed the majority and substantive portion of the medical decision making.  I have formulated the assessment on this patient and the plan of action as noted above. I have discussed the case with Raquel Guevara NP, have edited/reviewed the note, and agree with the care plan.  She is complaining of poor appetite.  On examination, she does have 2-3+ lower extremity edema.  Hemoglobin is 9.3.  Bone marrow biopsy results are pending.  Can follow-up on testing as outpatient.    I discussed the patient's findings and my recommendations with patient, spouse and nursing.    Part of this note may be an electronic transcription/translation of spoken language to printed text using the Dragon Dictation System.    Electronically signed by Rc Ayala MD, 07/22/23, 2:41 PM EDT.

## 2023-07-22 NOTE — CASE MANAGEMENT/SOCIAL WORK
"Physicians Statement of Medical Necessity for  Ambulance Transportation    GENERAL INFORMATION     Name: Rosario Ortez  YOB: 1950  Medicare #:     [id number]     Transport Date 07/22/2023  (Valid for round trips this date, or for scheduled repetitive trips for 60 days from the date signed below.)  Origin: [address] [place] [address]  Destination: [address] [place] [address] [place] [facility]   Is the Patient's stay covered under Medicare Part A (PPS/DRG?)Yes  Closest appropriate facility? Yes  If this a hosp-hosp transfer? No  Is this a hospice patient? No    MEDICAL NECESSITY QUESTIONAIRE    Ambulance Transportation is medically necessary only if other means of transportation are contraindicated or would be potentially harmful to the patient.  To meet this requirement, the patient must be either \"bed confined\" or suffer from a condition such that transport by means other than an ambulance is contraindicated by the patient's condition.  The following questions must be answered by the healthcare professional signing below for this form to be valid:     1) Describe the MEDICAL CONDITION (physical and/or mental) of this patient AT THE TIME OF AMBULANCE TRANSPORT that requires the patient to be transported in an ambulance, and why transport by other means is contraindicated by the patient's condition: 8L cont. 02, Stage 2 on coccyx, right leg cellulitis, per PT- max assist x2 to get pt to edge of bed. Unable to sit for duration of the trip due to fatigue.   Past Medical History:   Diagnosis Date    A-fib     Abnormal results of liver function studies 07/20/2023    AI (aortic incompetence) 07/20/2023    Description: mild per 5/11 ECHO    Allergic rhinitis 07/20/2023    Anorexia 07/20/2023    Ascites 07/20/2023    Avitaminosis D 07/20/2023    Benign neoplasm of ascending colon 10/06/2014    Blood glucose elevated 07/20/2023    Constipation 07/20/2023    Coronary artery disease     Diastolic " "dysfunction 07/20/2023    Diverticulosis of large intestine without perforation or abscess without bleeding 01/20/2023    Endometriosis 07/20/2023    Essential hypertension 04/29/2021    Gastroesophageal reflux disease 07/20/2023    Hemorrhage, postmenopausal 07/20/2023    Hyperlipidemia 07/20/2023    Hypothyroidism 07/20/2023    Lumbar radiculopathy 07/20/2023    Melena 01/20/2023    Osteoarthritis of right hip 04/29/2021    Paroxysmal atrial fibrillation 07/17/2023    Peripheral neuropathy 04/25/2012    Personal history of colonic polyps 10/06/2014    NC (pulmonary regurgitation) 07/20/2023    Description: Trace NC per 5/11 ECHO    Primary hypertriglyceridemia 06/14/2020    Sarcoidosis 07/17/2023    Suspected    Second degree hemorrhoids 08/20/2020    Tremor 07/20/2023    Vitamin B12 deficiency 02/19/2014      Past Surgical History:   Procedure Laterality Date    BREAST BIOPSY      ENDOSCOPY N/A 7/18/2023    Procedure: ESOPHAGOGASTRODUODENOSCOPY with dobhoff placement and securement of feeding tube to duodenum using endoscopic clipping x 1;  Surgeon: Zeyad Flores MD;  Location: [facility] ENDOSCOPY;  Service: Gastroenterology;  Laterality: N/A;    HYSTERECTOMY      JOINT REPLACEMENT Right 2015      2) Is this patient \"bed confined\" as defined below?Yes   To be \"bed confined\" the patient must satisfy all three of the following criteria:  (1) unable to get up from bed without assistance; AND (2) unable to ambulate;  AND (3) unable to sit in a chair or wheelchair.  3) Can this patient safely be transported by car or wheelchair van (I.e., may safely sit during transport, without an attendant or monitoring?)No   4. In addition to completing questions 1-3 above, please check any of the following conditions that apply*:          *Note: supporting documentation for any boxes checked must be maintained in the patient's medical records Requires oxygen - unable to self administer, Unable to tolerate seated position " for time needed to transport, and Unable to sit in a chair or wheelchair due to decubitus ulcers or other wounds      SIGNATURE OF PHYSICIAN OR OTHER AUTHORIZED HEALTHCARE PROFESSIONAL    I certify that the above information is true and correct based on my evaluation of this patient, and represent that the patient requires transport by ambulance and that other forms of transport are contraindicated.  I understand that this information will be used by the Centers for Medicare and Medicaid Services (CMS) to support the determiniation of medical necessity for ambulance services, and I represent that I have personal knowledge of the patient's condition at the time of transport.       If this box is checked, I also certify that the patient is physically or mentally incapable of signing the ambulance service's claim form and that the institution with which I am affiliated has furnished care, services or assistance to the patient.  My signature below is made on behalf of the patient pursuant to 42 .36(b)(4). In accordance with 42 .37, the specific reason(s) that the patient is physically or mentally incapable of signing the claim for is as follows:     Signature of Physician or Healthcare Professional     [facility] Date/Time:   07/22/2023     (For Scheduled repetitive transport, this form is not valid for transports performed more than 60 days after this date).                                                                                                                                            --------------------------------------------------------------------------------------------  Printed Name and Credentials of Physician or Authorized Healthcare Professional     *Form must be signed by patient's attending physician for scheduled, repetitive transports,.  For non-repetitive ambulance transports, if unable to obtain the signature of the attending physician, any of the following may  sign (please select below):     Physician  Clinical Nurse Specialist  Registered Nurse     Physician Assistant  Discharge Planner  Licensed Practical Nurse     Nurse Practitioner x

## 2023-07-23 NOTE — DISCHARGE SUMMARY
[facility] Medicine Services  DISCHARGE SUMMARY    Patient Name: Rosario Ortez  : 1950  MRN: [id number]    Date of Admission: 2023  Discharge Diagnosis: JOSÉ on CKD Stage 3   Date of Discharge:  23  Primary Care Physician: Ian Cordero MD      Presenting Problem:   Weakness [R53.1]  Acute kidney injury [N17.9]  JOSÉ (acute kidney injury) [N17.9]  Bacteremia [R78.81]  Atrial fibrillation [I48.91]  End of life care [Z51.5]    Active and Resolved Hospital Problems:  Active Hospital Problems    Diagnosis POA    **End of life care [Z51.5] Not Applicable    Hyperlipidemia [E78.5] Yes    Hypothyroidism [E03.9] Yes    Gastroesophageal reflux disease [K21.9] Yes    Moderate malnutrition [E44.0] Yes    Paroxysmal atrial fibrillation [I48.0] Yes    Sarcoidosis [D86.9] Yes    Metabolic acidosis [E87.20] Yes    Bacteremia due to Streptococcus [R78.81, B95.5] Yes    Cellulitis of right leg [L03.115] Yes    Knee joint cyst, right [M25.861] Yes    Bilateral leg edema [R60.0] Yes    UTI due to Klebsiella species [N39.0, B96.89] Yes    JOSÉ (acute kidney injury) [N17.9] Yes    Dysphagia [R13.10] Yes    Feeding difficulties [R63.30] Yes    Essential hypertension [I10] Yes      Resolved Hospital Problems   No resolved problems to display.     1. JOSÉ on CKD Stage 3 with metabolic acidosis-likely pre-renal in the setting of volume depletion from diuretics.  Bicarbonate fluids,   Nephrology following.  Creatinine level slowly improving,  will continue to monitor.     2. Right leg cellulitis: ? Knee abscess,   -continue IV rocephin.    -ID consulted to due to strep bacteremia.  Probable source is the right leg.    -Consulted wound care also.    Keep leg elevated.    Aspirated, culture pending     3. Strep bacteremia-continue ceftriaxone.  ID consulted.  Continue with IV antibiotics.     4. B/L leg edema-chronic issue.  Had venous dopplers done 2023 and negative for any DVT.  Keep  leg elevated while in bed.  Has some lymphedema also.     5.  Klebsiella UTI.    -Continue ceftriaxone.     6. PAF  -continue Eliquis 5 mg BID,     -Monitor HR. .      7. Dysphagia/occult positive stool-  -EGD/dilatation done, no concerning findings.   -ST following,  - puréed diet along with NG tube feeds.       PT/OT    Hospital Course     Hospital Course:  Rosario Ortez is a 73 y.o. female with past medical history of paroxysmal atrial fibrillation, hypertension, hypothyroidism and fluid accumulation in the abdomen and lower extremities and recently suspected to have sarcoidosis for which she is going to specialized center next week for further diagnosis has been doing at her baseline and went for her blood work at her PCPs office yesterday. PCPs office called her today and told her to come to the hospital because her kidney function looks worse and she has a UTI.  Patient does have JOSÉ on CKD but does not have a UTI at this point.  Her baseline activity is pretty poor and she would walk some very small distances with walker but is mostly wheelchair-bound.  She was also complaining of some area of erythematous lesions with some swelling in her right leg and knee and probably had some bronchitis for which she was started on doxycycline yesterday by her PCP.  Patient's oral intake is very poor as per family.  She has been on water pills with Lasix and she was also started on hydrochlorothiazide yesterday.  She denies any nausea vomiting or abdominal pain but is quite tender in the abdomen.  She denies any fever chills chest pain or shortness of breath but has some productive cough.  She denies any other significant complaints though.     7/21/2023 patient seen and examined in bed no acute distress,  at bedside, discussed with RN.  Family will meet with [facility] hospice to discussed possible care.  Vital signs stable.  7/22/23 patient seen and examined in bed no acute distress, will discharge patient home  with hospice.  Condition at discharge fair.    DISCHARGE Follow Up Recommendations for labs and diagnostics: Follow-up with PCP in a week.      Reasons For Change In Medications and Indications for New Medications:  START taking:  cefTRIAXone 2,000 mg in sodium chloride 0.9 % 100 mL IVPB   metoprolol tartrate (LOPRESSOR)   midodrine (PROAMATINE)   sodium bicarbonate   Stool Softener Plus Laxative (sennosides-docusate)    STOP taking:  doxycycline 100 MG capsule (VIBRAMYCIN)   hydroCHLOROthiazide 25 MG tablet (HYDRODIURIL)   irbesartan 75 MG tablet (AVAPRO)     Day of Discharge     Vital Signs:       Physical Exam Vitals and nursing note reviewed.   Constitutional:       General: She is not in acute distress.     Appearance: She is well-developed. She is not diaphoretic.   HENT:      Head: Normocephalic and atraumatic.   Cardiovascular:      Rate and Rhythm: Normal rate.   Pulmonary:      Effort: Pulmonary effort is normal. No respiratory distress.      Breath sounds: No wheezing.   Abdominal:      General: There is no distension.      Palpations: Abdomen is soft.   Musculoskeletal:         General: Normal range of motion.   Skin:     General: Skin is warm and dry.   Neurological:      Mental Status: She is alert.      Cranial Nerves: No cranial nerve deficit.   Psychiatric:         Behavior: Behavior normal.         Thought Content: Thought content normal.         Judgment: Judgment normal.          Pertinent  and/or Most Recent Results     LAB RESULTS:      Lab 07/22/23  0255 07/21/23  0045 07/20/23  0729 07/19/23  0310 07/18/23  0125 07/17/23  1859 07/17/23  1114   WBC 21.10* 21.80* 21.10* 16.30* 11.90*  --  13.10*   HEMOGLOBIN 9.3* 8.9* 10.5* 9.4* 8.3*   < > 7.4*   HEMATOCRIT 28.2* 28.6* 33.6* 29.3* 26.1*   < > 23.8*   PLATELETS 198 216 260 243 215  --  238   NEUTROS ABS 18.57* 19.40* 16.25* 13.86* 9.76*  --  9.96*   EOS ABS  --   --   --  0.82*  --   --   --    MCV 91.9 92.0 93.2 93.0 90.8  --  100.4*   SED RATE   --   --   --   --   --   --  11   CRP  --   --   --   --   --   --  5.47*   PROTIME  --   --  12.2*  --   --   --  13.2*   APTT  --   --  31.2*  --   --   --  47.0*    < > = values in this interval not displayed.         Lab 07/21/23 2304 07/21/23 0045 07/20/23 0729 07/19/23 0310 07/18/23 0125 07/17/23  1114   SODIUM 142 139 140 141 141 140   POTASSIUM 4.9 4.4 4.1 4.3 3.4* 3.7   CHLORIDE 105 103 103 108* 109* 110*   CO2 27.0 28.0 30.0* 23.0 21.0* 21.0*   ANION GAP 10.0 8.0 7.0 10.0 11.0 9.0   BUN 70* 65* 62* 60* 65* 75*   CREATININE 1.70* 1.66* 1.51* 1.62* 1.72* 2.18*   EGFR 31.1* 32.0* 35.9* 33.0* 30.7* 23.1*   GLUCOSE 89 95 119* 151* 101* 101*   CALCIUM 8.7 8.7 8.7 8.4* 8.3* 8.5*   MAGNESIUM  --  2.2  --   --   --  2.1   PHOSPHORUS 4.2 4.2 1.9* 2.3* 3.1  --    HEMOGLOBIN A1C  --  4.70*  --   --   --   --    TSH  --   --  2.270  --   --   --          Lab 07/21/23 2304 07/21/23 0045 07/20/23 0729 07/19/23 0310 07/18/23 0125 07/17/23  1114   TOTAL PROTEIN 5.1* 5.1*  --  5.3*  --  4.6*   ALBUMIN 1.9* 1.8* 2.1* 2.1* 2.1* 1.9*   GLOBULIN 3.2 3.3  --  3.2  --  2.7   ALT (SGPT) 16 13  --  17  --  15   AST (SGOT) 46* 43*  --  47*  --  26   BILIRUBIN 0.4 0.5  --  0.5  --  0.3   ALK PHOS 179* 190*  --  204*  --  137*         Lab 07/20/23  0729 07/17/23  1114   PROTIME 12.2* 13.2*   INR 1.15* 1.25*         Lab 07/21/23  0045   CHOLESTEROL 91   LDL CHOL 41   HDL CHOL 36*   TRIGLYCERIDES 61         Lab 07/17/23  1114   ABO TYPING O   RH TYPING Positive   ANTIBODY SCREEN Negative         Brief Urine Lab Results  (Last result in the past 365 days)        Color   Clarity   Blood   Leuk Est   Nitrite   Protein   CREAT   Urine HCG        07/13/23 0944 Yellow   Clear   Negative   Trace   Negative   Negative                 Microbiology Results (last 10 days)       Procedure Component Value - Date/Time    Body Fluid Culture - Body Fluid, Knee, Right [[id number]]  (Abnormal)  (Susceptibility) Collected: 07/18/23 5816    Lab  Status: Final result Specimen: Body Fluid from Knee, Right Updated: 07/21/23 0815     Body Fluid Culture Heavy growth (4+) Staphylococcus aureus     Gram Stain Many (4+) WBCs per low power field      Many (4+) Gram positive cocci in pairs    Susceptibility        Staphylococcus aureus      ISELA      Gentamicin Susceptible      Oxacillin Susceptible      Rifampin Susceptible      Vancomycin Susceptible                       Susceptibility Comments       Staphylococcus aureus    This isolate does not demonstrate inducible clindamycin resistance in vitro.                         XR Knee 1 or 2 View Right    Result Date: 7/13/2023  Impression: Impression: Degenerative changes. Small effusion. Electronically Signed: Juliane Baker MD  7/13/2023 1:50 PM EDT  Workstation ID: [id number]    XR Knee 1 or 2 View Right    Result Date: 7/8/2023  Impression: Impression: Moderate tricompartment arthritis. No fractures or dislocations. Electronically Signed: Bridgett Gaspar  7/8/2023 2:18 PM EDT  Workstation ID: [id number]    XR Bone Survey Complete    Result Date: 7/18/2023  Impression: Impression: Compression fracture of T6, likely old. Other chronic findings as detailed. Electronically Signed: Juliane Baker MD  7/18/2023 3:30 PM EDT  Workstation ID: [id number]    XR Chest 1 View    Result Date: 7/20/2023  Impression: Impression: New medial left basilar consolidation and new hazy and patchy airspace disease in both lungs. These findings could relate to edema or pneumonia Electronically Signed: Nayan Juárez  7/20/2023 6:18 AM EDT  Workstation ID: [id number]    XR Chest 1 View    Result Date: 7/8/2023  Impression: Impression: No acute cardiopulmonary abnormality. Electronically Signed: Bridgett Gaspar  7/8/2023 2:17 PM EDT  Workstation ID: [id number]    MRI Knee Right Without Contrast    Result Date: 7/14/2023  Impression: Impression: 1.Prominent diffuse edema in the soft tissues surrounding the knee which may indicate skin and soft tissue  infection. 2.2.8 x 0.8 x 2.5 cm collection is seen in the subcutaneous tissues along the medial femoral condyle which is nonspecific but could represent an abscess. 3.No significant knee effusion or significant marrow edema signal is seen on this exam to suggest joint infection. If there is high index of clinical concern, aspiration would be advised. 4.Advanced tricompartmental osteoarthritis with mild subchondral edema at the median ridge of the patella. 5.Limited visualization of the menisci on this exam with suspicion for tears of the posterior horns. Electronically Signed: Cj Turk  7/14/2023 4:51 PM EDT  Workstation ID: [id number]    XR Abdomen KUB    Result Date: 7/18/2023  Impression: Impression: Antegrade oriented feeding tube tip within distal stomach. Electronically Signed: Pedro Garcia MD  7/18/2023 1:11 PM EDT  Workstation ID: [id number]    CT Bone marrow biopsy and aspiration    Result Date: 7/18/2023  Impression: 1. Successful CT-guided bone marrow biopsy Electronically Signed: Cameron Boudreaux  7/18/2023 4:08 PM EDT  Workstation ID: [id number]    IR Inject/asp large joint or bursa    Result Date: 7/18/2023  Impression: 1. Successful aspiration of right medial knee abscess yielding 8 mL of purulent material. Electronically Signed: Cameron Boudreaux  7/18/2023 4:41 PM EDT  Workstation ID: [id number]     Results for orders placed during the hospital encounter of 06/26/23    Duplex Venous Lower Extremity - Right CAR    Interpretation Summary    Normal right lower extremity venous duplex scan.      Results for orders placed during the hospital encounter of 06/26/23    Duplex Venous Lower Extremity - Right CAR    Interpretation Summary    Normal right lower extremity venous duplex scan.          Labs Pending at Discharge:  Pending Labs       Order Current Status    Bone Marrow Exam In process    Bone Marrow Exam In process    Creatinine, Total Protein , Electrophoresis & Immunofixation 24 Hr Urine - Urine, Clean  Catch In process    Cytogenetics (Integrated Oncology) In process    Multiple Myeloma Panel By FISH - Bone Marrow Aspirate, In process            Procedures Performed  Procedure(s):  ESOPHAGOGASTRODUODENOSCOPY with dobhoff placement and securement of feeding tube to duodenum using endoscopic clipping x 1         Consults:   Consults       Date and Time Order Name Status Description    7/20/2023  2:28 PM Inpatient Pulmonology Consult Completed     7/20/2023 12:50 PM Inpatient Psychiatrist Consult Completed     7/20/2023 10:43 AM Inpatient Cardiology Consult Completed     7/18/2023 12:32 AM Hematology & Oncology Inpatient Consult Completed     7/15/2023  1:52 PM Inpatient General Surgery Consult Completed     7/14/2023 10:26 AM Inpatient Nephrology Consult Completed     7/14/2023 10:26 AM Inpatient Orthopedic Surgery Consult Completed     7/14/2023 10:26 AM Inpatient Infectious Diseases Consult Completed     6/26/2023  5:53 PM Inpatient Gastroenterology Consult Completed           Assessment & Plan     ASSESSMENT:  IgG kappa monoclonal gammopathy-incidental finding on work-up for CKD and anemia.  Spot urine WILL with no gammopathy.  Creatinine stable. Bone survey showed an old T6 fracture.  Immunoglobulins and light chain ratio both normal.  Bone marrow aspiration performed 7/18-awaiting results.  24-hour UIFE pending.  On prednisone as an outpatient for treatment for possible sarcoidosis, which was continued.  Anemia and OLAF-onset June 2023, associated with iron deficiency and heme positive stools, CKD, monoclonal gammopathy, and recent methotrexate use.  On folic acid and Protonix.  S/p 3 doses of Ferrlecit 250 mg each and 1 unit pRBCs (7/17).  Prior work-up revealed normal B12 and haptoglobin.  Retic was suppressed.  EGD was unremarkable.  Copper level normal. Stable.    Recurrent ascites, liver disease, chronic epigastric pain, noncaseating granuloma of the stomach, acute coagulopathy and elevated LFTs- in work-up  for sarcoidosis per Dr. Flores.  She was referred to the [facility] by  Rheumatology (records reviewed).  Acute hepatitis panel, platelets and fibrinogen all normal.  Mildly elevated AST.   No esophageal varices on recent EGD. On steroids. Coags improved.  Patient has not been eating but refuses NG or GT placement.  CKD with electrolyte imbalances, A-fib, hypertension, hyperlipidemia and hypothyroidism-On levothyroxine and TSH normal.  Per PMD and nephrology.  K. Aerogenes UTI, right knee S. Aureus cellulitis and leukocytosis-on Rocephin per ID.  We will recheck urinalysis due to leukocytosis.   Depression-psychiatry has been consulted.  Current infections clouding evaluation.     PLAN  Obtain SPEP from primary care provider as outpatient.  24-hour UIFE pending.  Bone marrow aspiration and biopsy path/cytogenetics pending.   DEXA scan as outpatient.  Colonoscopy as outpatient.  UA with reflex.     Electronically signed by Raquel Guevara, JOE, 07/22/23, 11:31 AM EDT.      Assessment  Hypoxemia  Probable pneumonia: Chest x-ray 7/20/2023 with left base infiltrate and diffuse airspace disease  Bacteriuria positive for Klebsiella  Probable right knee abscess status post aspiration by IR 7/18/2023: Growing Staphylococcus aureus  History of liver disease with noncaseating granulomas of the stomach, recent work-up for sarcoidosis by rheumatologist and recently taken off methotrexate 7/3/2023  JOSÉ/CKD  Malnutrition status post EGD and NG tube placement for tube feeding  IgG kappa monoclonal gammopathy status post bone marrow biopsy 7/19/2023  A-fib with RVR  Hypothyroidism  Anemia    Recommendations:  Oxygen supplement and titration to maintain saturation 90 to 95%: Currently requiring 6-8 L per nasal cannula  Antibiotics as per ID  Prednisone: Currently on 15 mg daily  Thyroid hormone replacement  HR control as per cardiology  Anticoagulation:  Eliquis  Atorvastatin    =========================================================  Assessment: Delirium due to medical condition Bacteremia, JOSÉ, UTI   Treatment Plan:      Per family patient's confusion started with admission.  Patient diagnosed with bacteremia, UTI, which can be a cause for increased confusion and a greater risk of delirium since sudden onset of confusion and altered mental status.     Pt has memory deficits and some confusion. Pt's decisional capacity appears to be impaired at this time, but this is reported to be an acute onset of confusion. Previous decision making capability prior to admission does not seem to be an issue.     Will follow and provide support prn.      Treatment Plan discussed with: Patient      I discussed the patients findings and my recommendations with patient and nursing staff     I have reviewed and approved the behavioral health treatment plans and problem list. Yes  Thank you for the consult   Referring MD has access to consult report and progress notes in EMR      Bridgett Nicock, JOE  07/21/23  =============================================      Assessment     Positive blood culture in 1 out of 2 sets on admission for Streptococcus species.  Patient had no endovascular device or prior cardiac valve replacement.  We are suspecting contamination.     Bacteriuria.  Urine cultures are growing Klebsiella aerogenes.  urinalysis was not significantly abnormal.     Probable right medial knee abscess.  MRI shows a fluid collection in the subcutaneous tissues along the medial femoral condyle but no findings to suggest a joint infection  -Aspiration of right knee by IR on 7/18/2023-cultures are growing methicillin susceptible Staphylococcus aureus     Recent work-up for sarcoidosis by an outpatient rheumatologist.  Patient was taken off methotrexate on 7/3/2023.  Still on prednisone.  Patient is likely still somewhat immunocompromised     Acute kidney injury-nephrology following      EGD with NG placement for tube feeds due to malnutrition on 7/18/2023     Incidental finding of IgG kappa monoclonal gammopathy-oncology following and patient is status post bone marrow biopsy on 7/19/2023     A-fib with RVR-patient is now on a Cardizem drip.  Cardiology following     Plan     Continue IV Rocephin 2 g every 24 hours-we will plan on 2 weeks total of antimicrobial therapy-last day on 7/27/2023.  Can be switched to p.o. Keflex 500 mg 3 times daily at discharge  Continue supportive care  A.m. labs  Case discussed with patient and family member at bedside        JOE [place]  Discharge Details        Discharge Medications        New Medications        Instructions Start Date   cefTRIAXone 2,000 mg in sodium chloride 0.9 % 100 mL IVPB   2,000 mg, Intravenous, Every 24 Hours      metoprolol tartrate 25 MG tablet  Commonly known as: LOPRESSOR   25 mg, Oral, 2 Times Daily PRN      midodrine 2.5 MG tablet  Commonly known as: PROAMATINE   2.5 mg, Oral, 3 Times Daily Before Meals      sodium bicarbonate 650 MG tablet   1,300 mg, Oral, 3 Times Daily      Stool Softener Plus Laxative 8.6-50 MG per tablet  Generic drug: sennosides-docusate   2 tablets, Oral, 2 Times Daily             Continue These Medications        Instructions Start Date   apixaban 2.5 MG tablet tablet  Commonly known as: ELIQUIS   2.5 mg, Oral, 2 Times Daily      atorvastatin 20 MG tablet  Commonly known as: LIPITOR   20 mg, Oral, Daily      ferrous sulfate 325 (65 FE) MG tablet   325 mg, Oral, Daily With Breakfast      folic acid 1 MG tablet  Commonly known as: FOLVITE   1 mg, Oral, Daily      furosemide 20 MG tablet  Commonly known as: LASIX   20 mg, Oral, Daily      levothyroxine 50 MCG tablet  Commonly known as: SYNTHROID, LEVOTHROID   50 mcg, Every Early Morning      pantoprazole 40 MG EC tablet  Commonly known as: PROTONIX   40 mg, Oral, Daily      predniSONE 5 MG tablet  Commonly known as: DELTASONE   15 mg, Oral, Daily       traZODone 50 MG tablet  Commonly known as: DESYREL   100 mg, Oral, Nightly PRN             Stop These Medications      doxycycline 100 MG capsule  Commonly known as: VIBRAMYCIN     hydroCHLOROthiazide 25 MG tablet  Commonly known as: HYDRODIURIL     irbesartan 75 MG tablet  Commonly known as: AVAPRO              Allergies   Allergen Reactions    Codeine Nausea And Vomiting, Other (See Comments) and Nausea Only     nausea  nausea      Topiramate Rash    Tramadol Other (See Comments) and Mental Status Change    Cefdinir Unknown - High Severity     Tolerated ceftriaxone July 2023    Levofloxacin Nausea Only    Lisinopril Cough         Discharge Disposition:   Hospice/Home    Diet:  Hospital:No active diet order        Discharge Activity:   Activity Instructions       Gradually Increase Activity Until at Pre-Hospitalization Level      Gradually Increase Activity Until at Pre-Hospitalization Level                CODE STATUS:  Code Status and Medical Interventions:   Ordered at: 07/13/23 1253     Level Of Support Discussed With:    Patient    Health Care Surrogate     Code Status (Patient has no pulse and is not breathing):    CPR (Attempt to Resuscitate)     Medical Interventions (Patient has pulse or is breathing):    Full Support     I have utilized all available, immediate resources to obtain, update, or review the patient's current medications including all prescriptions, over-the-counter products, herbals, cannabis/cannabidiol products, and vitamin.mineral/dietary (nutritional) supplements.      Level Of Support Discussed With: Patient; Health Care Surrogate  Code Status (Patient has no pulse and is not breathing): CPR (Attempt to Resuscitate)  Medical Interventions (Patient has pulse or is breathing): Full Support    Next of kin of Power of :   I confirmed that the patient's Advanced Care Plan is present, code status is documented, or surrogate decision maker is listed in the patient's medical record:  YES  Hospice care is currently being provided or has been provided this calendar year: YES        Admission Status:  I believe this patient meets 34 status.          Future Appointments   Date Time Provider Department Center   7/25/2023  7:15 AM ROOM 09 - [facility]  [facility] None       Additional Instructions for the Follow-ups that You Need to Schedule       Discharge Follow-up with PCP   As directed       Currently Documented PCP:    Ian Cordero MD    PCP Phone Number:    [phone]     Follow Up Details: 1 week         Discharge Follow-up with PCP   As directed       Currently Documented PCP:    Ian Cordero MD    PCP Phone Number:    [phone]     Follow Up Details: 1 week                 Time spent on Discharge including face to face service:  34 minutes    This patient has been examined wearing appropriate Personal Protective Equipment and discussed with  rn . 07/23/23      Signature: Electronically signed by Jordon Samson MD, 07/23/23, 3:43 PM EDT.

## 2023-07-24 DIAGNOSIS — R18.8 OTHER ASCITES: Primary | ICD-10-CM

## 2023-07-24 LAB — PLASMA CELL MYELOMA TARGETGENE PANEL RESULT: NORMAL

## 2023-07-24 RX ORDER — LIDOCAINE HYDROCHLORIDE 10 MG/ML
10 INJECTION, SOLUTION INFILTRATION; PERINEURAL AS NEEDED
OUTPATIENT
Start: 2023-07-24

## 2023-07-24 RX ORDER — ALBUMIN (HUMAN) 12.5 G/50ML
12.5 SOLUTION INTRAVENOUS DAILY PRN
OUTPATIENT
Start: 2023-07-24

## 2023-07-24 RX ORDER — ALBUMIN (HUMAN) 12.5 G/50ML
25 SOLUTION INTRAVENOUS DAILY PRN
OUTPATIENT
Start: 2023-07-24

## 2023-07-25 ENCOUNTER — HOSPITAL ENCOUNTER (OUTPATIENT)
Dept: INFUSION THERAPY | Facility: HOSPITAL | Age: 73
Discharge: HOME | End: 2023-07-25
Payer: MEDICARE

## 2023-07-25 DIAGNOSIS — R18.8 OTHER ASCITES: ICD-10-CM

## 2023-07-25 LAB
ALBUMIN 24H MFR UR ELPH: 34.3 %
ALPHA1 GLOB 24H MFR UR ELPH: 2 %
ALPHA2 GLOB 24H MFR UR ELPH: 13.9 %
B-GLOBULIN MFR UR ELPH: 27.2 %
CREAT 24H UR-MRATE: 422 MG/24 HR (ref 800–1800)
CREAT UR-MCNC: 105.5 MG/DL
GAMMA GLOB 24H MFR UR ELPH: 22.8 %
HIV 1 & 2 AB SER-IMP: ABNORMAL
INTERPRETATION UR IFE-IMP: ABNORMAL
M PROTEIN 24H MFR UR ELPH: ABNORMAL %
PROT UR-MCNC: 33.3 MG/DL

## 2023-07-27 LAB
LAB AP CASE REPORT: NORMAL
LAB AP CLINICAL INFORMATION: NORMAL
LAB AP DIAGNOSIS COMMENT: NORMAL
LAB AP FLOW CYTOMETRY SUMMARY: NORMAL
PATH REPORT.FINAL DX SPEC: NORMAL
PATH REPORT.GROSS SPEC: NORMAL

## 2023-07-31 LAB — CYTOGENETICS RESULT: NORMAL

## 2023-09-20 LAB
LAB AP CASE REPORT: NORMAL
LAB AP CLINICAL INFORMATION: NORMAL
LAB AP DIAGNOSIS COMMENT: NORMAL
LAB AP FLOW CYTOMETRY SUMMARY: NORMAL
LAB AP INTEGRATED ONCOLOGY, ADDENDUM: NORMAL
PATH REPORT.FINAL DX SPEC: NORMAL
PATH REPORT.GROSS SPEC: NORMAL
REF LAB TEST METHOD: NORMAL

## 2025-06-20 NOTE — THERAPY EVALUATION
Continued Stay SW/CM Assessment/Plan of Care Note         Active Substitute Decision Maker (SDM)    There are no active Substitute Decision Maker (SDM) on file.       Progress note:  Insurance requiring a peer to peer to be completed today by noon. If peer to peer not granted, pt is in agreement to returning home with [facility] home care.  Please call [phone] opt 5 REFERENCE NUMBER [id number] SUBSCRIBER #[id number]------- peer to peer due by 1230pm- message sent to hositalist to request completion.    Current Patient Status: Inpatient    See SW/CM flowsheets for other objective data.    Disposition Recommendations:  Preliminary discharge destination: Planned Discharge Destination: Rehabilitation/Skilled Care  SW/CM recommendation for discharge: Sub-acute nursing home vs home with home care    Destination Pharmacy:        Discharge Plan/Needs:     Continued Care and Services - Admitted Since 6/17/2025       Destination  Coordination complete.      Service Provider Request Status Services Address Phone Fax    [facility]  Selected Skilled Nursing [address] [facility] [address] [phone]                  Selected Continued Care - Episodes Includes continued care and service providers with selected services from the active episodes listed below      Care Transitions Episode start date: 5/22/2025 (Paused)   There are no active outsourced providers for this episode.               Prior To Hospitalization:    Living Situation: Family members and residing at House    .  Support Systems: Family members   Home Devices/Equipment: None            Mobility Assist Devices: Cane, Wheelchair   Type of Service Prior to Hospitalization: None               Patient/Family discharge goal (s):  Sub-acute nursing home vs home with home care    Prior Function  Bed Mobility: Independent (06/18/25 1200 : Lamar Zepeda)  Transfers: Modified Independent (06/18/25  Patient Name: Rosario Ortez  : 1950    MRN: [id number]                              Today's Date: 2023       Admit Date: 2023    Visit Dx:     ICD-10-CM ICD-9-CM   1. Syncope, unspecified syncope type  R55 780.2   2. Acute kidney injury  N17.9 584.9   3. Other ascites  R18.8 789.59     Patient Active Problem List   Diagnosis    Laceration of scalp, initial encounter    Acute kidney injury    Acute cystitis    Syncope, unspecified syncope type     Past Medical History:   Diagnosis Date    A-fib     Coronary artery disease      Past Surgical History:   Procedure Laterality Date    BREAST BIOPSY      HYSTERECTOMY      JOINT REPLACEMENT Right 2015      General Information       Row Name 23 1108          Physical Therapy Time and Intention    Document Type evaluation  -OD     Mode of Treatment physical therapy  -OD       Row Name 23 1108          General Information    Patient Profile Reviewed yes  -OD     Prior Level of Function mod assist:;grooming;bathing;all household mobility;community mobility;home management;ADL's  -OD     Existing Precautions/Restrictions fall  -OD     Barriers to Rehab previous functional deficit  -OD       Row Name 23 1108          Living Environment    People in Home child(clint), adult;spouse  -OD       Row Name 23 1108          Home Main Entrance    Number of Stairs, Main Entrance three;other (see comments)  Through garage, with railing  -OD     Stair Railings, Main Entrance railings safe and in good condition  -OD       Row Name 23 1108          Stairs Within Home, Primary    Number of Stairs, Within Home, Primary none  -OD       Row Name 23 1108          Cognition    Orientation Status (Cognition) oriented x 4  -OD       Row Name 23 1108          Safety Issues, Functional Mobility    Impairments Affecting Function (Mobility) balance;endurance/activity tolerance;motor planning;pain;strength  -OD               User Key  (r) =  1200 : Lamar Zepeda)  Ambulation in the Home: Modified  Independent (uses walls and funature for balance) (06/18/25 1200 : Lamar Zepeda)  Ambulation in the Community: Modified Independent (uses w/c if available) (06/18/25 1200 : Lamar Zepeda)    Current Function  Last Filed Values         Value Time User    Current Function  significantly below baseline level of function 6/18/2025  2:39 PM Bev Harrison, SYLVESTER    Therapy Impairments  activity tolerance; balance; strength; safety awareness 6/18/2025  2:39 PM Bev Harrison, PT    ADLs Requiring Support  bed mobility; transfers; ambulation; stairs 6/18/2025  2:39 PM Bev Harrison, SYLVESTER            Therapy Recommendations for Discharge:   PT:      Last Filed Values         Value Time User    PT Discharge Needs  therapy 5 or more times per week 6/18/2025  2:39 PM Bev Harrison, PT          OT:      Last Filed Values         Value Time User    OT Discharge Needs  therapy 5 or more times per week 6/18/2025  3:36 PM Lamar Zepeda          SLP:    Last Filed Values       None            Mobility Equipment Recommended for Discharge: further assessment needed      Barriers to Discharge  Identified Barriers to Discharge/Transition Planning:   Clinical barriers: Medical necessity for acute care   External barriers:     Psychosocial barriers:                 Recorded By, (t) = Taken By, (c) = Cosigned By      Initials Name Provider Type    OD Natacha Price, OLEGARIO Physical Therapist                   Mobility       Row Name 06/28/23 1110          Bed Mobility    Bed Mobility bed mobility (all) activities  -OD     All Activities, [place] (Bed Mobility) supervision  -OD     Assistive Device (Bed Mobility) bed rails  -OD     Comment, (Bed Mobility) Mod-ind with bed mobility, but relies a lot on spouse for Amado  -OD       Row Name 06/28/23 1110          Bed-Chair Transfer    Bed-Chair [place] (Transfers) supervision  -OD     Assistive Device (Bed-Chair Transfers) walker, front-wheeled  -OD       Row Name 06/28/23 1110          Sit-Stand Transfer    Sit-Stand [place] (Transfers) minimum assist (75% patient effort)  -OD     Assistive Device (Sit-Stand Transfers) walker, front-wheeled  -OD     Comment, (Sit-Stand Transfer) Cues for hand placement on bed and walker  -OD       Row Name 06/28/23 1110          Gait/Stairs (Locomotion)    [place] Level (Gait) contact guard  -OD     Assistive Device (Gait) walker, front-wheeled  -OD     Distance in Feet (Gait) 12  -OD     Deviations/Abnormal Patterns (Gait) bilateral deviations;festinating/shuffling;gait speed decreased;base of support, narrow;stride length decreased;weight shifting decreased  -OD     Bilateral Gait Deviations heel strike decreased;weight shift ability decreased  -OD               User Key  (r) = Recorded By, (t) = Taken By, (c) = Cosigned By      Initials Name Provider Type    OD Natacha Price PT Physical Therapist                   Obj/Interventions       Row Name 06/28/23 1112          Range of Motion Comprehensive    General Range of Motion no range of motion deficits identified  -OD       Row Name 06/28/23 1112          Strength Comprehensive (MMT)    General Manual Muscle Testing (MMT) Assessment lower extremity strength deficits identified  -OD     Comment, General Manual Muscle Testing  (MMT) Assessment Pt demo 3+/5 strength B hip flex, R knee ext. 4/5 L knee ext.  -OD       Row Name 06/28/23 1112          Balance    Balance Interventions standing;narrowed base of support;moderate challenge;tandem standing;highly challenging;other (see comments)  Pt limited due to fear in addition to impaired balance  -OD       Row Name 06/28/23 1112          Sensory Assessment (Somatosensory)    Sensory Assessment (Somatosensory) sensation intact  -OD               User Key  (r) = Recorded By, (t) = Taken By, (c) = Cosigned By      Initials Name Provider Type    OD Natacha Price, PT Physical Therapist                   Goals/Plan       Row Name 06/28/23 1127          Bed Mobility Goal 1 (PT)    Activity/Assistive Device (Bed Mobility Goal 1, PT) bed mobility activities, all  -OD     [place] Level/Cues Needed (Bed Mobility Goal 1, PT) independent  -OD     Time Frame (Bed Mobility Goal 1, PT) long term goal (LTG);2 weeks  -OD       Row Name 06/28/23 1127          Transfer Goal 1 (PT)    Activity/Assistive Device (Transfer Goal 1, PT) transfers, all;walker, rolling  -OD     [place] Level/Cues Needed (Transfer Goal 1, PT) modified independence  -OD     Time Frame (Transfer Goal 1, PT) long term goal (LTG);2 weeks  -OD       Row Name 06/28/23 1127          Gait Training Goal 1 (PT)    Activity/Assistive Device (Gait Training Goal 1, PT) gait (walking locomotion);assistive device use;diminish gait deviation;normalize weight shifts;walker, rolling  -OD     [place] Level (Gait Training Goal 1, PT) modified independence  -OD     Distance (Gait Training Goal 1, PT) 100 feet  -OD     Time Frame (Gait Training Goal 1, PT) long term goal (LTG);2 weeks  -OD       Row Name 06/28/23 1127          Problem Specific Goal 1 (PT)    Problem Specific Goal 1 (PT) Pt will be able to hold tandem stance (B) > 10 sec for improving balance and reducing risk for falls.  -OD     Time Frame (Problem Specific Goal 1, PT)  long-term goal (LTG);2 weeks  -OD       Row Name 06/28/23 1127          Therapy Assessment/Plan (PT)    Planned Therapy Interventions (PT) balance training;bed mobility training;gait training;home exercise program;neuromuscular re-education;patient/family education;strengthening;transfer training;vestibular therapy  -OD               User Key  (r) = Recorded By, (t) = Taken By, (c) = Cosigned By      Initials Name Provider Type    OD Natacha Price, PT Physical Therapist                   Clinical Impression       Row Name 06/28/23 1113          Pain    Pretreatment Pain Rating 0/10 - no pain  -OD     Posttreatment Pain Rating 0/10 - no pain  -OD     Pain Intervention(s) Emotional support;Ambulation/increased activity  -OD       Row Name 06/28/23 1113          Plan of Care Review    Plan of Care Reviewed With patient;spouse  -OD     Progress no change  -OD     Outcome Evaluation Pt is a 72 y/o F who presents to [facility] after syncopal episode and weakness. Pt has recurring abd pain with recent paracentesis on 6/13 and another on 6/27 at bedside due to ascites accum. PMH includes sarcoidosis, anemia, HRN, hypothyroidism. At baseline, pt lives with spouse and dtr who provide modA with ADL's, med mgmt, and mobility. Pt uses RW for mobility and sometime's W/C. Pt reports dizziness sometimes when walking around at target looking through different aisles, but denies dizziness with other head movements. Pt demo impaired smooth pursuit, and impaired saccades to the L. VOR head impulse (+) L. Sharpened Rhomberg (+). Pt currently is mod-ind with bed mobility, CGA-Amado with sit to stands, and CGA with ambulation. Pt demo narrow JERAD, slow gait, shuffled gait, difficulty with turns. Edu pt and spouse on recommending OPPT for addressing balance and dizziness symptoms, as well as improving strength to regain indep at home. PT to follow while admitted, and recommend home with family and OPPT upon d/c due to decline in baseline function.   -OD       Row Name 06/28/23 1113          Therapy Assessment/Plan (PT)    Rehab Potential (PT) good, to achieve stated therapy goals  -OD     Criteria for Skilled Interventions Met (PT) yes;skilled treatment is necessary  -OD     Therapy Frequency (PT) 3 times/wk  -OD     Predicted Duration of Therapy Intervention (PT) until d/c  -OD       Row Name 06/28/23 1113          Vital Signs    Pre Systolic BP Rehab 134  -OD     Pre Treatment Diastolic BP 53  -OD     O2 Delivery Pre Treatment room air  -OD     O2 Delivery Intra Treatment room air  -OD     O2 Delivery Post Treatment room air  -OD     Pre Patient Position Supine  -OD     Intra Patient Position Standing  -OD     Post Patient Position Sitting  -OD     Recovery Time VSS  -OD       Row Name 06/28/23 1113          Positioning and Restraints    Pre-Treatment Position in bed  -OD     Post Treatment Position chair  -OD     In Chair notified nsg;reclined;call light within reach;encouraged to call for assist;exit alarm on;with family/caregiver  -OD               User Key  (r) = Recorded By, (t) = Taken By, (c) = Cosigned By      Initials Name Provider Type    OD Natacha Price, PT Physical Therapist                   Outcome Measures       Row Name 06/28/23 1128 06/28/23 0850       How much help from another person do you currently need...    Turning from your back to your side while in flat bed without using bedrails? 4  -OD 4  -EH    Moving from lying on back to sitting on the side of a flat bed without bedrails? 4  -OD 4  -EH    Moving to and from a bed to a chair (including a wheelchair)? 3  -OD 3  -EH    Standing up from a chair using your arms (e.g., wheelchair, bedside chair)? 3  -OD 3  -EH    Climbing 3-5 steps with a railing? 2  -OD 2  -EH    To walk in hospital room? 3  -OD 3  -EH    AM-PAC 6 Clicks Score (PT) 19  -OD 19  -EH    Highest level of mobility 6 --> Walked 10 steps or more  -OD 6 --> Walked 10 steps or more  -EH              User Key  (r) =  Recorded By, (t) = Taken By, (c) = Cosigned By      Initials Name Provider Type     Estela Sales, RN Registered Nurse    Natacha Alba, PT Physical Therapist                                 Physical Therapy Education       Title: PT OT SLP Therapies (Done)       Topic: Physical Therapy (Done)       Point: Mobility training (Done)       Learning Progress Summary             Patient Acceptance, E, VU by OD at 6/28/2023 1129   Family Acceptance, E, VU by OD at 6/28/2023 1129                         Point: Home exercise program (Done)       Learning Progress Summary             Patient Acceptance, E, VU by OD at 6/28/2023 1129   Family Acceptance, E, VU by OD at 6/28/2023 1129                         Point: Body mechanics (Done)       Learning Progress Summary             Patient Acceptance, E, VU by OD at 6/28/2023 1129   Family Acceptance, E, VU by OD at 6/28/2023 1129                         Point: Precautions (Done)       Learning Progress Summary             Patient Acceptance, E, VU by OD at 6/28/2023 1129   Family Acceptance, E, VU by OD at 6/28/2023 1129                                         User Key       Initials Effective Dates Name Provider Type Discipline    OD 05/11/23 -  Natacha Price, PT Physical Therapist PT                  PT Recommendation and Plan  Planned Therapy Interventions (PT): balance training, bed mobility training, gait training, home exercise program, neuromuscular re-education, patient/family education, strengthening, transfer training, vestibular therapy  Plan of Care Reviewed With: patient, spouse  Progress: no change  Outcome Evaluation: Pt is a 72 y/o F who presents to [facility] after syncopal episode and weakness. Pt has recurring abd pain with recent paracentesis on 6/13 and another on 6/27 at bedside due to ascites accum. PMH includes sarcoidosis, anemia, HRN, hypothyroidism. At baseline, pt lives with spouse and dtr who provide modA with ADL's, med mgmt, and mobility. Pt uses  RW for mobility and sometime's W/C. Pt reports dizziness sometimes when walking around at target looking through different aisles, but denies dizziness with other head movements. Pt demo impaired smooth pursuit, and impaired saccades to the L. VOR head impulse (+) L. Sharpened Rhomberg (+). Pt currently is mod-ind with bed mobility, CGA-Amado with sit to stands, and CGA with ambulation. Pt demo narrow JERAD, slow gait, shuffled gait, difficulty with turns. Edu pt and spouse on recommending OPPT for addressing balance and dizziness symptoms, as well as improving strength to regain indep at home. PT to follow while admitted, and recommend home with family and OPPT upon d/c due to decline in baseline function.     Time Calculation:    PT Charges       Row Name 06/28/23 1130             Time Calculation    Start Time 0852  -OD      Stop Time 0930  -OD      Time Calculation (min) 38 min  -OD      PT Received On 06/28/23  -OD      PT - Next Appointment 06/29/23  -OD      PT Goal Re-Cert Due Date 07/12/23  -OD         Time Calculation- PT    Total Timed Code Minutes- PT 0 minute(s)  -OD                User Key  (r) = Recorded By, (t) = Taken By, (c) = Cosigned By      Initials Name Provider Type    OD Natacha Price, PT Physical Therapist                  Therapy Charges for Today       Code Description Service Date Service Provider Modifiers Qty    [id number]  PT EVAL MOD COMPLEXITY 4 6/28/2023 Natacha Price, PT GP 1            PT G-Codes  AM-PAC 6 Clicks Score (PT): 19  PT Discharge Summary  Anticipated Discharge Disposition (PT): home with assist, home with outpatient therapy services    Natacha Price PT  6/28/2023

## (undated) DEVICE — SUT PERMAHAND SILK 0 FSL 30IN BLK

## (undated) DEVICE — PK ENDO GI 50

## (undated) DEVICE — BITEBLOCK ENDO W/STRAP 60F A/ LF DISP

## (undated) DEVICE — NASO-JEJUNAL FEEDING TUBE: Brand: KANGAROO